# Patient Record
Sex: FEMALE | Race: WHITE | Employment: OTHER | URBAN - METROPOLITAN AREA
[De-identification: names, ages, dates, MRNs, and addresses within clinical notes are randomized per-mention and may not be internally consistent; named-entity substitution may affect disease eponyms.]

---

## 2018-03-13 ENCOUNTER — HOSPITAL ENCOUNTER (INPATIENT)
Age: 68
LOS: 8 days | Discharge: HOME | DRG: 871 | End: 2018-03-21
Attending: EMERGENCY MEDICINE | Admitting: INTERNAL MEDICINE
Payer: MEDICARE

## 2018-03-13 DIAGNOSIS — I95.9 HYPOTENSION, UNSPECIFIED HYPOTENSION TYPE: ICD-10-CM

## 2018-03-13 DIAGNOSIS — K92.2 GASTROINTESTINAL HEMORRHAGE, UNSPECIFIED GASTROINTESTINAL HEMORRHAGE TYPE: Primary | ICD-10-CM

## 2018-03-13 DIAGNOSIS — E83.42 HYPOMAGNESEMIA: ICD-10-CM

## 2018-03-13 DIAGNOSIS — I47.1 SVT (SUPRAVENTRICULAR TACHYCARDIA) (HCC): ICD-10-CM

## 2018-03-13 DIAGNOSIS — E87.6 HYPOKALEMIA: ICD-10-CM

## 2018-03-13 DIAGNOSIS — N30.01 ACUTE CYSTITIS WITH HEMATURIA: ICD-10-CM

## 2018-03-13 DIAGNOSIS — D64.9 ANEMIA, UNSPECIFIED TYPE: ICD-10-CM

## 2018-03-13 LAB
ALBUMIN SERPL-MCNC: 2.8 G/DL (ref 3.5–5)
ALBUMIN/GLOB SERPL: 0.8 {RATIO} (ref 1.1–2.2)
ALP SERPL-CCNC: 94 U/L (ref 45–117)
ALT SERPL-CCNC: 29 U/L (ref 12–78)
ANION GAP SERPL CALC-SCNC: 17 MMOL/L (ref 5–15)
APPEARANCE UR: ABNORMAL
AST SERPL-CCNC: 39 U/L (ref 15–37)
ATRIAL RATE: 0 BPM
BACTERIA URNS QL MICRO: ABNORMAL /HPF
BASOPHILS # BLD: 0 K/UL (ref 0–0.1)
BASOPHILS NFR BLD: 0 % (ref 0–1)
BILIRUB SERPL-MCNC: 1.2 MG/DL (ref 0.2–1)
BILIRUB UR QL: NEGATIVE
BUN SERPL-MCNC: 36 MG/DL (ref 6–20)
BUN/CREAT SERPL: 45 (ref 12–20)
CALCIUM SERPL-MCNC: 8.8 MG/DL (ref 8.5–10.1)
CALCULATED R AXIS, ECG10: 0 DEGREES
CALCULATED T AXIS, ECG11: 0 DEGREES
CHLORIDE SERPL-SCNC: 98 MMOL/L (ref 97–108)
CO2 SERPL-SCNC: 23 MMOL/L (ref 21–32)
COLOR UR: ABNORMAL
CREAT SERPL-MCNC: 0.8 MG/DL (ref 0.55–1.02)
DIAGNOSIS, 93000: NORMAL
DIFFERENTIAL METHOD BLD: ABNORMAL
EOSINOPHIL # BLD: 0 K/UL (ref 0–0.4)
EOSINOPHIL NFR BLD: 0 % (ref 0–7)
EPITH CASTS URNS QL MICRO: ABNORMAL /LPF
ERYTHROCYTE [DISTWIDTH] IN BLOOD BY AUTOMATED COUNT: 12.8 % (ref 11.5–14.5)
GLOBULIN SER CALC-MCNC: 3.6 G/DL (ref 2–4)
GLUCOSE SERPL-MCNC: 265 MG/DL (ref 65–100)
GLUCOSE UR STRIP.AUTO-MCNC: 250 MG/DL
HCT VFR BLD AUTO: 26.6 % (ref 35–47)
HEMOCCULT STL QL: POSITIVE
HGB BLD-MCNC: 8.9 G/DL (ref 11.5–16)
HGB UR QL STRIP: ABNORMAL
HYALINE CASTS URNS QL MICRO: ABNORMAL /LPF (ref 0–5)
IMM GRANULOCYTES # BLD: 0.1 K/UL (ref 0–0.04)
IMM GRANULOCYTES NFR BLD AUTO: 1 % (ref 0–0.5)
KETONES UR QL STRIP.AUTO: 15 MG/DL
LEUKOCYTE ESTERASE UR QL STRIP.AUTO: ABNORMAL
LIPASE SERPL-CCNC: 49 U/L (ref 73–393)
LYMPHOCYTES # BLD: 0.8 K/UL (ref 0.8–3.5)
LYMPHOCYTES NFR BLD: 7 % (ref 12–49)
MAGNESIUM SERPL-MCNC: 1.1 MG/DL (ref 1.6–2.4)
MCH RBC QN AUTO: 33.6 PG (ref 26–34)
MCHC RBC AUTO-ENTMCNC: 33.5 G/DL (ref 30–36.5)
MCV RBC AUTO: 100.4 FL (ref 80–99)
MONOCYTES # BLD: 1 K/UL (ref 0–1)
MONOCYTES NFR BLD: 9 % (ref 5–13)
NEUTS SEG # BLD: 8.7 K/UL (ref 1.8–8)
NEUTS SEG NFR BLD: 83 % (ref 32–75)
NITRITE UR QL STRIP.AUTO: NEGATIVE
NRBC # BLD: 0 K/UL (ref 0–0.01)
NRBC BLD-RTO: 0 PER 100 WBC
PH UR STRIP: 6 [PH] (ref 5–8)
PLATELET # BLD AUTO: 178 K/UL (ref 150–400)
PMV BLD AUTO: 10.7 FL (ref 8.9–12.9)
POTASSIUM SERPL-SCNC: 3.1 MMOL/L (ref 3.5–5.1)
PROT SERPL-MCNC: 6.4 G/DL (ref 6.4–8.2)
PROT UR STRIP-MCNC: 100 MG/DL
Q-T INTERVAL, ECG07: 0 MS
QRS DURATION, ECG06: 0 MS
QTC CALCULATION (BEZET), ECG08: 0 MS
RBC # BLD AUTO: 2.65 M/UL (ref 3.8–5.2)
RBC #/AREA URNS HPF: >100 /HPF (ref 0–5)
SODIUM SERPL-SCNC: 138 MMOL/L (ref 136–145)
SP GR UR REFRACTOMETRY: 1.02 (ref 1–1.03)
TROPONIN I SERPL-MCNC: <0.04 NG/ML
TSH SERPL DL<=0.05 MIU/L-ACNC: 5.3 UIU/ML (ref 0.36–3.74)
UROBILINOGEN UR QL STRIP.AUTO: 0.2 EU/DL (ref 0.2–1)
VENTRICULAR RATE, ECG03: 0 BPM
WBC # BLD AUTO: 10.5 K/UL (ref 3.6–11)
WBC URNS QL MICRO: >100 /HPF (ref 0–4)

## 2018-03-13 PROCEDURE — 87077 CULTURE AEROBIC IDENTIFY: CPT | Performed by: INTERNAL MEDICINE

## 2018-03-13 PROCEDURE — 87086 URINE CULTURE/COLONY COUNT: CPT | Performed by: INTERNAL MEDICINE

## 2018-03-13 PROCEDURE — 84484 ASSAY OF TROPONIN QUANT: CPT | Performed by: INTERNAL MEDICINE

## 2018-03-13 PROCEDURE — 81001 URINALYSIS AUTO W/SCOPE: CPT | Performed by: INTERNAL MEDICINE

## 2018-03-13 PROCEDURE — 87186 SC STD MICRODIL/AGAR DIL: CPT | Performed by: EMERGENCY MEDICINE

## 2018-03-13 PROCEDURE — P9016 RBC LEUKOCYTES REDUCED: HCPCS | Performed by: STUDENT IN AN ORGANIZED HEALTH CARE EDUCATION/TRAINING PROGRAM

## 2018-03-13 PROCEDURE — 93005 ELECTROCARDIOGRAM TRACING: CPT

## 2018-03-13 PROCEDURE — 74011000258 HC RX REV CODE- 258: Performed by: INTERNAL MEDICINE

## 2018-03-13 PROCEDURE — 86923 COMPATIBILITY TEST ELECTRIC: CPT | Performed by: STUDENT IN AN ORGANIZED HEALTH CARE EDUCATION/TRAINING PROGRAM

## 2018-03-13 PROCEDURE — 87186 SC STD MICRODIL/AGAR DIL: CPT | Performed by: INTERNAL MEDICINE

## 2018-03-13 PROCEDURE — 75810000137 HC PLCMT CENT VENOUS CATH

## 2018-03-13 PROCEDURE — 74011250636 HC RX REV CODE- 250/636: Performed by: INTERNAL MEDICINE

## 2018-03-13 PROCEDURE — 83690 ASSAY OF LIPASE: CPT | Performed by: STUDENT IN AN ORGANIZED HEALTH CARE EDUCATION/TRAINING PROGRAM

## 2018-03-13 PROCEDURE — 74011250636 HC RX REV CODE- 250/636

## 2018-03-13 PROCEDURE — 82272 OCCULT BLD FECES 1-3 TESTS: CPT | Performed by: EMERGENCY MEDICINE

## 2018-03-13 PROCEDURE — 83605 ASSAY OF LACTIC ACID: CPT | Performed by: EMERGENCY MEDICINE

## 2018-03-13 PROCEDURE — 65610000006 HC RM INTENSIVE CARE

## 2018-03-13 PROCEDURE — 36415 COLL VENOUS BLD VENIPUNCTURE: CPT | Performed by: INTERNAL MEDICINE

## 2018-03-13 PROCEDURE — 36430 TRANSFUSION BLD/BLD COMPNT: CPT

## 2018-03-13 PROCEDURE — 84443 ASSAY THYROID STIM HORMONE: CPT | Performed by: EMERGENCY MEDICINE

## 2018-03-13 PROCEDURE — 99285 EMERGENCY DEPT VISIT HI MDM: CPT

## 2018-03-13 PROCEDURE — C9113 INJ PANTOPRAZOLE SODIUM, VIA: HCPCS | Performed by: EMERGENCY MEDICINE

## 2018-03-13 PROCEDURE — 84436 ASSAY OF TOTAL THYROXINE: CPT | Performed by: INTERNAL MEDICINE

## 2018-03-13 PROCEDURE — 96367 TX/PROPH/DG ADDL SEQ IV INF: CPT

## 2018-03-13 PROCEDURE — 85025 COMPLETE CBC W/AUTO DIFF WBC: CPT | Performed by: STUDENT IN AN ORGANIZED HEALTH CARE EDUCATION/TRAINING PROGRAM

## 2018-03-13 PROCEDURE — 83735 ASSAY OF MAGNESIUM: CPT | Performed by: INTERNAL MEDICINE

## 2018-03-13 PROCEDURE — 96375 TX/PRO/DX INJ NEW DRUG ADDON: CPT

## 2018-03-13 PROCEDURE — 85027 COMPLETE CBC AUTOMATED: CPT | Performed by: INTERNAL MEDICINE

## 2018-03-13 PROCEDURE — 30233N1 TRANSFUSION OF NONAUTOLOGOUS RED BLOOD CELLS INTO PERIPHERAL VEIN, PERCUTANEOUS APPROACH: ICD-10-PCS | Performed by: INTERNAL MEDICINE

## 2018-03-13 PROCEDURE — 74011250636 HC RX REV CODE- 250/636: Performed by: EMERGENCY MEDICINE

## 2018-03-13 PROCEDURE — 84484 ASSAY OF TROPONIN QUANT: CPT | Performed by: STUDENT IN AN ORGANIZED HEALTH CARE EDUCATION/TRAINING PROGRAM

## 2018-03-13 PROCEDURE — 96365 THER/PROPH/DIAG IV INF INIT: CPT

## 2018-03-13 PROCEDURE — 96361 HYDRATE IV INFUSION ADD-ON: CPT

## 2018-03-13 PROCEDURE — 87040 BLOOD CULTURE FOR BACTERIA: CPT | Performed by: EMERGENCY MEDICINE

## 2018-03-13 PROCEDURE — 74011000258 HC RX REV CODE- 258: Performed by: EMERGENCY MEDICINE

## 2018-03-13 PROCEDURE — 83036 HEMOGLOBIN GLYCOSYLATED A1C: CPT | Performed by: INTERNAL MEDICINE

## 2018-03-13 PROCEDURE — 83735 ASSAY OF MAGNESIUM: CPT | Performed by: EMERGENCY MEDICINE

## 2018-03-13 PROCEDURE — 80053 COMPREHEN METABOLIC PANEL: CPT | Performed by: STUDENT IN AN ORGANIZED HEALTH CARE EDUCATION/TRAINING PROGRAM

## 2018-03-13 PROCEDURE — 87077 CULTURE AEROBIC IDENTIFY: CPT | Performed by: EMERGENCY MEDICINE

## 2018-03-13 PROCEDURE — 86901 BLOOD TYPING SEROLOGIC RH(D): CPT | Performed by: STUDENT IN AN ORGANIZED HEALTH CARE EDUCATION/TRAINING PROGRAM

## 2018-03-13 PROCEDURE — 96376 TX/PRO/DX INJ SAME DRUG ADON: CPT

## 2018-03-13 RX ORDER — ADENOSINE 3 MG/ML
INJECTION, SOLUTION INTRAVENOUS
Status: COMPLETED
Start: 2018-03-13 | End: 2018-03-13

## 2018-03-13 RX ORDER — ONDANSETRON 2 MG/ML
INJECTION INTRAMUSCULAR; INTRAVENOUS
Status: COMPLETED
Start: 2018-03-13 | End: 2018-03-13

## 2018-03-13 RX ORDER — MIDAZOLAM HYDROCHLORIDE 1 MG/ML
2 INJECTION, SOLUTION INTRAMUSCULAR; INTRAVENOUS
Status: DISCONTINUED | OUTPATIENT
Start: 2018-03-13 | End: 2018-03-13

## 2018-03-13 RX ORDER — ADENOSINE 3 MG/ML
12 INJECTION, SOLUTION INTRAVENOUS
Status: COMPLETED | OUTPATIENT
Start: 2018-03-13 | End: 2018-03-13

## 2018-03-13 RX ORDER — SODIUM CHLORIDE 9 MG/ML
250 INJECTION, SOLUTION INTRAVENOUS AS NEEDED
Status: DISCONTINUED | OUTPATIENT
Start: 2018-03-13 | End: 2018-03-21 | Stop reason: HOSPADM

## 2018-03-13 RX ORDER — POTASSIUM CHLORIDE 7.45 MG/ML
10 INJECTION INTRAVENOUS
Status: COMPLETED | OUTPATIENT
Start: 2018-03-13 | End: 2018-03-13

## 2018-03-13 RX ORDER — POTASSIUM CHLORIDE 7.45 MG/ML
10 INJECTION INTRAVENOUS
Status: COMPLETED | OUTPATIENT
Start: 2018-03-13 | End: 2018-03-14

## 2018-03-13 RX ORDER — MAGNESIUM SULFATE HEPTAHYDRATE 40 MG/ML
2 INJECTION, SOLUTION INTRAVENOUS ONCE
Status: COMPLETED | OUTPATIENT
Start: 2018-03-13 | End: 2018-03-13

## 2018-03-13 RX ORDER — ADENOSINE 3 MG/ML
6 INJECTION, SOLUTION INTRAVENOUS
Status: COMPLETED | OUTPATIENT
Start: 2018-03-13 | End: 2018-03-13

## 2018-03-13 RX ORDER — ONDANSETRON 2 MG/ML
4 INJECTION INTRAMUSCULAR; INTRAVENOUS
Status: COMPLETED | OUTPATIENT
Start: 2018-03-13 | End: 2018-03-13

## 2018-03-13 RX ORDER — PANTOPRAZOLE SODIUM 40 MG/10ML
40 INJECTION, POWDER, LYOPHILIZED, FOR SOLUTION INTRAVENOUS
Status: DISCONTINUED | OUTPATIENT
Start: 2018-03-13 | End: 2018-03-13 | Stop reason: SDUPTHER

## 2018-03-13 RX ORDER — SODIUM CHLORIDE 0.9 % (FLUSH) 0.9 %
5-10 SYRINGE (ML) INJECTION AS NEEDED
Status: DISCONTINUED | OUTPATIENT
Start: 2018-03-13 | End: 2018-03-21 | Stop reason: HOSPADM

## 2018-03-13 RX ORDER — FENTANYL CITRATE 50 UG/ML
50 INJECTION, SOLUTION INTRAMUSCULAR; INTRAVENOUS
Status: DISCONTINUED | OUTPATIENT
Start: 2018-03-13 | End: 2018-03-13

## 2018-03-13 RX ORDER — ONDANSETRON 2 MG/ML
4 INJECTION INTRAMUSCULAR; INTRAVENOUS
Status: DISCONTINUED | OUTPATIENT
Start: 2018-03-13 | End: 2018-03-21 | Stop reason: HOSPADM

## 2018-03-13 RX ADMIN — AMIODARONE HYDROCHLORIDE 1 MG/MIN: 50 INJECTION, SOLUTION INTRAVENOUS at 21:39

## 2018-03-13 RX ADMIN — POTASSIUM CHLORIDE 10 MEQ: 7.46 INJECTION, SOLUTION INTRAVENOUS at 21:42

## 2018-03-13 RX ADMIN — AMIODARONE HYDROCHLORIDE 150 MG: 50 INJECTION, SOLUTION INTRAVENOUS at 21:07

## 2018-03-13 RX ADMIN — ONDANSETRON 4 MG: 2 INJECTION INTRAMUSCULAR; INTRAVENOUS at 20:55

## 2018-03-13 RX ADMIN — ADENOSINE 6 MG: 3 INJECTION, SOLUTION INTRAVENOUS at 20:01

## 2018-03-13 RX ADMIN — SODIUM CHLORIDE 1000 ML: 900 INJECTION, SOLUTION INTRAVENOUS at 20:34

## 2018-03-13 RX ADMIN — SODIUM CHLORIDE 2000 ML: 900 INJECTION, SOLUTION INTRAVENOUS at 19:43

## 2018-03-13 RX ADMIN — MAGNESIUM SULFATE HEPTAHYDRATE 2 G: 40 INJECTION, SOLUTION INTRAVENOUS at 21:01

## 2018-03-13 RX ADMIN — ADENOSINE 12 MG: 3 INJECTION, SOLUTION INTRAVENOUS at 20:33

## 2018-03-13 RX ADMIN — ADENOSINE 12 MG: 3 INJECTION INTRAVENOUS at 20:33

## 2018-03-13 RX ADMIN — ADENOSINE 12 MG: 3 INJECTION, SOLUTION INTRAVENOUS at 20:08

## 2018-03-13 RX ADMIN — ONDANSETRON 4 MG: 2 INJECTION INTRAMUSCULAR; INTRAVENOUS at 23:39

## 2018-03-13 RX ADMIN — PANTOPRAZOLE SODIUM 40 MG: 40 INJECTION, POWDER, FOR SOLUTION INTRAVENOUS at 22:57

## 2018-03-13 RX ADMIN — ADENOSINE 6 MG: 3 INJECTION, SOLUTION INTRAVENOUS at 20:03

## 2018-03-13 RX ADMIN — DEXTROSE MONOHYDRATE 150 MG: 5 INJECTION, SOLUTION INTRAVENOUS at 22:48

## 2018-03-13 RX ADMIN — MAGNESIUM SULFATE HEPTAHYDRATE 2 G: 40 INJECTION, SOLUTION INTRAVENOUS at 22:41

## 2018-03-13 RX ADMIN — SODIUM CHLORIDE 1000 ML: 900 INJECTION, SOLUTION INTRAVENOUS at 20:16

## 2018-03-13 NOTE — IP AVS SNAPSHOT
[address] [facility] 74 
[phone] Patient: Alber Rodriguez MRN: [id number] VWI:0/01/7376 A check sangeetha indicates which time of day the medication should be taken. My Medications START taking these medications Instructions Each Dose to Equal  
 Morning Noon Evening Bedtime  
 amiodarone 200 mg tablet Commonly known as:  CORDARONE Start taking on:  3/22/2018 Your last dose was: Your next dose is: Take 1 Tab by mouth daily. 200 mg  
    
   
   
   
  
 folic acid 1 mg tablet Commonly known as:  Google Start taking on:  3/22/2018 Your last dose was: Your next dose is: Take 1 Tab by mouth daily. 1 mg  
    
   
   
   
  
 furosemide 20 mg tablet Commonly known as:  LASIX Your last dose was: Your next dose is: Take one tablet daily  
     
   
   
   
  
 potassium chloride SR 10 mEq tablet Commonly known as:  KLOR-CON 10 Your last dose was: Your next dose is: Take 1 Tab by mouth daily. 10 mEq  
    
   
   
   
  
 thiamine 100 mg tablet Commonly known as:  B-1 Start taking on:  3/22/2018 Your last dose was: Your next dose is: Take 1 Tab by mouth daily. 100 mg CONTINUE taking these medications Instructions Each Dose to Equal  
 Morning Noon Evening Bedtime VITAMIN C 500 mg tablet Generic drug:  ascorbic acid (vitamin C) Your last dose was: Your next dose is: Take 500 mg by mouth. 500 mg  
    
   
   
   
  
 VITAMIN D3 1,000 unit tablet Generic drug:  cholecalciferol Your last dose was: Your next dose is: Take 1,000 Units by mouth daily. 1000 Units Where to Get Your Medications Information on where to get these meds will be given to you by the nurse or doctor. ! Ask your nurse or doctor about these medications  
  amiodarone 573 mg tablet  
 folic acid 1 mg tablet  
 furosemide 20 mg tablet  
 potassium chloride SR 10 mEq tablet  
 thiamine 100 mg tablet

## 2018-03-13 NOTE — ED TRIAGE NOTES
C/o n/v/d, abdominal pain and dizziness x 2 days. +HA   Denies fevers. Denies CP or SOB. +coffee ground emesis in triage.

## 2018-03-13 NOTE — ED NOTES
Pt noted to convert into SVT on monitor. . Dr. Anabel Mcarthur notified. EKG ordered. Pt A/Ox4, denies pain, dizziness, or SOB. 7:38 PM  HR now 140, rhythm ST on monitor. 7:44 PM  Pt converted into SVT, , pt converted back to ST with vagal maneuvers. Dr. Anabel Mcarthur at bedside. .    7:51 PM  Pt in SVT, , Dr. Anabel Mcarthur at bedside. 7:52 PM  Pt able to convert back into ST with Vagal Maneuvers. .     0801  6 mg of Adenosine administered per Dr Anabel Day MD at bedside. Pt remains in SVT. Pt A/Ox4, RAMabel Denies SOB.     0803  6 mg of Adenosine administered per Dr. Anabel Day MD at bedside. Pt remains in SVT. Pt A/Ox4EVA Denies SOB.     0808  12 mg of Adenosie administered per Dr. Anabel Day MD at bedside. Pt remains in SVT. Pt A/Ox4, EVA Denies SOB.

## 2018-03-13 NOTE — IP AVS SNAPSHOT
[address] 
[phone] Patient: Carrie Ramos MRN: [id number] ILK:8/06/5405 About your hospitalization You were admitted on:  March 13, 2018 You last received care in the:  [address] [place] [address] You were discharged on:  March 21, 2018 Why you were hospitalized Your primary diagnosis was:  Svt (Supraventricular Tachycardia) (Hcc) Follow-up Information Follow up With Details Comments Contact Info Jovanni Prieto MD On 4/27/2018 10:40 AM [address] [place] [address] 
[phone] None   None (395) Patient stated that they have no PCP 
  
   pcp of record as estabished and see in one week Your Scheduled Appointments Wednesday March 21, 2018  3:00 PM EDT INFUSION 15 RI with RM 102C - CHAIR [facility] [address] [facility] [place] ([address]) [address] [address]  
[phone] Go to [address], ground floor. Thursday March 22, 2018  3:00 PM EDT INFUSION 15 RI with RM 102C - CHAIR [facility] [address] [facility] [place] ([address]) [address] [address]  
[phone] Go to [address], ground floor. Friday March 23, 2018  3:00 PM EDT INFUSION 15 RI with RM 102C - CHAIR [address] [facility] [place] ([address]) [address] [address] [phone] Go to [address], ground floor. Saturday March 24, 2018 12:00 PM EDT INFUSION 15 RI with RM 102C - CHAIR [facility] [facility] [place] ([address]) [address] [address] [phone] Go to [address], ground floor. Sunday March 25, 2018 12:00 PM EDT INFUSION 15 RI with RM 102C - CHAIR [facility] [facility] [place] ([address]) [address] [phone] Go to [address], ground floor. Monday March 26, 2018  3:00 PM EDT INFUSION 15 RI with RM 102C - CHAIR [address] [facility] [place] ([address]) [address] [address] [phone] Go to [address], ground floor. Friday April 27, 2018 10:40 AM EDT  
ESTABLISHED PATIENT with Nika Bonilla MD  
[facility] ([address] [facility]) [address] [place] [address]  [address]  
[phone] Discharge Orders None A check sangeetha indicates which time of day the medication should be taken. My Medications START taking these medications Instructions Each Dose to Equal  
 Morning Noon Evening Bedtime  
 amiodarone 200 mg tablet Commonly known as:  CORDARONE Start taking on:  3/22/2018 Your last dose was: Your next dose is: Take 1 Tab by mouth daily. 200 mg  
    
   
   
   
  
 folic acid 1 mg tablet Commonly known as:  Google Start taking on:  3/22/2018 Your last dose was: Your next dose is: Take 1 Tab by mouth daily. 1 mg  
    
   
   
   
  
 furosemide 20 mg tablet Commonly known as:  LASIX Your last dose was: Your next dose is: Take one tablet daily  
     
   
   
   
  
 potassium chloride SR 10 mEq tablet Commonly known as:  KLOR-CON 10 Your last dose was: Your next dose is: Take 1 Tab by mouth daily. 10 mEq  
    
   
   
   
  
 thiamine 100 mg tablet Commonly known as:  B-1 Start taking on:  3/22/2018 Your last dose was: Your next dose is: Take 1 Tab by mouth daily. 100 mg CONTINUE taking these medications Instructions Each Dose to Equal  
 Morning Noon Evening Bedtime VITAMIN C 500 mg tablet Generic drug:  ascorbic acid (vitamin C) Your last dose was: Your next dose is: Take 500 mg by mouth. 500 mg  
    
   
   
   
  
 VITAMIN D3 1,000 unit tablet Generic drug:  cholecalciferol Your last dose was: Your next dose is: Take 1,000 Units by mouth daily. 1000 Units Where to Get Your Medications Information on where to get these meds will be given to you by the nurse or doctor. ! Ask your nurse or doctor about these medications  
  amiodarone 629 mg tablet  
 folic acid 1 mg tablet  
 furosemide 20 mg tablet  
 potassium chloride SR 10 mEq tablet  
 thiamine 100 mg tablet Discharge Instructions Discharge Instructions PATIENT ID: Adolfo Duke MRN: [id number] YOB: 1950 DATE OF ADMISSION: 3/13/2018  7:12 PM   
DATE OF DISCHARGE: 3/21/2018 PRIMARY CARE PROVIDER: None ATTENDING PHYSICIAN: Wing Latrell MD 
DISCHARGING PROVIDER: Wing Latrell MD   
To contact this individual call [phone] and ask the  to page. If unavailable ask to be transferred the Adult Hospitalist Department. DISCHARGE DIAGNOSES sepsis CONSULTATIONS: IP CONSULT TO CARDIOLOGY 
IP CONSULT TO GASTROENTEROLOGY 
IP CONSULT TO GASTROENTEROLOGY 
IP CONSULT TO ELECTROPHYSIOLOGY 
IP CONSULT TO INFECTIOUS DISEASES PROCEDURES/SURGERIES: * No surgery found * PENDING TEST RESULTS:  
At the time of discharge the following test results are still pending: na 
 
FOLLOW UP APPOINTMENTS:  
Follow-up Information Follow up With Details Comments Contact Info Angel Myers MD On 4/27/2018 10:40 AM [place] [address] [address] [place] [address] [phone] None   None (395) Patient stated that they have no PCP 
  
   pcp of record as estabished and see in one week ADDITIONAL CARE RECOMMENDATIONS: na 
 
DIET: Resume previous diet limit sodium or else cardiac diet;; avoid alcohol ACTIVITY: Activity as tolerated WOUND CARE: na 
 
EQUIPMENT needed: na 
 
 
  
 SNF/Inpatient Rehab/LTAC  
x Independent/assisted living Hospice Other:  
 
  
 
Signed:  
Flora Sierra MD 
3/21/2018 
2:02 PM 
 
  
  
  
[employer] Announcement We are excited to announce that we are making your provider's discharge notes available to you in [employer]. You will see these notes when they are completed and signed by the physician that discharged you from your recent hospital stay. If you have any questions or concerns about any information you see in [employer], please call the Health Information Department where you were seen or reach out to your Primary Care Provider for more information about your plan of care. Introducing [address]! Elsy Anglin introduces [employer] patient portal. Now you can access parts of your medical record, email your doctor's office, and request medication refills online. 1. In your internet browser, go to [email or web address] 2. Click on the First Time User? Click Here link in the Sign In box. You will see the New Member Sign Up page. 3. Enter your [employer] Access Code exactly as it appears below. You will not need to use this code after youve completed the sign-up process. If you do not sign up before the expiration date, you must request a new code. · [employer] Access Code: [email or web address] Expires: 6/14/2018  1:46 PM 
 
4. Enter the last four digits of your Social Security Number (xxxx) and Date of Birth (mm/dd/yyyy) as indicated and click Submit. You will be taken to the next sign-up page. 5. Create a [employer] ID. This will be your [employer] login ID and cannot be changed, so think of one that is secure and easy to remember. 6. Create a [employer] password. You can change your password at any time. 7. Enter your Password Reset Question and Answer. This can be used at a later time if you forget your password. 8. Enter your e-mail address. You will receive e-mail notification when new information is available in [address] 9. Click Sign Up. You can now view and download portions of your medical record. 10. Click the Download Summary menu link to download a portable copy of your medical information. If you have questions, please visit the Frequently Asked Questions section of the [employer] website. Remember, [employer] is NOT to be used for urgent needs. For medical emergencies, dial 911. Now available from your iPhone and Android! Unresulted Labs-Please follow up with your PCP about these lab tests Order Current Status OVA & PARASITES, STOOL In process EKG, 12 LEAD, INITIAL Preliminary result Providers Seen During Your Hospitalization Provider Specialty Primary office phone Zoya Sow, [address] Emergency Medicine [phone] Lisa Jimenez MD Internal Medicine [phone] Geraldine Morley MD Internal Medicine [phone] Law Cardenas MD Internal Medicine [phone] Your Primary Care Physician (PCP) Primary Care Physician Office Phone Office Fax NONE ** None ** ** None ** You are allergic to the following No active allergies Recent Documentation Height Weight BMI Smoking Status 1.651 m 53.9 kg 19.77 kg/m2 Never Smoker Emergency Contacts Name Discharge Info Relation Home Work Mobile Rohith Rosen DISCHARGE CAREGIVER [3] Spouse [3] [phone] Patient Belongings The following personal items are in your possession at time of discharge: 
  Dental Appliances: None  Visual Aid: Glasses, With patient      Home Medications: None   Jewelry: None  Clothing: None    Other Valuables: None Please provide this summary of care documentation to your next provider. Signatures-by signing, you are acknowledging that this After Visit Summary has been reviewed with you and you have received a copy. Patient Signature:  ____________________________________________________________ Date:  ____________________________________________________________  
  
Devota Dandy Provider Signature:  ____________________________________________________________ Date:  ____________________________________________________________

## 2018-03-14 ENCOUNTER — APPOINTMENT (OUTPATIENT)
Dept: GENERAL RADIOLOGY | Age: 68
DRG: 871 | End: 2018-03-14
Attending: INTERNAL MEDICINE
Payer: MEDICARE

## 2018-03-14 ENCOUNTER — APPOINTMENT (OUTPATIENT)
Dept: GENERAL RADIOLOGY | Age: 68
DRG: 871 | End: 2018-03-14
Attending: EMERGENCY MEDICINE
Payer: MEDICARE

## 2018-03-14 ENCOUNTER — APPOINTMENT (OUTPATIENT)
Dept: CT IMAGING | Age: 68
DRG: 871 | End: 2018-03-14
Attending: INTERNAL MEDICINE
Payer: MEDICARE

## 2018-03-14 LAB
ABO + RH BLD: NORMAL
ALBUMIN SERPL-MCNC: 2.3 G/DL (ref 3.5–5)
ALBUMIN/GLOB SERPL: 0.9 {RATIO} (ref 1.1–2.2)
ALP SERPL-CCNC: 68 U/L (ref 45–117)
ALT SERPL-CCNC: 20 U/L (ref 12–78)
ANION GAP SERPL CALC-SCNC: 12 MMOL/L (ref 5–15)
ANION GAP SERPL CALC-SCNC: 7 MMOL/L (ref 5–15)
AST SERPL-CCNC: 33 U/L (ref 15–37)
ATRIAL RATE: 108 BPM
ATRIAL RATE: 135 BPM
ATRIAL RATE: 139 BPM
ATRIAL RATE: 153 BPM
BASOPHILS # BLD: 0 K/UL (ref 0–0.1)
BASOPHILS NFR BLD: 0 % (ref 0–1)
BILIRUB SERPL-MCNC: 1.2 MG/DL (ref 0.2–1)
BLD PROD TYP BPU: NORMAL
BLOOD GROUP ANTIBODIES SERPL: NORMAL
BNP SERPL-MCNC: 2490 PG/ML (ref 0–125)
BPU ID: NORMAL
BUN SERPL-MCNC: 18 MG/DL (ref 6–20)
BUN SERPL-MCNC: 24 MG/DL (ref 6–20)
BUN/CREAT SERPL: 49 (ref 12–20)
BUN/CREAT SERPL: 55 (ref 12–20)
CALCIUM SERPL-MCNC: 6.8 MG/DL (ref 8.5–10.1)
CALCIUM SERPL-MCNC: 7.2 MG/DL (ref 8.5–10.1)
CALCULATED P AXIS, ECG09: 38 DEGREES
CALCULATED P AXIS, ECG09: 72 DEGREES
CALCULATED P AXIS, ECG09: 92 DEGREES
CALCULATED R AXIS, ECG10: 71 DEGREES
CALCULATED R AXIS, ECG10: 72 DEGREES
CALCULATED R AXIS, ECG10: 92 DEGREES
CALCULATED R AXIS, ECG10: 98 DEGREES
CALCULATED T AXIS, ECG11: 41 DEGREES
CALCULATED T AXIS, ECG11: 50 DEGREES
CALCULATED T AXIS, ECG11: 53 DEGREES
CALCULATED T AXIS, ECG11: 62 DEGREES
CHLORIDE SERPL-SCNC: 110 MMOL/L (ref 97–108)
CHLORIDE SERPL-SCNC: 111 MMOL/L (ref 97–108)
CHOLEST SERPL-MCNC: 87 MG/DL
CK MB CFR SERPL CALC: NORMAL % (ref 0–2.5)
CK MB SERPL-MCNC: <1 NG/ML (ref 5–25)
CK SERPL-CCNC: 89 U/L (ref 26–192)
CO2 SERPL-SCNC: 20 MMOL/L (ref 21–32)
CO2 SERPL-SCNC: 24 MMOL/L (ref 21–32)
CORTIS SERPL-MCNC: 40.6 UG/DL
CREAT SERPL-MCNC: 0.33 MG/DL (ref 0.55–1.02)
CREAT SERPL-MCNC: 0.49 MG/DL (ref 0.55–1.02)
CROSSMATCH RESULT,%XM: NORMAL
DIAGNOSIS, 93000: NORMAL
DIFFERENTIAL METHOD BLD: ABNORMAL
EOSINOPHIL # BLD: 0 K/UL (ref 0–0.4)
EOSINOPHIL NFR BLD: 0 % (ref 0–7)
ERYTHROCYTE [DISTWIDTH] IN BLOOD BY AUTOMATED COUNT: 14.5 % (ref 11.5–14.5)
ERYTHROCYTE [DISTWIDTH] IN BLOOD BY AUTOMATED COUNT: 14.9 % (ref 11.5–14.5)
EST. AVERAGE GLUCOSE BLD GHB EST-MCNC: NORMAL MG/DL
FERRITIN SERPL-MCNC: 172 NG/ML (ref 8–252)
FOLATE SERPL-MCNC: 3.7 NG/ML (ref 5–21)
GLOBULIN SER CALC-MCNC: 2.6 G/DL (ref 2–4)
GLUCOSE SERPL-MCNC: 136 MG/DL (ref 65–100)
GLUCOSE SERPL-MCNC: 166 MG/DL (ref 65–100)
HBA1C MFR BLD: 4.7 % (ref 4.2–6.3)
HCT VFR BLD AUTO: 26.2 % (ref 35–47)
HCT VFR BLD AUTO: 30.3 % (ref 35–47)
HDLC SERPL-MCNC: 35 MG/DL
HDLC SERPL: 2.5 {RATIO} (ref 0–5)
HGB BLD-MCNC: 10.2 G/DL (ref 11.5–16)
HGB BLD-MCNC: 9.1 G/DL (ref 11.5–16)
IMM GRANULOCYTES # BLD: 0 K/UL
IMM GRANULOCYTES NFR BLD AUTO: 0 %
IRON SATN MFR SERPL: 72 % (ref 20–50)
IRON SERPL-MCNC: 131 UG/DL (ref 35–150)
IRON SERPL-MCNC: 131 UG/DL (ref 35–150)
LACTATE SERPL-SCNC: 1.6 MMOL/L (ref 0.4–2)
LACTATE SERPL-SCNC: 3.9 MMOL/L (ref 0.4–2)
LDLC SERPL CALC-MCNC: 37 MG/DL (ref 0–100)
LIPID PROFILE,FLP: NORMAL
LYMPHOCYTES # BLD: 0.6 K/UL (ref 0.8–3.5)
LYMPHOCYTES NFR BLD: 7 % (ref 12–49)
MAGNESIUM SERPL-MCNC: 1.6 MG/DL (ref 1.6–2.4)
MAGNESIUM SERPL-MCNC: 1.9 MG/DL (ref 1.6–2.4)
MAGNESIUM SERPL-MCNC: 2.7 MG/DL (ref 1.6–2.4)
MCH RBC QN AUTO: 33.2 PG (ref 26–34)
MCH RBC QN AUTO: 33.4 PG (ref 26–34)
MCHC RBC AUTO-ENTMCNC: 33.7 G/DL (ref 30–36.5)
MCHC RBC AUTO-ENTMCNC: 34.7 G/DL (ref 30–36.5)
MCV RBC AUTO: 95.6 FL (ref 80–99)
MCV RBC AUTO: 99.3 FL (ref 80–99)
MONOCYTES # BLD: 0.2 K/UL (ref 0–1)
MONOCYTES NFR BLD: 3 % (ref 5–13)
NEUTS BAND NFR BLD MANUAL: 6 % (ref 0–6)
NEUTS SEG # BLD: 7.4 K/UL (ref 1.8–8)
NEUTS SEG NFR BLD: 84 % (ref 32–75)
NRBC # BLD: 0 K/UL (ref 0–0.01)
NRBC # BLD: 0 K/UL (ref 0–0.01)
NRBC BLD-RTO: 0 PER 100 WBC
NRBC BLD-RTO: 0 PER 100 WBC
P-R INTERVAL, ECG05: 116 MS
P-R INTERVAL, ECG05: 120 MS
P-R INTERVAL, ECG05: 126 MS
PHOSPHATE SERPL-MCNC: 1.6 MG/DL (ref 2.6–4.7)
PHOSPHATE SERPL-MCNC: 2.4 MG/DL (ref 2.6–4.7)
PLATELET # BLD AUTO: 144 K/UL (ref 150–400)
PLATELET # BLD AUTO: 97 K/UL (ref 150–400)
PLATELET COMMENTS,PCOM: ABNORMAL
PMV BLD AUTO: 10.5 FL (ref 8.9–12.9)
PMV BLD AUTO: 10.7 FL (ref 8.9–12.9)
POTASSIUM SERPL-SCNC: 2.7 MMOL/L (ref 3.5–5.1)
POTASSIUM SERPL-SCNC: 3.6 MMOL/L (ref 3.5–5.1)
PREALB SERPL-MCNC: 6.4 MG/DL (ref 20–40)
PROT SERPL-MCNC: 4.9 G/DL (ref 6.4–8.2)
Q-T INTERVAL, ECG07: 288 MS
Q-T INTERVAL, ECG07: 296 MS
Q-T INTERVAL, ECG07: 316 MS
Q-T INTERVAL, ECG07: 392 MS
QRS DURATION, ECG06: 102 MS
QRS DURATION, ECG06: 106 MS
QRS DURATION, ECG06: 110 MS
QRS DURATION, ECG06: 110 MS
QTC CALCULATION (BEZET), ECG08: 444 MS
QTC CALCULATION (BEZET), ECG08: 480 MS
QTC CALCULATION (BEZET), ECG08: 509 MS
QTC CALCULATION (BEZET), ECG08: 525 MS
RBC # BLD AUTO: 2.74 M/UL (ref 3.8–5.2)
RBC # BLD AUTO: 3.05 M/UL (ref 3.8–5.2)
RBC MORPH BLD: ABNORMAL
SODIUM SERPL-SCNC: 141 MMOL/L (ref 136–145)
SODIUM SERPL-SCNC: 143 MMOL/L (ref 136–145)
SPECIMEN EXP DATE BLD: NORMAL
STATUS OF UNIT,%ST: NORMAL
T3FREE SERPL-MCNC: 1.5 PG/ML (ref 2.2–4)
T4 SERPL-MCNC: 9.6 UG/DL (ref 4.8–13.9)
TIBC SERPL-MCNC: 181 UG/DL (ref 250–450)
TRIGL SERPL-MCNC: 75 MG/DL (ref ?–150)
TROPONIN I SERPL-MCNC: 0.05 NG/ML
TROPONIN I SERPL-MCNC: 0.14 NG/ML
TROPONIN I SERPL-MCNC: 0.15 NG/ML
UNIT DIVISION, %UDIV: 0
VENTRICULAR RATE, ECG03: 108 BPM
VENTRICULAR RATE, ECG03: 135 BPM
VENTRICULAR RATE, ECG03: 139 BPM
VENTRICULAR RATE, ECG03: 188 BPM
VIT B12 SERPL-MCNC: 162 PG/ML (ref 211–911)
VLDLC SERPL CALC-MCNC: 15 MG/DL
WBC # BLD AUTO: 8.2 K/UL (ref 3.6–11)
WBC # BLD AUTO: 8.7 K/UL (ref 3.6–11)

## 2018-03-14 PROCEDURE — 82550 ASSAY OF CK (CPK): CPT | Performed by: INTERNAL MEDICINE

## 2018-03-14 PROCEDURE — 74011250636 HC RX REV CODE- 250/636: Performed by: INTERNAL MEDICINE

## 2018-03-14 PROCEDURE — 84134 ASSAY OF PREALBUMIN: CPT | Performed by: INTERNAL MEDICINE

## 2018-03-14 PROCEDURE — 74011250636 HC RX REV CODE- 250/636: Performed by: EMERGENCY MEDICINE

## 2018-03-14 PROCEDURE — 82746 ASSAY OF FOLIC ACID SERUM: CPT | Performed by: INTERNAL MEDICINE

## 2018-03-14 PROCEDURE — 83540 ASSAY OF IRON: CPT | Performed by: INTERNAL MEDICINE

## 2018-03-14 PROCEDURE — C1751 CATH, INF, PER/CENT/MIDLINE: HCPCS

## 2018-03-14 PROCEDURE — 65620000000 HC RM CCU GENERAL

## 2018-03-14 PROCEDURE — 74011000258 HC RX REV CODE- 258: Performed by: INTERNAL MEDICINE

## 2018-03-14 PROCEDURE — 82533 TOTAL CORTISOL: CPT | Performed by: INTERNAL MEDICINE

## 2018-03-14 PROCEDURE — 74011000250 HC RX REV CODE- 250: Performed by: INTERNAL MEDICINE

## 2018-03-14 PROCEDURE — 83735 ASSAY OF MAGNESIUM: CPT | Performed by: INTERNAL MEDICINE

## 2018-03-14 PROCEDURE — 36415 COLL VENOUS BLD VENIPUNCTURE: CPT | Performed by: INTERNAL MEDICINE

## 2018-03-14 PROCEDURE — 80061 LIPID PANEL: CPT | Performed by: INTERNAL MEDICINE

## 2018-03-14 PROCEDURE — 85025 COMPLETE CBC W/AUTO DIFF WBC: CPT | Performed by: INTERNAL MEDICINE

## 2018-03-14 PROCEDURE — C9113 INJ PANTOPRAZOLE SODIUM, VIA: HCPCS | Performed by: INTERNAL MEDICINE

## 2018-03-14 PROCEDURE — 71045 X-RAY EXAM CHEST 1 VIEW: CPT

## 2018-03-14 PROCEDURE — 74011250637 HC RX REV CODE- 250/637: Performed by: INTERNAL MEDICINE

## 2018-03-14 PROCEDURE — 74011000258 HC RX REV CODE- 258: Performed by: EMERGENCY MEDICINE

## 2018-03-14 PROCEDURE — 84100 ASSAY OF PHOSPHORUS: CPT | Performed by: INTERNAL MEDICINE

## 2018-03-14 PROCEDURE — 83880 ASSAY OF NATRIURETIC PEPTIDE: CPT | Performed by: INTERNAL MEDICINE

## 2018-03-14 PROCEDURE — 84481 FREE ASSAY (FT-3): CPT | Performed by: INTERNAL MEDICINE

## 2018-03-14 PROCEDURE — 93306 TTE W/DOPPLER COMPLETE: CPT

## 2018-03-14 PROCEDURE — 80048 BASIC METABOLIC PNL TOTAL CA: CPT | Performed by: INTERNAL MEDICINE

## 2018-03-14 PROCEDURE — 83605 ASSAY OF LACTIC ACID: CPT | Performed by: INTERNAL MEDICINE

## 2018-03-14 PROCEDURE — 82728 ASSAY OF FERRITIN: CPT | Performed by: INTERNAL MEDICINE

## 2018-03-14 PROCEDURE — 82607 VITAMIN B-12: CPT | Performed by: INTERNAL MEDICINE

## 2018-03-14 PROCEDURE — 80053 COMPREHEN METABOLIC PANEL: CPT | Performed by: INTERNAL MEDICINE

## 2018-03-14 RX ORDER — MAGNESIUM SULFATE HEPTAHYDRATE 40 MG/ML
2 INJECTION, SOLUTION INTRAVENOUS ONCE
Status: COMPLETED | OUTPATIENT
Start: 2018-03-14 | End: 2018-03-14

## 2018-03-14 RX ORDER — SODIUM CHLORIDE 9 MG/ML
125 INJECTION, SOLUTION INTRAVENOUS CONTINUOUS
Status: DISCONTINUED | OUTPATIENT
Start: 2018-03-14 | End: 2018-03-14

## 2018-03-14 RX ORDER — SODIUM CHLORIDE 0.9 % (FLUSH) 0.9 %
10 SYRINGE (ML) INJECTION
Status: ACTIVE | OUTPATIENT
Start: 2018-03-14 | End: 2018-03-14

## 2018-03-14 RX ORDER — SODIUM CHLORIDE 0.9 % (FLUSH) 0.9 %
5-10 SYRINGE (ML) INJECTION EVERY 8 HOURS
Status: DISCONTINUED | OUTPATIENT
Start: 2018-03-14 | End: 2018-03-21 | Stop reason: HOSPADM

## 2018-03-14 RX ORDER — SODIUM CHLORIDE 9 MG/ML
75 INJECTION, SOLUTION INTRAVENOUS CONTINUOUS
Status: DISCONTINUED | OUTPATIENT
Start: 2018-03-14 | End: 2018-03-17

## 2018-03-14 RX ORDER — ASCORBIC ACID 500 MG
500 TABLET ORAL DAILY
COMMUNITY

## 2018-03-14 RX ORDER — VANCOMYCIN HYDROCHLORIDE
1250 ONCE
Status: COMPLETED | OUTPATIENT
Start: 2018-03-14 | End: 2018-03-14

## 2018-03-14 RX ORDER — SODIUM CHLORIDE 0.9 % (FLUSH) 0.9 %
5-10 SYRINGE (ML) INJECTION AS NEEDED
Status: DISCONTINUED | OUTPATIENT
Start: 2018-03-14 | End: 2018-03-21 | Stop reason: HOSPADM

## 2018-03-14 RX ORDER — FOLIC ACID 5 MG/ML
1 INJECTION, SOLUTION INTRAMUSCULAR; INTRAVENOUS; SUBCUTANEOUS DAILY
Status: DISCONTINUED | OUTPATIENT
Start: 2018-03-14 | End: 2018-03-16

## 2018-03-14 RX ORDER — ACETAMINOPHEN 325 MG/1
650 TABLET ORAL
Status: DISCONTINUED | OUTPATIENT
Start: 2018-03-14 | End: 2018-03-21 | Stop reason: HOSPADM

## 2018-03-14 RX ORDER — NOREPINEPHRINE BITARTRATE/D5W 8 MG/250ML
2-16 PLASTIC BAG, INJECTION (ML) INTRAVENOUS
Status: DISCONTINUED | OUTPATIENT
Start: 2018-03-14 | End: 2018-03-15 | Stop reason: ALTCHOICE

## 2018-03-14 RX ORDER — MELATONIN
1000 DAILY
COMMUNITY
End: 2018-11-20 | Stop reason: SDUPTHER

## 2018-03-14 RX ORDER — POTASSIUM CHLORIDE 14.9 MG/ML
10 INJECTION INTRAVENOUS
Status: COMPLETED | OUTPATIENT
Start: 2018-03-14 | End: 2018-03-14

## 2018-03-14 RX ORDER — CYANOCOBALAMIN 1000 UG/ML
1000 INJECTION, SOLUTION INTRAMUSCULAR; SUBCUTANEOUS
Status: DISCONTINUED | OUTPATIENT
Start: 2018-03-14 | End: 2018-03-21 | Stop reason: HOSPADM

## 2018-03-14 RX ORDER — POTASSIUM CHLORIDE 14.9 MG/ML
10 INJECTION INTRAVENOUS
Status: COMPLETED | OUTPATIENT
Start: 2018-03-14 | End: 2018-03-15

## 2018-03-14 RX ADMIN — Medication 4 MCG/MIN: at 08:39

## 2018-03-14 RX ADMIN — SODIUM CHLORIDE 40 MG: 9 INJECTION INTRAMUSCULAR; INTRAVENOUS; SUBCUTANEOUS at 17:45

## 2018-03-14 RX ADMIN — Medication 10 ML: at 22:00

## 2018-03-14 RX ADMIN — POTASSIUM CHLORIDE 10 MEQ: 200 INJECTION, SOLUTION INTRAVENOUS at 09:08

## 2018-03-14 RX ADMIN — VANCOMYCIN HYDROCHLORIDE 750 MG: 750 INJECTION, POWDER, LYOPHILIZED, FOR SOLUTION INTRAVENOUS at 22:08

## 2018-03-14 RX ADMIN — PIPERACILLIN SODIUM AND TAZOBACTAM SODIUM 3.38 G: 3; .375 INJECTION, POWDER, LYOPHILIZED, FOR SOLUTION INTRAVENOUS at 05:01

## 2018-03-14 RX ADMIN — AMIODARONE HYDROCHLORIDE 0.5 MG/MIN: 50 INJECTION, SOLUTION INTRAVENOUS at 08:39

## 2018-03-14 RX ADMIN — SODIUM CHLORIDE 75 ML/HR: 900 INJECTION, SOLUTION INTRAVENOUS at 00:17

## 2018-03-14 RX ADMIN — POTASSIUM CHLORIDE 10 MEQ: 200 INJECTION, SOLUTION INTRAVENOUS at 22:08

## 2018-03-14 RX ADMIN — Medication 10 ML: at 17:45

## 2018-03-14 RX ADMIN — ONDANSETRON 4 MG: 2 INJECTION INTRAMUSCULAR; INTRAVENOUS at 12:31

## 2018-03-14 RX ADMIN — POTASSIUM CHLORIDE 10 MEQ: 200 INJECTION, SOLUTION INTRAVENOUS at 23:14

## 2018-03-14 RX ADMIN — SODIUM CHLORIDE: 900 INJECTION, SOLUTION INTRAVENOUS at 09:08

## 2018-03-14 RX ADMIN — ACETAMINOPHEN 650 MG: 325 TABLET, FILM COATED ORAL at 05:07

## 2018-03-14 RX ADMIN — SODIUM CHLORIDE 40 MG: 9 INJECTION INTRAMUSCULAR; INTRAVENOUS; SUBCUTANEOUS at 05:08

## 2018-03-14 RX ADMIN — POTASSIUM CHLORIDE 10 MEQ: 200 INJECTION, SOLUTION INTRAVENOUS at 20:37

## 2018-03-14 RX ADMIN — Medication 4 MCG/MIN: at 23:18

## 2018-03-14 RX ADMIN — VANCOMYCIN HYDROCHLORIDE 1250 MG: 10 INJECTION, POWDER, LYOPHILIZED, FOR SOLUTION INTRAVENOUS at 05:08

## 2018-03-14 RX ADMIN — POTASSIUM CHLORIDE 10 MEQ: 200 INJECTION, SOLUTION INTRAVENOUS at 10:43

## 2018-03-14 RX ADMIN — SODIUM CHLORIDE 125 ML/HR: 900 INJECTION, SOLUTION INTRAVENOUS at 05:00

## 2018-03-14 RX ADMIN — CEFTRIAXONE 1 G: 1 INJECTION, POWDER, FOR SOLUTION INTRAMUSCULAR; INTRAVENOUS at 00:09

## 2018-03-14 RX ADMIN — PIPERACILLIN AND TAZOBACTAM 3.38 G: 3; .375 INJECTION, POWDER, FOR SOLUTION INTRAVENOUS at 17:45

## 2018-03-14 RX ADMIN — SODIUM CHLORIDE 75 ML/HR: 900 INJECTION, SOLUTION INTRAVENOUS at 08:38

## 2018-03-14 RX ADMIN — POTASSIUM CHLORIDE 10 MEQ: 200 INJECTION, SOLUTION INTRAVENOUS at 07:04

## 2018-03-14 RX ADMIN — Medication 7 MCG/MIN: at 10:46

## 2018-03-14 RX ADMIN — SODIUM CHLORIDE: 900 INJECTION, SOLUTION INTRAVENOUS at 20:11

## 2018-03-14 RX ADMIN — ONDANSETRON 4 MG: 2 INJECTION INTRAMUSCULAR; INTRAVENOUS at 01:17

## 2018-03-14 RX ADMIN — PIPERACILLIN AND TAZOBACTAM 3.38 G: 3; .375 INJECTION, POWDER, FOR SOLUTION INTRAVENOUS at 10:54

## 2018-03-14 RX ADMIN — AMIODARONE HYDROCHLORIDE 0.5 MG/MIN: 50 INJECTION, SOLUTION INTRAVENOUS at 15:09

## 2018-03-14 RX ADMIN — FOLIC ACID 1 MG: 5 INJECTION, SOLUTION INTRAMUSCULAR; INTRAVENOUS; SUBCUTANEOUS at 09:57

## 2018-03-14 RX ADMIN — MAGNESIUM SULFATE HEPTAHYDRATE 2 G: 40 INJECTION, SOLUTION INTRAVENOUS at 20:11

## 2018-03-14 RX ADMIN — POTASSIUM CHLORIDE 10 MEQ: 200 INJECTION, SOLUTION INTRAVENOUS at 07:57

## 2018-03-14 RX ADMIN — CYANOCOBALAMIN 1000 MCG: 1000 INJECTION, SOLUTION INTRAMUSCULAR at 09:57

## 2018-03-14 RX ADMIN — POTASSIUM CHLORIDE 10 MEQ: 7.46 INJECTION, SOLUTION INTRAVENOUS at 00:41

## 2018-03-14 NOTE — ED NOTES
Pt sleeping in bed, on monitor/2L NC, IVFs/Amio drip continues to infuse, family remains at bedside, no N/V/D noted, NADN, VSS, WCTM.

## 2018-03-14 NOTE — PROGRESS NOTES
Pharmacist Note - Vancomycin Dosing    Consult provided for this 69 y.o. female for indication of sepsis. Antibiotic regimen(s): Zosyn and Vancomycin    Recent Labs      18   2359  18   1830   WBC  8.7  10.5   CREA   --   0.80   BUN   --   36*     Frequency of BMP: daily  Height: 165 cm  Weight: 45 kg  Est CrCl: 48 ml/min  Temp (24hrs), Av °F (36.7 °C), Min:97.6 °F (36.4 °C), Max:98.7 °F (37.1 °C)    Cultures: urine and blood      Goal trough = 15 - 20 mcg/mL    Therapy will be initiated with a loading dose of 1250 mg IV x 1 to be followed by a maintenance dose of 750 mg IV every 16 hours. Pharmacy to follow patient daily and order levels / make dose adjustments as appropriate.

## 2018-03-14 NOTE — ED NOTES
Dr. Husam Teixeira with Cardiology at bedside for eval, informed pt she wants her to stay on Amio drip and to have IVFs continue for BP. Pt denies CP/SOB, no N/V/D noted, temp improving, family remains at bedside, [facility].

## 2018-03-14 NOTE — ED NOTES
This RN assumed care of patient at this time. Patient resting in room with  at bedside. Amiodarone placed on hold at this time for SBP <90 per cardiology order, cardiology and inpatient MD paged. Patient A&O, speaking in full sentences, and does not appear to be in distress. Bag 2/4 of potassium infusing. Will continue to monitor patient via telemetry.

## 2018-03-14 NOTE — ED NOTES
RN attempted to take pt to CT, pt refused. Pt remains A&Ox3 and following commands. Johanna Kirk MD aware.

## 2018-03-14 NOTE — CONSULTS
[address]: Nancy Hess  [phone]  Requesting/referring provider: Dr. Yara Valentino  Reason for Consult: SVT, hypotension    HPI: Dawna Fang, a 69y.o. year-old who presents for evaluation of SVT new onset, in the setting of hypotension, nausea, vomiting, weakness, GIB, malaise x 3 days. In the ER has received 4L fluid, 1uPRBC and initial severe hypotension has improved slightly. Pt declines cardioversion unless emergent. No signs of heart failure or respiratory distress at this time. BP is borderline, MAP is 71 so will hold for now. Mag 1.1 on admission, repleting. Amio given IV for SVT in setting of hypotension after she became refractory to vagal maneuvers and and adenosine. Pt reports UTI sx prior to onset of illness. Ucx ordered    Assessment/Plan:  1. SVT- replete mag to 2, k to 4  -on amio gtt  -pt declines cardioversion at this time  2. GIB- PRBC infusing, recheck ordered  3. Abnormal TSH- check T4  4. Hyperglycemia- check A1c, no hx dm    Soc no tob no etoh   FHx + SVT  She  has no past medical history on file. Cardiovascular ROS: no chest pain or dyspnea on exertion  Respiratory ROS: no cough, shortness of breath, or wheezing  Neurological ROS: no TIA or stroke symptoms  All other systems negative except as above. PE  Vitals:    03/13/18 2119 03/13/18 2128 03/13/18 2143 03/13/18 2158   BP: (!) 77/54 (!) 77/54 (!) 86/53 90/64   Pulse: (!) 190 (!) 190 (!) 188 (!) 190   Resp: 22 22 16 20   Temp: 97.6 °F (36.4 °C) 97.6 °F (36.4 °C) 98.3 °F (36.8 °C) 97.9 °F (36.6 °C)   SpO2: 100% 100% 100% 100%   Weight:       Height:        Body mass index is 16.64 kg/(m^2).    General appearance - alert, thin, NAD  Mental status - affect appropriate to mood  Eyes - sclera anicteric, moist mucous membranes  Neck - supple, no significant adenopathy  Lymphatics - no  lymphadenopathy  Chest - clear to auscultation, no wheezes, rales or rhonchi  Heart - tachy, normal S1, S2, no murmurs, rubs, clicks or gallops  Abdomen - soft, nontender, nondistended, no masses or organomegaly  Back exam - full range of motion, no tenderness  Neurological - cranial nerves II through XII grossly intact, no focal deficit  Musculoskeletal - no muscular tenderness noted, normal strength  Extremities - peripheral pulses normal, no pedal edema  Skin - normal coloration  no rashes    Recent Labs:  No results found for: CHOL, CHOLX, CHLST, CHOLV, 559376, HDL, LDL, LDLC, DLDLP, TGLX, TRIGL, TRIGP, CHHD, CHHDX  Lab Results   Component Value Date/Time    Creatinine 0.80 03/13/2018 06:30 PM     Lab Results   Component Value Date/Time    BUN 36 (H) 03/13/2018 06:30 PM     Lab Results   Component Value Date/Time    Potassium 3.1 (L) 03/13/2018 06:30 PM     No results found for: HBA1C, HGBE8, STT6PUBP  Lab Results   Component Value Date/Time    HGB 8.9 (L) 03/13/2018 06:30 PM     Lab Results   Component Value Date/Time    PLATELET 891 73/39/3663 06:30 PM       Reviewed:  History reviewed. No pertinent past medical history.   History   Smoking Status    Never Smoker   Smokeless Tobacco    Never Used     History   Alcohol Use No     No Known Allergies    Current Facility-Administered Medications   Medication Dose Route Frequency    potassium chloride 10 mEq in 100 ml IVPB  10 mEq IntraVENous NOW    sodium chloride (NS) flush 5-10 mL  5-10 mL IntraVENous PRN    0.9% sodium chloride infusion 250 mL  250 mL IntraVENous PRN    0.9% sodium chloride infusion 250 mL  250 mL IntraVENous PRN    fentaNYL citrate (PF) injection 50 mcg  50 mcg IntraVENous NOW    midazolam (VERSED) injection 2 mg  2 mg IntraVENous NOW    potassium chloride 10 mEq in 100 ml IVPB  10 mEq IntraVENous NOW    amiodarone (CORDARONE) 375 mg/250 mL D5W infusion  1 mg/min IntraVENous TITRATE    pantoprazole (PROTONIX) 40 mg in sodium chloride (NS) 10 mL injection  40 mg IntraVENous NOW    amiodarone (CORDARONE) 150 mg in dextrose 5% 100 mL bolus infusion  150 mg IntraVENous NOW    magnesium sulfate 2 g/50 ml IVPB (premix or compounded)  2 g IntraVENous ONCE     No current outpatient prescriptions on file.        Tori David MD  [facility] heart and Vascular [place]  [address] [facility] [address]  [facility], [address] [facility] [address]

## 2018-03-14 NOTE — H&P
[address] [facility]  ACUTE CARE HISTORY AND PHYSICAL    Name:ANGEL ALBRECHT  MR#: [id number]  : 1950  ACCOUNT #: [de-identified]   DATE OF SERVICE: 2018    PRIMARY CARE PHYSICIAN:  None. SOURCE OF INFORMATION:  The patient and the patient's spouse, who was present at the bedside. CHIEF COMPLAINT: Nausea, vomiting and diarrhea. HISTORY OF PRESENT ILLNESS:  This is a 64-year-old woman without any significant past medical history, who was in her usual state of health until about 3 days ago when the patient developed nausea, vomiting and diarrhea. The symptoms are progressive. The patient also has associated abdominal pain. The pain is located at the center of the abdomen, described as a cramping pain. No known aggravating factors, but slight relief after bowel movement. The pain is 6/10 in severity. No associated fever, rigors and chills. The diarrhea is without mucous, but the  stated that there was blood in one of the episodes of the diarrhea. The patient emesis is described as coffee-ground. No prior history of gastrointestinal symptoms. No sick contact. The patient was brought to the emergency room reluctantly because the patient did not want to come to the emergency room. She has no primary care physician. She has not had any routine followups such as colonoscopy done in the past.  When the patient arrived at the emergency room, she went into supraventricular tachycardia. She also dropped blood pressure. The emergency room physician attempted to do cardioversion. The patient refused. Cardiology consult was requested. The patient was seen by the cardiologist while she was still in the emergency room. The patient also refused cardioversion by the cardiologist.  The patient was then started on amiodarone and was subsequently referred to the hospitalist service for evaluation for admission. She was found to be stool guaiac positive in the emergency room. The patient also presented with anemia. The patient was discussed with the gastroenterologist on call. Admission to the hospitalist service was advised. The patient also presented with elevated lactic acid level on urinalysis consistent with a urinary tract infection.   No record of prior admission to this hospital.      MD NANY Jules / Theresa Lemos  D: 03/14/2018 03:33     T: 03/14/2018 07:00  JOB #: [id number]

## 2018-03-14 NOTE — ED NOTES
Pt remains resting in bed, on monitor/2L NC, IVFs/abx and Amio drip continues, BP ranging from 80-90s SBP. Pt denies any pain at this time, not passing blood or any abd distention noted. Pt remains A&Ox3 and following commands, pt afebrile at this time, family remains at bedside, to notify provider of BP. WCTM.

## 2018-03-14 NOTE — PROGRESS NOTES
[place]: Husam Teixeira  [phone]  Requesting/referring provider: Dr. Yanna Michael  Reason for Consult: SVT, hypotension    Update. Finally this am with HR control, sinus tach. Awaiting EGD for eval of GIB. Declines colonoscopy to me. But with weight loss, nausea, and a month of gastritis, reiterated that I think she needs EGD. Rx for UTI/sepsis. Cont amio dripp today for control of SVT. HPI: Namrata Headley, a 69y.o. year-old who presents for evaluation of SVT new onset, in the setting of hypotension, nausea, vomiting, weakness, GIB, malaise x 3 days. In the ER has received 4L fluid, 1uPRBC and initial severe hypotension has improved slightly. Pt declines cardioversion unless emergent. No signs of heart failure or respiratory distress at this time. BP is borderline, MAP is 71 so will hold for now. Mag 1.1 on admission, repleting. Amio given IV for SVT in setting of hypotension after she became refractory to vagal maneuvers and and adenosine. Pt reports UTI sx prior to onset of illness. Ucx ordered    Assessment/Plan:  1. SVT- replete mag to 2, k to 4  -on amio gtt  -pt declines cardioversion/ablation/etc at this time  2. GIB- PRBC infusing, recheck ordered, GI consulting  3. Abnormal TSH- check T4  4. Hyperglycemia- check A1c, no hx dm  5. UTi/urosepsis- zosyn started  6. Troponin elevation- nonspecific in current setting. 7. Protein calorie malnutrition nini Body mass index is 16.64 kg/(m^2). prealbumin 6.4 GI evaluation ongoing  8. BNP elevation- appropriate for degeree of tachycardia, etc. Recheck tomorrow. Echo pending    Soc no tob no etoh   FHx + SVT  She  has no past medical history on file. Cardiovascular ROS: no chest pain or dyspnea on exertion  Respiratory ROS: no cough, shortness of breath, or wheezing  Neurological ROS: no TIA or stroke symptoms  All other systems negative except as above.      PE  Vitals:    03/14/18 0845 03/14/18 0848 03/14/18 0945 03/14/18 1030   BP: 93/56  95/56 95/54   Pulse: 92  100 96   Resp: 18  20    Temp:       SpO2: 95% 94% 92% 93%   Weight:       Height:        Body mass index is 16.64 kg/(m^2). General appearance - alert, thin, NAD  Mental status - affect appropriate to mood  Eyes - sclera anicteric, moist mucous membranes  Neck - supple, no significant adenopathy  Lymphatics - no  lymphadenopathy  Chest - clear to auscultation, no wheezes, rales or rhonchi  Heart - tachy, normal S1, S2, no murmurs, rubs, clicks or gallops  Abdomen - soft, nontender, nondistended, no masses or organomegaly  Back exam - full range of motion, no tenderness  Neurological - cranial nerves II through XII grossly intact, no focal deficit  Musculoskeletal - no muscular tenderness noted, normal strength  Extremities - peripheral pulses normal, no pedal edema  Skin - normal coloration  no rashes    Recent Labs:  Lab Results   Component Value Date/Time    Cholesterol, total 87 03/14/2018 05:07 AM    HDL Cholesterol 35 03/14/2018 05:07 AM    LDL, calculated 37 03/14/2018 05:07 AM    Triglyceride 75 03/14/2018 05:07 AM    CHOL/HDL Ratio 2.5 03/14/2018 05:07 AM     Lab Results   Component Value Date/Time    Creatinine 0.49 (L) 03/14/2018 05:07 AM     Lab Results   Component Value Date/Time    BUN 24 (H) 03/14/2018 05:07 AM     Lab Results   Component Value Date/Time    Potassium 2.7 (LL) 03/14/2018 05:07 AM     Lab Results   Component Value Date/Time    Hemoglobin A1c 4.7 03/13/2018 11:16 PM     Lab Results   Component Value Date/Time    HGB 9.1 (L) 03/14/2018 05:07 AM     Lab Results   Component Value Date/Time    PLATELET 97 (L) 49/18/9950 05:07 AM       Reviewed:  History reviewed. No pertinent past medical history.   History   Smoking Status    Never Smoker   Smokeless Tobacco    Never Used     History   Alcohol Use No     No Known Allergies    Current Facility-Administered Medications   Medication Dose Route Frequency    0.9% sodium chloride infusion  75 mL/hr IntraVENous CONTINUOUS  sodium chloride (NS) flush 5-10 mL  5-10 mL IntraVENous Q8H    sodium chloride (NS) flush 5-10 mL  5-10 mL IntraVENous PRN    acetaminophen (TYLENOL) tablet 650 mg  650 mg Oral Q4H PRN    pantoprazole (PROTONIX) 40 mg in sodium chloride 10 mL injection  40 mg IntraVENous Q12H    Vancomycin Pharmacy Dosing   Other Rx Dosing/Monitoring    piperacillin-tazobactam (ZOSYN) 3.375 g in 0.9% sodium chloride (MBP/ADV) 100 mL  3.375 g IntraVENous Q8H    sodium chloride 0.9 % bolus infusion 100 mL  100 mL IntraVENous RAD ONCE    iopamidol (ISOVUE-370) 76 % injection 100 mL  100 mL IntraVENous RAD ONCE    sodium chloride (NS) flush 10 mL  10 mL IntraVENous RAD ONCE    vancomycin (VANCOCIN) 750 mg in 0.9% sodium chloride (MBP/ADV) 250 mL  750 mg IntraVENous Q16H    NOREPINephrine (LEVOPHED) 8 mg in 5% dextrose 250mL infusion  2-16 mcg/min IntraVENous TITRATE    potassium phosphate 45 mmol in 0.9% sodium chloride 250 mL infusion   IntraVENous ONCE    folic acid (FOLVITE) 5 mg/mL injection 1 mg  1 mg IntraVENous DAILY    cyanocobalamin (VITAMIN B12) injection 1,000 mcg  1,000 mcg IntraMUSCular Q30D    sodium chloride (NS) flush 5-10 mL  5-10 mL IntraVENous PRN    0.9% sodium chloride infusion 250 mL  250 mL IntraVENous PRN    0.9% sodium chloride infusion 250 mL  250 mL IntraVENous PRN    amiodarone (CORDARONE) 375 mg/250 mL D5W infusion  1 mg/min IntraVENous TITRATE    ondansetron (ZOFRAN) injection 4 mg  4 mg IntraVENous Q4H PRN     Current Outpatient Prescriptions   Medication Sig    ascorbic acid, vitamin C, (VITAMIN C) 500 mg tablet Take 500 mg by mouth.  cholecalciferol (VITAMIN D3) 1,000 unit tablet Take 1,000 Units by mouth daily.        Tori David MD  [facility] [place]  [address] [facility] [address]  [facility], [address]

## 2018-03-14 NOTE — CONSULTS
[facility].  [address] [facility] 140 [facility], [address]  [phone]                     GI CONSULTATION NOTE  Berhane Coronado, AGACNP-BC  Work Cell: [phone]      NAME:  Dawna Fang   :   1950   MRN:   [id number]       Referring Provider: Dr. Jason Fuller Date: 3/14/2018     Chief Complaint: Diarrhea, nausea and vomiting     History of Present Illness:  Patient is a 69 y.o. who is seen in consultation at the request of Dr. Yara Valentino for the above complaints. Ms. Alec Rosario is without any significant PMH whom presented to the ER with diarrhea, nausea and vomiting. Onset of symptoms was 2 days ago. Monday she had abrupt onset of diarrhea. Stools loose, watery and non-bloody. She reports having between 5-6 episodes Monday. Tuesday, she had abrupt onset of nausea and vomiting. She did not have any episodes of diarrhea yesterday. No fevers or chills. No prior history of similar symptoms. No recent sick contacts, antibiotic use, travel or hospitalizations. She takes a few vitamins daily. No ASA or NSAID use. No tobacco and reports rare alcohol use. No prior EGD or colonoscopy. No known family history of cancer. No weight loss. In ER she was found to be in SVT and hypotensive, she was seen by cardiology and refused cardioversion. She was started on amiodarone drip but this was stopped due to hypotension. She was recently started on levophed drip this AM. She has UTI with suspected septic shock. She is anemic, stool heme positive with multiple electrolyte abnormalities. CT scans pending. PMH:  History reviewed. No pertinent past medical history.     PSH:  Past Surgical History:   Procedure Laterality Date    HX ORTHOPAEDIC      HX TONSILLECTOMY      HX WISDOM TEETH EXTRACTION         Allergies:  No Known Allergies    Home Medications:  None       Hospital Medications:  Current Facility-Administered Medications   Medication Dose Route Frequency    0.9% sodium chloride infusion 75 mL/hr IntraVENous CONTINUOUS    sodium chloride (NS) flush 5-10 mL  5-10 mL IntraVENous Q8H    sodium chloride (NS) flush 5-10 mL  5-10 mL IntraVENous PRN    acetaminophen (TYLENOL) tablet 650 mg  650 mg Oral Q4H PRN    pantoprazole (PROTONIX) 40 mg in sodium chloride 10 mL injection  40 mg IntraVENous Q12H    Vancomycin Pharmacy Dosing   Other Rx Dosing/Monitoring    piperacillin-tazobactam (ZOSYN) 3.375 g in 0.9% sodium chloride (MBP/ADV) 100 mL  3.375 g IntraVENous Q8H    sodium chloride 0.9 % bolus infusion 100 mL  100 mL IntraVENous RAD ONCE    iopamidol (ISOVUE-370) 76 % injection 100 mL  100 mL IntraVENous RAD ONCE    sodium chloride (NS) flush 10 mL  10 mL IntraVENous RAD ONCE    vancomycin (VANCOCIN) 750 mg in 0.9% sodium chloride (MBP/ADV) 250 mL  750 mg IntraVENous Q16H    potassium chloride 10 mEq in 50 ml IVPB  10 mEq IntraVENous Q1H    NOREPINephrine (LEVOPHED) 8 mg in 5% dextrose 250mL infusion  2-16 mcg/min IntraVENous TITRATE    potassium phosphate 45 mmol in 0.9% sodium chloride 250 mL infusion   IntraVENous ONCE    folic acid (FOLVITE) 5 mg/mL injection 1 mg  1 mg IntraVENous DAILY    cyanocobalamin (VITAMIN B12) injection 1,000 mcg  1,000 mcg IntraMUSCular Q30D    sodium chloride (NS) flush 5-10 mL  5-10 mL IntraVENous PRN    0.9% sodium chloride infusion 250 mL  250 mL IntraVENous PRN    0.9% sodium chloride infusion 250 mL  250 mL IntraVENous PRN    amiodarone (CORDARONE) 375 mg/250 mL D5W infusion  1 mg/min IntraVENous TITRATE    ondansetron (ZOFRAN) injection 4 mg  4 mg IntraVENous Q4H PRN     No current outpatient prescriptions on file. Social History:  Social History   Substance Use Topics    Smoking status: Never Smoker    Smokeless tobacco: Never Used    Alcohol use No       Family History:  History reviewed. No pertinent family history.     Review of Systems:    Constitutional: negative fever, negative chills, negative weight loss  Eyes:   negative visual changes  ENT:   negative sore throat, tongue or lip swelling  Respiratory:  negative cough, negative dyspnea  Cards:  negative for chest pain, palpitations, lower extremity edema  GI:   See HPI  :  negative for frequency, dysuria  Integument:  negative for rash and pruritus  Heme:  negative for easy bruising and gum/nose bleeding  Musculoskel: negative for myalgias, back pain and muscle weakness  Neuro: negative for headaches, dizziness, vertigo  Psych:  negative for feelings of anxiety, depression      Objective:   Patient Vitals for the past 8 hrs:   BP Temp Pulse Resp SpO2   03/14/18 0848 - - - - 94 %   03/14/18 0742 90/44 98.8 °F (37.1 °C) 93 16 95 %   03/14/18 0636 (!) 89/47 99.7 °F (37.6 °C) 98 18 96 %   03/14/18 0530 91/44 (!) 100.8 °F (38.2 °C) (!) 107 16 98 %   03/14/18 0411 100/54 - (!) 118 16 99 %   03/14/18 0314 105/61 - (!) 115 16 99 %   03/14/18 0201 101/57 - (!) 114 16 96 %   03/14/18 0122 102/62 - (!) 120 18 100 %             PHYSICAL EXAM:  General: WD, Thin. Alert, cooperative, no acute distress.    HEENT: NC, Atraumatic. PERRLA, EOMI. Anicteric sclerae. Lungs:  CTA Bilaterally. No Wheezing/Rhonchi/Rales. Heart:  Regular rate and rhythm, No murmur, No Rubs, No Gallops  Abdomen: Soft, non-distended, non-tender.  +Bowel sounds, no HSM  Extremities: No c/c/e  Neurologic:  Alert and oriented X 3. No acute neurological distress. Psych:   Good insight. Not anxious nor agitated.     Data Review     Recent Labs      03/14/18   0507  03/13/18   2359   WBC  8.2  8.7   HGB  9.1*  10.2*   HCT  26.2*  30.3*   PLT  97*  144*     Recent Labs      03/14/18   0507  03/13/18   1830   NA  141  138   K  2.7*  3.1*   CL  110*  98   CO2  24  23   BUN  24*  36*   CREA  0.49*  0.80   GLU  166*  265*   PHOS  1.6*   --    CA  7.2*  8.8     Recent Labs      03/14/18   0507  03/13/18   1830   SGOT  33  39*   AP  68  94   TP  4.9*  6.4   ALB  2.3*  2.8*   GLOB  2.6  3.6   LPSE   --   49*     No results for input(s): INR, PTP, APTT in the last 72 hours. No lab exists for component: INREXT     Imaging studies reviewed      Assessment:   1. Nausea, vomiting and diarrhea - possibly related to acute infectious process, however UTI and sepsis may be playing role as well,  2. Anemia - with heme positive stool, r/o upper/lower GI source of blood loss  3. UTI with septic shock  4. SVT   5. Hypokalemia     Patient Active Problem List   Diagnosis Code    SVT (supraventricular tachycardia) (HCC) I47.1              Plan:   -Supportive management per primary team  -IV PPI BID  -Continue antibiotics  -Stool studies for any recurrent diarrhea  -Follow cultures and await results of CT scan  -Consider EGD/colonoscopy once cardiac and metabolic status optimized - discussed with patient whom is reluctant to proceed with either  -Discussed with Dr. Esteban Sotelo  -Will follow along with you  -Thank you kindly for allowing us to participate in the care of this patient      I have personally reviewed the history and independently examined the patient. I have reviewed the chart and agree with the documentation recorded by the Mid Level Provider, including the assessment, treatment plan, and disposition.       Nicolasa Yen MD

## 2018-03-14 NOTE — ED NOTES
RN spoke with Dr. Tesfaye Luther with Cardiology, informed of latest Trop of 0.15- no orders received. Aware of pt's improving HR and that pt remains on Amio drip, no new orders received regarding drip.

## 2018-03-14 NOTE — ED NOTES
Pt resting in bed, on monitor/2L NC, remains on IVFs/Amio drip, A&Ox3 and following commands, denies CP/SOB, WCTM.

## 2018-03-14 NOTE — PROGRESS NOTES
1224 TRANSFER - IN REPORT:    Verbal report received from Debbie(name) on Wicho Randolph  being received from ED(unit) for routine progression of care      Report consisted of patients Situation, Background, Assessment and   Recommendations(SBAR). Information from the following report(s) SBAR, Kardex, ED Summary, Procedure Summary, Intake/Output, MAR, Accordion and Recent Results was reviewed with the receiving nurse. Opportunity for questions and clarification was provided. Assessment completed upon patients arrival to unit and care assumed. 1300 Pt arrived to unit via stretcher on 1L NC. Primary Nurse King Reyes RN and Dayna Danielson RN performed a dual skin assessment on this patient No impairment noted    Current Bed:     Total Care (foam)    Maximo score is 19    1730 CT called, Pt still refusing to have CTs performed. 200 Spoke with patient, Pt would like to have CTs performed tomorrow 3/15. Pt does not wish to speak with anyone but her doctors. 1930 Bedside and Verbal shift change report given to Amina Perez (oncoming nurse) by Mariano Rebolledo (offgoing nurse). Report included the following information SBAR, Kardex, ED Summary, Procedure Summary, Intake/Output, MAR, Accordion and Recent Results.

## 2018-03-14 NOTE — ED NOTES
In-patient team 8 provider in ER and made aware of pt's BP-orders received. Pharm sent request for med to be sent STAT. RN on phone with Cardiology regarding Amio drip.

## 2018-03-14 NOTE — H&P
[address] [facility]  ACUTE CARE HISTORY AND PHYSICAL    Name:ANGEL ALBRECHT  MR#: [id number]  : 1950  ACCOUNT #: [de-identified]   DATE OF SERVICE: 2018    PRIMARY CARE PHYSICIAN:  None. SOURCE OF INFORMATION:  The patient and the patient's spouse, who was present at the bedside. CHIEF COMPLAINT:  Nausea, vomiting and diarrhea. HISTORY OF PRESENT ILLNESS:  This is a 64-year-old woman without any significant past medical history, was in her usual state of health until about 3 days ago when patient developed nausea, vomiting and diarrhea. These symptoms are progressive and getting worse. Patient also has associated abdominal pain, the abdominal pain is located at the center of the abdomen, described as cramping. No known aggravating factors, but there is slight relief with bowel movement. No associated fever, rigors, chills. The diarrhea has no mucous, but the  stated that there was blood in the stool. The patient's vomit is coffee ground. The patient has no prior history of gastrointestinal disease. The patient has no primary care physician. She has not seen a doctor for some time. She has not had any routine followup, such as colonoscopy. Patient was brought to the emergency room reluctantly because the patient did not want to come to the emergency room. When the patient arrived at the emergency room, she was found to be in supraventricular tachycardia. She was also hypotensive. The emergency room physician was there to do a cardioversion, but the patient refused. A cardiology consult was requested. Patient was seen by the cardiologist while she was still in the emergency room. Patient also refused cardioversion by the cardiologist.  Patient was then started on amiodarone. She was found to be guaiac positive in the emergency room. She presented with anemia. A gastroenterology consult was requested. Admission to the hospitalist service was advised. Patient was subsequently referred to the hospitalist service for evaluation for admission. She has elevated lactic acid level and urinalysis consistent with a urinary tract infection when she arrived at the emergency room as well. PAST MEDICAL HISTORY:  Not significant. ALLERGIES:  NO KNOWN DRUG ALLERGIES. MEDICATIONS:  Patient is not on any medication at home. FAMILY HISTORY:  This was reviewed. It is not pertinent. No family history of gastrointestinal disease. PAST SURGICAL HISTORY:  Significant for tonsillectomy, wisdom tooth extraction. SOCIAL HISTORY:  No history of alcohol or tobacco abuse. REVIEW OF SYSTEMS:    HEAD, EYES, EARS, NOSE AND THROAT:  This is positive for dizziness. No headache, no blurring of vision, no photophobia. RESPIRATORY:  No cough, no shortness of breath, no hemoptysis. CARDIOVASCULAR:  No chest pain, orthopnea. No palpitations. GASTROINTESTINAL SYSTEM:  This is positive for abdominal pain, nausea, vomiting and diarrhea. GENITOURINARY:  No dysuria, no urgency and no frequency. All other systems are reviewed and they are negative. PHYSICAL EXAMINATION:  GENERAL:  Patient appeared ill and in moderate distress. VITAL SIGNS:  On arrival at the emergency room, temperature 97.8, pulse 135, this went up as high as 204, respiratory rate 18, blood pressure 105/65, oxygen saturation 100% on room air. HEAD:  Normocephalic, atraumatic. EYES:  Normal eye movement, no redness, no drainage, no discharge. EARS:  Normal external ears with no obvious drainage. NOSE:  No deformity and no drainage. MOUTH AND THROAT:  No visible oral lesions. Dry oral mucosa. NECK:  Supple, no JVD, no thyromegaly. CHEST:  Clear breath sounds. No wheezing, no crackles. HEART:  Normal S1 and S2, regular. No clinically appreciable murmur. ABDOMEN:  Soft, nontender, normal bowel sounds. CENTRAL NERVOUS SYSTEM:  Alert, oriented x3.   No gross focal neurological deficit. EXTREMITIES:  No edema. Pulses 2+ bilaterally. MUSCULOSKELETAL:  No obvious joint deformity or swelling. SKIN:  No active skin lesions seen in the exposed parts of the body. PSYCHIATRIC:  Normal mood and affect. LYMPHATICS:  No cervical lymphadenopathy. DIAGNOSTIC DATA:  EKG shows sinus tachycardia, right ventricular hypertrophy and nonspecific ST and T waves abnormalities. Chest x-ray shows mild pulmonary edema with bibasilar atelectasis. LABORATORY DATA:  Hematology:  WBC 10.5, hemoglobin 8.9, hematocrit 26.6, platelet 757. Lipase level 49. Chemistry:  Sodium 138, potassium 3.1, chloride 98, CO2 of 23, glucose 265, BUN 36, creatinine 0.80, calcium 8.8, bilirubin total 1.2, ALT 29, AST 39, alkaline phosphatase 94, total protein 6.4, albumin level 2.8, globulin 3.6. Cardiac profile, troponin less than 0.04. Stool occult blood positive. Magnesium 1.1.  TSH level 5.30. Urinalysis is significant for negative nitrite, large leukocyte esterase, 4+ bacteria, large blood. Lactic acid level 3.9. ASSESSMENT:  1. Supraventricular tachycardia. 2.  Suspected upper gastrointestinal bleed. 3.  Anemia secondary to upper gastrointestinal bleed. 4.  Hypokalemia. 5.  Hypomagnesemia. 6.  Hypotension. 7.  Subclinical hypothyroidism. 8.  Hyperglycemia. 9.  Acute gastroenteritis. 10.  Acute cystitis with hematuria. 11.  Dizziness. 12.  Suspected sepsis. 13.  Cachexia. ASSESSMENT AND PLAN:  1. Supraventricular tachycardia. We will admit the patient for further evaluation and treatment. We will obtain serial cardiac markers to rule out acute myocardial infarction as a possible cause of the supraventricular tachycardia. The patient has been seen by the cardiologist.  We will continue with amiodarone continuous infusion. Echocardiogram was also ordered by Cardiology. Will await further recommendation from Cardiology. 2.  Suspected upper gastrointestinal bleed.   We will start the patient on Protonix. We will await further recommendation from the gastroenterologist.    3.  Anemia. This is secondary to the upper gastrointestinal bleed. Transfusion was started in the emergency room by the emergency room physician. Will monitor the patient's hemoglobin and hematocrit closely. We will carry out anemia workup. 4.  Hypokalemia. Potassium was replaced in the emergency room. We will repeat a potassium level. 5.  Hypomagnesemia. This was also replaced in the emergency room. We will repeat a magnesium level. 6.  Hypotension. Patient became hypotensive in the emergency room, received IV fluid. We will monitor the patient's blood pressure closely. We will continue with fluid therapy. 7.  Subclinical hypothyroidism. We will check T3 and T4. Patient may require Synthroid. 8.  Hyperglycemia. We will check hemoglobin A1c level. The patient denies any history of diabetes. 9.  Acute gastroenteritis. We will await further recommendation from the gastroenterologist.  We will continue with conservative therapy, which included IV fluids. 10.  Acute cystitis with hematuria. Patient will be started on antibiotics. We will await urine culture. 11.  Dizziness. The etiology is not clear. We will obtain a CT scan of the head for further evaluation. 12.  Suspected sepsis. This is based on the tachycardia, elevated lactic acid level and is most likely coming from the acute cystitis with hematuria. We will start the patient on vancomycin and Zosyn. It could also be coming from the gastroenteritis as well. 13.  Cachexia. The patient is chronically sick, has not seen doctors for quite some time, has no primary care physician. We will obtain a CT scan of the chest, as well as a CT scan of the abdomen to evaluate from mass lesion. OTHER ISSUES:    CODE STATUS:  The patient is a FULL CODE. Will request SCD for DVT prophylaxis.       Renny Spencer MD       RE / CN  D: 03/14/2018 04:06     T: 03/14/2018 07:24  JOB #: [id number]

## 2018-03-14 NOTE — ED NOTES
Pt resting in bed, on monitor/2L NC, IVFs/IV abx infusing along with Amio drip. AM labs drawn and sent to lab. Pt 20 RAC IV d/c'd due to redness noted around IV site-RN pulled IV as precautionary measures-[facility] site, no complications noted while removing IV. Pt temp rechecked, 100.8- Tylenol given. Pt remains A&Ox3 and following commands, family remains at bedside, [facility].

## 2018-03-14 NOTE — PROGRESS NOTES
Admission Medication Reconciliation:    Information obtained from: Patient, chart, RX Query    Significant PMH/Disease States:   History reviewed. No pertinent past medical history. Chief Complaint for this Admission:  Vomiting, diarrhea, dizziness    Allergies:  Review of patient's allergies indicates no known allergies. Prior to Admission Medications:   Prior to Admission Medications   Prescriptions Last Dose Informant Patient Reported? Taking?   ascorbic acid, vitamin C, (VITAMIN C) 500 mg tablet 3/13/2018 at Unknown time  Yes Yes   Sig: Take 500 mg by mouth. cholecalciferol (VITAMIN D3) 1,000 unit tablet 3/13/2018 at Unknown time  Yes Yes   Sig: Take 1,000 Units by mouth daily. Facility-Administered Medications: None         Comments/Recommendations: Patient is a reliable historians, son Radha Denson) was present and confirmed her report. She has no known allergies, takes no medications, has been in \"fantastic\" health her whole life. Takes 2 supplements, which I added. Thank you for allowing me to participate in the care of your patient.     Leigh FreitasD, RN #[id number]

## 2018-03-14 NOTE — ED NOTES
Monie informed of labs. Verbal order for 10mEq K+ x4 for a total of 40mEq and to notify [facility] with Cardiology regarding trending Trop.

## 2018-03-14 NOTE — ED PROVIDER NOTES
HPI Comments: 69 y.o. female with no significant past medical history who presents from home via private vehicle with chief complaint of nausea, vomiting, and diarrhea. Pt reports that she has had 3 days of persistent vomiting and diarrhea. She describes the diarrhea as being watery and proceeded by diffuse cramping abdominal discomfort that relieves after she defecates. Today her  was concerned by her continued symptoms and that he thought he noticed blood in the patient's emesis. The patient does not recall having blood in her emesis. She denies black or bloody stools, but does states that she has a transient history of hemorrhoids in the past.  She also denies fever, chills, recent sick contacts, CP, SOB, palpitations, cough, leg swelling or pain, Hx of arrhythmia. There are no other acute medical concerns at this time. Note written by marce Wise, as dictated by Silver Lincoln DO 7:35 PM          The history is provided by the patient. History reviewed. No pertinent past medical history. Past Surgical History:   Procedure Laterality Date    HX ORTHOPAEDIC      HX TONSILLECTOMY      HX WISDOM TEETH EXTRACTION           History reviewed. No pertinent family history. Social History     Social History    Marital status:      Spouse name: N/A    Number of children: N/A    Years of education: N/A     Occupational History    Not on file. Social History Main Topics    Smoking status: Never Smoker    Smokeless tobacco: Never Used    Alcohol use No    Drug use: No    Sexual activity: Not on file     Other Topics Concern    Not on file     Social History Narrative    No narrative on file         ALLERGIES: Review of patient's allergies indicates no known allergies. Review of Systems   Constitutional: Negative for fever. HENT: Negative for trouble swallowing. Eyes: Negative for visual disturbance. Respiratory: Negative for cough.     Cardiovascular: Negative for chest pain. Gastrointestinal: Positive for abdominal pain, diarrhea, nausea and vomiting. Genitourinary: Negative for difficulty urinating. Musculoskeletal: Negative for gait problem. Skin: Negative for rash. Neurological: Positive for dizziness. Negative for headaches. Hematological: Does not bruise/bleed easily. Psychiatric/Behavioral: Negative for sleep disturbance. All other systems reviewed and are negative. Vitals:    03/13/18 [birth year] 03/13/18 1957 03/13/18 2011   BP:  113/78 100/76 111/78   Pulse: (!) 223 (!) 127 (!) 213 (!) 210   Resp: 24 23 16 18   Temp:       SpO2:  100% 100% 100%   Weight:    45.4 kg (100 lb)   Height:                Physical Exam   Constitutional: She is oriented to person, place, and time. She appears well-developed and well-nourished. Thin elderly female   HENT:   Head: Normocephalic and atraumatic. Eyes: Conjunctivae are normal.   Neck: Normal range of motion. Cardiovascular: Intact distal pulses. Tachycardic   Pulmonary/Chest: Effort normal and breath sounds normal. No respiratory distress. Abdominal: Soft. Bowel sounds are normal. There is no tenderness. There is no rebound and no guarding. Musculoskeletal: Normal range of motion. Neurological: She is alert and oriented to person, place, and time. Skin: Skin is warm and dry. Psychiatric: Her behavior is normal.   Nursing note and vitals reviewed. Note written by marce Pringle, as dictated by Donette Phoenix, DO 7:38 PM       MDM  Number of Diagnoses or Management Options  Critical Care  Total time providing critical care:  minutes (Total critical care time spent exclusive of procedures:  90 minutes  )    Anemia and volume loss form GI bleed and vomiting has caused her to have dysrhythmia. Plan to aggressively rehydrate and give blood.   If that is not successful, I do not believe her primary problem is cardiac however will consider giving antiarrhythmics if volume is not sufficient. She may also have a UTI that may be contributing to her presentation. Will add abx for the uti    ED Course       Central Line  Date/Time: 3/14/2018 1:08 AM  Performed by: Donny Christopher  Authorized by: Baltazar BENTON     Consent:     Consent obtained:  Verbal and emergent situation    Consent given by:  Patient and spouse    Risks discussed:  Arterial puncture, bleeding, incorrect placement, infection, nerve damage and pneumothorax    Alternatives discussed:  Observation  Pre-procedure details:     Hand hygiene: Hand hygiene performed prior to insertion      Sterile barrier technique: All elements of maximal sterile technique followed      Skin preparation:  ChloraPrep    Skin preparation agent: Skin preparation agent completely dried prior to procedure    Anesthesia (see MAR for exact dosages): Anesthesia method:  None  Procedure details:     Location:  L internal jugular    Patient position:  Trendelenburg    Procedural supplies:  Triple lumen    Catheter size:  7 Fr    Landmarks identified: yes      Ultrasound guidance: yes      Sterile ultrasound techniques: Sterile gel and sterile probe covers were used      Number of attempts:  2    Successful placement: yes    Post-procedure details:     Post-procedure:  Dressing applied and line sutured    Assessment:  Blood return through all ports, free fluid flow, no pneumothorax on x-ray and placement verified by x-ray    Patient tolerance of procedure: Tolerated well, no immediate complications          CONSULT NOTE:  9:13 PM Cleo Szymanski DO spoke with Dr. Shannon Edwards, Consult for Cardiology. Discussed available diagnostic tests and clinical findings. Dr. Shannon Edwards recommends continuing with electrical cardioversion if patient's BP continues to worsen. CONSULT NOTE:  9:38 PM Cleo Szymanski DO spoke with Dr. Liz Nelson, Consult for Hospitalist.  Discussed available diagnostic tests and clinical findings.   Dr. Liz Nelson agrees to come see and admit the patient. CONSULT NOTE:  9:38 PM Kristen Mosley DO spoke with Dr. Huong Dutton, Consult for Cardiology. Discussed available diagnostic tests and clinical findings. Dr. Huong Dutton agrees with continuing with current plan to cardiovert the patient if BP continues to fall. CONSULT NOTE:  9:39 PM Kristen Mosley DO spoke with Dr. Mayr Hansen, Consult for Gastroenterology. Discussed available diagnostic tests and clinical findings. Dr. Mary Hansen recommends giving the patient Protonix and he agrees to consult on the admission. 9:42 PM  ED EKG interpretation:  Rhythm: Supraventricular tachycardia with PVCs; and regular . Rate (approx.): 188 bpm; Axis: right axis deviation; ST/T wave: non-specific changes; no STEMI; normal QRS interval, Prolonged QTc interval at 509 ms. Note written by marce Larson, as dictated by Kristen Mosley DO 11:31 PM.      10:31 PM  Discussed the patient's case with Hospitalist, Cardiologist, and Gastroenterologist who all agree that currently the patient's tachycardia and hypotension are most likely due to anemia and hypovolemia. Specifically discussed whether antibiotics should be added to the patient's treatment at this time with the gastroenterologist, Dr. Mary Hansen, who does not believe that they are needed at this time; and I agree with that plan. 11:08 PM  ED EKG interpretation:  Rhythm: sinus tachycardia with PACs; and regular . Rate (approx.): 108 bpm; Axis: normal; ST/T wave: non-specific changes; normal RP and QRS intervals; prolonged QTc interval at 525 ms.   Note written by marce Larson, as dictated by Kristen Mosley DO 11:32 PM

## 2018-03-14 NOTE — PROGRESS NOTES
Hospitalist Progress Note  Gino Graves MD  Answering service: [phone] from in house phone      Date of Service:  3/14/2018  NAME:  Lisa Hall  :  1950  MRN:  [id number]      Admission Summary:   70 yo woman with no significant medical history presented to the ED from home on 3/13/18 with progressively worsening nausea, coffee-ground emesis, diarrhea x3 days and abdominal pain. In the ED, patient was found to be in SVT and hypotensive. Pt refused cardioversion so she was started on amiodarone gtt. She was admitted tot he ICU for new onset SVT, UGIB, UTI, sepsis. Interval history / Subjective:   Feels better than she did earlier; denies CP, SOB, palpitations, abdominal pain, f/c/n/v, diarrhea, dysuria now;  at bedside; d/w nurse; still in ED pending ICU bed     Assessment & Plan:     UTI with septic shock (POA)  - tachycardia, lactic acidosis, fever, tachypnea, hypotension on admission  - BCx 3/13 pending  - UCx 3/13 pending  - C diff ordered  - empiric vanc, Zosyn started 3/13; de-escalate at indicated  - start norepinephrine gtt  - decrease IVF rate due to pulmonary edema    SVT (POA)  - resolved with amiodarone gtt  - pt declined cardioversion  - Cardiology following  - troponin trending up  - TTE pending    UGIB and ABLA (POA)  - FOBT positive  - B12 and folate deficiencies; supplement  - s/p 2 units PRBC transfusion  - GI consulted but too unstable for EGD at this time  - continue IV PPI BID  - keep NPO on IVF    Hypokalemia, hypomagnesemia, hypophosphatemia - replete prn    Subclinical hypothyroidism - TSH mildly elevated but free T4 WNL and free T3 suppressed  - needs outpatient f/u in 6 weeks    Underweight, Body mass index is 16.64 kg/(m^2).    - check prealbumin  - consult Nutrition once able to take in po    Pulmonary edema   - decrease IVF rate  - check NT-proBNP and TTE    Code status: full  DVT prophylaxis: SCDs    Care Plan discussed with: Patient/Family, Nurse and Consultant GI  Disposition: TBD. Came from home. Has no PCP     Hospital Problems  Date Reviewed: 3/14/2018          Codes Class Noted POA    * (Principal)SVT (supraventricular tachycardia) ([facility] [address] ICD-10-CM: I47.1  ICD-9-CM: 427.89  3/13/2018 Yes            Review of Systems:   Pertinent items are noted in HPI. Vital Signs:    Last 24hrs VS reviewed since prior progress note.  Most recent are:  Visit Vitals    BP 90/44 (BP 1 Location: Left arm, BP Patient Position: At rest)    Pulse 93    Temp 98.8 °F (37.1 °C)    Resp 16    Ht 5' 5\" (1.651 m)    Wt 45.4 kg (100 lb)    SpO2 95%    BMI 16.64 kg/m2     No intake or output data in the 24 hours ending 03/14/18 0813     Physical Examination:      Constitutional:  awake, no acute distress, cooperative, pleasant    ENT:  oral mucosa moist, oropharynx benign  Neck supple, no masses   Resp:  b/l rales, no wheeze   CV:  regular rhythm, normal rate, no m/r/g appreciated, no edema, +pulses    GI:  +BS, soft, non distended, non tender     Musculoskeletal:  moves all extremities    Neurologic:  AAOx3, NFD     Skin:  warm, dry  Eyes:  PERRL    Data Review:    Review and/or order of clinical lab test  Review and/or order of tests in the radiology section of CPT  Review and/or order of tests in the medicine section of CPT    Labs:     Recent Labs      03/14/18 0507 03/13/18 2359   WBC  8.2  8.7   HGB  9.1*  10.2*   HCT  26.2*  30.3*   PLT  97*  144*     Recent Labs      03/14/18   0507 03/13/18 2359 03/13/18   1830   NA  141   --   138   K  2.7*   --   3.1*   CL  110*   --   98   CO2  24   --   23   BUN  24*   --   36*   CREA  0.49*   --   0.80   GLU  166*   --   265*   CA  7.2*   --   8.8   MG  1.9  2.7*  1.1*   PHOS  1.6*   --    --      Recent Labs      03/14/18   0507 03/13/18   1830   SGOT  33  39*   ALT  20  29   AP  68  94   TBILI  1.2*  1.2*   TP  4.9*  6.4   ALB  2.3*  2.8*   GLOB  2.6  3.6 LPSE   --   49*     No results for input(s): INR, PTP, APTT in the last 72 hours. No lab exists for component: INREXT   Recent Labs      03/14/18   0507   TIBC  181*   PSAT  72*   FERR  172      Lab Results   Component Value Date/Time    Folate 3.7 (L) 03/14/2018 05:07 AM      No results for input(s): PH, PCO2, PO2 in the last 72 hours.   Recent Labs      03/14/18   0507  03/13/18   2359  03/13/18   1830   CPK  89   --    --    CKNDX  Cannot be calculated   --    --    TROIQ  0.15*  0.05*  <0.04     Lab Results   Component Value Date/Time    Cholesterol, total 87 03/14/2018 05:07 AM    HDL Cholesterol 35 03/14/2018 05:07 AM    LDL, calculated 37 03/14/2018 05:07 AM    Triglyceride 75 03/14/2018 05:07 AM    CHOL/HDL Ratio 2.5 03/14/2018 05:07 AM     No results found for: [facility]  Lab Results   Component Value Date/Time    Color YELLOW/STRAW 03/13/2018 11:16 PM    Appearance CLOUDY (A) 03/13/2018 11:16 PM    Specific gravity 1.021 03/13/2018 11:16 PM    pH (UA) 6.0 03/13/2018 11:16 PM    Protein 100 (A) 03/13/2018 11:16 PM    Glucose 250 (A) 03/13/2018 11:16 PM    Ketone 15 (A) 03/13/2018 11:16 PM    Bilirubin NEGATIVE  03/13/2018 11:16 PM    Urobilinogen 0.2 03/13/2018 11:16 PM    Nitrites NEGATIVE  03/13/2018 11:16 PM    Leukocyte Esterase LARGE (A) 03/13/2018 11:16 PM    Epithelial cells FEW 03/13/2018 11:16 PM    Bacteria 4+ (A) 03/13/2018 11:16 PM    WBC >100 (H) 03/13/2018 11:16 PM    RBC >100 (H) 03/13/2018 11:16 PM     Medications Reviewed:     Current Facility-Administered Medications   Medication Dose Route Frequency    0.9% sodium chloride infusion  75 mL/hr IntraVENous CONTINUOUS    sodium chloride (NS) flush 5-10 mL  5-10 mL IntraVENous Q8H    sodium chloride (NS) flush 5-10 mL  5-10 mL IntraVENous PRN    acetaminophen (TYLENOL) tablet 650 mg  650 mg Oral Q4H PRN    lactobac ac& pc-s.therm-b.anim (KAY Q/RISAQUAD)  1 Cap Oral DAILY    pantoprazole (PROTONIX) 40 mg in sodium chloride 10 mL injection  40 mg IntraVENous Q12H    Vancomycin Pharmacy Dosing   Other Rx Dosing/Monitoring    piperacillin-tazobactam (ZOSYN) 3.375 g in 0.9% sodium chloride (MBP/ADV) 100 mL  3.375 g IntraVENous Q8H    sodium chloride 0.9 % bolus infusion 100 mL  100 mL IntraVENous RAD ONCE    iopamidol (ISOVUE-370) 76 % injection 100 mL  100 mL IntraVENous RAD ONCE    sodium chloride (NS) flush 10 mL  10 mL IntraVENous RAD ONCE    vancomycin (VANCOCIN) 750 mg in 0.9% sodium chloride (MBP/ADV) 250 mL  750 mg IntraVENous Q16H    potassium chloride 10 mEq in 50 ml IVPB  10 mEq IntraVENous Q1H    NOREPINephrine (LEVOPHED) 8 mg in 5% dextrose 250mL infusion  2-16 mcg/min IntraVENous TITRATE    sodium chloride (NS) flush 5-10 mL  5-10 mL IntraVENous PRN    0.9% sodium chloride infusion 250 mL  250 mL IntraVENous PRN    0.9% sodium chloride infusion 250 mL  250 mL IntraVENous PRN    amiodarone (CORDARONE) 375 mg/250 mL D5W infusion  1 mg/min IntraVENous TITRATE    ondansetron (ZOFRAN) injection 4 mg  4 mg IntraVENous Q4H PRN     No current outpatient prescriptions on file.     ______________________________________________________________________  EXPECTED LENGTH OF STAY: - - -  ACTUAL LENGTH OF STAY:          [address] MD Josh

## 2018-03-15 ENCOUNTER — APPOINTMENT (OUTPATIENT)
Dept: CT IMAGING | Age: 68
DRG: 871 | End: 2018-03-15
Attending: INTERNAL MEDICINE
Payer: MEDICARE

## 2018-03-15 LAB
ALBUMIN SERPL-MCNC: 2.4 G/DL (ref 3.5–5)
ALBUMIN/GLOB SERPL: 0.7 {RATIO} (ref 1.1–2.2)
ALP SERPL-CCNC: 79 U/L (ref 45–117)
ALT SERPL-CCNC: 26 U/L (ref 12–78)
ANION GAP SERPL CALC-SCNC: 10 MMOL/L (ref 5–15)
AST SERPL-CCNC: 45 U/L (ref 15–37)
BACTERIA SPEC CULT: ABNORMAL
BASOPHILS # BLD: 0.1 K/UL (ref 0–0.1)
BASOPHILS NFR BLD: 1 % (ref 0–1)
BILIRUB SERPL-MCNC: 0.9 MG/DL (ref 0.2–1)
BUN SERPL-MCNC: 15 MG/DL (ref 6–20)
BUN/CREAT SERPL: 44 (ref 12–20)
CALCIUM SERPL-MCNC: 7.2 MG/DL (ref 8.5–10.1)
CC UR VC: ABNORMAL
CHLORIDE SERPL-SCNC: 111 MMOL/L (ref 97–108)
CO2 SERPL-SCNC: 20 MMOL/L (ref 21–32)
CREAT SERPL-MCNC: 0.34 MG/DL (ref 0.55–1.02)
DIFFERENTIAL METHOD BLD: ABNORMAL
EOSINOPHIL # BLD: 0 K/UL (ref 0–0.4)
EOSINOPHIL NFR BLD: 0 % (ref 0–7)
ERYTHROCYTE [DISTWIDTH] IN BLOOD BY AUTOMATED COUNT: 15.9 % (ref 11.5–14.5)
GLOBULIN SER CALC-MCNC: 3.3 G/DL (ref 2–4)
GLUCOSE SERPL-MCNC: 121 MG/DL (ref 65–100)
HCT VFR BLD AUTO: 29.8 % (ref 35–47)
HGB BLD-MCNC: 10 G/DL (ref 11.5–16)
IMM GRANULOCYTES # BLD: 0 K/UL
IMM GRANULOCYTES NFR BLD AUTO: 0 %
LYMPHOCYTES # BLD: 0.7 K/UL (ref 0.8–3.5)
LYMPHOCYTES NFR BLD: 7 % (ref 12–49)
MAGNESIUM SERPL-MCNC: 2 MG/DL (ref 1.6–2.4)
MCH RBC QN AUTO: 33.4 PG (ref 26–34)
MCHC RBC AUTO-ENTMCNC: 33.6 G/DL (ref 30–36.5)
MCV RBC AUTO: 99.7 FL (ref 80–99)
MONOCYTES # BLD: 0.3 K/UL (ref 0–1)
MONOCYTES NFR BLD: 3 % (ref 5–13)
NEUTS BAND NFR BLD MANUAL: 2 % (ref 0–6)
NEUTS SEG # BLD: 9 K/UL (ref 1.8–8)
NEUTS SEG NFR BLD: 87 % (ref 32–75)
NRBC # BLD: 0 K/UL (ref 0–0.01)
NRBC BLD-RTO: 0 PER 100 WBC
PHOSPHATE SERPL-MCNC: 2.2 MG/DL (ref 2.6–4.7)
PLATELET # BLD AUTO: 128 K/UL (ref 150–400)
PMV BLD AUTO: 10.1 FL (ref 8.9–12.9)
POTASSIUM SERPL-SCNC: 4.2 MMOL/L (ref 3.5–5.1)
PROT SERPL-MCNC: 5.7 G/DL (ref 6.4–8.2)
RBC # BLD AUTO: 2.99 M/UL (ref 3.8–5.2)
RBC MORPH BLD: ABNORMAL
RBC MORPH BLD: ABNORMAL
SERVICE CMNT-IMP: ABNORMAL
SODIUM SERPL-SCNC: 141 MMOL/L (ref 136–145)
WBC # BLD AUTO: 10.1 K/UL (ref 3.6–11)
WBC MORPH BLD: ABNORMAL

## 2018-03-15 PROCEDURE — 74011250636 HC RX REV CODE- 250/636: Performed by: EMERGENCY MEDICINE

## 2018-03-15 PROCEDURE — 74011000258 HC RX REV CODE- 258: Performed by: INTERNAL MEDICINE

## 2018-03-15 PROCEDURE — 74011000250 HC RX REV CODE- 250: Performed by: INTERNAL MEDICINE

## 2018-03-15 PROCEDURE — 74011636320 HC RX REV CODE- 636/320: Performed by: INTERNAL MEDICINE

## 2018-03-15 PROCEDURE — 84100 ASSAY OF PHOSPHORUS: CPT | Performed by: INTERNAL MEDICINE

## 2018-03-15 PROCEDURE — 80053 COMPREHEN METABOLIC PANEL: CPT | Performed by: INTERNAL MEDICINE

## 2018-03-15 PROCEDURE — 74011250636 HC RX REV CODE- 250/636: Performed by: INTERNAL MEDICINE

## 2018-03-15 PROCEDURE — 36415 COLL VENOUS BLD VENIPUNCTURE: CPT | Performed by: INTERNAL MEDICINE

## 2018-03-15 PROCEDURE — C9113 INJ PANTOPRAZOLE SODIUM, VIA: HCPCS | Performed by: INTERNAL MEDICINE

## 2018-03-15 PROCEDURE — 70450 CT HEAD/BRAIN W/O DYE: CPT

## 2018-03-15 PROCEDURE — 83735 ASSAY OF MAGNESIUM: CPT | Performed by: INTERNAL MEDICINE

## 2018-03-15 PROCEDURE — 87040 BLOOD CULTURE FOR BACTERIA: CPT | Performed by: INTERNAL MEDICINE

## 2018-03-15 PROCEDURE — 71275 CT ANGIOGRAPHY CHEST: CPT

## 2018-03-15 PROCEDURE — 65660000001 HC RM ICU INTERMED STEPDOWN

## 2018-03-15 PROCEDURE — 74177 CT ABD & PELVIS W/CONTRAST: CPT

## 2018-03-15 PROCEDURE — 85025 COMPLETE CBC W/AUTO DIFF WBC: CPT | Performed by: INTERNAL MEDICINE

## 2018-03-15 RX ORDER — SODIUM CHLORIDE 0.9 % (FLUSH) 0.9 %
10 SYRINGE (ML) INJECTION
Status: COMPLETED | OUTPATIENT
Start: 2018-03-15 | End: 2018-03-15

## 2018-03-15 RX ADMIN — POTASSIUM CHLORIDE 10 MEQ: 200 INJECTION, SOLUTION INTRAVENOUS at 00:00

## 2018-03-15 RX ADMIN — IOPAMIDOL 100 ML: 755 INJECTION, SOLUTION INTRAVENOUS at 17:58

## 2018-03-15 RX ADMIN — Medication 10 ML: at 17:58

## 2018-03-15 RX ADMIN — SODIUM CHLORIDE: 900 INJECTION, SOLUTION INTRAVENOUS at 12:25

## 2018-03-15 RX ADMIN — PIPERACILLIN AND TAZOBACTAM 3.38 G: 3; .375 INJECTION, POWDER, FOR SOLUTION INTRAVENOUS at 19:03

## 2018-03-15 RX ADMIN — AMIODARONE HYDROCHLORIDE 1 MG/MIN: 50 INJECTION, SOLUTION INTRAVENOUS at 21:58

## 2018-03-15 RX ADMIN — Medication 5 ML: at 14:00

## 2018-03-15 RX ADMIN — PIPERACILLIN AND TAZOBACTAM 3.38 G: 3; .375 INJECTION, POWDER, FOR SOLUTION INTRAVENOUS at 10:14

## 2018-03-15 RX ADMIN — SODIUM CHLORIDE 40 MG: 9 INJECTION INTRAMUSCULAR; INTRAVENOUS; SUBCUTANEOUS at 17:10

## 2018-03-15 RX ADMIN — Medication 10 ML: at 21:57

## 2018-03-15 RX ADMIN — PIPERACILLIN AND TAZOBACTAM 3.38 G: 3; .375 INJECTION, POWDER, FOR SOLUTION INTRAVENOUS at 02:30

## 2018-03-15 RX ADMIN — SODIUM CHLORIDE 40 MG: 9 INJECTION INTRAMUSCULAR; INTRAVENOUS; SUBCUTANEOUS at 04:48

## 2018-03-15 RX ADMIN — FOLIC ACID 1 MG: 5 INJECTION, SOLUTION INTRAMUSCULAR; INTRAVENOUS; SUBCUTANEOUS at 09:00

## 2018-03-15 RX ADMIN — SODIUM CHLORIDE 100 ML: 900 INJECTION, SOLUTION INTRAVENOUS at 17:58

## 2018-03-15 RX ADMIN — Medication 2 MCG/MIN: at 04:49

## 2018-03-15 RX ADMIN — AMIODARONE HYDROCHLORIDE 0.5 MG/MIN: 50 INJECTION, SOLUTION INTRAVENOUS at 04:48

## 2018-03-15 RX ADMIN — VANCOMYCIN HYDROCHLORIDE 750 MG: 750 INJECTION, POWDER, LYOPHILIZED, FOR SOLUTION INTRAVENOUS at 15:09

## 2018-03-15 NOTE — PROGRESS NOTES
[facility] Progress Note    3/15/2018 8:48 AM  Admit Date: 3/13/2018  Admit Diagnosis: SVT (supraventricular tachycardia) (HCC)    Assessment/Plan     1. SVT- resolved on IV Amiodarone infusion, in sinus tachycardia -110bpm currently, will continue IV Amiodarone until GI testing is complete for stabilization of rhythm  -pt declined cardioversion/ablation/etc   -BP dropped with IV Adenosine, SVT refractory to vagal manuevers  2. GIB- Hgb up to 10.0 today after PRBC infusions, GI planning for EGD/colonoscopy once stable, on PPI   -ok to proceed with testing from cardiac standpoint today   3. Abnormal TSH- T4 ok at 9.6  4. UTI/urosepsis- cultures pending, antibiotics per primary team   5. Troponin elevation- nonspecific in current setting, TTE normal  6. Protein calorie malnutrition PTA Body mass index is 18.71 kg/(m^2). prealbumin 6.4 GI evaluation ongoing  7. BNP elevation- appropriate for degeree of tachycardia, etc, will recheck in AM  8. Hypomagnesemia - resolved  9. Hypotension - resolved, off IV levophed now    Echo 3/18 - LVEF 55 % to 60 %, no WMA, grade 1 dd, mildly dilated LA, mild to mod MR, mild TR, mild pulmonary HTN    Soc no tob no etoh   FHx + SVT    Subjective:     Purvi Gallagher denies chest pain, dyspnea, palpitations. Still on IV Amiodarone for rhythm control, off IV Levophed for hypotension now. Awaiting GI workup per patient for GI bleed. + weight loss, nausea, a month of gastritis - told patient she needs GI testing. Objective:      Physical Exam:  Visit Vitals    /67 (BP 1 Location: Left arm, BP Patient Position: At rest)    Pulse 95    Temp 98.2 °F (36.8 °C)    Resp 28    Ht 5' 5\" (1.651 m)    Wt 112 lb 7 oz (51 kg)    SpO2 94%    BMI 18.71 kg/m2     General Appearance:  Well developed, well nourished, alert and oriented x 3, in no acute distress. Ears/Nose/Mouth/Throat:   Hearing grossly normal.         Neck: Supple.  Left IJ line in place Chest:   Lungs clear to auscultation bilaterally. Cardiovascular:  Slightly tachycardic, regular rhythm, S1, S2 normal, no murmur. Abdomen:   Soft, non-tender, bowel sounds are active. Extremities: No edema bilaterally. Skin: Warm and dry. Telemetry: sinus tachycardia -110bpm    Data Review:   Labs:    Recent Results (from the past 24 hour(s))   METABOLIC PANEL, BASIC    Collection Time: 03/14/18  5:50 PM   Result Value Ref Range    Sodium 143 136 - 145 mmol/L    Potassium 3.6 3.5 - 5.1 mmol/L    Chloride 111 (H) 97 - 108 mmol/L    CO2 20 (L) 21 - 32 mmol/L    Anion gap 12 5 - 15 mmol/L    Glucose 136 (H) 65 - 100 mg/dL    BUN 18 6 - 20 MG/DL    Creatinine 0.33 (L) 0.55 - 1.02 MG/DL    BUN/Creatinine ratio 55 (H) 12 - 20      GFR est AA >60 >60 ml/min/1.73m2    GFR est non-AA >60 >60 ml/min/1.73m2    Calcium 6.8 (L) 8.5 - 10.1 MG/DL   MAGNESIUM    Collection Time: 03/14/18  5:50 PM   Result Value Ref Range    Magnesium 1.6 1.6 - 2.4 mg/dL   PHOSPHORUS    Collection Time: 03/14/18  5:50 PM   Result Value Ref Range    Phosphorus 2.4 (L) 2.6 - 4.7 MG/DL   CBC WITH AUTOMATED DIFF    Collection Time: 03/15/18  2:45 AM   Result Value Ref Range    WBC 10.1 3.6 - 11.0 K/uL    RBC 2.99 (L) 3.80 - 5.20 M/uL    HGB 10.0 (L) 11.5 - 16.0 g/dL    HCT 29.8 (L) 35.0 - 47.0 %    MCV 99.7 (H) 80.0 - 99.0 FL    MCH 33.4 26.0 - 34.0 PG    MCHC 33.6 30.0 - 36.5 g/dL    RDW 15.9 (H) 11.5 - 14.5 %    PLATELET 472 (L) 897 - 400 K/uL    MPV 10.1 8.9 - 12.9 FL    NRBC 0.0 0  WBC    ABSOLUTE NRBC 0.00 0.00 - 0.01 K/uL    NEUTROPHILS 87 (H) 32 - 75 %    BAND NEUTROPHILS 2 0 - 6 %    LYMPHOCYTES 7 (L) 12 - 49 %    MONOCYTES 3 (L) 5 - 13 %    EOSINOPHILS 0 0 - 7 %    BASOPHILS 1 0 - 1 %    IMMATURE GRANULOCYTES 0 %    ABS. NEUTROPHILS 9.0 (H) 1.8 - 8.0 K/UL    ABS. LYMPHOCYTES 0.7 (L) 0.8 - 3.5 K/UL    ABS. MONOCYTES 0.3 0.0 - 1.0 K/UL    ABS. EOSINOPHILS 0.0 0.0 - 0.4 K/UL    ABS.  BASOPHILS 0.1 0.0 - 0.1 K/UL ABS. IMM. GRANS. 0.0 K/UL    DF MANUAL      RBC COMMENTS ANISOCYTOSIS  1+        RBC COMMENTS POLYCHROMASIA  PRESENT        WBC COMMENTS VACUOLATED POLYS     METABOLIC PANEL, COMPREHENSIVE    Collection Time: 03/15/18  2:45 AM   Result Value Ref Range    Sodium 141 136 - 145 mmol/L    Potassium 4.2 3.5 - 5.1 mmol/L    Chloride 111 (H) 97 - 108 mmol/L    CO2 20 (L) 21 - 32 mmol/L    Anion gap 10 5 - 15 mmol/L    Glucose 121 (H) 65 - 100 mg/dL    BUN 15 6 - 20 MG/DL    Creatinine 0.34 (L) 0.55 - 1.02 MG/DL    BUN/Creatinine ratio 44 (H) 12 - 20      GFR est AA >60 >60 ml/min/1.73m2    GFR est non-AA >60 >60 ml/min/1.73m2    Calcium 7.2 (L) 8.5 - 10.1 MG/DL    Bilirubin, total 0.9 0.2 - 1.0 MG/DL    ALT (SGPT) 26 12 - 78 U/L    AST (SGOT) 45 (H) 15 - 37 U/L    Alk.  phosphatase 79 45 - 117 U/L    Protein, total 5.7 (L) 6.4 - 8.2 g/dL    Albumin 2.4 (L) 3.5 - 5.0 g/dL    Globulin 3.3 2.0 - 4.0 g/dL    A-G Ratio 0.7 (L) 1.1 - 2.2     MAGNESIUM    Collection Time: 03/15/18  2:45 AM   Result Value Ref Range    Magnesium 2.0 1.6 - 2.4 mg/dL   PHOSPHORUS    Collection Time: 03/15/18  2:45 AM   Result Value Ref Range    Phosphorus 2.2 (L) 2.6 - 4.7 MG/DL           Current Facility-Administered Medications   Medication Dose Route Frequency    0.9% sodium chloride infusion  75 mL/hr IntraVENous CONTINUOUS    sodium chloride (NS) flush 5-10 mL  5-10 mL IntraVENous Q8H    sodium chloride (NS) flush 5-10 mL  5-10 mL IntraVENous PRN    acetaminophen (TYLENOL) tablet 650 mg  650 mg Oral Q4H PRN    pantoprazole (PROTONIX) 40 mg in sodium chloride (NS) 10 mL injection  40 mg IntraVENous Q12H    Vancomycin Pharmacy Dosing   Other Rx Dosing/Monitoring    piperacillin-tazobactam (ZOSYN) 3.375 g in 0.9% sodium chloride (MBP/ADV) 100 mL  3.375 g IntraVENous Q8H    vancomycin (VANCOCIN) 750 mg in 0.9% sodium chloride (MBP/ADV) 250 mL  750 mg IntraVENous C50N    folic acid (FOLVITE) 5 mg/mL injection 1 mg  1 mg IntraVENous DAILY  cyanocobalamin (VITAMIN B12) injection 1,000 mcg  1,000 mcg IntraMUSCular Q30D    sodium chloride (NS) flush 5-10 mL  5-10 mL IntraVENous PRN    0.9% sodium chloride infusion 250 mL  250 mL IntraVENous PRN    0.9% sodium chloride infusion 250 mL  250 mL IntraVENous PRN    amiodarone (CORDARONE) 375 mg/250 mL D5W infusion  1 mg/min IntraVENous TITRATE    ondansetron (ZOFRAN) injection 4 mg  4 mg IntraVENous Q4H PRN       Angel Myers MD  [facility] [address] [place] [address] [address] [address]  [facility]  [phone]

## 2018-03-15 NOTE — PROGRESS NOTES
Physical Therapy Screening:    An [facility] screening referral was triggered for physical therapy based on results obtained during the nursing admission assessment. The patients chart was reviewed and the patient is appropriate for a skilled therapy evaluation if there is a decline in functional mobility from baseline. Please order a consult for physical therapy if you are in agreement and would like an evaluation to be completed. Thank you.     Richard Ramos, PT

## 2018-03-15 NOTE — INTERDISCIPLINARY ROUNDS
IDR/SLIDR Summary          Patient: Kita Glasgow MRN: [id number]    Age: 69 y.o. YOB: 1950 Room/Bed: [address] [place] [address]   Admit Diagnosis: SVT (supraventricular tachycardia) (HCC)  Principal Diagnosis: SVT (supraventricular tachycardia) (HCC)   Goals: Stable vital signs  Readmission: NO  Quality Measure: {Quality Measures:62401}  VTE Prophylaxis: Mechanical  Influenza Vaccine screening completed? YES  Pneumococcal Vaccine screening completed? YES  Mobility needs: {Yes/No Caps:59295}   Nutrition plan:{Yes/No Caps:75502}  Consults:{Consult needed:73189}    Financial concerns:{Yes/No Caps:22126}  Escalated to CM? {YES/NO:24297}  RRAT Score: ***   Interventions:{Intervention:75911}  Testing due for pt today?  YES  LOS: 2 days Expected length of stay *** days  Discharge plan: ***   PCP: None  Transportation needs: {Yes/No Caps:62544}    Days before discharge:two or more days before discharge   Discharge disposition: Home    Signed:     Rahat Domínguez RN  3/15/2018  1:28 AM

## 2018-03-15 NOTE — PROGRESS NOTES
1930: Bedside shift report received from [address].  at bedside. 2000: Patient agreed to go forward with CT imaging tomorrow during the day. 0730: Bedside shift change report given to Adiel Albarado RN (oncoming nurse) by Kai Alcala RN (offgoing nurse). Report included the following information SBAR, Kardex, Intake/Output, MAR and Recent Results.

## 2018-03-15 NOTE — PROGRESS NOTES
Primary Nurse Marry Owens RN and Janis RN performed a dual skin assessment on this patient No impairment noted    1947:Bedside and Verbal shift change report given to Dilia Perez (oncoming nurse) by Randa Dolan (offgoing nurse). Report included the following information SBAR, Kardex, Procedure Summary, Intake/Output, MAR, Accordion, Recent Results, Med Rec Status and Cardiac Rhythm NSR/ST.

## 2018-03-15 NOTE — PROGRESS NOTES
0730 Bedside shift change report given to Niecy Gordon (oncoming nurse) by Bren Panchal (offgoing nurse). Report included the following information SBAR, Intake/Output, MAR, Recent Results and Cardiac Rhythm Sinus Tach. 3326 Pt's /83 with MAP of 91. Levophed gtt stopped. 1216 TRANSFER - OUT REPORT:    Verbal report given to Sammy(name) on Kita Glasgow  being transferred to [facility]) for routine progression of care       Report consisted of patients Situation, Background, Assessment and   Recommendations(SBAR). Information from the following report(s) SBAR, Intake/Output, MAR, Recent Results and Cardiac Rhythm NSR was reviewed with the receiving nurse. Lines:   Triple Lumen Triple Lumen CVL to [facility] 03/13/18 Left Neck (Active)   Central Line Being Utilized Yes 3/15/2018  8:00 AM   Criteria for Appropriate Use Hemodynamically unstable, requiring monitoring lines, vasopressors, or volume resuscitation 3/15/2018  8:00 AM   Site Assessment Clean, dry, & intact 3/15/2018  8:00 AM   Infiltration Assessment 0 3/15/2018  8:00 AM   Affected Extremity/Extremities Color distal to insertion site pink (or appropriate for race); Pulses palpable;Range of motion performed 3/15/2018  8:00 AM   Date of Last Dressing Change 03/13/18 3/15/2018  8:00 AM   Dressing Status Clean, dry, & intact 3/15/2018  8:00 AM   Dressing Type Disk with Chlorhexadine gluconate (CHG) 3/15/2018  8:00 AM   Action Taken Open ports on tubing capped 3/15/2018  8:00 AM   Proximal Hub Color/Line Status White 3/15/2018  8:00 AM   Positive Blood Return (Medial Site) Yes 3/15/2018  8:00 AM   Medial Hub Color/Line Status Blue 3/15/2018  8:00 AM   Positive Blood Return (Lateral Site) Yes 3/15/2018  8:00 AM   Distal Hub Color/Line Status Brown 3/15/2018  8:00 AM   Positive Blood Return (Site #3) Yes 3/15/2018  8:00 AM   Alcohol Cap Used Yes 3/15/2018  8:00 AM       Peripheral IV 03/13/18 Left Antecubital (Active)   Site Assessment Clean, dry, & intact 3/15/2018 8:00 AM   Phlebitis Assessment 0 3/15/2018  8:00 AM   Infiltration Assessment 0 3/15/2018  8:00 AM   Dressing Status Clean, dry, & intact 3/15/2018  8:00 AM   Dressing Type Transparent 3/15/2018  8:00 AM   Hub Color/Line Status Pink 3/15/2018  8:00 AM   Action Taken Open ports on tubing capped 3/15/2018  8:00 AM   Alcohol Cap Used Yes 3/15/2018  8:00 AM       Peripheral IV 03/13/18 Left Hand (Active)   Site Assessment Clean, dry, & intact 3/15/2018  8:00 AM   Phlebitis Assessment 0 3/15/2018  8:00 AM   Infiltration Assessment 0 3/15/2018  8:00 AM   Dressing Status Clean, dry, & intact 3/15/2018  8:00 AM   Dressing Type Transparent 3/15/2018  8:00 AM   Hub Color/Line Status Pink 3/15/2018  8:00 AM   Action Taken Open ports on tubing capped 3/15/2018  8:00 AM   Alcohol Cap Used Yes 3/15/2018  8:00 AM        Opportunity for questions and clarification was provided.       Patient transported with:   Monitor  O2 @ 1 liters  Registered Nurse  [facility]

## 2018-03-15 NOTE — PROGRESS NOTES
Problem: Falls - Risk of  Goal: *Absence of Falls  Document Joseph Fall Risk and appropriate interventions in the flowsheet. Outcome: Progressing Towards Goal  Fall Risk Interventions:        pt wearing gripper socks, pt encouraged to ask for assistance to get out of bed. Pt encouraged to rise slowly. Call light within reach of pt and bed in lowest position and locked.

## 2018-03-15 NOTE — PROGRESS NOTES
Hospitalist Progress Note  Primo Brown MD  Answering service: [phone] from in house phone      Date of Service:  3/15/2018  NAME:  Alber Rodriguez  :  1950  MRN:  [id number]      Admission Summary:   70 yo woman with no significant medical history presented to the ED from home on 3/13/18 with progressively worsening nausea, coffee-ground emesis, diarrhea x3 days and abdominal pain. In the ED, patient was found to be in SVT and hypotensive. Pt refused cardioversion so she was started on amiodarone gtt. She was admitted tot he ICU for new onset SVT, UGIB, UTI, sepsis. Interval history / Subjective:   Feels better than she did yesterday; denies CP, SOB, palpitations, abdominal pain, f/c/n/v, diarrhea, dysuria now; , son, and sister at bedside; d/w nurse, off pressor as of this morning but still tachy at times     Assessment & Plan:     GNR UTI with septic shock (POA)  - tachycardia, lactic acidosis, fever, tachypnea, hypotension on admission  - BCx 3/13 GNR 1/2 bottles, 3/15 pending  - UCx 3/13 GNR  - C diff negative  - empiric vanc, Zosyn started 3/13; de-escalate as indicated  - septic shock resolved s/p IVF and norepinephrine gtt    SVT (POA)  - resolved with amiodarone gtt  - pt declined cardioversion  - Cardiology following  - troponin trending up  - TTE 3/14 EF 77-21%, grade 1 diastolic dysfunction    UGIB and ABLA (POA)  - FOBT positive  - B12 and folate deficiencies; supplements started  - s/p 2 units PRBC transfusion  - GI consulted but too unstable for EGD at this time  - continue IV PPI BID  - keep NPO on IVF  - CT chest/abd/pelvis pending    Hypokalemia, hypomagnesemia, hypophosphatemia - replete prn    Subclinical hypothyroidism - TSH mildly elevated but free T4 WNL and free T3 suppressed  - needs outpatient f/u in 6 weeks    Severe protein-calorie malnutrition/underweight, Body mass index is 18.71 kg/(m^2).    - prealbumin 6.4  - consult Nutrition once able to take in po    Pulmonary edema likely due to newly diagnosed diastolic heart failure, unknown chronicity  - NYHA Class 1  - decrease IVF rate  - NT-proBNP elevated and TTE as above    Code status: full  DVT prophylaxis: SCDs    Care Plan discussed with: Patient/Family, Nurse and Consultant GI  Disposition: TBD. Came from home. Has no PCP     Hospital Problems  Date Reviewed: 3/14/2018          Codes Class Noted POA    * (Principal)SVT (supraventricular tachycardia) ([facility] [address] ICD-10-CM: I47.1  ICD-9-CM: 427.89  3/13/2018 Yes            Review of Systems:   Pertinent items are noted in HPI. Vital Signs:    Last 24hrs VS reviewed since prior progress note.  Most recent are:  Visit Vitals    /63 (BP 1 Location: Left arm, BP Patient Position: At rest)    Pulse 97    Temp 98.4 °F (36.9 °C)    Resp 25    Ht 5' 5\" (1.651 m)    Wt 51 kg (112 lb 7 oz)    SpO2 92%    BMI 18.71 kg/m2       Intake/Output Summary (Last 24 hours) at 03/15/18 1223  Last data filed at 03/15/18 1200   Gross per 24 hour   Intake          3487.81 ml   Output             1600 ml   Net          1887.81 ml        Physical Examination:      Constitutional:  awake, no acute distress, cooperative, pleasant    ENT:  oral mucosa moist, oropharynx benign  Neck supple, no masses   Resp:  b/l rales, no wheeze   CV:  regular rhythm, normal rate, no m/r/g appreciated, no edema, +pulses    GI:  +BS, soft, non distended, non tender     Musculoskeletal:  moves all extremities    Neurologic:  AAOx3, NFD     Skin:  warm, dry  Eyes:  PERRL    Data Review:    Review and/or order of clinical lab test  Review and/or order of tests in the radiology section of CPT  Review and/or order of tests in the medicine section of CPT    Labs:     Recent Labs      03/15/18   0245  03/14/18   0507   WBC  10.1  8.2   HGB  10.0*  9.1*   HCT  29.8*  26.2*   PLT  128*  97*     Recent Labs      03/15/18   0245  03/14/18   1750 03/14/18   0507   NA  141  143  141   K  4.2  3.6  2.7*   CL  111*  111*  110*   CO2  20*  20*  24   BUN  15  18  24*   CREA  0.34*  0.33*  0.49*   GLU  121*  136*  166*   CA  7.2*  6.8*  7.2*   MG  2.0  1.6  1.9   PHOS  2.2*  2.4*  1.6*     Recent Labs      03/15/18   0245  03/14/18   0507  03/13/18   1830   SGOT  45*  33  39*   ALT  26  20  29   AP  79  68  94   TBILI  0.9  1.2*  1.2*   TP  5.7*  4.9*  6.4   ALB  2.4*  2.3*  2.8*   GLOB  3.3  2.6  3.6   LPSE   --    --   49*     No results for input(s): INR, PTP, APTT in the last 72 hours. No lab exists for component: INREXT, INREXT   Recent Labs      03/14/18   0507   TIBC  181*   PSAT  72*   FERR  172      Lab Results   Component Value Date/Time    Folate 3.7 (L) 03/14/2018 05:07 AM      No results for input(s): PH, PCO2, PO2 in the last 72 hours.   Recent Labs      03/14/18   0507  03/13/18   2359  03/13/18   1830   CPK  89   --    --    CKNDX  Cannot be calculated   --    --    TROIQ  0.14*  0.15*  0.05*  <0.04     Lab Results   Component Value Date/Time    Cholesterol, total 87 03/14/2018 05:07 AM    HDL Cholesterol 35 03/14/2018 05:07 AM    LDL, calculated 37 03/14/2018 05:07 AM    Triglyceride 75 03/14/2018 05:07 AM    CHOL/HDL Ratio 2.5 03/14/2018 05:07 AM     No results found for: [facility]  Lab Results   Component Value Date/Time    Color YELLOW/STRAW 03/13/2018 11:16 PM    Appearance CLOUDY (A) 03/13/2018 11:16 PM    Specific gravity 1.021 03/13/2018 11:16 PM    pH (UA) 6.0 03/13/2018 11:16 PM    Protein 100 (A) 03/13/2018 11:16 PM    Glucose 250 (A) 03/13/2018 11:16 PM    Ketone 15 (A) 03/13/2018 11:16 PM    Bilirubin NEGATIVE  03/13/2018 11:16 PM    Urobilinogen 0.2 03/13/2018 11:16 PM    Nitrites NEGATIVE  03/13/2018 11:16 PM    Leukocyte Esterase LARGE (A) 03/13/2018 11:16 PM    Epithelial cells FEW 03/13/2018 11:16 PM    Bacteria 4+ (A) 03/13/2018 11:16 PM    WBC >100 (H) 03/13/2018 11:16 PM    RBC >100 (H) 03/13/2018 11:16 PM     Medications Reviewed:     Current Facility-Administered Medications   Medication Dose Route Frequency    potassium phosphate 20 mmol in 0.9% sodium chloride 250 mL infusion   IntraVENous ONCE    0.9% sodium chloride infusion  75 mL/hr IntraVENous CONTINUOUS    sodium chloride (NS) flush 5-10 mL  5-10 mL IntraVENous Q8H    sodium chloride (NS) flush 5-10 mL  5-10 mL IntraVENous PRN    acetaminophen (TYLENOL) tablet 650 mg  650 mg Oral Q4H PRN    pantoprazole (PROTONIX) 40 mg in sodium chloride (NS) 10 mL injection  40 mg IntraVENous Q12H    Vancomycin Pharmacy Dosing   Other Rx Dosing/Monitoring    piperacillin-tazobactam (ZOSYN) 3.375 g in 0.9% sodium chloride (MBP/ADV) 100 mL  3.375 g IntraVENous Q8H    vancomycin (VANCOCIN) 750 mg in 0.9% sodium chloride (MBP/ADV) 250 mL  750 mg IntraVENous Q16H    NOREPINephrine (LEVOPHED) 8 mg in 5% dextrose 250mL infusion  2-16 mcg/min IntraVENous TITRATE    folic acid (FOLVITE) 5 mg/mL injection 1 mg  1 mg IntraVENous DAILY    cyanocobalamin (VITAMIN B12) injection 1,000 mcg  1,000 mcg IntraMUSCular Q30D    sodium chloride (NS) flush 5-10 mL  5-10 mL IntraVENous PRN    0.9% sodium chloride infusion 250 mL  250 mL IntraVENous PRN    0.9% sodium chloride infusion 250 mL  250 mL IntraVENous PRN    amiodarone (CORDARONE) 375 mg/250 mL D5W infusion  1 mg/min IntraVENous TITRATE    ondansetron (ZOFRAN) injection 4 mg  4 mg IntraVENous Q4H PRN     ______________________________________________________________________  EXPECTED LENGTH OF STAY: 3d 16h  ACTUAL LENGTH OF STAY:          2                 Trang Walsh MD

## 2018-03-15 NOTE — PROGRESS NOTES
[address]  [address]   [phone]                GI PROGRESS NOTE  Pamela [place], [facility]  Work Cell: [phone]      NAME:   Purvi Gallagher   :    1950   MRN:    [id number]     Assessment/Plan   1. Nausea, vomiting and diarrhea - possibly related to acute infectious process, however UTI and sepsis may be playing role as well. Symptoms improved. No further evaluation.   - Continue PPI and antibiotics  - Stool studies for any recurrent diarrhea  - Cultures pending  - Await results of CT scans   - Consider EGD/colonoscopy depending upon results of the above - discussed with patient whom is reluctant to proceed with either   2. Anemia - with heme positive stool, r/o upper/lower GI source of blood loss  - Monitor H&H, transfuse as needed   3. UTI with septic shock - improved   4. SVT - improved   5. Hypokalemia - improved      Patient Active Problem List   Diagnosis Code    SVT (supraventricular tachycardia) ([facility]) I47.1       Subjective:     Feeling better. Denies any nausea, vomiting, abdominal pain or diarrhea. Refused CT scans yesterday but now agreeable. Review of Systems    Constitutional: negative fever, negative chills, negative weight loss  Eyes:   negative visual changes  ENT:   negative sore throat, tongue or lip swelling  Respiratory:  negative cough, negative dyspnea  Cards:  negative for chest pain, palpitations, lower extremity edema  GI:   See HPI  :  negative for frequency, dysuria  Integument:  negative for rash and pruritus  Heme:  negative for easy bruising and gum/nose bleeding  Musculoskel: negative for myalgias, back pain and muscle weakness  Neuro: negative for headaches, dizziness, vertigo  Psych:  negative for feelings of anxiety, depression      Objective:     VITALS:   Last 24hrs VS reviewed since prior hospitalist progress note.  Most recent are:  Visit Vitals    /66 (BP 1 Location: Left arm, BP Patient Position: At rest)    Pulse 97    Temp 99 °F (37.2 °C)    Resp 27    Ht 5' 5\" (1.651 m)    Wt 51 kg (112 lb 7 oz)    SpO2 97%    BMI 18.71 kg/m2       Intake/Output Summary (Last 24 hours) at 03/15/18 1313  Last data filed at 03/15/18 1200   Gross per 24 hour   Intake          3487.81 ml   Output             1400 ml   Net          2087.81 ml        PHYSICAL EXAM:  General   well developed, thin, alert, in no acute distress  EENT  Normocephalic, Atraumatic, PERRLA, EOMI, sclera clear  Respiratory   Clear To Auscultation bilaterally - no wheezes, rales, rhonchi, or crackles  Cardiology  Regular Rate and Rythmn  - no murmurs, rubs or gallops  Abdominal  Soft, non-tender, non-distended, positive bowel sounds, no hepatosplenomegaly, no palpable mass  Extremities  No clubbing, cyanosis, or edema. Pulses intact. Neurological  No focal neurological deficits noted  Psychological  Oriented x 3. Normal affect. Lab Data   Recent Results (from the past 12 hour(s))   CBC WITH AUTOMATED DIFF    Collection Time: 03/15/18  2:45 AM   Result Value Ref Range    WBC 10.1 3.6 - 11.0 K/uL    RBC 2.99 (L) 3.80 - 5.20 M/uL    HGB 10.0 (L) 11.5 - 16.0 g/dL    HCT 29.8 (L) 35.0 - 47.0 %    MCV 99.7 (H) 80.0 - 99.0 FL    MCH 33.4 26.0 - 34.0 PG    MCHC 33.6 30.0 - 36.5 g/dL    RDW 15.9 (H) 11.5 - 14.5 %    PLATELET 966 (L) 509 - 400 K/uL    MPV 10.1 8.9 - 12.9 FL    NRBC 0.0 0  WBC    ABSOLUTE NRBC 0.00 0.00 - 0.01 K/uL    NEUTROPHILS 87 (H) 32 - 75 %    BAND NEUTROPHILS 2 0 - 6 %    LYMPHOCYTES 7 (L) 12 - 49 %    MONOCYTES 3 (L) 5 - 13 %    EOSINOPHILS 0 0 - 7 %    BASOPHILS 1 0 - 1 %    IMMATURE GRANULOCYTES 0 %    ABS. NEUTROPHILS 9.0 (H) 1.8 - 8.0 K/UL    ABS. LYMPHOCYTES 0.7 (L) 0.8 - 3.5 K/UL    ABS. MONOCYTES 0.3 0.0 - 1.0 K/UL    ABS. EOSINOPHILS 0.0 0.0 - 0.4 K/UL    ABS. BASOPHILS 0.1 0.0 - 0.1 K/UL    ABS. IMM.  GRANS. 0.0 K/UL    DF MANUAL      RBC COMMENTS ANISOCYTOSIS  1+        RBC COMMENTS POLYCHROMASIA  PRESENT        WBC COMMENTS VACUOLATED POLYS     METABOLIC PANEL, COMPREHENSIVE    Collection Time: 03/15/18  2:45 AM   Result Value Ref Range    Sodium 141 136 - 145 mmol/L    Potassium 4.2 3.5 - 5.1 mmol/L    Chloride 111 (H) 97 - 108 mmol/L    CO2 20 (L) 21 - 32 mmol/L    Anion gap 10 5 - 15 mmol/L    Glucose 121 (H) 65 - 100 mg/dL    BUN 15 6 - 20 MG/DL    Creatinine 0.34 (L) 0.55 - 1.02 MG/DL    BUN/Creatinine ratio 44 (H) 12 - 20      GFR est AA >60 >60 ml/min/1.73m2    GFR est non-AA >60 >60 ml/min/1.73m2    Calcium 7.2 (L) 8.5 - 10.1 MG/DL    Bilirubin, total 0.9 0.2 - 1.0 MG/DL    ALT (SGPT) 26 12 - 78 U/L    AST (SGOT) 45 (H) 15 - 37 U/L    Alk.  phosphatase 79 45 - 117 U/L    Protein, total 5.7 (L) 6.4 - 8.2 g/dL    Albumin 2.4 (L) 3.5 - 5.0 g/dL    Globulin 3.3 2.0 - 4.0 g/dL    A-G Ratio 0.7 (L) 1.1 - 2.2     MAGNESIUM    Collection Time: 03/15/18  2:45 AM   Result Value Ref Range    Magnesium 2.0 1.6 - 2.4 mg/dL   PHOSPHORUS    Collection Time: 03/15/18  2:45 AM   Result Value Ref Range    Phosphorus 2.2 (L) 2.6 - 4.7 MG/DL         Medications: Reviewed    PMH/ reviewed - no change compared to H&P  Mid-Level Provider: Levon Jha NP   Date/Time:  3/15/2018

## 2018-03-15 NOTE — PROGRESS NOTES
Problem: Falls - Risk of  Goal: *Absence of Falls  Document Joseph Fall Risk and appropriate interventions in the flowsheet.    Outcome: Progressing Towards Goal  Fall Risk Interventions:

## 2018-03-16 LAB
ALBUMIN SERPL-MCNC: 2.1 G/DL (ref 3.5–5)
ALBUMIN/GLOB SERPL: 0.8 {RATIO} (ref 1.1–2.2)
ALP SERPL-CCNC: 74 U/L (ref 45–117)
ALT SERPL-CCNC: 24 U/L (ref 12–78)
ANION GAP SERPL CALC-SCNC: 10 MMOL/L (ref 5–15)
AST SERPL-CCNC: 47 U/L (ref 15–37)
BASOPHILS # BLD: 0 K/UL (ref 0–0.1)
BASOPHILS NFR BLD: 0 % (ref 0–1)
BILIRUB SERPL-MCNC: 0.8 MG/DL (ref 0.2–1)
BNP SERPL-MCNC: 3010 PG/ML (ref 0–125)
BUN SERPL-MCNC: 12 MG/DL (ref 6–20)
BUN/CREAT SERPL: 35 (ref 12–20)
CALCIUM SERPL-MCNC: 7 MG/DL (ref 8.5–10.1)
CHLORIDE SERPL-SCNC: 109 MMOL/L (ref 97–108)
CO2 SERPL-SCNC: 20 MMOL/L (ref 21–32)
CREAT SERPL-MCNC: 0.34 MG/DL (ref 0.55–1.02)
DIFFERENTIAL METHOD BLD: ABNORMAL
EOSINOPHIL # BLD: 0 K/UL (ref 0–0.4)
EOSINOPHIL NFR BLD: 0 % (ref 0–7)
ERYTHROCYTE [DISTWIDTH] IN BLOOD BY AUTOMATED COUNT: 15.5 % (ref 11.5–14.5)
GLOBULIN SER CALC-MCNC: 2.8 G/DL (ref 2–4)
GLUCOSE SERPL-MCNC: 106 MG/DL (ref 65–100)
HCT VFR BLD AUTO: 28 % (ref 35–47)
HGB BLD-MCNC: 9.5 G/DL (ref 11.5–16)
IMM GRANULOCYTES # BLD: 0 K/UL (ref 0–0.04)
IMM GRANULOCYTES NFR BLD AUTO: 0 % (ref 0–0.5)
LYMPHOCYTES # BLD: 0.6 K/UL (ref 0.8–3.5)
LYMPHOCYTES NFR BLD: 11 % (ref 12–49)
MAGNESIUM SERPL-MCNC: 1.8 MG/DL (ref 1.6–2.4)
MCH RBC QN AUTO: 33.5 PG (ref 26–34)
MCHC RBC AUTO-ENTMCNC: 33.9 G/DL (ref 30–36.5)
MCV RBC AUTO: 98.6 FL (ref 80–99)
MONOCYTES # BLD: 0.5 K/UL (ref 0–1)
MONOCYTES NFR BLD: 9 % (ref 5–13)
NEUTS SEG # BLD: 4.7 K/UL (ref 1.8–8)
NEUTS SEG NFR BLD: 80 % (ref 32–75)
NRBC # BLD: 0 K/UL (ref 0–0.01)
NRBC BLD-RTO: 0 PER 100 WBC
PHOSPHATE SERPL-MCNC: 2.1 MG/DL (ref 2.6–4.7)
PLATELET # BLD AUTO: 121 K/UL (ref 150–400)
PMV BLD AUTO: 10.3 FL (ref 8.9–12.9)
POTASSIUM SERPL-SCNC: 3.4 MMOL/L (ref 3.5–5.1)
PROT SERPL-MCNC: 4.9 G/DL (ref 6.4–8.2)
RBC # BLD AUTO: 2.84 M/UL (ref 3.8–5.2)
RBC MORPH BLD: ABNORMAL
RBC MORPH BLD: ABNORMAL
SODIUM SERPL-SCNC: 139 MMOL/L (ref 136–145)
WBC # BLD AUTO: 5.8 K/UL (ref 3.6–11)
WBC MORPH BLD: ABNORMAL

## 2018-03-16 PROCEDURE — 74011250636 HC RX REV CODE- 250/636: Performed by: INTERNAL MEDICINE

## 2018-03-16 PROCEDURE — C9113 INJ PANTOPRAZOLE SODIUM, VIA: HCPCS | Performed by: INTERNAL MEDICINE

## 2018-03-16 PROCEDURE — 36415 COLL VENOUS BLD VENIPUNCTURE: CPT | Performed by: NURSE PRACTITIONER

## 2018-03-16 PROCEDURE — 83880 ASSAY OF NATRIURETIC PEPTIDE: CPT | Performed by: NURSE PRACTITIONER

## 2018-03-16 PROCEDURE — 74011000258 HC RX REV CODE- 258: Performed by: INTERNAL MEDICINE

## 2018-03-16 PROCEDURE — 84100 ASSAY OF PHOSPHORUS: CPT | Performed by: INTERNAL MEDICINE

## 2018-03-16 PROCEDURE — 74011250637 HC RX REV CODE- 250/637: Performed by: NURSE PRACTITIONER

## 2018-03-16 PROCEDURE — 65660000000 HC RM CCU STEPDOWN

## 2018-03-16 PROCEDURE — 74011250637 HC RX REV CODE- 250/637: Performed by: INTERNAL MEDICINE

## 2018-03-16 PROCEDURE — 74011250636 HC RX REV CODE- 250/636: Performed by: NURSE PRACTITIONER

## 2018-03-16 PROCEDURE — 85025 COMPLETE CBC W/AUTO DIFF WBC: CPT | Performed by: INTERNAL MEDICINE

## 2018-03-16 PROCEDURE — 83735 ASSAY OF MAGNESIUM: CPT | Performed by: NURSE PRACTITIONER

## 2018-03-16 PROCEDURE — 80053 COMPREHEN METABOLIC PANEL: CPT | Performed by: INTERNAL MEDICINE

## 2018-03-16 RX ORDER — PANTOPRAZOLE SODIUM 40 MG/1
40 TABLET, DELAYED RELEASE ORAL
Status: DISCONTINUED | OUTPATIENT
Start: 2018-03-16 | End: 2018-03-21 | Stop reason: HOSPADM

## 2018-03-16 RX ORDER — SODIUM,POTASSIUM PHOSPHATES 280-250MG
1 POWDER IN PACKET (EA) ORAL 4 TIMES DAILY
Status: DISCONTINUED | OUTPATIENT
Start: 2018-03-16 | End: 2018-03-17

## 2018-03-16 RX ORDER — FOLIC ACID 1 MG/1
1 TABLET ORAL DAILY
Status: DISCONTINUED | OUTPATIENT
Start: 2018-03-16 | End: 2018-03-21 | Stop reason: HOSPADM

## 2018-03-16 RX ORDER — POTASSIUM CHLORIDE 750 MG/1
40 TABLET, FILM COATED, EXTENDED RELEASE ORAL ONCE
Status: COMPLETED | OUTPATIENT
Start: 2018-03-16 | End: 2018-03-16

## 2018-03-16 RX ORDER — POTASSIUM CHLORIDE 750 MG/1
40 TABLET, FILM COATED, EXTENDED RELEASE ORAL
Status: COMPLETED | OUTPATIENT
Start: 2018-03-16 | End: 2018-03-16

## 2018-03-16 RX ORDER — AMIODARONE HYDROCHLORIDE 200 MG/1
200 TABLET ORAL DAILY
Status: DISCONTINUED | OUTPATIENT
Start: 2018-03-16 | End: 2018-03-21 | Stop reason: HOSPADM

## 2018-03-16 RX ORDER — LANOLIN ALCOHOL/MO/W.PET/CERES
400 CREAM (GRAM) TOPICAL DAILY
Status: DISCONTINUED | OUTPATIENT
Start: 2018-03-16 | End: 2018-03-20

## 2018-03-16 RX ORDER — MAGNESIUM SULFATE HEPTAHYDRATE 40 MG/ML
2 INJECTION, SOLUTION INTRAVENOUS ONCE
Status: COMPLETED | OUTPATIENT
Start: 2018-03-16 | End: 2018-03-16

## 2018-03-16 RX ADMIN — POTASSIUM CHLORIDE 40 MEQ: 750 TABLET, EXTENDED RELEASE ORAL at 17:54

## 2018-03-16 RX ADMIN — Medication 10 ML: at 13:35

## 2018-03-16 RX ADMIN — PIPERACILLIN AND TAZOBACTAM 3.38 G: 3; .375 INJECTION, POWDER, FOR SOLUTION INTRAVENOUS at 02:54

## 2018-03-16 RX ADMIN — AMIODARONE HYDROCHLORIDE 200 MG: 200 TABLET ORAL at 09:08

## 2018-03-16 RX ADMIN — CEFTRIAXONE 1 G: 1 INJECTION, POWDER, FOR SOLUTION INTRAMUSCULAR; INTRAVENOUS at 12:28

## 2018-03-16 RX ADMIN — PANTOPRAZOLE SODIUM 40 MG: 40 TABLET, DELAYED RELEASE ORAL at 17:54

## 2018-03-16 RX ADMIN — POTASSIUM & SODIUM PHOSPHATES POWDER PACK 280-160-250 MG 1 PACKET: 280-160-250 PACK at 17:53

## 2018-03-16 RX ADMIN — PIPERACILLIN AND TAZOBACTAM 3.38 G: 3; .375 INJECTION, POWDER, FOR SOLUTION INTRAVENOUS at 09:07

## 2018-03-16 RX ADMIN — MAGNESIUM SULFATE HEPTAHYDRATE 2 G: 40 INJECTION, SOLUTION INTRAVENOUS at 08:58

## 2018-03-16 RX ADMIN — Medication 400 MG: at 11:40

## 2018-03-16 RX ADMIN — SODIUM CHLORIDE 40 MG: 9 INJECTION INTRAMUSCULAR; INTRAVENOUS; SUBCUTANEOUS at 05:45

## 2018-03-16 RX ADMIN — POTASSIUM CHLORIDE 40 MEQ: 750 TABLET, EXTENDED RELEASE ORAL at 08:53

## 2018-03-16 RX ADMIN — VANCOMYCIN HYDROCHLORIDE 750 MG: 750 INJECTION, POWDER, LYOPHILIZED, FOR SOLUTION INTRAVENOUS at 05:45

## 2018-03-16 RX ADMIN — Medication 10 ML: at 05:46

## 2018-03-16 RX ADMIN — SODIUM CHLORIDE 75 ML/HR: 900 INJECTION, SOLUTION INTRAVENOUS at 13:35

## 2018-03-16 RX ADMIN — FOLIC ACID 1 MG: 1 TABLET ORAL at 11:47

## 2018-03-16 NOTE — PROGRESS NOTES
[facility] Progress Note    3/16/2018 8:30 AM  Admit Date: 3/13/2018  Admit Diagnosis: SVT (supraventricular tachycardia) (HCC)    Assessment/Plan     1. SVT- resolved on IV Amiodarone infusion, in NSR currently on IV Amiodarone, since no inpatient GI procedures are being performed will stop IV Amiodarone and begin Amiodarone 200mg daily, will consult EP/Dr. Man Garcia regarding medication recommendations for long term suppression of SVT, patient not interested in ablation, declined cardioversion   -Adenosine worked x 2 then not responsive, SVT refractory to vagal manuevers after initial success. Initial presentation complicated by refractory hypotension, dehydration, electrolyte abnormalities and gi bleed  2. GIB- Hgb stable after PRBC infusions, patient reports that GI is not planning to do any inpatient procedures, on PPI   -ok to proceed with testing from cardiac standpoint today   3. Abnormal TSH- T4 ok at 9.6  4. UTI/urosepsis- cultures pending, antibiotics per primary team   5. Troponin elevation- nonspecific in current setting, TTE normal  6. Protein calorie malnutrition PTA Body mass index is 18.71 kg/(m^2). prealbumin 6.4 GI evaluation ongoing  7. BNP elevation- appropriate for degeree of tachycardia, etc  8. Hypomagnesemia - Mg 1.8, will give Mg Sulfate 2g IV once now, will begin Mg Oxide 400mg daily   9. Hypotension - resolved, off IV levophed   10. Hypokalemia - K 3.4, will give KCL 40meq PO once now  11. Etoh use - possibly significant use PTA, pt avoids discussion    Echo 3/18 - LVEF 55 % to 60 %, no WMA, grade 1 dd, mildly dilated LA, mild to mod MR, mild TR, mild pulmonary HTN    Soc no tob   FHx + SVT    Subjective:     Mariann Linares denies chest pain, dyspnea, palpitations. Still on IV Amiodarone for rhythm control. Says GI is not planning any inpatient procedures for now. Wants to go home. Discussed medical management of SVT and EP consult today.   She is agreeable to this.      Objective:      Physical Exam:  Visit Vitals    /78 (BP 1 Location: Right arm, BP Patient Position: At rest)    Pulse 92    Temp 98.1 °F (36.7 °C)    Resp 26    Ht 5' 5\" (1.651 m)    Wt 118 lb 13.3 oz (53.9 kg)    SpO2 93%    BMI 19.77 kg/m2     General Appearance:  Well developed, well nourished, alert and oriented x 3, in no acute distress. Ears/Nose/Mouth/Throat:   Hearing grossly normal.         Neck: Supple. Left IJ line in place   Chest:   Lungs clear to auscultation bilaterally. Cardiovascular:  Normal rate and regular rhythm, S1, S2 normal, no murmur. Abdomen:   Soft, non-tender, bowel sounds are active. Extremities: No edema bilaterally. Skin: Warm and dry. Telemetry: normal sinus rhythm    Data Review:   Labs:    Recent Results (from the past 24 hour(s))   MAGNESIUM    Collection Time: 03/16/18  5:44 AM   Result Value Ref Range    Magnesium 1.8 1.6 - 2.4 mg/dL   NT-PRO BNP    Collection Time: 03/16/18  5:44 AM   Result Value Ref Range    NT pro-BNP 3010 (H) 0 - 125 PG/ML   CBC WITH AUTOMATED DIFF    Collection Time: 03/16/18  5:44 AM   Result Value Ref Range    WBC 5.8 3.6 - 11.0 K/uL    RBC 2.84 (L) 3.80 - 5.20 M/uL    HGB 9.5 (L) 11.5 - 16.0 g/dL    HCT 28.0 (L) 35.0 - 47.0 %    MCV 98.6 80.0 - 99.0 FL    MCH 33.5 26.0 - 34.0 PG    MCHC 33.9 30.0 - 36.5 g/dL    RDW 15.5 (H) 11.5 - 14.5 %    PLATELET 820 (L) 285 - 400 K/uL    MPV 10.3 8.9 - 12.9 FL    NRBC 0.0 0  WBC    ABSOLUTE NRBC 0.00 0.00 - 0.01 K/uL    NEUTROPHILS 80 (H) 32 - 75 %    LYMPHOCYTES 11 (L) 12 - 49 %    MONOCYTES 9 5 - 13 %    EOSINOPHILS 0 0 - 7 %    BASOPHILS 0 0 - 1 %    IMMATURE GRANULOCYTES 0 0.0 - 0.5 %    ABS. NEUTROPHILS 4.7 1.8 - 8.0 K/UL    ABS. LYMPHOCYTES 0.6 (L) 0.8 - 3.5 K/UL    ABS. MONOCYTES 0.5 0.0 - 1.0 K/UL    ABS. EOSINOPHILS 0.0 0.0 - 0.4 K/UL    ABS. BASOPHILS 0.0 0.0 - 0.1 K/UL    ABS. IMM.  GRANS. 0.0 0.00 - 0.04 K/UL    DF SMEAR SCANNED      RBC COMMENTS ANISOCYTOSIS  1+        RBC COMMENTS MACROCYTOSIS  1+        WBC COMMENTS TOXIC GRANULATION     METABOLIC PANEL, COMPREHENSIVE    Collection Time: 03/16/18  5:44 AM   Result Value Ref Range    Sodium 139 136 - 145 mmol/L    Potassium 3.4 (L) 3.5 - 5.1 mmol/L    Chloride 109 (H) 97 - 108 mmol/L    CO2 20 (L) 21 - 32 mmol/L    Anion gap 10 5 - 15 mmol/L    Glucose 106 (H) 65 - 100 mg/dL    BUN 12 6 - 20 MG/DL    Creatinine 0.34 (L) 0.55 - 1.02 MG/DL    BUN/Creatinine ratio 35 (H) 12 - 20      GFR est AA >60 >60 ml/min/1.73m2    GFR est non-AA >60 >60 ml/min/1.73m2    Calcium 7.0 (L) 8.5 - 10.1 MG/DL    Bilirubin, total 0.8 0.2 - 1.0 MG/DL    ALT (SGPT) 24 12 - 78 U/L    AST (SGOT) 47 (H) 15 - 37 U/L    Alk.  phosphatase 74 45 - 117 U/L    Protein, total 4.9 (L) 6.4 - 8.2 g/dL    Albumin 2.1 (L) 3.5 - 5.0 g/dL    Globulin 2.8 2.0 - 4.0 g/dL    A-G Ratio 0.8 (L) 1.1 - 2.2     PHOSPHORUS    Collection Time: 03/16/18  5:44 AM   Result Value Ref Range    Phosphorus 2.1 (L) 2.6 - 4.7 MG/DL           Current Facility-Administered Medications   Medication Dose Route Frequency    magnesium sulfate 2 g/50 ml IVPB (premix or compounded)  2 g IntraVENous ONCE    amiodarone (CORDARONE) tablet 200 mg  200 mg Oral DAILY    magnesium oxide (MAG-OX) tablet 400 mg  400 mg Oral DAILY    0.9% sodium chloride infusion  75 mL/hr IntraVENous CONTINUOUS    sodium chloride (NS) flush 5-10 mL  5-10 mL IntraVENous Q8H    sodium chloride (NS) flush 5-10 mL  5-10 mL IntraVENous PRN    acetaminophen (TYLENOL) tablet 650 mg  650 mg Oral Q4H PRN    pantoprazole (PROTONIX) 40 mg in sodium chloride (NS) 10 mL injection  40 mg IntraVENous Q12H    Vancomycin Pharmacy Dosing   Other Rx Dosing/Monitoring    piperacillin-tazobactam (ZOSYN) 3.375 g in 0.9% sodium chloride (MBP/ADV) 100 mL  3.375 g IntraVENous Q8H    vancomycin (VANCOCIN) 750 mg in 0.9% sodium chloride (MBP/ADV) 250 mL  750 mg IntraVENous I15W    folic acid (FOLVITE) 5 mg/mL injection 1 mg  1 mg IntraVENous DAILY    cyanocobalamin (VITAMIN B12) injection 1,000 mcg  1,000 mcg IntraMUSCular Q30D    sodium chloride (NS) flush 5-10 mL  5-10 mL IntraVENous PRN    0.9% sodium chloride infusion 250 mL  250 mL IntraVENous PRN    0.9% sodium chloride infusion 250 mL  250 mL IntraVENous PRN    ondansetron (ZOFRAN) injection 4 mg  4 mg IntraVENous Q4H PRN       Angel Myers MD  [facility] [address] [facility] [address]  [place]  [phone]

## 2018-03-16 NOTE — PROGRESS NOTES
NUTRITION COMPLETE ASSESSMENT    RECOMMENDATIONS:   1. Advance diet as tolerated  2. Encourage po intake when diet advanced  3. Weekly weights     Interventions/Plan:   Food/Nutrient Delivery:           RD to follow diet advancement, po intake, wt satus    Assessment:   Reason for Assessment:   [x]BPA/MST Referral     Diet: Clear liquids  Supplements: None  Nutritionally Significant Medications: [x] Reviewed & Includes: Vit B12, Folvite, Mag-Ox, Protonix, NS @75mL/hr  Meal Intake: No data found. Current Hospitalization:   Fluid Restriction:  none  Appetite:  fair  PO Ability: Independent Average po intake: unsure-diet just upgraded  Average supplements intake:  n/a      Subjective:  \"I usually weigh 102 lbs; how could I be 118 lbs when I haven't eaten anything over the past couple days. I like being 102 lbs. I don't want to gain any weight. If you send me Ensure I won't drink it. \"    Objective:  Pt seen for MST. Pt admitted with supraventricular tachycardia, upper GI bleed. Reviewed chart, spoke with pt and . Since admission pt found to have hypokalemia, hypomagnesemia, hypotension, anemia and subclinical hypothyroidism. Pt diet advanced this AM to clears- pt states drinking juice. Indicated that her  lbs (wt loss in past, but not recently; indicated wt stable for some time) and surprised that scale weighed her at 118 lbs. Offered Ensure clear while pt on clear liquid diet; pt declined- states she will not drink any supplements or eat snacks when diet advanced- wants to stay the same weight and not gain (understands that UBW of 102 lbs is underweight). Noted temporal and clavicular wasting. K and phosphorus low; being repleted. Will follow per protocol. Estimated Nutrition Needs:   Kcals/day: 1278 Kcals/day (1808-1950 kcal/day (MSJ x 1.2-1.3))  Protein: 54 g (54-59g/day (1-1.1g/kg))  Fluid: 1300 ml (1mL/kcal)  Based On:  Lianne [address]  Weight Used: Actual wt    Pt expected to meet estimated nutrient needs:  []   Yes     []  No [x] Unable to predict at this time    Nutrition Diagnosis:   1. Inadequate protein-energy intake related to clear liquid diet as evidenced by pt on clear liquid diet    Goals:      Pt diet to advance in next 1-2 days     Monitoring & Evaluation:    - Total energy intake   - Weight/weight change   -      Previous Nutrition Goals Met:   N/A  Previous Recommendations:    N/A    Education & Discharge Needs:   [x] None Identified   [] Identified and addressed    [] Participated in care plan, discharge planning, and/or interdisciplinary rounds        Cultural, Anglican and ethnic food preferences identified: None    Skin Integrity: [x]Intact  []Other  Edema: [x]None []Other  Last BM: 3/14/2018  Food Allergies: [x]None []Other  Diet Restrictions: Cultural/Mandaeism Preference(s): None     Anthropometrics:    Weight Loss Metrics 3/16/2018 3/13/2018   Today's Wt 118 lb 13.3 oz -   BMI - 19.77 kg/m2      Weight Source: Standing scale (comment)  Height: 5' 5\" (165.1 cm),    Body mass index is 19.77 kg/(m^2).    ,     ,      Labs:    Lab Results   Component Value Date/Time    Sodium 139 03/16/2018 05:44 AM    Potassium 3.4 (L) 03/16/2018 05:44 AM    Chloride 109 (H) 03/16/2018 05:44 AM    CO2 20 (L) 03/16/2018 05:44 AM    Glucose 106 (H) 03/16/2018 05:44 AM    BUN 12 03/16/2018 05:44 AM    Creatinine 0.34 (L) 03/16/2018 05:44 AM    Calcium 7.0 (L) 03/16/2018 05:44 AM    Magnesium 1.8 03/16/2018 05:44 AM    Phosphorus 2.1 (L) 03/16/2018 05:44 AM    Albumin 2.1 (L) 03/16/2018 05:44 AM     Lab Results   Component Value Date/Time    Hemoglobin A1c 4.7 03/13/2018 11:16 PM         Jakob Rivera RD

## 2018-03-16 NOTE — PROGRESS NOTES
Rosaline Peck is a 69 yr old female admitted due to UTI with septic shock, SVT, pulmonary edema and possible diastolic HF. Upon interview patient is alert and oriented x4. Patient verified demographics and NOK. Patient does not have a PCP. Provided patient a list of the [employer] PCP and encouraged her to review. Patient reports full independence with no assistive device. Patient drives but often has family take her places. Patient is not on home oxygen but is requiring 1 L NC. Need to try and wean the oxygen prior to discharge.     Patient would benefit from the [facility] to home Visit    Care Management Interventions  PCP Verified by CM: No  [employer] Signup: No  Discharge Durable Medical Equipment: No  Physical Therapy Consult: No  Occupational Therapy Consult: No  Speech Therapy Consult: No  Current Support Network: Lives with Spouse  Confirm Follow Up Transport: Family  Plan discussed with Pt/Family/Caregiver: Yes  Freedom of Choice Offered: Yes     [phone]

## 2018-03-16 NOTE — PROGRESS NOTES
TRANSFER - OUT REPORT:    Verbal report given to RAEANN Ochoa (name) on Jazmine Coffey  being transferred to [facility] (unit) for routine progression of care       Report consisted of patients Situation, Background, Assessment and   Recommendations(SBAR). Information from the following report(s) SBAR was reviewed with the receiving nurse.     Lines:   Triple Lumen Triple Lumen CVL to LIJ 03/13/18 Left Neck (Active)   Central Line Being Utilized Yes 3/15/2018 11:00 PM   Criteria for Appropriate Use Hemodynamically unstable, requiring monitoring lines, vasopressors, or volume resuscitation 3/15/2018 11:00 PM   Site Assessment Clean, dry, & intact 3/15/2018 11:00 PM   Infiltration Assessment 0 3/15/2018 11:00 PM   Affected Extremity/Extremities Color distal to insertion site pink (or appropriate for race) 3/15/2018 11:00 PM   Date of Last Dressing Change 03/13/18 3/15/2018 11:00 PM   Dressing Status Clean, dry, & intact 3/15/2018 11:00 PM   Dressing Type Disk with Chlorhexadine gluconate (CHG) 3/15/2018 11:00 PM   Action Taken Open ports on tubing capped 3/15/2018 11:00 PM   Proximal Hub Color/Line Status Yellow 3/15/2018 11:00 PM   Positive Blood Return (Medial Site) Yes 3/15/2018 11:00 PM   Medial Hub Color/Line Status Blue 3/15/2018 11:00 PM   Positive Blood Return (Lateral Site) Yes 3/15/2018 11:00 PM   Distal Hub Color/Line Status Brown 3/15/2018 11:00 PM   Positive Blood Return (Site #3) Yes 3/15/2018 11:00 PM   Alcohol Cap Used Yes 3/15/2018 11:00 PM       Peripheral IV 03/13/18 Left Antecubital (Active)   Site Assessment Clean, dry, & intact 3/16/2018  3:23 AM   Phlebitis Assessment 0 3/16/2018  3:23 AM   Infiltration Assessment 0 3/16/2018  3:23 AM   Dressing Status Clean, dry, & intact 3/16/2018  3:23 AM   Dressing Type Transparent 3/16/2018  3:23 AM   Hub Color/Line Status Capped;Flushed 3/16/2018  3:23 AM   Action Taken Open ports on tubing capped 3/16/2018  3:23 AM   Alcohol Cap Used Yes 3/16/2018  3:23 AM Peripheral IV 03/13/18 Left Hand (Active)   Site Assessment Clean, dry, & intact 3/16/2018  3:23 AM   Phlebitis Assessment 0 3/16/2018  3:23 AM   Infiltration Assessment 0 3/16/2018  3:23 AM   Dressing Status Clean, dry, & intact 3/16/2018  3:23 AM   Dressing Type Transparent 3/16/2018  3:23 AM   Hub Color/Line Status Capped;Flushed 3/16/2018  3:23 AM   Action Taken Open ports on tubing capped 3/16/2018  3:23 AM   Alcohol Cap Used Yes 3/16/2018  3:23 AM        Opportunity for questions and clarification was provided.       Patient transported with:   Mithridion

## 2018-03-16 NOTE — CONSULTS
Cardiac Electrophysiology Consultation Note     Subjective:      Tom Titus is a 69 y.o. patient who is seen for evaluation of PSVT  She is kindly referred from Dr Kemal Garcia  She was admitted with GI bleeding and GI consult has been obtained but will wait for EGD outpatient  She reported normal health until this week.   She said her weight is always on low side and she did not have palpitation until she was told about her arrhythmia  She said her son had PSVT and did not have ablation done but she did not know what he had, starting age 12  There is no family hx of arrhythmia or sudden death  She did not know she has RBBB  She admitted to drinking daily 1-3 glasses of wine but no liquor     PSVT did not break with adenosine reportedly but not sure if it was given large dose or via central line or not  She has not had recurrent PSVT with amiodarone    Echo 3/2018 - LVEF 55 % to 60 %, no WMA, grade 1 dd, mildly dilated LA, mild to mod MR, mild TR, mild pulmonary HTN    Patient Active Problem List   Diagnosis Code    SVT (supraventricular tachycardia) (formerly [facility]) I47.1     Current Facility-Administered Medications   Medication Dose Route Frequency Provider Last Rate Last Dose    amiodarone (CORDARONE) tablet 200 mg  200 mg Oral DAILY García Crystal, NP   200 mg at 03/16/18 0908    magnesium oxide (MAG-OX) tablet 400 mg  400 mg Oral DAILY García Crystal, NP   400 mg at 10/60/51 7583    folic acid (FOLVITE) tablet 1 mg  1 mg Oral DAILY Mathew Jacob MD   1 mg at 03/16/18 1147    cefTRIAXone (ROCEPHIN) 1 g in 0.9% sodium chloride (MBP/ADV) 50 mL  1 g IntraVENous Q24H Mathew Jacob  mL/hr at 03/16/18 1228 1 g at 03/16/18 1228    pantoprazole (PROTONIX) tablet 40 mg  40 mg Oral ACB&D Sue Rodriguez NP        potassium chloride SR (KLOR-CON 10) tablet 40 mEq  40 mEq Oral ONCE Mathew Jacob MD        potassium, sodium phosphates (NEUTRA-PHOS) packet 1 Packet  1 Packet Oral QID Sofie Laney Bullock MD        0.9% sodium chloride infusion  75 mL/hr IntraVENous CONTINUOUS Tyler Salcedo MD 75 mL/hr at 03/16/18 1335 75 mL/hr at 03/16/18 1335    sodium chloride (NS) flush 5-10 mL  5-10 mL IntraVENous Q8H Jim Lomax MD   10 mL at 03/16/18 1335    sodium chloride (NS) flush 5-10 mL  5-10 mL IntraVENous PRN Connie Brown MD        acetaminophen (TYLENOL) tablet 650 mg  650 mg Oral Q4H PRN Connie Brown MD   650 mg at 03/14/18 0507    cyanocobalamin (VITAMIN B12) injection 1,000 mcg  1,000 mcg IntraMUSCular Q30D Elizabeth Brown MD   1,000 mcg at 03/14/18 0957    sodium chloride (NS) flush 5-10 mL  5-10 mL IntraVENous PRN Naseem Lively, DO        0.9% sodium chloride infusion 250 mL  250 mL IntraVENous PRN Naseem Lively, DO        0.9% sodium chloride infusion 250 mL  250 mL IntraVENous PRN Naseem Lively, DO        ondansetron [facility]) injection 4 mg  4 mg IntraVENous Q4H PRN Connie Brown MD   4 mg at 03/14/18 1231     No Known Allergies  No past medical history. Past Surgical History:   Procedure Laterality Date    HX ORTHOPAEDIC      HX TONSILLECTOMY      HX WISDOM TEETH EXTRACTION       family history: see HPI  Social History   Substance Use Topics    Smoking status: Never Smoker    Smokeless tobacco: Never Used    Alcohol use No        Review of Systems:   Constitutional: Negative for fever, chills, weight loss, + malaise/fatigue. HEENT: Negative for nosebleeds, vision changes. Respiratory: Negative for cough, hemoptysis  Cardiovascular: Negative for chest pain, + palpitations, no orthopnea, claudication, leg swelling, syncope, and PND. Gastrointestinal: + for nausea, vomiting, diarrhea, no blood in stool and melena. Genitourinary: Negative for dysuria, and hematuria. Musculoskeletal: + for myalgias, arthralgia. Skin: Negative for rash. Heme: Does not bleed or bruise easily.    Neurological: Negative for speech change and focal weakness     Objective: Visit Vitals    /68    Pulse 95    Temp 98.2 °F (36.8 °C)    Resp 18    Ht 5' 5\" (1.651 m)    Wt 118 lb 13.3 oz (53.9 kg)    SpO2 96%    BMI 19.77 kg/m2      Physical Exam:   Constitutional: No respiratory distress. Thin pleasant woman  Head: Normocephalic and atraumatic. Eyes: Pupils are equal, round  ENT: hearing normal  Neck: supple. No JVD present. Cardiovascular: Normal rate, regular rhythm. Exam reveals no gallop and no friction rub. No murmur heard. Pulmonary/Chest: No wheezes. Abdominal: Soft, no tenderness. Musculoskeletal: no edema. Neurological: alert, oriented. Skin: Skin is warm and dry  Psychiatric: normal mood and affect. 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 She wants to have 30 day loop monitor sent to her house  We will order and follow up in office  I have discussed with Dr Kalli Ho and Dr Rema Lawrence as well    Future Appointments  Date Time Provider Jamie Allen   4/3/2018 2:00 PM Fina Staton  [address]          Thank you for involving me in this patient's care and please call with further concerns or questions. Rod Carmona M.D.   Electrophysiology/Cardiology  [facility]  [address], [address], [address]  [address], [address]                             [address] [place] [address]  [phone]

## 2018-03-16 NOTE — CONSULTS
Full note will follow  She is seen and examined  I spoke to her  and her and they did not want medications or ablation.   She wants 30 day loop recorder to her house  She may go home and follow up outpatient

## 2018-03-16 NOTE — PROGRESS NOTES
Hospitalist Progress Note  Jaret Patel MD  Answering service: [phone] from in house phone      Date of Service:  3/16/2018  NAME:  Sumanth Smith  :  1950  MRN:  [id number]      Admission Summary:   68 yo woman with no significant medical history presented to the ED from home on 3/13/18 with progressively worsening nausea, coffee-ground emesis, diarrhea x3 days and abdominal pain. In the ED, patient was found to be in SVT and hypotensive. Pt refused cardioversion so she was started on amiodarone gtt. She was admitted tot he ICU for new onset SVT, UGIB, UTI, sepsis.     Interval history / Subjective:   Just had bowel incontinence with large amount of liquid green stool; denies CP, SOB, palpitations, abdominal pain, f/c/n/v, dysuria now;  at bedside; d/w nurse, transferred to remote telemetry     Assessment & Plan:     E coli bacteremia and UTI with septic shock (POA)  - tachycardia, lactic acidosis, fever, tachypnea, hypotension on admission  - BCx 3/13 E coli 1/2 bottles, 3/15 NGTD; unlikely contaminant as same organism  - UCx 3/13 E coli  - C diff negative  - s/p empiric vanc, Zosyn 3/13-3/16  - ceftriaxone started 3/16 and will need 10-14 days  - septic shock resolved s/p IVF and norepinephrine gtt  - would not recommend levofloxacin due to recent SVT    SVT (POA)  - resolved with amiodarone gtt  - pt declined cardioversion/ablation  - Cardiology following  - EPS consulted but pt wants to have outpatient 30-day loop recorder  - troponin peaked at 0.15 and trended down  - TTE 3/14 EF 91-99%, grade 1 diastolic dysfunction    UGIB and ABLA (POA)  - FOBT positive  - B12 and folate deficiencies; supplements started  - s/p 2 units PRBC transfusion  - GI consulted: recommending EUS for possible IPMN  - continue PPI BID  - now on full liquids and ADAT  - CT chest/abd/pelvis 3/15 ascites, anasarca, 1.5cm pancreatic head cyst likely representing an incidental side branch  IPMN    Mild compression deformity fracture of T12   - seen on CT a/p  - seems asymptomatic but significant generalized weakness  - PT/OT evals  - check vitamin D    Multiple locules of gas in face, orbits, neck  - seen on CT head and CTA chest 3/15  - pt asymptomatic  - spoke with radiology Kriss Llamas 3/16 appears to be intravascular air and if pt is asymptomatic is likely clinically insignificant    Hypokalemia, hypomagnesemia, hypophosphatemia - replete prn    Subclinical hypothyroidism - TSH mildly elevated but free T4 WNL and free T3 suppressed  - needs outpatient f/u in 6 weeks    Severe protein-calorie malnutrition/underweight, Body mass index is 19.77 kg/(m^2). - prealbumin 6.4  - consult Nutrition    Pulmonary edema likely due to newly diagnosed diastolic heart failure, unknown chronicity  - NYHA Class 1  - decrease IVF rate  - NT-proBNP elevated and TTE as above    Code status: full  DVT prophylaxis: SCDs    Care Plan discussed with: Patient/Family, Nurse and Consultant GI  Disposition: TBD. Came from home. Has no PCP     Hospital Problems  Date Reviewed: 3/14/2018          Codes Class Noted POA    * (Principal)SVT (supraventricular tachycardia) ([facility] [address] ICD-10-CM: I47.1  ICD-9-CM: 427.89  3/13/2018 Yes            Review of Systems:   Pertinent items are noted in HPI. Vital Signs:    Last 24hrs VS reviewed since prior progress note.  Most recent are:  Visit Vitals    /68    Pulse 95    Temp 98.2 °F (36.8 °C)    Resp 18    Ht 5' 5\" (1.651 m)    Wt 53.9 kg (118 lb 13.3 oz)    SpO2 96%    BMI 19.77 kg/m2       Intake/Output Summary (Last 24 hours) at 03/16/18 1557  Last data filed at 03/16/18 0800   Gross per 24 hour   Intake                0 ml   Output             1000 ml   Net            -1000 ml        Physical Examination:      Constitutional:  awake, no acute distress, cooperative, pleasant    ENT:  oral mucosa moist, oropharynx benign  Neck supple, no masses   Resp:  b/l rales, no wheeze   CV:  regular rhythm, normal rate, no m/r/g appreciated, no edema, +pulses    GI:  +BS, soft, non distended, non tender     Musculoskeletal:  moves all extremities    Neurologic:  AAOx3, NFD     Skin:  warm, dry  Eyes:  PERRL    Data Review:    Review and/or order of clinical lab test  Review and/or order of tests in the radiology section of CPT  Review and/or order of tests in the medicine section of CPT    Labs:     Recent Labs      03/16/18   0544  03/15/18   0245   WBC  5.8  10.1   HGB  9.5*  10.0*   HCT  28.0*  29.8*   PLT  121*  128*     Recent Labs      03/16/18   0544  03/15/18   0245  03/14/18   1750   NA  139  141  143   K  3.4*  4.2  3.6   CL  109*  111*  111*   CO2  20*  20*  20*   BUN  12  15  18   CREA  0.34*  0.34*  0.33*   GLU  106*  121*  136*   CA  7.0*  7.2*  6.8*   MG  1.8  2.0  1.6   PHOS  2.1*  2.2*  2.4*     Recent Labs      03/16/18   0544  03/15/18   0245  03/14/18   0507  03/13/18   1830   SGOT  47*  45*  33  39*   ALT  24  26  20  29   AP  74  79  68  94   TBILI  0.8  0.9  1.2*  1.2*   TP  4.9*  5.7*  4.9*  6.4   ALB  2.1*  2.4*  2.3*  2.8*   GLOB  2.8  3.3  2.6  3.6   LPSE   --    --    --   49*     No results for input(s): INR, PTP, APTT in the last 72 hours. No lab exists for component: INREXT, INREXT   Recent Labs      03/14/18   0507   TIBC  181*   PSAT  72*   FERR  172      Lab Results   Component Value Date/Time    Folate 3.7 (L) 03/14/2018 05:07 AM      No results for input(s): PH, PCO2, PO2 in the last 72 hours.   Recent Labs      03/14/18   0507  03/13/18   2359  03/13/18   1830   CPK  89   --    --    CKNDX  Cannot be calculated   --    --    TROIQ  0.14*  0.15*  0.05*  <0.04     Lab Results   Component Value Date/Time    Cholesterol, total 87 03/14/2018 05:07 AM    HDL Cholesterol 35 03/14/2018 05:07 AM    LDL, calculated 37 03/14/2018 05:07 AM    Triglyceride 75 03/14/2018 05:07 AM    CHOL/HDL Ratio 2.5 03/14/2018 05:07 AM     No results found for: [facility]  Lab Results   Component Value Date/Time    Color YELLOW/STRAW 03/13/2018 11:16 PM    Appearance CLOUDY (A) 03/13/2018 11:16 PM    Specific gravity 1.021 03/13/2018 11:16 PM    pH (UA) 6.0 03/13/2018 11:16 PM    Protein 100 (A) 03/13/2018 11:16 PM    Glucose 250 (A) 03/13/2018 11:16 PM    Ketone 15 (A) 03/13/2018 11:16 PM    Bilirubin NEGATIVE  03/13/2018 11:16 PM    Urobilinogen 0.2 03/13/2018 11:16 PM    Nitrites NEGATIVE  03/13/2018 11:16 PM    Leukocyte Esterase LARGE (A) 03/13/2018 11:16 PM    Epithelial cells FEW 03/13/2018 11:16 PM    Bacteria 4+ (A) 03/13/2018 11:16 PM    WBC >100 (H) 03/13/2018 11:16 PM    RBC >100 (H) 03/13/2018 11:16 PM     Medications Reviewed:     Current Facility-Administered Medications   Medication Dose Route Frequency    amiodarone (CORDARONE) tablet 200 mg  200 mg Oral DAILY    magnesium oxide (MAG-OX) tablet 400 mg  400 mg Oral DAILY    folic acid (FOLVITE) tablet 1 mg  1 mg Oral DAILY    cefTRIAXone (ROCEPHIN) 1 g in 0.9% sodium chloride (MBP/ADV) 50 mL  1 g IntraVENous Q24H    pantoprazole (PROTONIX) tablet 40 mg  40 mg Oral ACB&D    potassium chloride SR (KLOR-CON 10) tablet 40 mEq  40 mEq Oral ONCE    potassium, sodium phosphates (NEUTRA-PHOS) packet 1 Packet  1 Packet Oral QID    0.9% sodium chloride infusion  75 mL/hr IntraVENous CONTINUOUS    sodium chloride (NS) flush 5-10 mL  5-10 mL IntraVENous Q8H    sodium chloride (NS) flush 5-10 mL  5-10 mL IntraVENous PRN    acetaminophen (TYLENOL) tablet 650 mg  650 mg Oral Q4H PRN    cyanocobalamin (VITAMIN B12) injection 1,000 mcg  1,000 mcg IntraMUSCular Q30D    sodium chloride (NS) flush 5-10 mL  5-10 mL IntraVENous PRN    0.9% sodium chloride infusion 250 mL  250 mL IntraVENous PRN    0.9% sodium chloride infusion 250 mL  250 mL IntraVENous PRN    ondansetron (ZOFRAN) injection 4 mg  4 mg IntraVENous Q4H PRN ______________________________________________________________________  EXPECTED LENGTH OF STAY: 3d 16h  ACTUAL LENGTH OF STAY:          Joe Ferrer MD

## 2018-03-16 NOTE — ROUTINE PROCESS
TRANSFER - IN REPORT:    Verbal report received from Adrianna(name) on Mariann Linares  being received from [facility]) for routine progression of care      Report consisted of patients Situation, Background, Assessment and   Recommendations(SBAR). Information from the following report(s) SBAR was reviewed with the receiving nurse. Opportunity for questions and clarification was provided. Assessment completed upon patients arrival to unit and care assumed.

## 2018-03-16 NOTE — PROGRESS NOTES
Pt alert and oriented x4 lungs clear on 1L N/C for comfort. Remains on tele in SR with occasional PAC/PVCs pulses+, noted edema to RUE x2 with redness to fingers comparatively. No c/o of numbness/tingling. BS+ hypoactive, no pain/tenderness upon palpitation. Last BM 3/14, per pt was liquid/coffee grounds. No issues noted overnight. No c/o of dysuria, pur wick in place. Pt on bedrest, baseline is selfcare/ambulatory. Pt NPO with ice chips only, possible EGD. No acute distress as of note. Continue to monitor status and medicate as ordered. Bedside shift change report given to Zena Arias RN (oncoming nurse) by Ester Moe (offgoing nurse). Report included the following information SBAR, Kardex, Intake/Output, MAR, Recent Results and Cardiac Rhythm . Valentín Orellana

## 2018-03-16 NOTE — PROGRESS NOTES
[address]  [address]  [address] [place] [address]   [phone]                GI PROGRESS NOTE  Rita Conley, [employer]  Work Cell: [phone]      NAME:   Jazmine Coffey   :    1950   MRN:    [id number]     Assessment/Plan   1. Nausea, vomiting and diarrhea - possibly related to acute infectious process, however UTI and sepsis may be playing role as well. Symptoms improved. No further diarrhea. CT with pancreatic cyst - suspected IPMN. - Advance diet as tolerated  - Continue PPI and antibiotics  - Stool studies for any recurrent diarrhea  - Consider EGD/EUS +/- colonoscopy as outpatient given that Hgb stable and is without any overt evidence of bleeding   2. Anemia - with heme positive stool, Hgb stable. No overt signs of bleeding.   - Monitor H&H, transfuse as needed   3. UTI with septic shock - improved   4. SVT - improved   5. Hypokalemia - improved      Patient Active Problem List   Diagnosis Code    SVT (supraventricular tachycardia) ([facility]) I47.1       Subjective:     Feeling much better. Denies any pain. Tolerating clears without nausea or vomiting. Passing flatus but has not had BM. Review of Systems    Constitutional: negative fever, negative chills, negative weight loss  Eyes:   negative visual changes  ENT:   negative sore throat, tongue or lip swelling  Respiratory:  negative cough, negative dyspnea  Cards:  negative for chest pain, palpitations, lower extremity edema  GI:   See HPI  :  negative for frequency, dysuria  Integument:  negative for rash and pruritus  Heme:  negative for easy bruising and gum/nose bleeding  Musculoskel: negative for myalgias, back pain and muscle weakness  Neuro: negative for headaches, dizziness, vertigo  Psych:  negative for feelings of anxiety, depression     Objective:     VITALS:   Last 24hrs VS reviewed since prior hospitalist progress note.  Most recent are:  Visit Vitals    /78 (BP 1 Location: Right arm, BP Patient Position: At rest)    Pulse 92    Temp 98.1 °F (36.7 °C)    Resp 26    Ht 5' 5\" (1.651 m)    Wt 53.9 kg (118 lb 13.3 oz)    SpO2 93%    BMI 19.77 kg/m2       Intake/Output Summary (Last 24 hours) at 03/16/18 1134  Last data filed at 03/16/18 3768   Gross per 24 hour   Intake              400 ml   Output             1100 ml   Net             -700 ml        PHYSICAL EXAM:  General   well developed, thin, alert, in no acute distress  EENT  Normocephalic, Atraumatic, PERRLA, EOMI, sclera clear  Respiratory   Clear To Auscultation bilaterally - no wheezes, rales, rhonchi, or crackles  Cardiology  Regular Rate and Rythmn  - no murmurs, rubs or gallops  Abdominal  Soft, non-tender, non-distended, positive bowel sounds, no hepatosplenomegaly, no palpable mass  Extremities  No clubbing, cyanosis, or edema. Pulses intact. Neurological  No focal neurological deficits noted  Psychological  Oriented x 3. Normal affect. Lab Data   Recent Results (from the past 12 hour(s))   MAGNESIUM    Collection Time: 03/16/18  5:44 AM   Result Value Ref Range    Magnesium 1.8 1.6 - 2.4 mg/dL   NT-PRO BNP    Collection Time: 03/16/18  5:44 AM   Result Value Ref Range    NT pro-BNP 3010 (H) 0 - 125 PG/ML   CBC WITH AUTOMATED DIFF    Collection Time: 03/16/18  5:44 AM   Result Value Ref Range    WBC 5.8 3.6 - 11.0 K/uL    RBC 2.84 (L) 3.80 - 5.20 M/uL    HGB 9.5 (L) 11.5 - 16.0 g/dL    HCT 28.0 (L) 35.0 - 47.0 %    MCV 98.6 80.0 - 99.0 FL    MCH 33.5 26.0 - 34.0 PG    MCHC 33.9 30.0 - 36.5 g/dL    RDW 15.5 (H) 11.5 - 14.5 %    PLATELET 698 (L) 895 - 400 K/uL    MPV 10.3 8.9 - 12.9 FL    NRBC 0.0 0  WBC    ABSOLUTE NRBC 0.00 0.00 - 0.01 K/uL    NEUTROPHILS 80 (H) 32 - 75 %    LYMPHOCYTES 11 (L) 12 - 49 %    MONOCYTES 9 5 - 13 %    EOSINOPHILS 0 0 - 7 %    BASOPHILS 0 0 - 1 %    IMMATURE GRANULOCYTES 0 0.0 - 0.5 %    ABS. NEUTROPHILS 4.7 1.8 - 8.0 K/UL    ABS. LYMPHOCYTES 0.6 (L) 0.8 - 3.5 K/UL    ABS.  MONOCYTES 0.5 0.0 - 1.0 K/UL    ABS. EOSINOPHILS 0.0 0.0 - 0.4 K/UL    ABS. BASOPHILS 0.0 0.0 - 0.1 K/UL    ABS. IMM. GRANS. 0.0 0.00 - 0.04 K/UL    DF SMEAR SCANNED      RBC COMMENTS ANISOCYTOSIS  1+        RBC COMMENTS MACROCYTOSIS  1+        WBC COMMENTS TOXIC GRANULATION     METABOLIC PANEL, COMPREHENSIVE    Collection Time: 03/16/18  5:44 AM   Result Value Ref Range    Sodium 139 136 - 145 mmol/L    Potassium 3.4 (L) 3.5 - 5.1 mmol/L    Chloride 109 (H) 97 - 108 mmol/L    CO2 20 (L) 21 - 32 mmol/L    Anion gap 10 5 - 15 mmol/L    Glucose 106 (H) 65 - 100 mg/dL    BUN 12 6 - 20 MG/DL    Creatinine 0.34 (L) 0.55 - 1.02 MG/DL    BUN/Creatinine ratio 35 (H) 12 - 20      GFR est AA >60 >60 ml/min/1.73m2    GFR est non-AA >60 >60 ml/min/1.73m2    Calcium 7.0 (L) 8.5 - 10.1 MG/DL    Bilirubin, total 0.8 0.2 - 1.0 MG/DL    ALT (SGPT) 24 12 - 78 U/L    AST (SGOT) 47 (H) 15 - 37 U/L    Alk. phosphatase 74 45 - 117 U/L    Protein, total 4.9 (L) 6.4 - 8.2 g/dL    Albumin 2.1 (L) 3.5 - 5.0 g/dL    Globulin 2.8 2.0 - 4.0 g/dL    A-G Ratio 0.8 (L) 1.1 - 2.2     PHOSPHORUS    Collection Time: 03/16/18  5:44 AM   Result Value Ref Range    Phosphorus 2.1 (L) 2.6 - 4.7 MG/DL         Medications: Reviewed    PMH/SH reviewed - no change compared to H&P  Mid-Level Provider: Debbrah Severs, NP   Date/Time:  3/16/2018        I have personally reviewed the history and independently examined the patient. I have reviewed the chart and agree with the documentation recorded by the Mid Level Provider, including the assessment, treatment plan, and disposition.       Ward Yusuf MD

## 2018-03-17 ENCOUNTER — APPOINTMENT (OUTPATIENT)
Dept: GENERAL RADIOLOGY | Age: 68
DRG: 871 | End: 2018-03-17
Attending: INTERNAL MEDICINE
Payer: MEDICARE

## 2018-03-17 LAB
25(OH)D3 SERPL-MCNC: 13.6 NG/ML (ref 30–100)
ANION GAP SERPL CALC-SCNC: 8 MMOL/L (ref 5–15)
BUN SERPL-MCNC: 7 MG/DL (ref 6–20)
BUN/CREAT SERPL: 20 (ref 12–20)
CALCIUM SERPL-MCNC: 7.4 MG/DL (ref 8.5–10.1)
CHLORIDE SERPL-SCNC: 109 MMOL/L (ref 97–108)
CO2 SERPL-SCNC: 24 MMOL/L (ref 21–32)
CREAT SERPL-MCNC: 0.35 MG/DL (ref 0.55–1.02)
ERYTHROCYTE [DISTWIDTH] IN BLOOD BY AUTOMATED COUNT: 14.9 % (ref 11.5–14.5)
GLUCOSE SERPL-MCNC: 116 MG/DL (ref 65–100)
HCT VFR BLD AUTO: 28.1 % (ref 35–47)
HGB BLD-MCNC: 9.4 G/DL (ref 11.5–16)
MAGNESIUM SERPL-MCNC: 1.5 MG/DL (ref 1.6–2.4)
MCH RBC QN AUTO: 33 PG (ref 26–34)
MCHC RBC AUTO-ENTMCNC: 33.5 G/DL (ref 30–36.5)
MCV RBC AUTO: 98.6 FL (ref 80–99)
NRBC # BLD: 0 K/UL (ref 0–0.01)
NRBC BLD-RTO: 0 PER 100 WBC
PHOSPHATE SERPL-MCNC: 1.2 MG/DL (ref 2.6–4.7)
PLATELET # BLD AUTO: 126 K/UL (ref 150–400)
PMV BLD AUTO: 10.5 FL (ref 8.9–12.9)
POTASSIUM SERPL-SCNC: 3.8 MMOL/L (ref 3.5–5.1)
RBC # BLD AUTO: 2.85 M/UL (ref 3.8–5.2)
SODIUM SERPL-SCNC: 141 MMOL/L (ref 136–145)
WBC # BLD AUTO: 6.4 K/UL (ref 3.6–11)

## 2018-03-17 PROCEDURE — 74011000258 HC RX REV CODE- 258: Performed by: INTERNAL MEDICINE

## 2018-03-17 PROCEDURE — 83735 ASSAY OF MAGNESIUM: CPT | Performed by: NURSE PRACTITIONER

## 2018-03-17 PROCEDURE — 82306 VITAMIN D 25 HYDROXY: CPT | Performed by: INTERNAL MEDICINE

## 2018-03-17 PROCEDURE — 80048 BASIC METABOLIC PNL TOTAL CA: CPT | Performed by: NURSE PRACTITIONER

## 2018-03-17 PROCEDURE — 85027 COMPLETE CBC AUTOMATED: CPT | Performed by: INTERNAL MEDICINE

## 2018-03-17 PROCEDURE — 74011250637 HC RX REV CODE- 250/637: Performed by: NURSE PRACTITIONER

## 2018-03-17 PROCEDURE — 97116 GAIT TRAINING THERAPY: CPT

## 2018-03-17 PROCEDURE — 74011250636 HC RX REV CODE- 250/636: Performed by: INTERNAL MEDICINE

## 2018-03-17 PROCEDURE — G8979 MOBILITY GOAL STATUS: HCPCS

## 2018-03-17 PROCEDURE — 36415 COLL VENOUS BLD VENIPUNCTURE: CPT | Performed by: NURSE PRACTITIONER

## 2018-03-17 PROCEDURE — G8978 MOBILITY CURRENT STATUS: HCPCS

## 2018-03-17 PROCEDURE — 84100 ASSAY OF PHOSPHORUS: CPT | Performed by: NURSE PRACTITIONER

## 2018-03-17 PROCEDURE — 93970 EXTREMITY STUDY: CPT

## 2018-03-17 PROCEDURE — 71046 X-RAY EXAM CHEST 2 VIEWS: CPT

## 2018-03-17 PROCEDURE — 74011250637 HC RX REV CODE- 250/637: Performed by: INTERNAL MEDICINE

## 2018-03-17 PROCEDURE — 77030012893

## 2018-03-17 PROCEDURE — 97161 PT EVAL LOW COMPLEX 20 MIN: CPT

## 2018-03-17 PROCEDURE — 65660000000 HC RM CCU STEPDOWN

## 2018-03-17 RX ORDER — SODIUM,POTASSIUM PHOSPHATES 280-250MG
2 POWDER IN PACKET (EA) ORAL 4 TIMES DAILY
Status: DISCONTINUED | OUTPATIENT
Start: 2018-03-17 | End: 2018-03-21 | Stop reason: HOSPADM

## 2018-03-17 RX ADMIN — CASTOR OIL AND BALSAM, PERU: 788; 87 OINTMENT TOPICAL at 17:59

## 2018-03-17 RX ADMIN — PANTOPRAZOLE SODIUM 40 MG: 40 TABLET, DELAYED RELEASE ORAL at 17:59

## 2018-03-17 RX ADMIN — POTASSIUM & SODIUM PHOSPHATES POWDER PACK 280-160-250 MG 2 PACKET: 280-160-250 PACK at 23:19

## 2018-03-17 RX ADMIN — PANTOPRAZOLE SODIUM 40 MG: 40 TABLET, DELAYED RELEASE ORAL at 07:16

## 2018-03-17 RX ADMIN — CASTOR OIL AND BALSAM, PERU: 788; 87 OINTMENT TOPICAL at 09:18

## 2018-03-17 RX ADMIN — POTASSIUM & SODIUM PHOSPHATES POWDER PACK 280-160-250 MG 2 PACKET: 280-160-250 PACK at 17:59

## 2018-03-17 RX ADMIN — FOLIC ACID 1 MG: 1 TABLET ORAL at 09:17

## 2018-03-17 RX ADMIN — Medication 400 MG: at 09:17

## 2018-03-17 RX ADMIN — POTASSIUM & SODIUM PHOSPHATES POWDER PACK 280-160-250 MG 1 PACKET: 280-160-250 PACK at 09:17

## 2018-03-17 RX ADMIN — Medication 10 ML: at 07:17

## 2018-03-17 RX ADMIN — CEFTRIAXONE 1 G: 1 INJECTION, POWDER, FOR SOLUTION INTRAMUSCULAR; INTRAVENOUS at 12:07

## 2018-03-17 RX ADMIN — Medication 10 ML: at 15:37

## 2018-03-17 RX ADMIN — POTASSIUM & SODIUM PHOSPHATES POWDER PACK 280-160-250 MG 1 PACKET: 280-160-250 PACK at 12:07

## 2018-03-17 RX ADMIN — Medication 10 ML: at 22:00

## 2018-03-17 RX ADMIN — AMIODARONE HYDROCHLORIDE 200 MG: 200 TABLET ORAL at 09:17

## 2018-03-17 NOTE — ROUTINE PROCESS
Primary Nurse Aidan Blackburn RN and Zoie RN performed a dual skin assessment on this patient Impairment noted- see wound doc flow sheet  Maximo score is 19

## 2018-03-17 NOTE — PROGRESS NOTES
Hospitalist Progress Note  Bashir Guthrie MD  Answering service: [phone] from in house phone      Date of Service:  3/17/2018  NAME:  Druscilla Leventhal  :  1950  MRN:  [id number]      Admission Summary:   68 yo woman with no significant medical history presented to the ED from home on 3/13/18 with progressively worsening nausea, coffee-ground emesis, diarrhea x3 days and abdominal pain. In the ED, patient was found to be in SVT and hypotensive. Pt refused cardioversion so she was started on amiodarone gtt. She was admitted tot he ICU for new onset SVT, UGIB, UTI, sepsis.     Interval history / Subjective:   Still c/o diarrhea but now liquid and not as green; denies CP, SOB, palpitations, abdominal pain, f/c/n/v, dysuria now;  and sister at bedside; d/w nurse, getting CABEZAS and SpO2 down to 86% on RA     Assessment & Plan:     E coli bacteremia and UTI with septic shock (POA)  - tachycardia, lactic acidosis, fever, tachypnea, hypotension on admission  - BCx 3/13 E coli 1/2 bottles, 3/15 NGTD; unlikely contaminant as same organism  - UCx 3/13 E coli  - C diff negative  - s/p empiric vanc, Zosyn 3/13-3/16  - ceftriaxone started 3/16 and will need 10-14 days  - septic shock resolved s/p IVF and norepinephrine gtt  - would not recommend levofloxacin due to recent SVT  - consult ID on Monday 3/19 for outpt abx    CABEZAS and hypoxia on exertion, 3/17  - likely due to b/l pleural effusions and atx  - check CXR today  - wean supplemental O2 as tolerated or will need home O2 eval prior to discharge    SVT (POA)  - resolved with amiodarone gtt  - pt declined cardioversion/ablation  - Cardiology following  - EPS consulted but pt wants to have outpatient 30-day loop recorder  - troponin peaked at 0.15 and trended down  - TTE 3/14 EF 63-45%, grade 1 diastolic dysfunction    UGIB and ABLA (POA)  - FOBT positive  - B12 and folate deficiencies; supplements started  - s/p 2 units PRBC transfusion  - GI consulted: recommending EGD/EUS for possible IPMN; pt agreeable to EGD/EUS if it can be done on Mon/Tues of next week   - continue PPI BID  - tolerating solid diet  - CT chest/abd/pelvis 3/15 ascites, anasarca, 1.5cm pancreatic head cyst likely representing an incidental side branch IPMN    Mild compression deformity fracture of T12   - seen on CT a/p  - seems asymptomatic but significant generalized weakness  - PT/OT evals  - vitamin D deficiency    Vitamin D deficiency - start supplement    Multiple locules of gas in face, orbits, neck  - seen on CT head and CTA chest 3/15  - pt asymptomatic  - spoke with radiology Kriss Valverde 3/16 appears to be intravascular air and if pt is asymptomatic is likely clinically insignificant    Hypokalemia, hypomagnesemia, hypophosphatemia - replete prn    Subclinical hypothyroidism - TSH mildly elevated but free T4 WNL and free T3 suppressed  - needs outpatient f/u in 6 weeks    Severe protein-calorie malnutrition/underweight, Body mass index is 19.77 kg/(m^2). - prealbumin 6.4  - consult Nutrition    Pulmonary edema likely due to newly diagnosed diastolic heart failure, unknown chronicity  - NYHA Class 1  - stop IVF  - NT-proBNP elevated and TTE as above    Bilateral LE edema - check venous duplex, elevate legs, JOSHUA hose  - pt has been refusing to wear SCDs per nurse    Code status: full  DVT prophylaxis: unable to give chemical PPx due to GIB and pt refusing to wear SCDs    Care Plan discussed with: Patient/Family and Nurse  Disposition: TBD. Came from home. Has no PCP     Hospital Problems  Date Reviewed: 3/14/2018          Codes Class Noted POA    * (Principal)SVT (supraventricular tachycardia) ([facility] [address] ICD-10-CM: I47.1  ICD-9-CM: 427.89  3/13/2018 Yes            Review of Systems:   Pertinent items are noted in HPI. Vital Signs:    Last 24hrs VS reviewed since prior progress note.  Most recent are:  Visit Vitals    /76    Pulse (!) 106    Temp 98.5 °F (36.9 °C)    Resp 16    Ht 5' 5\" (1.651 m)    Wt 53.9 kg (118 lb 13.3 oz)    SpO2 93%    BMI 19.77 kg/m2       Intake/Output Summary (Last 24 hours) at 03/17/18 1526  Last data filed at 03/16/18 2055   Gross per 24 hour   Intake                0 ml   Output              250 ml   Net             -250 ml        Physical Examination:      Constitutional:  awake, no acute distress, cooperative, pleasant    ENT:  oral mucosa moist, oropharynx benign  Neck supple, no masses   Resp:  b/l rales, no wheeze   CV:  regular rhythm, normal rate, no m/r/g appreciated, b/l LE edema, +pulses    GI:  +BS, soft, non distended, non tender     Musculoskeletal:  moves all extremities    Neurologic:  AAOx3, NFD     Skin:  warm, dry  Eyes:  PERRL    Data Review:    Review and/or order of clinical lab test  Review and/or order of tests in the radiology section of CPT  Review and/or order of tests in the medicine section of CPT    Labs:     Recent Labs      03/17/18 0315 03/16/18   0544   WBC  6.4  5.8   HGB  9.4*  9.5*   HCT  28.1*  28.0*   PLT  126*  121*     Recent Labs      03/17/18 0315 03/16/18   0544  03/15/18   0245   NA  141  139  141   K  3.8  3.4*  4.2   CL  109*  109*  111*   CO2  24  20*  20*   BUN  7  12  15   CREA  0.35*  0.34*  0.34*   GLU  116*  106*  121*   CA  7.4*  7.0*  7.2*   MG  1.5*  1.8  2.0   PHOS  1.2*  2.1*  2.2*     Recent Labs      03/16/18   0544  03/15/18   0245   SGOT  47*  45*   ALT  24  26   AP  74  79   TBILI  0.8  0.9   TP  4.9*  5.7*   ALB  2.1*  2.4*   GLOB  2.8  3.3     No results for input(s): INR, PTP, APTT in the last 72 hours. No lab exists for component: INREXT, INREXT   No results for input(s): FE, TIBC, PSAT, FERR in the last 72 hours. Lab Results   Component Value Date/Time    Folate 3.7 (L) 03/14/2018 05:07 AM      No results for input(s): PH, PCO2, PO2 in the last 72 hours.   No results for input(s): CPK, CKNDX, TROIQ in the last 72 hours.     No lab exists for component: CPKMB  Lab Results   Component Value Date/Time    Cholesterol, total 87 03/14/2018 05:07 AM    HDL Cholesterol 35 03/14/2018 05:07 AM    LDL, calculated 37 03/14/2018 05:07 AM    Triglyceride 75 03/14/2018 05:07 AM    CHOL/HDL Ratio 2.5 03/14/2018 05:07 AM     No results found for: [facility]  Lab Results   Component Value Date/Time    Color YELLOW/STRAW 03/13/2018 11:16 PM    Appearance CLOUDY (A) 03/13/2018 11:16 PM    Specific gravity 1.021 03/13/2018 11:16 PM    pH (UA) 6.0 03/13/2018 11:16 PM    Protein 100 (A) 03/13/2018 11:16 PM    Glucose 250 (A) 03/13/2018 11:16 PM    Ketone 15 (A) 03/13/2018 11:16 PM    Bilirubin NEGATIVE  03/13/2018 11:16 PM    Urobilinogen 0.2 03/13/2018 11:16 PM    Nitrites NEGATIVE  03/13/2018 11:16 PM    Leukocyte Esterase LARGE (A) 03/13/2018 11:16 PM    Epithelial cells FEW 03/13/2018 11:16 PM    Bacteria 4+ (A) 03/13/2018 11:16 PM    WBC >100 (H) 03/13/2018 11:16 PM    RBC >100 (H) 03/13/2018 11:16 PM     Medications Reviewed:     Current Facility-Administered Medications   Medication Dose Route Frequency    potassium, sodium phosphates (NEUTRA-PHOS) packet 2 Packet  2 Packet Oral QID    amiodarone (CORDARONE) tablet 200 mg  200 mg Oral DAILY    magnesium oxide (MAG-OX) tablet 400 mg  400 mg Oral DAILY    folic acid (FOLVITE) tablet 1 mg  1 mg Oral DAILY    cefTRIAXone (ROCEPHIN) 1 g in 0.9% sodium chloride (MBP/ADV) 50 mL  1 g IntraVENous Q24H    pantoprazole (PROTONIX) tablet 40 mg  40 mg Oral ACB&D    balsam peru-castor oil (VENELEX)  mg/gram ointment   Topical BID    sodium chloride (NS) flush 5-10 mL  5-10 mL IntraVENous Q8H    sodium chloride (NS) flush 5-10 mL  5-10 mL IntraVENous PRN    acetaminophen (TYLENOL) tablet 650 mg  650 mg Oral Q4H PRN    cyanocobalamin (VITAMIN B12) injection 1,000 mcg  1,000 mcg IntraMUSCular Q30D    sodium chloride (NS) flush 5-10 mL  5-10 mL IntraVENous PRN    0.9% sodium chloride infusion 250 mL  250 mL IntraVENous PRN    0.9% sodium chloride infusion 250 mL  250 mL IntraVENous PRN    ondansetron (ZOFRAN) injection 4 mg  4 mg IntraVENous Q4H PRN     ______________________________________________________________________  EXPECTED LENGTH OF STAY: 3d 16h  ACTUAL LENGTH OF STAY:          [address] Lavern Cantrell MD

## 2018-03-17 NOTE — PROCEDURES
[facility]  *** FINAL REPORT ***    Name: Eleanor Dover  MRN: [id number]    Inpatient  : 10 Sep 1950  HIS Order #: [id number]  [id number] [facility] Visit #: [id number]  Date: 17 Mar 2018    TYPE OF TEST: Peripheral Venous Testing    REASON FOR TEST  Limb swelling    Right Leg:-  Deep venous thrombosis:           No  Superficial venous thrombosis:    No  Deep venous insufficiency:        Not examined  Superficial venous insufficiency: Not examined    Left Leg:-  Deep venous thrombosis:           No  Superficial venous thrombosis:    No  Deep venous insufficiency:        Not examined  Superficial venous insufficiency: Not examined      INTERPRETATION/FINDINGS  PROCEDURE:  Color duplex ultrasound imaging of lower extremity veins. FINDINGS:       Right: The common femoral, deep femoral, femoral, popliteal,  posterior tibial, peroneal, and great saphenous are patent and without   evidence of thrombus;  each is fully compressible and there is no  narrowing of the flow channel on color Doppler imaging. Phasic flow  is observed in the common femoral vein. Left:   The common femoral, deep femoral, femoral, popliteal,  posterior tibial, peroneal, and great saphenous are patent and without   evidence of thrombus;  each is fully compressible and there is no  narrowing of the flow channel on color Doppler imaging. Phasic flow  is observed in the common femoral vein. IMPRESSION:  No evidence of right or left lower extremity vein  thrombosis. ADDITIONAL COMMENTS    I have personally reviewed the data relevant to the interpretation of  this  study.     TECHNOLOGIST: Anna Marie Burden RVT  Signed: 2018 05:38 PM    PHYSICIAN: Michael Manzo MD  Signed: 2018 08:24 AM

## 2018-03-17 NOTE — PROGRESS NOTES
Problem: Mobility Impaired (Adult and Pediatric)  Goal: *Acute Goals and Plan of Care (Insert Text)  Physical Therapy Goals  Initiated 3/17/2018  1. Patient will move from supine to sit and sit to supine , scoot up and down and roll side to side in bed with independence within 7 day(s). 2.  Patient will transfer from bed to chair and chair to bed with independence using the least restrictive device within 7 day(s). 3.  Patient will perform sit to stand with independence within 7 day(s). 4.  Patient will ambulate with supervision/set-up for 300 feet with the least restrictive device within 7 day(s). 5.  Patient will ascend/descend 7 stairs with 1 handrail(s) with minimal assistance/contact guard assist within 7 day(s). physical Therapy EVALUATION  Patient: Lisa Hall (79 y.o. female)  Date: 3/17/2018  Primary Diagnosis: SVT (supraventricular tachycardia) ([facility])        Precautions: falls      ASSESSMENT :  Based on the objective data described below, the patient presents with generalized weakness, decreased functional mobility, decreased balance below her baseline and decreased gait. Pt with possible h/o ETOH per chart but apparently denies. 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 Pt now feels she is very weak and not at her usual baseline. Pt initially very unsteady on standing and walking, did somewhat better with use of IV pole for steadying herself. Gait is unsteady and with path deviations (mild), and pt fatigued after 40 feet, wanting to turn back. Productive cough and mild SOB noted but easily resolved with sitting on bed. Pt states she is not a smoker and doesn't know how she got this cough. Pt cautioned to call for assistance as she is unsteady and she verbalized understanding.  Recommend HHPT, will need to clear at least 3 steps to get into her home, but can stay downstairs if needed. Supportive  and sister present. Will follow for strengthening and gait training. .  Recommended to pt and family OOB x three meals and walking with nsg. Patient will benefit from skilled intervention to address the above impairments. Patients rehabilitation potential is considered to be Excellent  Factors which may influence rehabilitation potential include:   [x]         None noted  []         Mental ability/status  []         Medical condition  []         Home/family situation and support systems  []         Safety awareness  []         Pain tolerance/management  []         Other:      PLAN :  Recommendations and Planned Interventions:  [x]           Bed Mobility Training             []    Neuromuscular Re-Education  [x]           Transfer Training                   []    Orthotic/Prosthetic Training  [x]           Gait Training                         []    Modalities  [x]           Therapeutic Exercises           []    Edema Management/Control  []           Therapeutic Activities            []    Patient and Family Training/Education  []           Other (comment):    Frequency/Duration: Patient will be followed by physical therapy  5 times a week to address goals. Discharge Recommendations: Home Health  Further Equipment Recommendations for Discharge: tbd     SUBJECTIVE:   Patient stated I thnk I will need some therapy at home at first.     OBJECTIVE DATA SUMMARY:   HISTORY:    History reviewed. No pertinent past medical history.   Past Surgical History:   Procedure Laterality Date    HX ORTHOPAEDIC      HX TONSILLECTOMY      HX WISDOM TEETH EXTRACTION       Prior Level of Function/Home Situation: Ind without A device  Personal factors and/or comorbidities impacting plan of care:     Home Situation  Home Environment: Private residence  One/Two Story Residence: Two story  Living Alone: No  Support Systems: Child(aram), Family member(s), Friends \ neighbors  Patient Expects to be Discharged to[de-identified] Private residence  Current DME Used/Available at Home: None    EXAMINATION/PRESENTATION/DECISION MAKING:   Critical Behavior:  Neurologic State: Alert  Orientation Level: Appropriate for age  Cognition: Appropriate decision making     Hearing: Auditory  Auditory Impairment: None  Skin:  intact  Edema: none noted  Range Of Motion:      WFL      Strength:        generally decreased WFL           Coordination:   decreased  Vision:    grossly normal  Functional Mobility:  Bed Mobility:  Rolling: Contact guard assistance  Supine to Sit: Moderate assistance  Sit to Supine: Moderate assistance  Scooting: Assist x1;Additional time  Transfers:  Sit to Stand: Assist x1;Minimum assistance  Stand to Sit: Contact guard assistance            Balance:   Sitting: Intact  Standing: Impaired  Standing - Static: Good  Standing - Dynamic : Fair  Ambulation/Gait Training:  Distance (ft): 80 Feet (ft)  Assistive Device: Gait belt  Ambulation - Level of Assistance: Minimal assistance (occ mod assist due to loss of balance)        Gait Abnormalities: Antalgic;Trunk sway increased; Path deviations        Base of Support: Narrowed     Speed/Ailyn: Pace decreased (<100 feet/min); Fluctuations  Step Length: Right shortened;Left shortened            Functional Measure:  Tinetti test:    Sitting Balance: 1  Arises: 0  Attempts to Rise: 0  Immediate Standing Balance: 0  Standing Balance: 1  Nudged: 1  Eyes Closed: 0  Turn 360 Degrees - Continuous/Discontinuous: 0  Turn 360 Degrees - Steady/Unsteady: 1  Sitting Down: 1  Balance Score: 5  Indication of Gait: 0  R Step Length/Height: 1  L Step Length/Height: 1  R Foot Clearance: 1  L Foot Clearance: 1  Step Symmetry: 1  Step Continuity: 1  Path: 1  Trunk: 0  Walking Time: 0  Gait Score: 7  Total Score: 12       Tinetti Test and G-code impairment scale:  Percentage of Impairment CH    0%   CI    1-19% CJ    20-39% CK    40-59% CL    60-79% CM    80-99% CN     100%   Tinetti  Score 0-28 28 23-27 17-22 12-16 6-11 1-5 0       Tinetti Tool Score Risk of Falls  <19 = High Fall Risk  19-24 = Moderate Fall Risk  25-28 = Low Fall Risk  Tinetti ME. Performance-Oriented Assessment of Mobility Problems in Elderly Patients. [employer]; [id number]. (Scoring Description: PT Bulletin Feb. 10, 1993)    Older adults: Laura Wahl et al, 2009; n = 1000 [facility] elderly evaluated with ABC, BRADY, ADL, and IADL)  · Mean BRADY score for males aged 69-68 years = 26.21(3.40)  · Mean BRADY score for females age 69-68 years = 25.16(4.30)  · Mean BRADY score for males over 80 years = 23.29(6.02)  · Mean BRADY score for females over 80 years = 17.20(8.32)       G codes: In compliance with CMSs Claims Based Outcome Reporting, the following G-code set was chosen for this patient based on their primary functional limitation being treated: The outcome measure chosen to determine the severity of the functional limitation was the Tinetti with a score of 12/28 which was correlated with the impairment scale.     ? Mobility - Walking and Moving Around:     - CURRENT STATUS: CI - 1%-19% impaired, limited or restricted    - GOAL STATUS: CK - 40%-59% impaired, limited or restricted    - D/C STATUS:  ---------------To be determined---------------      Physical Therapy Evaluation Charge Determination   History Examination Presentation Decision-Making   MEDIUM  Complexity : 1-2 comorbidities / personal factors will impact the outcome/ POC  LOW Complexity : 1-2 Standardized tests and measures addressing body structure, function, activity limitation and / or participation in recreation  LOW Complexity : Stable, uncomplicated  LOW Complexity : FOTO score of       Based on the above components, the patient evaluation is determined to be of the following complexity level: LOW     Pain:  Pain Scale 1: Numeric (0 - 10)  Pain Intensity 1: 0      Activity Tolerance:   fair  Please refer to the flowsheet for vital signs taken during this treatment. After treatment:   []         Patient left in no apparent distress sitting up in chair  [x]         Patient left in no apparent distress in bed  [x]         Call bell left within reach  [x]         Nursing notified  [x]         Caregiver present  []         Bed alarm activated    COMMUNICATION/EDUCATION:   The patients plan of care was discussed with: Registered Nurse. [x]         Fall prevention education was provided and the patient/caregiver indicated understanding. [x]         Patient/family have participated as able in goal setting and plan of care. [x]         Patient/family agree to work toward stated goals and plan of care. []         Patient understands intent and goals of therapy, but is neutral about his/her participation. []         Patient is unable to participate in goal setting and plan of care.     Thank you for this referral.  Shad Morley, PT   Time Calculation: 14 mins

## 2018-03-17 NOTE — PROGRESS NOTES
[facility] Progress Note    3/17/2018 8:30 AM  Admit Date: 3/13/2018  Admit Diagnosis: SVT (supraventricular tachycardia) (HCC)    Assessment/Plan     1. SVT- due to extenuating circumstances. After discussion with pt and with ep, will stop amio and do loop, only more meds if svt recurs. 2. GIB- Hgb stable after PRBC infusions, patient reports that GI is not planning to do any inpatient procedures, on PPI   3. Abnormal TSH- T4 ok at 9.6  4. UTI/urosepsis- iv abx as epr im  5. Troponin elevation- nonspecific in current setting, TTE normal  6. Protein calorie malnutrition PTA Body mass index is 18.71 kg/(m^2). prealbumin 6.4    7. BNP elevation- appropriate for degeree of tachycardia, etc  8. Hypomagnesemia - replete  9. Hypotension - resolved, off IV levophed   10. Hypokalemia - replete prn  11. Etoh use - possibly significant use PTA, pt avoids discussion    Echo 3/18 - LVEF 55 % to 60 %, no WMA, grade 1 dd, mildly dilated LA, mild to mod MR, mild TR, mild pulmonary HTN    Soc no tob   FHx + SVT    Subjective:     Luis Duarte denies chest pain, dyspnea, palpitations. Still on IV Amiodarone for rhythm control. Says [facility] is not planning any inpatient procedures for now. Wants to go home. Discussed medical management of SVT and EP consult today. She is agreeable to this. Objective:      Physical Exam:  Visit Vitals    /70 (BP 1 Location: Left arm, BP Patient Position: At rest)    Pulse 95    Temp 98.8 °F (37.1 °C)    Resp 15    Ht 5' 5\" (1.651 m)    Wt 118 lb 13.3 oz (53.9 kg)    SpO2 93%    BMI 19.77 kg/m2     General Appearance:  Well developed, well nourished, alert and oriented x 3, in no acute distress. Ears/Nose/Mouth/Throat:   Hearing grossly normal.         Neck: Supple. Left IJ line in place   Chest:   Lungs clear to auscultation bilaterally. Cardiovascular:  Normal rate and regular rhythm, S1, S2 normal, no murmur.    Abdomen:   Soft, non-tender, bowel sounds are active. Extremities: No edema bilaterally. Skin: Warm and dry.            Telemetry: normal sinus rhythm    Data Review:   Labs:    Recent Results (from the past 24 hour(s))   METABOLIC PANEL, BASIC    Collection Time: 03/17/18  3:15 AM   Result Value Ref Range    Sodium 141 136 - 145 mmol/L    Potassium 3.8 3.5 - 5.1 mmol/L    Chloride 109 (H) 97 - 108 mmol/L    CO2 24 21 - 32 mmol/L    Anion gap 8 5 - 15 mmol/L    Glucose 116 (H) 65 - 100 mg/dL    BUN 7 6 - 20 MG/DL    Creatinine 0.35 (L) 0.55 - 1.02 MG/DL    BUN/Creatinine ratio 20 12 - 20      GFR est AA >60 >60 ml/min/1.73m2    GFR est non-AA >60 >60 ml/min/1.73m2    Calcium 7.4 (L) 8.5 - 10.1 MG/DL   MAGNESIUM    Collection Time: 03/17/18  3:15 AM   Result Value Ref Range    Magnesium 1.5 (L) 1.6 - 2.4 mg/dL   CBC W/O DIFF    Collection Time: 03/17/18  3:15 AM   Result Value Ref Range    WBC 6.4 3.6 - 11.0 K/uL    RBC 2.85 (L) 3.80 - 5.20 M/uL    HGB 9.4 (L) 11.5 - 16.0 g/dL    HCT 28.1 (L) 35.0 - 47.0 %    MCV 98.6 80.0 - 99.0 FL    MCH 33.0 26.0 - 34.0 PG    MCHC 33.5 30.0 - 36.5 g/dL    RDW 14.9 (H) 11.5 - 14.5 %    PLATELET 632 (L) 697 - 400 K/uL    MPV 10.5 8.9 - 12.9 FL    NRBC 0.0 0  WBC    ABSOLUTE NRBC 0.00 0.00 - 0.01 K/uL   VITAMIN D, 25 HYDROXY    Collection Time: 03/17/18  3:15 AM   Result Value Ref Range    Vitamin D 25-Hydroxy 13.6 (L) 30 - 100 ng/mL   PHOSPHORUS    Collection Time: 03/17/18  3:15 AM   Result Value Ref Range    Phosphorus 1.2 (L) 2.6 - 4.7 MG/DL           Current Facility-Administered Medications   Medication Dose Route Frequency    amiodarone (CORDARONE) tablet 200 mg  200 mg Oral DAILY    magnesium oxide (MAG-OX) tablet 400 mg  400 mg Oral DAILY    folic acid (FOLVITE) tablet 1 mg  1 mg Oral DAILY    cefTRIAXone (ROCEPHIN) 1 g in 0.9% sodium chloride (MBP/ADV) 50 mL  1 g IntraVENous Q24H    pantoprazole (PROTONIX) tablet 40 mg  40 mg Oral ACB&D    potassium, sodium phosphates (NEUTRA-PHOS) packet 1 Packet  1 Packet Oral QID    balsam peru-castor oil (VENELEX) [id number] mg/gram ointment   Topical BID    0.9% sodium chloride infusion  75 mL/hr IntraVENous CONTINUOUS    sodium chloride (NS) flush 5-10 mL  5-10 mL IntraVENous Q8H    sodium chloride (NS) flush 5-10 mL  5-10 mL IntraVENous PRN    acetaminophen (TYLENOL) tablet 650 mg  650 mg Oral Q4H PRN    cyanocobalamin (VITAMIN B12) injection 1,000 mcg  1,000 mcg IntraMUSCular Q30D    sodium chloride (NS) flush 5-10 mL  5-10 mL IntraVENous PRN    0.9% sodium chloride infusion 250 mL  250 mL IntraVENous PRN    0.9% sodium chloride infusion 250 mL  250 mL IntraVENous PRN    ondansetron (ZOFRAN) injection 4 mg  4 mg IntraVENous Q4H PRN       Yun Borrego MD  [facility] [address] [place] [address] 13, [address] [place] [address],[address]  Tash Chappell  [phone]

## 2018-03-18 LAB
ALBUMIN SERPL-MCNC: 1.9 G/DL (ref 3.5–5)
ALBUMIN/GLOB SERPL: 0.6 {RATIO} (ref 1.1–2.2)
ALP SERPL-CCNC: 78 U/L (ref 45–117)
ALT SERPL-CCNC: 22 U/L (ref 12–78)
ANION GAP SERPL CALC-SCNC: 7 MMOL/L (ref 5–15)
AST SERPL-CCNC: 43 U/L (ref 15–37)
BILIRUB SERPL-MCNC: 0.6 MG/DL (ref 0.2–1)
BUN SERPL-MCNC: 5 MG/DL (ref 6–20)
BUN/CREAT SERPL: 19 (ref 12–20)
CALCIUM SERPL-MCNC: 7.5 MG/DL (ref 8.5–10.1)
CHLORIDE SERPL-SCNC: 107 MMOL/L (ref 97–108)
CO2 SERPL-SCNC: 27 MMOL/L (ref 21–32)
CREAT SERPL-MCNC: 0.26 MG/DL (ref 0.55–1.02)
ERYTHROCYTE [DISTWIDTH] IN BLOOD BY AUTOMATED COUNT: 14.9 % (ref 11.5–14.5)
GLOBULIN SER CALC-MCNC: 3 G/DL (ref 2–4)
GLUCOSE SERPL-MCNC: 105 MG/DL (ref 65–100)
HCT VFR BLD AUTO: 25.6 % (ref 35–47)
HGB BLD-MCNC: 8.6 G/DL (ref 11.5–16)
MAGNESIUM SERPL-MCNC: 1.2 MG/DL (ref 1.6–2.4)
MCH RBC QN AUTO: 33.2 PG (ref 26–34)
MCHC RBC AUTO-ENTMCNC: 33.6 G/DL (ref 30–36.5)
MCV RBC AUTO: 98.8 FL (ref 80–99)
NRBC # BLD: 0 K/UL (ref 0–0.01)
NRBC BLD-RTO: 0 PER 100 WBC
PHOSPHATE SERPL-MCNC: 2.6 MG/DL (ref 2.6–4.7)
PLATELET # BLD AUTO: 120 K/UL (ref 150–400)
PMV BLD AUTO: 11 FL (ref 8.9–12.9)
POTASSIUM SERPL-SCNC: 3.5 MMOL/L (ref 3.5–5.1)
PROT SERPL-MCNC: 4.9 G/DL (ref 6.4–8.2)
RBC # BLD AUTO: 2.59 M/UL (ref 3.8–5.2)
SODIUM SERPL-SCNC: 141 MMOL/L (ref 136–145)
WBC # BLD AUTO: 7 K/UL (ref 3.6–11)
WBC #/AREA STL HPF: NORMAL /HPF (ref 0–4)

## 2018-03-18 PROCEDURE — 85027 COMPLETE CBC AUTOMATED: CPT | Performed by: INTERNAL MEDICINE

## 2018-03-18 PROCEDURE — 87427 SHIGA-LIKE TOXIN AG IA: CPT | Performed by: INTERNAL MEDICINE

## 2018-03-18 PROCEDURE — 83735 ASSAY OF MAGNESIUM: CPT | Performed by: INTERNAL MEDICINE

## 2018-03-18 PROCEDURE — 36415 COLL VENOUS BLD VENIPUNCTURE: CPT | Performed by: INTERNAL MEDICINE

## 2018-03-18 PROCEDURE — 87177 OVA AND PARASITES SMEARS: CPT | Performed by: INTERNAL MEDICINE

## 2018-03-18 PROCEDURE — 65660000000 HC RM CCU STEPDOWN

## 2018-03-18 PROCEDURE — 80053 COMPREHEN METABOLIC PANEL: CPT | Performed by: INTERNAL MEDICINE

## 2018-03-18 PROCEDURE — 84100 ASSAY OF PHOSPHORUS: CPT | Performed by: INTERNAL MEDICINE

## 2018-03-18 PROCEDURE — 74011250637 HC RX REV CODE- 250/637: Performed by: NURSE PRACTITIONER

## 2018-03-18 PROCEDURE — 74011250637 HC RX REV CODE- 250/637: Performed by: INTERNAL MEDICINE

## 2018-03-18 PROCEDURE — 87045 FECES CULTURE AEROBIC BACT: CPT | Performed by: INTERNAL MEDICINE

## 2018-03-18 PROCEDURE — 89055 LEUKOCYTE ASSESSMENT FECAL: CPT | Performed by: INTERNAL MEDICINE

## 2018-03-18 PROCEDURE — 74011000258 HC RX REV CODE- 258: Performed by: INTERNAL MEDICINE

## 2018-03-18 PROCEDURE — 74011250636 HC RX REV CODE- 250/636: Performed by: INTERNAL MEDICINE

## 2018-03-18 RX ORDER — THERA TABS 400 MCG
1 TAB ORAL DAILY
Status: DISCONTINUED | OUTPATIENT
Start: 2018-03-18 | End: 2018-03-21 | Stop reason: HOSPADM

## 2018-03-18 RX ORDER — LANOLIN ALCOHOL/MO/W.PET/CERES
100 CREAM (GRAM) TOPICAL DAILY
Status: DISCONTINUED | OUTPATIENT
Start: 2018-03-18 | End: 2018-03-21 | Stop reason: HOSPADM

## 2018-03-18 RX ADMIN — PIPERACILLIN SODIUM,TAZOBACTAM SODIUM 4.5 G: 4; .5 INJECTION, POWDER, FOR SOLUTION INTRAVENOUS at 13:14

## 2018-03-18 RX ADMIN — POTASSIUM & SODIUM PHOSPHATES POWDER PACK 280-160-250 MG 2 PACKET: 280-160-250 PACK at 13:14

## 2018-03-18 RX ADMIN — POTASSIUM & SODIUM PHOSPHATES POWDER PACK 280-160-250 MG 2 PACKET: 280-160-250 PACK at 10:08

## 2018-03-18 RX ADMIN — PANTOPRAZOLE SODIUM 40 MG: 40 TABLET, DELAYED RELEASE ORAL at 17:37

## 2018-03-18 RX ADMIN — CASTOR OIL AND BALSAM, PERU: 788; 87 OINTMENT TOPICAL at 17:38

## 2018-03-18 RX ADMIN — CASTOR OIL AND BALSAM, PERU: 788; 87 OINTMENT TOPICAL at 10:09

## 2018-03-18 RX ADMIN — Medication 400 MG: at 10:09

## 2018-03-18 RX ADMIN — Medication 10 ML: at 22:53

## 2018-03-18 RX ADMIN — POTASSIUM & SODIUM PHOSPHATES POWDER PACK 280-160-250 MG 2 PACKET: 280-160-250 PACK at 17:37

## 2018-03-18 RX ADMIN — Medication 1 CAPSULE: at 13:14

## 2018-03-18 RX ADMIN — PIPERACILLIN SODIUM,TAZOBACTAM SODIUM 4.5 G: 4; .5 INJECTION, POWDER, FOR SOLUTION INTRAVENOUS at 22:53

## 2018-03-18 RX ADMIN — THERA TABS 1 TABLET: TAB at 10:09

## 2018-03-18 RX ADMIN — PANTOPRAZOLE SODIUM 40 MG: 40 TABLET, DELAYED RELEASE ORAL at 10:09

## 2018-03-18 RX ADMIN — FOLIC ACID 1 MG: 1 TABLET ORAL at 10:09

## 2018-03-18 RX ADMIN — Medication 10 ML: at 04:12

## 2018-03-18 RX ADMIN — POTASSIUM & SODIUM PHOSPHATES POWDER PACK 280-160-250 MG 2 PACKET: 280-160-250 PACK at 22:53

## 2018-03-18 RX ADMIN — Medication 10 ML: at 14:00

## 2018-03-18 RX ADMIN — MAGNESIUM SULFATE HEPTAHYDRATE 6 G: 500 INJECTION, SOLUTION INTRAMUSCULAR; INTRAVENOUS at 10:43

## 2018-03-18 RX ADMIN — Medication 100 MG: at 10:09

## 2018-03-18 RX ADMIN — AMIODARONE HYDROCHLORIDE 200 MG: 200 TABLET ORAL at 10:09

## 2018-03-18 NOTE — PROGRESS NOTES
Hospitalist Progress Note  Graciela Mendez MD  Answering service: [phone] from in house phone      Date of Service:  3/18/2018  NAME:  Mariann Linares  :  1950  MRN:  [id number]      Admission Summary:   68 yo woman with no significant medical history presented to the ED from home on 3/13/18 with progressively worsening nausea, coffee-ground emesis, diarrhea x3 days and abdominal pain. In the ED, patient was found to be in SVT and hypotensive. Pt refused cardioversion so she was started on amiodarone gtt. She was admitted tot he ICU for new onset SVT, UGIB, UTI, sepsis.     Interval history / Subjective:   Still c/o diarrhea but now liquid and not as green; denies CP, SOB, palpitations, abdominal pain, f/c/n/v, dysuria now;  at bedside; d/w nurse, getting CABEZAS and SpO2 down to mid-80s% on RA     Assessment & Plan:     E coli bacteremia and UTI with septic shock (POA)  - tachycardia, lactic acidosis, fever, tachypnea, hypotension on admission  - BCx 3/13 E coli 1/2 bottles, 3/15 NGTD; unlikely contaminant as same organism  - UCx 3/13 E coli  - C diff negative  - s/p empiric vanc, Zosyn 3/13-3/16; s/p ceftriaxone 3/16-3/17  - restarted Zosyn 3/18 due to possible HCAP  - septic shock resolved s/p IVF and norepinephrine gtt  - would not recommend levofloxacin due to recent SVT  - consult ID on Monday 3/19 for outpt abx    CABEZAS and hypoxia on exertion likely due to b/l pleural effusions, atx and possible developing HCAP, 3/17  - CXR 3/17 with above  - wean supplemental O2 as tolerated or will need home O2 eval prior to discharge  - check sputum Cx if obtainable, check MRSA swab  - afebrile and no leucocytosis but due to above symptoms, will change ceftriaxone to Zosyn    SVT (POA)  - resolved with amiodarone gtt  - pt declined cardioversion/ablation  - Cardiology following  - EPS consulted but pt wants to have outpatient 30-day loop recorder  - troponin peaked at 0.15 and trended down  - TTE 3/14 EF 99-54%, grade 1 diastolic dysfunction    UGIB and ABLA (POA)  - FOBT positive  - B12 and folate deficiencies; supplements started  - s/p 2 units PRBC transfusion  - GI consulted: recommending EGD/EUS for possible IPMN; pt agreeable to EGD/EUS if it can be done on Mon/Tues of this week   - continue PPI BID  - tolerating solid diet  - CT chest/abd/pelvis 3/15 ascites, anasarca, 1.5cm pancreatic head cyst likely representing an incidental side branch IPMN    Mild compression deformity fracture of T12   - seen on CT a/p  - seems asymptomatic but significant generalized weakness  - PT/OT evals  - vitamin D deficiency    Vitamin D deficiency - started supplement    Multiple locules of gas in face, orbits, neck  - seen on CT head and CTA chest 3/15  - pt asymptomatic  - spoke with radiology Kriss Salazar [place] 3/16 appears to be intravascular air and if pt is asymptomatic is likely clinically insignificant    Hypokalemia, hypomagnesemia, hypophosphatemia - replete prn    Subclinical hypothyroidism - TSH mildly elevated but free T4 WNL and free T3 suppressed  - needs outpatient f/u in 6 weeks    Severe protein-calorie malnutrition/underweight, Body mass index is 19.77 kg/(m^2).    - prealbumin 6.4  - consult Nutrition  - likely due to chronic alcoholism    Pulmonary edema likely due to newly diagnosed diastolic heart failure, unknown chronicity  - NYHA Class 1  - stopped IVF  - NT-proBNP elevated and TTE as above    Bilateral LE edema - venous duplex 3/17 negative DVT  - elevate legs, JOSHUA hose  - pt has been refusing to wear SCDs per nurse    Chronic alcoholism  - reportedly drinks 1-3 glasses of wine daily; complete cessation advised  - no evidence of withdrawal so far but has been confused at times/not remembering events during this hospitalization  - no indication for prn Ativan but will start MVI, thiamine, folic acid    Code status: full  DVT prophylaxis: unable to give chemical PPx due to GIB and pt refusing to wear SCDs    Care Plan discussed with: Patient/Family and Nurse. Spoke to  at bedside  Disposition: TBD. Came from home. Has no PCP     Hospital Problems  Date Reviewed: 3/14/2018          Codes Class Noted POA    * (Principal)SVT (supraventricular tachycardia) ([facility] [place] [address] ICD-10-CM: I47.1  ICD-9-CM: 427.89  3/13/2018 Yes            Review of Systems:   Pertinent items are noted in HPI. Vital Signs:    Last 24hrs VS reviewed since prior progress note.  Most recent are:  Visit Vitals    /69 (BP 1 Location: Left arm, BP Patient Position: At rest)    Pulse (!) 108    Temp 98.5 °F (36.9 °C)    Resp 20    Ht 5' 5\" (1.651 m)    Wt 53.9 kg (118 lb 13.3 oz)    SpO2 (!) 87%    BMI 19.77 kg/m2     No intake or output data in the 24 hours ending 03/18/18 0935     Physical Examination:      Constitutional:  awake, no acute distress   ENT:  oral mucosa moist, oropharynx benign  Neck supple, no masses   Resp:  b/l rales, no wheeze   CV:  regular rhythm, normal rate, no m/r/g appreciated, b/l LE edema, +pulses    GI:  +BS, soft, non distended, non tender     Musculoskeletal:  moves all extremities    Neurologic:  AAOx3, NFD     Skin:  warm, dry  Eyes:  PERRL    Data Review:    Review and/or order of clinical lab test  Review and/or order of tests in the radiology section of CPT  Review and/or order of tests in the medicine section of CPT    Labs:     Recent Labs      03/18/18   0408  03/17/18   0315   WBC  7.0  6.4   HGB  8.6*  9.4*   HCT  25.6*  28.1*   PLT  120*  126*     Recent Labs      03/18/18   0408  03/17/18   0315  03/16/18   0544   NA  141  141  139   K  3.5  3.8  3.4*   CL  107  109*  109*   CO2  27  24  20*   BUN  5*  7  12   CREA  0.26*  0.35*  0.34*   GLU  105*  116*  106*   CA  7.5*  7.4*  7.0*   MG  1.2*  1.5*  1.8   PHOS  2.6  1.2*  2.1*     Recent Labs      03/18/18   0408  03/16/18   0544   SGOT  43*  47*   ALT  22  24   AP 78  74   TBILI  0.6  0.8   TP  4.9*  4.9*   ALB  1.9*  2.1*   GLOB  3.0  2.8     No results for input(s): INR, PTP, APTT in the last 72 hours. No lab exists for component: INREXT, INREXT   No results for input(s): FE, TIBC, PSAT, FERR in the last 72 hours. Lab Results   Component Value Date/Time    Folate 3.7 (L) 03/14/2018 05:07 AM      No results for input(s): PH, PCO2, PO2 in the last 72 hours. No results for input(s): CPK, CKNDX, TROIQ in the last 72 hours.     No lab exists for component: CPKMB  Lab Results   Component Value Date/Time    Cholesterol, total 87 03/14/2018 05:07 AM    HDL Cholesterol 35 03/14/2018 05:07 AM    LDL, calculated 37 03/14/2018 05:07 AM    Triglyceride 75 03/14/2018 05:07 AM    CHOL/HDL Ratio 2.5 03/14/2018 05:07 AM     No results found for: [facility]  Lab Results   Component Value Date/Time    Color YELLOW/STRAW 03/13/2018 11:16 PM    Appearance CLOUDY (A) 03/13/2018 11:16 PM    Specific gravity 1.021 03/13/2018 11:16 PM    pH (UA) 6.0 03/13/2018 11:16 PM    Protein 100 (A) 03/13/2018 11:16 PM    Glucose 250 (A) 03/13/2018 11:16 PM    Ketone 15 (A) 03/13/2018 11:16 PM    Bilirubin NEGATIVE  03/13/2018 11:16 PM    Urobilinogen 0.2 03/13/2018 11:16 PM    Nitrites NEGATIVE  03/13/2018 11:16 PM    Leukocyte Esterase LARGE (A) 03/13/2018 11:16 PM    Epithelial cells FEW 03/13/2018 11:16 PM    Bacteria 4+ (A) 03/13/2018 11:16 PM    WBC >100 (H) 03/13/2018 11:16 PM    RBC >100 (H) 03/13/2018 11:16 PM     Medications Reviewed:     Current Facility-Administered Medications   Medication Dose Route Frequency    magnesium sulfate 6 g in 0.9% sodium chloride IVPB  6 g IntraVENous ONCE    thiamine (B-1) tablet 100 mg  100 mg Oral DAILY    potassium, sodium phosphates (NEUTRA-PHOS) packet 2 Packet  2 Packet Oral QID    amiodarone (CORDARONE) tablet 200 mg  200 mg Oral DAILY    magnesium oxide (MAG-OX) tablet 400 mg  400 mg Oral DAILY    folic acid (FOLVITE) tablet 1 mg  1 mg Oral DAILY    cefTRIAXone (ROCEPHIN) 1 g in 0.9% sodium chloride (MBP/ADV) 50 mL  1 g IntraVENous Q24H    pantoprazole (PROTONIX) tablet 40 mg  40 mg Oral ACB&D    balsam peru-castor oil (VENELEX)  mg/gram ointment   Topical BID    sodium chloride (NS) flush 5-10 mL  5-10 mL IntraVENous Q8H    sodium chloride (NS) flush 5-10 mL  5-10 mL IntraVENous PRN    acetaminophen (TYLENOL) tablet 650 mg  650 mg Oral Q4H PRN    cyanocobalamin (VITAMIN B12) injection 1,000 mcg  1,000 mcg IntraMUSCular Q30D    sodium chloride (NS) flush 5-10 mL  5-10 mL IntraVENous PRN    0.9% sodium chloride infusion 250 mL  250 mL IntraVENous PRN    0.9% sodium chloride infusion 250 mL  250 mL IntraVENous PRN    ondansetron (ZOFRAN) injection 4 mg  4 mg IntraVENous Q4H PRN     ______________________________________________________________________  EXPECTED LENGTH OF STAY: 3d 16h  ACTUAL LENGTH OF STAY:          5                 Lila Jerez MD

## 2018-03-18 NOTE — PROGRESS NOTES
Problem: Falls - Risk of  Goal: *Absence of Falls  Document Joseph Fall Risk and appropriate interventions in the flowsheet.    Outcome: Progressing Towards Goal  Fall Risk Interventions:  Mobility Interventions: Communicate number of staff needed for ambulation/transfer, OT consult for ADLs, Patient to call before getting OOB, PT Consult for mobility concerns, PT Consult for assist device competence, Strengthening exercises (ROM-active/passive), Utilize walker, cane, or other assitive device, Utilize gait belt for transfers/ambulation         Medication Interventions: Assess postural VS orthostatic hypotension, Evaluate medications/consider consulting pharmacy, Patient to call before getting OOB, Teach patient to arise slowly, Utilize gait belt for transfers/ambulation    Elimination Interventions: Call light in reach, Elevated toilet seat, Patient to call for help with toileting needs, Toilet paper/wipes in reach, Toileting schedule/hourly rounds

## 2018-03-18 NOTE — PROGRESS NOTES
GI PROGRESS NOTE    NAME:             Ashlee Bright   :              1950   MRN:              [id number]   Admit Date:     3/13/2018  Todays Date:  3/17/2018      Subjective:     Anxious to go home  Noting 3 loose stools today     Abdominal pain:none  Nausea: none      Review of Systems - Respiratory ROS: no cough, shortness of breath, or wheezing  Cardiovascular ROS: no chest pain or dyspnea on exertion  Medications-reviewed     Current Facility-Administered Medications   Medication Dose Route Frequency    potassium, sodium phosphates (NEUTRA-PHOS) packet 2 Packet  2 Packet Oral QID    amiodarone (CORDARONE) tablet 200 mg  200 mg Oral DAILY    magnesium oxide (MAG-OX) tablet 400 mg  400 mg Oral DAILY    folic acid (FOLVITE) tablet 1 mg  1 mg Oral DAILY    cefTRIAXone (ROCEPHIN) 1 g in 0.9% sodium chloride (MBP/ADV) 50 mL  1 g IntraVENous Q24H    pantoprazole (PROTONIX) tablet 40 mg  40 mg Oral ACB&D    balsam peru-castor oil (VENELEX)  mg/gram ointment   Topical BID    sodium chloride (NS) flush 5-10 mL  5-10 mL IntraVENous Q8H    sodium chloride (NS) flush 5-10 mL  5-10 mL IntraVENous PRN    acetaminophen (TYLENOL) tablet 650 mg  650 mg Oral Q4H PRN    cyanocobalamin (VITAMIN B12) injection 1,000 mcg  1,000 mcg IntraMUSCular Q30D    sodium chloride (NS) flush 5-10 mL  5-10 mL IntraVENous PRN    0.9% sodium chloride infusion 250 mL  250 mL IntraVENous PRN    0.9% sodium chloride infusion 250 mL  250 mL IntraVENous PRN    ondansetron (ZOFRAN) injection 4 mg  4 mg IntraVENous Q4H PRN        Objective:   Patient Vitals for the past 8 hrs:   BP Temp Pulse Resp SpO2   187 117/64 98.2 °F (36.8 °C) (!) 104 16 91 %   18 1530 123/77 98.5 °F (36.9 °C) (!) 108 16 93 %         0701 -  1900  In: -   Out: 450 [Urine:450]    EXAM:    Visit Vitals    /64    Pulse (!) 104    Temp 98.2 °F (36.8 °C)    Resp 16    Ht 5' 5\" (1.651 m)    Wt 53.9 kg (118 lb 13.3 oz)    SpO2 91%    BMI 19.77 kg/m2     General appearance: alert, cooperative, no distress, appears stated age  Lungs: clear to auscultation bilaterally  Heart:tachycardic, regular rhythm, S1, S2 normal, no murmur, click, rub or gallop  Abdomen: soft, non-tender. Bowel sounds normal. No masses,  no organomegaly      Data Review     Recent Labs      03/17/18   0315  03/16/18   0544   WBC  6.4  5.8   HGB  9.4*  9.5*   HCT  28.1*  28.0*   PLT  126*  121*     Recent Labs      03/17/18   0315  03/16/18   0544   NA  141  139   K  3.8  3.4*   CL  109*  109*   CO2  24  20*   BUN  7  12   CREA  0.35*  0.34*   GLU  116*  106*   PHOS  1.2*  2.1*   CA  7.4*  7.0*     Recent Labs      03/16/18   0544  03/15/18   0245   SGOT  47*  45*   AP  74  79   TP  4.9*  5.7*   ALB  2.1*  2.4*   GLOB  2.8  3.3                              Assessment:   1. Nausea, vomiting and diarrhea - possibly related to acute infectious process, however UTI and sepsis may be playing role as well. Symptoms improved. Still some diarrhea. CT with pancreatic cyst - suspected IPMN. - Continue PPI and antibiotics  - Stool studies to be obtained  - Consider EGD/EUS +/- colonoscopy as outpatient given that Hgb stable and is without any overt evidence of bleeding. EUS can be done on Tuesday according to Dr. Thanh Horton   2. Anemia - with heme positive stool, Hgb stable. No overt signs of bleeding.   - Monitor H&H, transfuse as needed   3. UTI with septic shock - improved   4. SVT - improved   5. Hypokalemia - improved          Principal Problem:    SVT (supraventricular tachycardia) ([address] (3/13/2018)        Plan:   1.  As above         Saw Butler MD

## 2018-03-18 NOTE — PROGRESS NOTES
Bedside and Verbal shift change report given to RAEANN Ochoa (oncoming nurse) by Deandra Rosen (offgoing nurse). Report included the following information SBAR, Kardex, Intake/Output, MAR, Recent Results, Cardiac Rhythm Occ SVTs and Alarm Parameters .

## 2018-03-18 NOTE — PROGRESS NOTES
GI PROGRESS NOTE    NAME:             Jose Ray   :              1950   MRN:              [id number]   Admit Date:     3/13/2018  Todays Date:  3/18/2018      Subjective:          Anxious to go home  Noting 3 loose stools today                Abdominal pain:none  Nausea: none   Stools collected for study                                      Review of Systems - Respiratory ROS: no cough, shortness of breath, or wheezing  Cardiovascular ROS: no chest pain or dyspnea on exertion  Medications-reviewed     Current Facility-Administered Medications   Medication Dose Route Frequency    thiamine (B-1) tablet 100 mg  100 mg Oral DAILY    therapeutic multivitamin (THERAGRAN) tablet 1 Tab  1 Tab Oral DAILY    piperacillin-tazobactam (ZOSYN) 4.5 g in 0.9% sodium chloride (MBP/ADV) 100 mL  4.5 g IntraVENous Q8H    lactobac ac& pc-s.therm-b.anim (KAY Q/RISAQUAD)  1 Cap Oral DAILY    piperacillin-tazobactam (ZOSYN) 4.5 g in 0.9% sodium chloride (MBP/ADV) 100 mL  4.5 g IntraVENous ONCE    potassium, sodium phosphates (NEUTRA-PHOS) packet 2 Packet  2 Packet Oral QID    amiodarone (CORDARONE) tablet 200 mg  200 mg Oral DAILY    magnesium oxide (MAG-OX) tablet 400 mg  400 mg Oral DAILY    folic acid (FOLVITE) tablet 1 mg  1 mg Oral DAILY    pantoprazole (PROTONIX) tablet 40 mg  40 mg Oral ACB&D    balsam peru-castor oil (VENELEX)  mg/gram ointment   Topical BID    sodium chloride (NS) flush 5-10 mL  5-10 mL IntraVENous Q8H    sodium chloride (NS) flush 5-10 mL  5-10 mL IntraVENous PRN    acetaminophen (TYLENOL) tablet 650 mg  650 mg Oral Q4H PRN    cyanocobalamin (VITAMIN B12) injection 1,000 mcg  1,000 mcg IntraMUSCular Q30D    sodium chloride (NS) flush 5-10 mL  5-10 mL IntraVENous PRN    0.9% sodium chloride infusion 250 mL  250 mL IntraVENous PRN    0.9% sodium chloride infusion 250 mL  250 mL IntraVENous PRN    ondansetron (ZOFRAN) injection 4 mg  4 mg IntraVENous Q4H PRN Objective:   Patient Vitals for the past 8 hrs:   BP Temp Pulse Resp SpO2   03/18/18 1225 112/68 98.8 °F (37.1 °C) (!) 106 20 (!) 88 %   03/18/18 0822 - - - - (!) 87 %   03/18/18 0812 116/78 98.5 °F (36.9 °C) (!) 108 20 (!) 83 %        03/16 1901 - 03/18 0700  In: -   Out: 250 [Urine:250]    EXAM:    Visit Vitals    /68    Pulse (!) 106    Temp 98.8 °F (37.1 °C)    Resp 20    Ht 5' 5\" (1.651 m)    Wt 53.9 kg (118 lb 13.3 oz)    SpO2 (!) 88%    BMI 19.77 kg/m2     General appearance: alert, cooperative, no distress, appears stated age      Data Review     Recent Labs      03/18/18   0408  03/17/18   0315   WBC  7.0  6.4   HGB  8.6*  9.4*   HCT  25.6*  28.1*   PLT  120*  126*     Recent Labs      03/18/18   0408  03/17/18   0315   NA  141  141   K  3.5  3.8   CL  107  109*   CO2  27  24   BUN  5*  7   CREA  0.26*  0.35*   GLU  105*  116*   PHOS  2.6  1.2*   CA  7.5*  7.4*     Recent Labs      03/18/18   0408  03/16/18   0544   SGOT  43*  47*   AP  78  74   TP  4.9*  4.9*   ALB  1.9*  2.1*   GLOB  3.0  2.8                              Assessment:   1. Nausea, vomiting and diarrhea - possibly related to acute infectious process, however UTI and sepsis may be playing role as well. Symptoms improved. Still some diarrhea. CT with pancreatic cyst - suspected IPMN. - Continue PPI and antibiotics  - Stool studies to be obtained  - Consider EGD/EUS +/- colonoscopy as outpatient given that Hgb stable and is without any overt evidence of bleeding. EUS can be done on Tuesday according to Dr. Kandi Dodson   2. Anemia - with heme positive stool, Hgb stable. No overt signs of bleeding.   - Monitor H&H, transfuse as needed   3. UTI with septic shock - improved   4. SVT - improved   5. Hypokalemia - improved        Principal Problem:    SVT (supraventricular tachycardia) ([facility] [address] (3/13/2018)        Plan:   As above.  Will stay for EUS Tuesday       Karthik Brown MD

## 2018-03-18 NOTE — ROUTINE PROCESS
Patient is 83% sats on the room air. Refuses to apply and wear oxygen. MD will be notified. 09:40 MD is in the room.

## 2018-03-19 LAB
ALBUMIN SERPL-MCNC: 1.9 G/DL (ref 3.5–5)
ALBUMIN/GLOB SERPL: 0.6 {RATIO} (ref 1.1–2.2)
ALP SERPL-CCNC: 80 U/L (ref 45–117)
ALT SERPL-CCNC: 19 U/L (ref 12–78)
ANION GAP SERPL CALC-SCNC: 7 MMOL/L (ref 5–15)
AST SERPL-CCNC: 35 U/L (ref 15–37)
ATRIAL RATE: 100 BPM
BACTERIA SPEC CULT: ABNORMAL
BACTERIA SPEC CULT: ABNORMAL
BILIRUB SERPL-MCNC: 0.8 MG/DL (ref 0.2–1)
BUN SERPL-MCNC: 6 MG/DL (ref 6–20)
BUN/CREAT SERPL: 21 (ref 12–20)
CALCIUM SERPL-MCNC: 7.8 MG/DL (ref 8.5–10.1)
CALCULATED P AXIS, ECG09: 5 DEGREES
CALCULATED R AXIS, ECG10: 83 DEGREES
CALCULATED T AXIS, ECG11: 7 DEGREES
CHLORIDE SERPL-SCNC: 105 MMOL/L (ref 97–108)
CO2 SERPL-SCNC: 26 MMOL/L (ref 21–32)
CREAT SERPL-MCNC: 0.29 MG/DL (ref 0.55–1.02)
DIAGNOSIS, 93000: NORMAL
ERYTHROCYTE [DISTWIDTH] IN BLOOD BY AUTOMATED COUNT: 14.6 % (ref 11.5–14.5)
GLOBULIN SER CALC-MCNC: 3.2 G/DL (ref 2–4)
GLUCOSE SERPL-MCNC: 106 MG/DL (ref 65–100)
HCT VFR BLD AUTO: 27.4 % (ref 35–47)
HGB BLD-MCNC: 9.2 G/DL (ref 11.5–16)
MAGNESIUM SERPL-MCNC: 1.8 MG/DL (ref 1.6–2.4)
MCH RBC QN AUTO: 33.2 PG (ref 26–34)
MCHC RBC AUTO-ENTMCNC: 33.6 G/DL (ref 30–36.5)
MCV RBC AUTO: 98.9 FL (ref 80–99)
NRBC # BLD: 0.02 K/UL (ref 0–0.01)
NRBC BLD-RTO: 0.2 PER 100 WBC
P-R INTERVAL, ECG05: 122 MS
PHOSPHATE SERPL-MCNC: 3.8 MG/DL (ref 2.6–4.7)
PLATELET # BLD AUTO: 198 K/UL (ref 150–400)
PMV BLD AUTO: 11.1 FL (ref 8.9–12.9)
POTASSIUM SERPL-SCNC: 3.5 MMOL/L (ref 3.5–5.1)
PROT SERPL-MCNC: 5.1 G/DL (ref 6.4–8.2)
Q-T INTERVAL, ECG07: 368 MS
QRS DURATION, ECG06: 110 MS
QTC CALCULATION (BEZET), ECG08: 474 MS
RBC # BLD AUTO: 2.77 M/UL (ref 3.8–5.2)
SERVICE CMNT-IMP: ABNORMAL
SODIUM SERPL-SCNC: 138 MMOL/L (ref 136–145)
VENTRICULAR RATE, ECG03: 100 BPM
WBC # BLD AUTO: 9.7 K/UL (ref 3.6–11)

## 2018-03-19 PROCEDURE — 74011000258 HC RX REV CODE- 258: Performed by: INTERNAL MEDICINE

## 2018-03-19 PROCEDURE — 74011250636 HC RX REV CODE- 250/636: Performed by: EMERGENCY MEDICINE

## 2018-03-19 PROCEDURE — 97535 SELF CARE MNGMENT TRAINING: CPT | Performed by: OCCUPATIONAL THERAPIST

## 2018-03-19 PROCEDURE — 85027 COMPLETE CBC AUTOMATED: CPT | Performed by: INTERNAL MEDICINE

## 2018-03-19 PROCEDURE — 74011250637 HC RX REV CODE- 250/637: Performed by: INTERNAL MEDICINE

## 2018-03-19 PROCEDURE — 84100 ASSAY OF PHOSPHORUS: CPT | Performed by: INTERNAL MEDICINE

## 2018-03-19 PROCEDURE — 97165 OT EVAL LOW COMPLEX 30 MIN: CPT | Performed by: OCCUPATIONAL THERAPIST

## 2018-03-19 PROCEDURE — C1751 CATH, INF, PER/CENT/MIDLINE: HCPCS

## 2018-03-19 PROCEDURE — 74011250636 HC RX REV CODE- 250/636: Performed by: INTERNAL MEDICINE

## 2018-03-19 PROCEDURE — 02HV33Z INSERTION OF INFUSION DEVICE INTO SUPERIOR VENA CAVA, PERCUTANEOUS APPROACH: ICD-10-PCS

## 2018-03-19 PROCEDURE — 51798 US URINE CAPACITY MEASURE: CPT

## 2018-03-19 PROCEDURE — 83735 ASSAY OF MAGNESIUM: CPT | Performed by: INTERNAL MEDICINE

## 2018-03-19 PROCEDURE — 36415 COLL VENOUS BLD VENIPUNCTURE: CPT | Performed by: INTERNAL MEDICINE

## 2018-03-19 PROCEDURE — 74011250637 HC RX REV CODE- 250/637: Performed by: NURSE PRACTITIONER

## 2018-03-19 PROCEDURE — 80053 COMPREHEN METABOLIC PANEL: CPT | Performed by: INTERNAL MEDICINE

## 2018-03-19 PROCEDURE — 76937 US GUIDE VASCULAR ACCESS: CPT

## 2018-03-19 PROCEDURE — 65660000000 HC RM CCU STEPDOWN

## 2018-03-19 PROCEDURE — 97116 GAIT TRAINING THERAPY: CPT

## 2018-03-19 PROCEDURE — 93005 ELECTROCARDIOGRAM TRACING: CPT

## 2018-03-19 RX ORDER — FUROSEMIDE 10 MG/ML
10 INJECTION INTRAMUSCULAR; INTRAVENOUS 2 TIMES DAILY
Status: DISCONTINUED | OUTPATIENT
Start: 2018-03-19 | End: 2018-03-19

## 2018-03-19 RX ORDER — FUROSEMIDE 10 MG/ML
10 INJECTION INTRAMUSCULAR; INTRAVENOUS 2 TIMES DAILY
Status: DISCONTINUED | OUTPATIENT
Start: 2018-03-20 | End: 2018-03-20

## 2018-03-19 RX ADMIN — Medication 100 MG: at 10:17

## 2018-03-19 RX ADMIN — Medication 5 ML: at 14:00

## 2018-03-19 RX ADMIN — PANTOPRAZOLE SODIUM 40 MG: 40 TABLET, DELAYED RELEASE ORAL at 19:16

## 2018-03-19 RX ADMIN — PIPERACILLIN SODIUM,TAZOBACTAM SODIUM 4.5 G: 4; .5 INJECTION, POWDER, FOR SOLUTION INTRAVENOUS at 08:24

## 2018-03-19 RX ADMIN — Medication 1 CAPSULE: at 10:17

## 2018-03-19 RX ADMIN — Medication 10 ML: at 08:24

## 2018-03-19 RX ADMIN — AMIODARONE HYDROCHLORIDE 200 MG: 200 TABLET ORAL at 10:17

## 2018-03-19 RX ADMIN — POTASSIUM & SODIUM PHOSPHATES POWDER PACK 280-160-250 MG 2 PACKET: 280-160-250 PACK at 19:18

## 2018-03-19 RX ADMIN — POTASSIUM & SODIUM PHOSPHATES POWDER PACK 280-160-250 MG 2 PACKET: 280-160-250 PACK at 13:49

## 2018-03-19 RX ADMIN — POTASSIUM & SODIUM PHOSPHATES POWDER PACK 280-160-250 MG 1 PACKET: 280-160-250 PACK at 10:17

## 2018-03-19 RX ADMIN — PIPERACILLIN SODIUM,TAZOBACTAM SODIUM 4.5 G: 4; .5 INJECTION, POWDER, FOR SOLUTION INTRAVENOUS at 13:57

## 2018-03-19 RX ADMIN — FUROSEMIDE 10 MG: 10 INJECTION, SOLUTION INTRAMUSCULAR; INTRAVENOUS at 19:17

## 2018-03-19 RX ADMIN — PANTOPRAZOLE SODIUM 40 MG: 40 TABLET, DELAYED RELEASE ORAL at 08:24

## 2018-03-19 RX ADMIN — CEFTRIAXONE 2 G: 2 INJECTION, POWDER, FOR SOLUTION INTRAMUSCULAR; INTRAVENOUS at 16:23

## 2018-03-19 RX ADMIN — SODIUM CHLORIDE 250 ML: 900 INJECTION, SOLUTION INTRAVENOUS at 13:49

## 2018-03-19 RX ADMIN — Medication 400 MG: at 10:17

## 2018-03-19 RX ADMIN — THERA TABS 1 TABLET: TAB at 10:17

## 2018-03-19 RX ADMIN — Medication 10 ML: at 19:25

## 2018-03-19 RX ADMIN — FOLIC ACID 1 MG: 1 TABLET ORAL at 10:17

## 2018-03-19 NOTE — PROGRESS NOTES
Bedside and Verbal shift change report given to Ernestina Garcia RN (oncoming nurse) by Axel Bear (offgoing nurse). Report included the following information SBAR, Kardex and MAR.

## 2018-03-19 NOTE — PROGRESS NOTES
Occupational Therapy EVALUATION/discharge  Patient: Adolfo Duke (53 y.o. female)  Date: 3/19/2018  Primary Diagnosis: SVT (supraventricular tachycardia) ([facility])        Precautions:   (standard)    ASSESSMENT:   Based on the objective data described below, the patient presents at an overall supervision level with LE ADLs, toileting and functional mobility pushing IV pole while amb. O2 sats on RA 88% and RN placed pt on 2L o2 with sats then >92%. Pt demonstrated good safety awareness and no LOB during session. Pt and her  educated on energy conservation techniques with written handout provided. Both verbalized good understanding. Further skilled acute occupational therapy is not indicated at this time. Discharge Recommendations: None for OT  Further Equipment Recommendations for Discharge: none for OT      SUBJECTIVE:   Patient stated I am doing ok with the ADLs. I just need PT    OBJECTIVE DATA SUMMARY:   HISTORY:   History reviewed. No pertinent past medical history. Past Surgical History:   Procedure Laterality Date    HX ORTHOPAEDIC      HX TONSILLECTOMY      HX WISDOM TEETH EXTRACTION         Prior Level of Function/Environment/Context: Independent, drives very little, performs light IADLs  Home Situation  Home Environment: Private residence  One/Two Story Residence: Two story  Living Alone: No  Support Systems: Child(aram), Family member(s), Friends \ neighbors  Patient Expects to be Discharged to[de-identified] Private residence  Current DME Used/Available at Home: None  Tub or Shower Type: Shower  [x]  Right hand dominant   []  Left hand dominant    EXAMINATION OF PERFORMANCE DEFICITS:  Cognitive/Behavioral Status:  Neurologic State: Alert; Appropriate for age  Orientation Level: Oriented X4  Cognition: Appropriate decision making; Appropriate for age attention/concentration; Appropriate safety awareness; Follows commands  Perception: Appears intact  Perseveration: No perseveration noted  Safety/Judgement: Awareness of environment;Driving appropriateness; Fall prevention;Good awareness of safety precautions; Home safety; Insight into deficits      Hearing: Auditory  Auditory Impairment: None    Vision/Perceptual:    Acuity: Within Defined Limits    Corrective Lenses: Glasses    Range of Motion:  AROM: Generally decreased, functional  PROM: Generally decreased, functional                      Strength:  Strength: Generally decreased, functional                Coordination:  Coordination: Within functional limits  Fine Motor Skills-Upper: Left Intact; Right Intact    Gross Motor Skills-Upper: Left Intact; Right Intact    Tone & Sensation:  Tone: Normal  Sensation: Intact                      Balance:  Sitting: Intact  Standing: Intact (pushing IV pole)    Functional Mobility and Transfers for ADLs:       Transfers:  Sit to Stand: Supervision  Stand to Sit: Supervision  Bed to Chair: Supervision  Toilet Transfer : Supervision    ADL Assessment:  Feeding: Independent    Oral Facial Hygiene/Grooming: Supervision (standing at sink)    Bathing: Supervision; Additional time    Upper Body Dressing: Independent    Lower Body Dressing: Supervision; Additional time    Toileting: Supervision; Additional time (bowel urgency)                ADL Intervention and task modifications:  Patient instructed and indicated understanding energy conservation techniques to increase independence and safety during all ADLs for end goal of returning back home. Provided instruction body is like a jar of marbles, marbles represent energy, at end of day need as many marbles as possible to obtain a good night sleep, REM sleep is when the body repairs itself. This ensures a full jar of marbles, full of energy when wake up to start a new day. Use energy conservation techniques during ADLs so can increase participation in life activities patient prefers, to ensure more frequent good days.  If having a bad day, evaluate tasks completed day before and re-plan how to save energy to complete same tasks, for example if going grocery shopping do not complete full bathing/dressing/grooming. Visual handout provided. Patient indicated understanding by stating tasks already completing to save energy ie sitting to don all clothing. Cognitive Retraining  Safety/Judgement: Awareness of environment;Driving appropriateness; Fall prevention;Good awareness of safety precautions; Home safety; Insight into deficits      Functional Measure:  Barthel Index:    Bathin  Bladder: 10  Bowels: 5  Groomin  Dressin  Feeding: 10  Mobility: 10  Stairs: 5  Toilet Use: 5  Transfer (Bed to Chair and Back): 10  Total: 65       Barthel and G-code impairment scale:  Percentage of impairment CH  0% CI  1-19% CJ  20-39% CK  40-59% CL  60-79% CM  80-99% CN  100%   Barthel Score 0-100 100 99-80 79-60 59-40 20-39 1-19   0   Barthel Score 0-20 20 17-19 13-16 9-12 5-8 1-4 0      The Barthel ADL Index: Guidelines  1. The index should be used as a record of what a patient does, not as a record of what a patient could do. 2. The main aim is to establish degree of independence from any help, physical or verbal, however minor and for whatever reason. 3. The need for supervision renders the patient not independent. 4. A patient's performance should be established using the best available evidence. Asking the patient, friends/relatives and nurses are the usual sources, but direct observation and common sense are also important. However direct testing is not needed. 5. Usually the patient's performance over the preceding 24-48 hours is important, but occasionally longer periods will be relevant. 6. Middle categories imply that the patient supplies over 50 per cent of the effort. 7. Use of aids to be independent is allowed. Juan Carlos Stanley., Barthel, D.W. (0102). Functional evaluation: the Barthel Index. [address] [facility] (14)2. LEONARD Guzman, Dominguez Avalos., Kathy Valle., Nadine Sommers, [address] [place] [address] (). Measuring the change indisability after inpatient rehabilitation; comparison of the responsiveness of the Barthel Index and Functional [place] Measure. Journal of Neurology, Neurosurgery, and Psychiatry, 66(4), 241-863. SHAYLA Bateman, LUPIS Mercedes, & Tyler Najera M.A. (2004.) Assessment of post-stroke quality of life in cost-effectiveness studies: The usefulness of the Barthel Index and the EuroQoL-5D. Quality of Life Research, 13, 888-77       G codes: In compliance with CMSs Claims Based Outcome Reporting, the following G-code set was chosen for this patient based on their primary functional limitation being treated: The outcome measure chosen to determine the severity of the functional limitation was the Barthel Index with a score of 65/100 which was correlated with the impairment scale. ? Self Care:     - CURRENT STATUS: CJ - 20%-39% impaired, limited or restricted    - GOAL STATUS: CJ - 20%-39% impaired, limited or restricted    - D/C STATUS:  CJ - 20%-39% impaired, limited or restricted     Occupational Therapy Evaluation Charge Determination   History Examination Decision-Making   LOW Complexity : Brief history review  LOW Complexity : 1-3 performance deficits relating to physical, cognitive , or psychosocial skils that result in activity limitations and / or participation restrictions  LOW Complexity : No comorbidities that affect functional and no verbal or physical assistance needed to complete eval tasks       Based on the above components, the patient evaluation is determined to be of the following complexity level: LOW   Pain:  Pain Scale 1: Numeric (0 - 10)  Pain Intensity 1: 0              Activity Tolerance:   Fair  Please refer to the flowsheet for vital signs taken during this treatment.   After treatment:   [x]  Patient left in no apparent distress sitting up in chair  []  Patient left in no apparent distress in bed  [x]  Call bell left within reach  [x] Nursing notified  [x]  Caregiver present - pt's   []  Bed alarm activated    COMMUNICATION/EDUCATION:   Communication/Collaboration:  [x]      Home safety education was provided and the patient/caregiver indicated understanding. [x]      Patient/family have participated as able and agree with findings and recommendations. []      Patient is unable to participate in plan of care at this time.   Findings and recommendations were discussed with: Registered Nurse    Pepper Phalen, OT  Time Calculation: 24 mins

## 2018-03-19 NOTE — PROCEDURES
Midline Insertion and Progress Note    PRE-PROCEDURE VERIFICATION  Correct Procedure: yes  Correct Site:  yes  Temperature: Temp: 97.8 °F (36.6 °C), Temperature Source: Temp Source: Oral  Recent Labs      03/19/18   0445   BUN  6   CREA  0.29*   PLT  198   WBC  9.7     Allergies: Review of patient's allergies indicates no known allergies. PROCEDURE DETAIL  A Powerglide single lumen midline was started for antibiotic therapy.  The following documentation is in addition to the Midline properties in the lines/airways flowsheet :  Lot #: [id number]  Catheter Total Length: 8 (cm)  Vein Selection for Midline :right basilic  Complication Related to Insertion: none      Line is okay to use: yes    Cathleen Mireles, RN, BSN, [address] [facility]  [facility] [place]

## 2018-03-19 NOTE — WOUND CARE
WOCN Note:   New consult placed by RN for skin assessment; Chart review revealed:   Admitted for nausea, vomiting, diarrhea; history of   1. Supraventricular tachycardia. 2.  Suspected upper gastrointestinal bleed. 3.  Anemia secondary to upper gastrointestinal bleed. 4.  Hypokalemia. 5.  Hypomagnesemia. 6.  Hypotension. 7.  Subclinical hypothyroidism. 8.  Hyperglycemia. 9.  Acute gastroenteritis. 10.  Acute cystitis with hematuria. 11.  Dizziness. 12.  Suspected sepsis. 13.  Cachexia. Admitted from home;  at bedside;     Assessment:   Patient is alert, verbal denied pain; Patient up in chair with legs elevated; Patient wearing briefs for incontinence  Diet: cardiac  Bilateral heel, buttocks, and sacral skin intact and with blanchable erythema. Palpable DP pulses bilaterally. Bilateral lower extremities with edema; Buttocks with a cluster of superficial gold scabs on right buttock consistent with scratches; bilateral buttock/perianal area red, raw consistent with incontinence associated dermatitis; barrier cream applied; Patient repositioned without assist from sitting to standing and back to sitting with legs elevated on chair with pillow; heels offloaded;     Skin Care & Pressure Injury Prevention Recommendations:  1. Minimize layers of linen/pads under patient to optimize support surface. 2.  Encourage patient to reposition approximately every 2 hours;  3. Float heels with pillow under calves. 4.  Manage incontinence SensiCare barrier cream to bilateral buttock/perianal area; apply barrier cream three times daily and with each incontinence episode, use moisturizing foam cleanser to remove soiled surface layer and reapply barrier cream;  minimize use of briefs when able;   5. Continue on current surface for pressure redistribution. 6.  Assess/protect skin in contact with medical devices. Keep skin moisturized;   7.   Ensure that patient is repositioning in chair shifting weight approximately every 15 minutes and standing up every hour.      Patient has an order for [facility]; recommend discontinuing vasolex and using barrier cream to manage incontinence associated dermatitis    Discussed above assessment and recommendations with Donte Jolly (RAEANN)    Transition of Care: Plan to follow weekly and as needed while admitted to hospital.    Fanta Krishnamurthy RN  Certified Wound, Ostomy, Continence Nurse  office [phone]  pager [phone]

## 2018-03-19 NOTE — PROGRESS NOTES
Bedside and Verbal shift change report given to Chiqui Cabral RN (oncoming nurse) by Hilary Hernandez RN (offgoing nurse). Report included the following information SBAR, Kardex, ED Summary, Intake/Output, MAR and Recent Results.

## 2018-03-19 NOTE — ADVANCED PRACTICE NURSE
Dr. Aretha Moser office to arrange 30 day event monitor  Will arrange follow up with Dr. Priya Casey for 4-6 weeks so that event monitor results can be reviewed. We will sign off at this point. Please feel free to contact us for any future problems. Thank you for allowing us to participate in the care of Lisa Hall.     Juliann Marquez NP

## 2018-03-19 NOTE — PROGRESS NOTES
NUTRITION FOLLOW-UP ASSESSMENT    RECOMMENDATIONS:   1. Continue cardiac diet  2. Obtain standing weight  3. Encourage high protein foods and snacks: Offer Magic Cup, yogurt and protein powder  4. Rx vitamin, mineral replacement     Interventions/Plan:   Food/Nutrient Delivery:           meals and snacks  Nutrition Education:     Coordination of Care:    Nutrition Counseling:        Assessment:   Reason for Assessment:   [x] Provider Consult    Diet: Cardiac  Supplements: Initially refused Ensure Clear  Nutritionally Significant Medications: [x] Reviewed & Includes: B12, B1,Folvite, MV, FloraQ, Protonix, Neutra-Phos, mg2+ replacement, Zosyn   Meal Intake: No data found. Subjective:  Appetite good; Bowels vastly better; -110#. Really don't know how much I weigh. I'll stand on a scale. Had croissant with turkey sausage from [employer] breakfast today.  eating patient lunch entree however, pt did take the soup and eating chicken salad brought in by sister. Objective:  Admit SVT, +N/V diarrhea; heme+ stool (Hgb 9.2 3/19/18), pancreatic cyst GI suspected IPMN. Pt reports bowels as \"vastly improved now\". GI noted Hgb stable, Rx PPI and declined EGD/EUS; stool studies pending. Noted pancreatic cyst, IPMN suspected. No prior encounters to document wt trend. Pt claims -110#, but told prior RD # and  admit wt 118# standing scale (LE+1 pitting edema on admit). Plan to re-weigh to verify. Pt doesn't report any recent weight changes, and claims appetite good now. No difficulty chew/swallow. Visited and eating lunch however, appears to be eating more foods brought in by family then hospital trays to document intake.  eating her hospital lunch tray now. ? Appetite/intake PTA. Pre-albumin 6.4 (3/14/18) appears moderate depletion, but had heme+ stool. Discussed kcal/protein needs. Electrolytes replaced and Rx MV, B1, B12 and folate. Claims dislikes Ensure.  Trial offer Magic Cup (9 gm pro), yogurt and protein powder to add to food of choice. Estimated Nutrition Needs:   Kcals/day: 1278 Kcals/day (1550-5579 kcal/day (MSJ x 1.2-1.3))  Protein: 54 g (54-59g/day (1-1.1g/kg))  Fluid: 1300 ml (1mL/kcal)     Based On: [place] [facility] [place]  Weight Used: 118# admit wt    Pt expected to meet estimated nutrient needs: Appears good now, but  [x] Unable to predict at this time  Comparative Standards:  ;  ;      Nutrition Diagnosis:   1. Altered GI related to ? Acute infection/sepsis (UTI)  vs. unknown (EGD, EUS refused, stool studies pending) as evidenced by Rx PPI, ABX and heme+stool, +N/V and diarrhea on admit, pancreatic cyst.    Goals:      Consume % all meals in next 24 hr.     Monitoring & Evaluation:    - Total energy intake   - Weight/weight change   -  Total protein intake    Previous Nutrition Goals Met:  Previous goal met with diet progressed from liquids to solids and tolerated. Previous Recommendations:      N/A    Education & Discharge Needs:   [x] Identified and addressed: Discussed protein/kcal needs    [x] Participated in care plan, discharge planning, and/or interdisciplinary rounds        Cultural, Congregation and ethnic food preferences identified:   None    Skin Integrity: [x]Intact    Edema: [x] LE+1 pitting  Last BM: 3/19/18  Food Allergies: [x]NKA    Anthropometrics:    Weight Loss Metrics 3/16/2018 LE +1 pitting edema 3/13/2018   Today's Wt 118 lb 13.3 oz -   BMI - 19.77 kg/m2      Last 3 Recorded Weights in this Encounter    03/15/18 0000 03/15/18 0600 03/16/18 0520   Weight: 51 kg (112 lb 7 oz) 51 kg (112 lb 7 oz) 53.9 kg (118 lb 13.3 oz)      Weight Source: Standing scale   Height: 5' 5\" (165.1 cm),    Body mass index is 19.77 kg/(m^2).    ,     ,      Labs:    Lab Results   Component Value Date/Time    Sodium 138 03/19/2018 04:45 AM    Potassium 3.5 03/19/2018 04:45 AM    Chloride 105 03/19/2018 04:45 AM    CO2 26 03/19/2018 04:45 AM    Glucose 106 (H) 03/19/2018 04:45 AM    BUN 6 03/19/2018 04:45 AM    Creatinine 0.29 (L) 03/19/2018 04:45 AM    Calcium 7.8 (L) 03/19/2018 04:45 AM    Magnesium 1.8 03/19/2018 04:45 AM    Phosphorus 3.8 03/19/2018 04:45 AM    Albumin 1.9 (L) 03/19/2018 04:45 AM     Lab Results   Component Value Date/Time    Hemoglobin A1c 4.7 03/13/2018 11:16 PM     Lab Results   Component Value Date/Time    Glucose 106 (H) 03/19/2018 04:45 AM      Lab Results   Component Value Date/Time    ALT (SGPT) 19 03/19/2018 04:45 AM    AST (SGOT) 35 03/19/2018 04:45 AM    Alk.  phosphatase 80 03/19/2018 04:45 AM    Bilirubin, total 0.8 03/19/2018 04:45 AM        Charmel Oppenheim, RD

## 2018-03-19 NOTE — PROGRESS NOTES
Hospitalist Progress Note  Skye Young MD  Answering service: [phone] from in house phone      Date of Service:  3/19/2018  NAME:  Adolfo Duke  :  1950  MRN:  [id number]      Admission Summary:   70 yo woman with no significant medical history presented to the ED from home on 3/13/18 with progressively worsening nausea, coffee-ground emesis, diarrhea x3 days and abdominal pain. In the ED, patient was found to be in SVT and hypotensive. Pt refused cardioversion so she was started on amiodarone gtt. She was admitted tot he ICU for new onset SVT, UGIB, UTI, sepsis.     Interval history / Subjective:   Just finished getting midline IV placed; diarrhea resolved; c/o SOB with exertion and still using O2 NC due to desaturation with PT ambulation; denies CP, palpitations, abdominal pain, f/c/n/v, dysuria;  at bedside; d/w nurse, getting CABEZAS and SpO2 down to mid-80s% on RA     Assessment & Plan:     E coli bacteremia and UTI with septic shock (POA)  - tachycardia, lactic acidosis, fever, tachypnea, hypotension on admission  - BCx 3/13 E coli 1/2 bottles, 3/15 NGTD; unlikely contaminant as same organism  - UCx 3/13 E coli  - C diff negative  - s/p empiric vanc, Zosyn 3/13-3/16; s/p ceftriaxone 3/16-3/17  - restarted Zosyn 3/18-3/19 due to possible HCAP  - septic shock resolved s/p IVF and norepinephrine gtt  - would not recommend levofloxacin due to recent SVT  - ID consulted: recommended ceftriaxone 2g IV q24h until 3/26 at [facility]  - s/p midline placement 3/19    CABEZAS and hypoxia on exertion likely due to b/l pleural effusions, atx and possible developing HCAP, 3/17  - CXR 3/17 with above  - wean supplemental O2 as tolerated  - sputum Cx unobtainable, refused MRSA swab  - afebrile and no leucocytosis but due to above symptoms, changed ceftriaxone to Zosyn  - trial of Lasix but pt does not want to go home with Lasix  - check CXR in AM    SVT (POA)  - resolved with amiodarone gtt  - pt declined cardioversion/ablation  - Cardiology following  - EPS consulted but pt wants to have outpatient 30-day loop recorder  - troponin peaked at 0.15 and trended down  - TTE 3/14 EF 60-45%, grade 1 diastolic dysfunction    UGIB and ABLA (POA)  - FOBT positive  - B12 and folate deficiencies; supplements started  - s/p 2 units PRBC transfusion  - GI consulted: recommending EGD/EUS for possible IPMN; pt agreeable to EGD/EUS if it can be done on Mon/Tues of this week   - continue PPI BID  - tolerating solid diet  - CT chest/abd/pelvis 3/15 ascites, anasarca, 1.5cm pancreatic head cyst likely representing an incidental side branch IPMN    Mild compression deformity fracture of T12   - seen on CT a/p  - seems asymptomatic but significant generalized weakness  - PT/OT evals  - vitamin D deficiency    Vitamin D deficiency - started supplement    Multiple locules of gas in face, orbits, neck  - seen on CT head and CTA chest 3/15  - pt asymptomatic  - spoke with radiology Kriss Hernandez 3/16 appears to be intravascular air and if pt is asymptomatic is likely clinically insignificant    Hypokalemia, hypomagnesemia, hypophosphatemia - replete prn    Subclinical hypothyroidism - TSH mildly elevated but free T4 WNL and free T3 suppressed  - needs outpatient f/u in 6 weeks    Severe protein-calorie malnutrition/underweight, Body mass index is 19.77 kg/(m^2).    - prealbumin 6.4  - consult Nutrition  - likely due to chronic alcoholism    Pulmonary edema likely due to newly diagnosed diastolic heart failure, unknown chronicity  - NYHA Class 1  - stopped IVF  - NT-proBNP elevated and TTE as above  - IV Lasix inpatient but pt does not want to go home on Lasix    Bilateral LE edema - venous duplex 3/17 negative DVT  - elevate legs, JOSHUA hose  - pt has been refusing to wear SCDs per nurse    Chronic alcoholism  - reportedly drinks 1-3 glasses of wine daily; complete cessation advised  - no evidence of withdrawal so far but has been confused at times/not remembering events during this hospitalization  - no indication for prn Ativan but started MVI, thiamine, folic acid    Diarrhea - resolved  - stool Cx 3/18 pending    Code status: full  DVT prophylaxis: unable to give chemical PPx due to GIB and pt refusing to wear SCDs    Care Plan discussed with: Patient/Family and Nurse. Spoke to  at bedside  Disposition: TBD. Came from home. Has no PCP     Hospital Problems  Date Reviewed: 3/14/2018          Codes Class Noted POA    * (Principal)SVT (supraventricular tachycardia) ([facility] [address] ICD-10-CM: I47.1  ICD-9-CM: 427.89  3/13/2018 Yes            Review of Systems:   Pertinent items are noted in HPI. Vital Signs:    Last 24hrs VS reviewed since prior progress note.  Most recent are:  Visit Vitals    /70 (BP 1 Location: Left arm, BP Patient Position: At rest)    Pulse 90    Temp 97.8 °F (36.6 °C)    Resp 18    Ht 5' 5\" (1.651 m)    Wt 53.9 kg (118 lb 13.3 oz)    SpO2 93%    BMI 19.77 kg/m2     No intake or output data in the 24 hours ending 03/19/18 1626     Physical Examination:      Constitutional:  awake, no acute distress, NC in place   ENT:  oral mucosa moist, oropharynx benign  Neck supple, no masses   Resp:  b/l rales, no wheeze   CV:  regular rhythm, normal rate, no m/r/g appreciated, b/l LE edema, +pulses    GI:  +BS, soft, non distended, non tender     Musculoskeletal:  moves all extremities, RUE midline IV    Neurologic:  AAOx3, NFD     Skin:  warm, dry  Eyes:  PERRL    Data Review:    Review and/or order of clinical lab test  Review and/or order of tests in the radiology section of CPT  Review and/or order of tests in the medicine section of CPT    Labs:     Recent Labs      03/19/18   0445  03/18/18   0408   WBC  9.7  7.0   HGB  9.2*  8.6*   HCT  27.4*  25.6*   PLT  198  120*     Recent Labs      03/19/18   0445  03/18/18   0408  03/17/18   0315   NA  138  141  141 K  3.5  3.5  3.8   CL  105  107  109*   CO2  26  27  24   BUN  6  5*  7   CREA  0.29*  0.26*  0.35*   GLU  106*  105*  116*   CA  7.8*  7.5*  7.4*   MG  1.8  1.2*  1.5*   PHOS  3.8  2.6  1.2*     Recent Labs      03/19/18   0445  03/18/18   0408   SGOT  35  43*   ALT  19  22   AP  80  78   TBILI  0.8  0.6   TP  5.1*  4.9*   ALB  1.9*  1.9*   GLOB  3.2  3.0     No results for input(s): INR, PTP, APTT in the last 72 hours. No lab exists for component: INREXT, INREXT   No results for input(s): FE, TIBC, PSAT, FERR in the last 72 hours. Lab Results   Component Value Date/Time    Folate 3.7 (L) 03/14/2018 05:07 AM      No results for input(s): PH, PCO2, PO2 in the last 72 hours. No results for input(s): CPK, CKNDX, TROIQ in the last 72 hours.     No lab exists for component: CPKMB  Lab Results   Component Value Date/Time    Cholesterol, total 87 03/14/2018 05:07 AM    HDL Cholesterol 35 03/14/2018 05:07 AM    LDL, calculated 37 03/14/2018 05:07 AM    Triglyceride 75 03/14/2018 05:07 AM    CHOL/HDL Ratio 2.5 03/14/2018 05:07 AM     No results found for: [facility]  Lab Results   Component Value Date/Time    Color YELLOW/STRAW 03/13/2018 11:16 PM    Appearance CLOUDY (A) 03/13/2018 11:16 PM    Specific gravity 1.021 03/13/2018 11:16 PM    pH (UA) 6.0 03/13/2018 11:16 PM    Protein 100 (A) 03/13/2018 11:16 PM    Glucose 250 (A) 03/13/2018 11:16 PM    Ketone 15 (A) 03/13/2018 11:16 PM    Bilirubin NEGATIVE  03/13/2018 11:16 PM    Urobilinogen 0.2 03/13/2018 11:16 PM    Nitrites NEGATIVE  03/13/2018 11:16 PM    Leukocyte Esterase LARGE (A) 03/13/2018 11:16 PM    Epithelial cells FEW 03/13/2018 11:16 PM    Bacteria 4+ (A) 03/13/2018 11:16 PM    WBC >100 (H) 03/13/2018 11:16 PM    RBC >100 (H) 03/13/2018 11:16 PM     Medications Reviewed:     Current Facility-Administered Medications   Medication Dose Route Frequency    cefTRIAXone (ROCEPHIN) 2 g in 0.9% sodium chloride (MBP/ADV) 50 mL  2 g IntraVENous Q24H    thiamine (B-1) tablet 100 mg  100 mg Oral DAILY    therapeutic multivitamin (THERAGRAN) tablet 1 Tab  1 Tab Oral DAILY    lactobac ac& pc-s.therm-b.anim (KAY Q/RISAQUAD)  1 Cap Oral DAILY    potassium, sodium phosphates (NEUTRA-PHOS) packet 2 Packet  2 Packet Oral QID    amiodarone (CORDARONE) tablet 200 mg  200 mg Oral DAILY    magnesium oxide (MAG-OX) tablet 400 mg  400 mg Oral DAILY    folic acid (FOLVITE) tablet 1 mg  1 mg Oral DAILY    pantoprazole (PROTONIX) tablet 40 mg  40 mg Oral ACB&D    balsam peru-castor oil (VENELEX)  mg/gram ointment   Topical BID    sodium chloride (NS) flush 5-10 mL  5-10 mL IntraVENous Q8H    sodium chloride (NS) flush 5-10 mL  5-10 mL IntraVENous PRN    acetaminophen (TYLENOL) tablet 650 mg  650 mg Oral Q4H PRN    cyanocobalamin (VITAMIN B12) injection 1,000 mcg  1,000 mcg IntraMUSCular Q30D    sodium chloride (NS) flush 5-10 mL  5-10 mL IntraVENous PRN    0.9% sodium chloride infusion 250 mL  250 mL IntraVENous PRN    0.9% sodium chloride infusion 250 mL  250 mL IntraVENous PRN    ondansetron (ZOFRAN) injection 4 mg  4 mg IntraVENous Q4H PRN     ______________________________________________________________________  EXPECTED LENGTH OF STAY: 3d 16h  ACTUAL LENGTH OF STAY:          6                 Chitra Vásquez MD

## 2018-03-19 NOTE — PROGRESS NOTES
Bedside and Verbal shift change report given to [address] [place] [address] (oncoming nurse) by Marla Baumann (offgoing nurse). Report included the following information SBAR, Kardex and ED Summary.

## 2018-03-19 NOTE — PROGRESS NOTES
Problem: Falls - Risk of  Goal: *Absence of Falls  Document Joseph Fall Risk and appropriate interventions in the flowsheet.    Outcome: Progressing Towards Goal  Fall Risk Interventions:  Mobility Interventions: Communicate number of staff needed for ambulation/transfer         Medication Interventions: Assess postural VS orthostatic hypotension    Elimination Interventions: Call light in reach

## 2018-03-19 NOTE — PROGRESS NOTES
[address]  [address] [facility] [address]   [phone]                GI PROGRESS NOTE  Pamela Thomas, AGACN-BC  Work Cell: [phone]      NAME:   Purvi Gallagher   :    1950   MRN:    [id number]     Assessment/Plan   1. Nausea, vomiting and diarrhea - possibly related to acute infectious process, however UTI and sepsis may be playing role as well. Symptoms improved. Still with some loose stools. CT with pancreatic cyst - suspected IPMN. - Continue PPI   - Stool studies pending   - Declined EGD/EUS upon my visit - however would like to further discuss this with Dr. Kaur   2. Anemia - with heme positive stool, Hgb stable. No overt signs of bleeding.   - Monitor H&H, transfuse as needed   3. UTI with septic shock and E. Coli bacteremia - improved   - Management per primary team  4. SVT - improved   5. Hypokalemia - improved      Patient Active Problem List   Diagnosis Code    SVT (supraventricular tachycardia) ([facility]) I47.1       Subjective:     Feeling relatively well. Denies any pain. Having some urgent stools she suspects is due to antibiotics. Tolerating diet. Review of Systems    Constitutional: negative fever, negative chills, negative weight loss  Eyes:   negative visual changes  ENT:   negative sore throat, tongue or lip swelling  Respiratory:  negative cough, negative dyspnea  Cards:  negative for chest pain, palpitations, lower extremity edema  GI:   See HPI  :  negative for frequency, dysuria  Integument:  negative for rash and pruritus  Heme:  negative for easy bruising and gum/nose bleeding  Musculoskel: negative for myalgias, back pain and muscle weakness  Neuro: negative for headaches, dizziness, vertigo  Psych:  negative for feelings of anxiety, depression      Objective:     VITALS:   Last 24hrs VS reviewed since prior hospitalist progress note.  Most recent are:  Visit Vitals    /67    Pulse 90    Temp 99.1 °F (37.3 °C)    Resp 20  Ht 5' 5\" (1.651 m)    Wt 53.9 kg (118 lb 13.3 oz)    SpO2 (!) 86%    BMI 19.77 kg/m2     No intake or output data in the 24 hours ending 03/19/18 0923     PHYSICAL EXAM:  General   well developed, thin, alert, in no acute distress  EENT  Normocephalic, Atraumatic, PERRLA, EOMI, sclera clear  Respiratory   Clear To Auscultation bilaterally - no wheezes, rales, rhonchi, or crackles  Cardiology  Regular Rate and Rythmn  - no murmurs, rubs or gallops  Abdominal  Soft, non-tender, non-distended, positive bowel sounds, no hepatosplenomegaly, no palpable mass  Extremities  No clubbing, cyanosis, or edema. Pulses intact. Neurological  No focal neurological deficits noted  Psychological  Oriented x 3. Normal affect. Lab Data   Recent Results (from the past 12 hour(s))   CBC W/O DIFF    Collection Time: 03/19/18  4:45 AM   Result Value Ref Range    WBC 9.7 3.6 - 11.0 K/uL    RBC 2.77 (L) 3.80 - 5.20 M/uL    HGB 9.2 (L) 11.5 - 16.0 g/dL    HCT 27.4 (L) 35.0 - 47.0 %    MCV 98.9 80.0 - 99.0 FL    MCH 33.2 26.0 - 34.0 PG    MCHC 33.6 30.0 - 36.5 g/dL    RDW 14.6 (H) 11.5 - 14.5 %    PLATELET 586 916 - 708 K/uL    MPV 11.1 8.9 - 12.9 FL    NRBC 0.2 (H) 0  WBC    ABSOLUTE NRBC 0.02 (H) 0.00 - 8.98 K/uL   METABOLIC PANEL, COMPREHENSIVE    Collection Time: 03/19/18  4:45 AM   Result Value Ref Range    Sodium 138 136 - 145 mmol/L    Potassium 3.5 3.5 - 5.1 mmol/L    Chloride 105 97 - 108 mmol/L    CO2 26 21 - 32 mmol/L    Anion gap 7 5 - 15 mmol/L    Glucose 106 (H) 65 - 100 mg/dL    BUN 6 6 - 20 MG/DL    Creatinine 0.29 (L) 0.55 - 1.02 MG/DL    BUN/Creatinine ratio 21 (H) 12 - 20      GFR est AA >60 >60 ml/min/1.73m2    GFR est non-AA >60 >60 ml/min/1.73m2    Calcium 7.8 (L) 8.5 - 10.1 MG/DL    Bilirubin, total 0.8 0.2 - 1.0 MG/DL    ALT (SGPT) 19 12 - 78 U/L    AST (SGOT) 35 15 - 37 U/L    Alk.  phosphatase 80 45 - 117 U/L    Protein, total 5.1 (L) 6.4 - 8.2 g/dL    Albumin 1.9 (L) 3.5 - 5.0 g/dL    Globulin 3.2 2.0 - 4.0 g/dL    A-G Ratio 0.6 (L) 1.1 - 2.2     MAGNESIUM    Collection Time: 03/19/18  4:45 AM   Result Value Ref Range    Magnesium 1.8 1.6 - 2.4 mg/dL   PHOSPHORUS    Collection Time: 03/19/18  4:45 AM   Result Value Ref Range    Phosphorus 3.8 2.6 - 4.7 MG/DL         Medications: Reviewed    PMH/ reviewed - no change compared to H&P  Mid-Level Provider: Galina Ruelsa NP   Date/Time:  3/19/2018        I have personally reviewed the history and independently examined the patient. I have reviewed the chart and agree with the documentation recorded by the Mid Level Provider, including the assessment, treatment plan, and disposition.     ASSESSMENT AND PLAN:  WOuld hold off on endoscopic procedures at this time till she finishes the antibiotics and her O2 sats start holding up    Arnel Santoyo MD

## 2018-03-19 NOTE — CONSULTS
ID consult  NAME:  Druscilla Leventhal                      :   1950                       MRN:   [id number]   Date/Time:  3/19/2018 12:40 PM  Subjective:   REASON FOR CONSULT:   E.coli bacteremia      Druscilla Leventhal  is a 69 y. o.female  with  No PMH not on any meds  Admitted with diarrhea and vomiting on 3/13/18  Pt was doing well and was at baseline when she developed diarrhea on 3/12 and then vomiting. Pt says she was having urinary symptoms for a few days- burning and frequency- also was tired with malaise- she had coffee ground vomitus and blood in the stool  She denied any fever. When she came to the ED she was in SVT, hypotensive - no fever  She was seen by cardiology- refused SVT, was started on amiodarone drip  She received PRBC for Hb 8.9  I am asked to see her for e.coli in the blood and urine and for antibiotic management  Pt says she retired as a  last year and says has been depressed because of sudden void- she ahs been drinking 3 glasses of wine every day    History reviewed. No pertinent past medical history. PSH- tonsillectomy    Social History     Social History    Marital status:      Spouse name: N/A    Number of children: N/A    Years of education: N/A     Occupational History    Not on file. Social History Main Topics    Smoking status: Never Smoker    Smokeless tobacco: Never Used    Alcohol use No    Drug use: No    Sexual activity: Not on file     Other Topics Concern    Not on file     Social History Narrative      History reviewed. No pertinent family history.   No Known Allergies      Current Facility-Administered Medications   Medication Dose Route Frequency Provider Last Rate Last Dose    thiamine (B-1) tablet 100 mg  100 mg Oral DAILY Bashir Guthrie MD   100 mg at 18 1017    therapeutic multivitamin (THERAGRAN) tablet 1 Tab  1 Tab Oral DAILY Bashir Guthrie MD   1 Tab at 18 1017    piperacillin-tazobactam (ZOSYN) 4.5 g in 0.9% sodium chloride (MBP/ADV) 100 mL  4.5 g IntraVENous Q8H Thelma Vance MD   4.5 g at 03/19/18 0824    lactobac ac& pc-s.therm-b.anim (KAY Q/RISAQUAD)  1 Cap Oral DAILY Thelma Vance MD   1 Cap at 03/19/18 1017    potassium, sodium phosphates (NEUTRA-PHOS) packet 2 Packet  2 Packet Oral QID Thelma Vance MD   1 Packet at 03/19/18 1017    amiodarone (CORDARONE) tablet 200 mg  200 mg Oral DAILY Daniel Oropeza NP   200 mg at 03/19/18 1017    magnesium oxide (MAG-OX) tablet 400 mg  400 mg Oral DAILY Daniel Oropeza NP   400 mg at 56/38/78 2736    folic acid (FOLVITE) tablet 1 mg  1 mg Oral DAILY Thelma Vance MD   1 mg at 03/19/18 1017    pantoprazole (PROTONIX) tablet 40 mg  40 mg Oral ACB&D Rylie Abraham NP   40 mg at 03/19/18 0824    balsam peru-castor oil (VENELEX)  mg/gram ointment   Topical BID Thelma Vance MD   Stopped at 03/19/18 0900    sodium chloride (NS) flush 5-10 mL  5-10 mL IntraVENous Q8H Melanie Oliveros MD   10 mL at 03/19/18 0824    sodium chloride (NS) flush 5-10 mL  5-10 mL IntraVENous PRN Melanie Oliveros MD   10 mL at 03/18/18 0412    acetaminophen (TYLENOL) tablet 650 mg  650 mg Oral Q4H PRN Melanie Oliveros MD   650 mg at 03/14/18 0507    cyanocobalamin (VITAMIN B12) injection 1,000 mcg  1,000 mcg IntraMUSCular Q30D Thelma Vance MD   1,000 mcg at 03/14/18 0957    sodium chloride (NS) flush 5-10 mL  5-10 mL IntraVENous PRN Juan Rajesh, DO        0.9% sodium chloride infusion 250 mL  250 mL IntraVENous PRN Juan Rajesh, DO        0.9% sodium chloride infusion 250 mL  250 mL IntraVENous PRN Juan Rajesh, DO        [facility]ron [facility]) injection 4 mg  4 mg IntraVENous Q4H PRN Melanie Oliveros MD   4 mg at 03/14/18 1231        REVIEW OF SYSTEMS:     Const: negative fever, negative chills, negative weight loss  Eyes:  negative diplopia or visual changes, negative eye pain  ENT:  Has cough  Resp:  Cough and sob  Cards: negative for chest pain, palpitations, lower extremity edema  Skin:  negative for rash and pruritus  Heme: negative for easy bruising and gum/nose bleeding  MS: negative for myalgias, arthralgias, back pain and muscle weakness  Neurolo:negative for headaches, dizziness, vertigo, memory problems   Psych: situational depression    Objective:   VITALS:    Visit Vitals    /64 (BP 1 Location: Right arm, BP Patient Position: At rest)    Pulse 90    Temp 98.2 °F (36.8 °C)    Resp 20    Ht 5' 5\" (1.651 m)    Wt 118 lb 13.3 oz (53.9 kg)    SpO2 94%    BMI 19.77 kg/m2       PHYSICAL EXAM:   General:    Alert, cooperative, no distress, frail    Head:   Normocephalic, without obvious abnormality, atraumatic. Eyes:   Conjunctivae clear, anicteric sclerae. Pupils are equal  Nose:  Nares normal. No drainage or sinus tenderness. Throat:    Lips, mucosa, and tongue normal.  No Thrush  Neck:  Supple,   Back:    No CVA tenderness. Lungs:   Decreased air entry both bases  Heart:   s1s2  Abdomen:   Soft,   Extremities: Edema legs  Skin:     No rashes or lesions.   Not Jaundiced  Lymph: Cervical, supraclavicular normal.  Neurologic: Grossly non-focal    Pertinent Labs  Wbc 9.7    HB 9.2  .4 on admission  B 12 162  Folate 3.7  ALT 29  AST 39  TIBC 181  Iron saturation 72  Iron 131  Cr 0.29  Lactate 3.9    IMAGING RESULTS:      Impression/Recommendation  69 yr female with no PMH admitted with vomiting, diarrhea, found to be in SVT/low MG/Low K/Anemic/     Sepsis due to ecoli bacteremia and urosepsis  Will check for post void bladder scan to look for residue  CT abdomen no evidence of renal lesion /obstruction  On zosyn- change to ceftriaxone  She will go home on IV ceftriaxone 2 grams every day for 7 more days ( total of 14 days)  levaquin was an option- side effects discussed and she prefers not to be on it  She wants to go to [facility] to get the antibiotic      SVT could be due to infection  / ? Low Mg and K contributing   ( was alcohol contributing )    Hypoxia/ cough- could be due to fluid overload- has b/l pleural effusion  And atelectasis   unlikely to be pneumonia    Hypomagnesemia/jypokalemia could be due to alcohol- replenished    Anemia - possible GI bleed - FOB positive- S/p PRBC  Also has low B12/Folate    Age indeterminate compression fracture T12- thin built-  Osteoporosis highly likely  Hypotension resolved    ETOH use    Discussed with patient and family and  requesting provider      [facility]  1) Care of midline  2) Ceftriaxone 2 grams IV every day until 3/26/18  3) BC/CMP on 3/24/18  4) Remove midline after completion of antibiotic  5) FAx results to [facility] at [phone]) Call [facility] with any questions- [phone]

## 2018-03-19 NOTE — PROGRESS NOTES
Problem: Mobility Impaired (Adult and Pediatric)  Goal: *Acute Goals and Plan of Care (Insert Text)  Physical Therapy Goals  Initiated 3/17/2018  1. Patient will move from supine to sit and sit to supine , scoot up and down and roll side to side in bed with independence within 7 day(s). 2.  Patient will transfer from bed to chair and chair to bed with independence using the least restrictive device within 7 day(s). 3.  Patient will perform sit to stand with independence within 7 day(s). 4.  Patient will ambulate with supervision/set-up for 300 feet with the least restrictive device within 7 day(s). 5.  Patient will ascend/descend 7 stairs with 1 handrail(s) with minimal assistance/contact guard assist within 7 day(s). physical Therapy TREATMENT  Patient: Fabiola Resendez (16 y.o. female)  Date: 3/19/2018  Diagnosis: SVT (supraventricular tachycardia) (HCC) SVT (supraventricular tachycardia) (HCC)       Precautions: fall  Chart, physical therapy assessment, plan of care and goals were reviewed.     ASSESSMENT:  Pt is making steady progress, she is OOB in chair and motivated to regain her strength and endurance, pt c/o BLE edema and stiffness, agreeable to participate with therapy, sit to stand from chair with CGA, ambulated with rolling walker with CGA x 150 feet, given cues to stay close to walker base of support, pt also practiced stairs, pt reports feeling very nervous on stairs, completed 3 steps with both rails and minimal assist and reassurance, SPO2 decreased to 85% while ambulating on RA, reapplied O2, SPO2 gradually increased to 94%, pt adamant that she will not use O2 at home even if she continues to desaturate with activity, educated pt on the importance of maintaining SPO2 > 90%, pt has a supportive  and sister, anticipate she is appropriate to return home,  recommend home health PT  Progression toward goals:  []    Improving appropriately and progressing toward goals  [x]    Improving slowly and progressing toward goals  []    Not making progress toward goals and plan of care will be adjusted     PLAN:  Patient continues to benefit from skilled intervention to address the above impairments. Continue treatment per established plan of care. Discharge Recommendations:  Home Health  Further Equipment Recommendations for Discharge:  portable oxygen and rolling walker, to be determined      SUBJECTIVE:   Patient stated I will leave AMA before I go home with oxygen.   Pt politely declining oxygen if needed at home. OBJECTIVE DATA SUMMARY:   Critical Behavior:  Neurologic State: Alert, Appropriate for age  Orientation Level: Oriented X4  Cognition: Appropriate decision making, Appropriate for age attention/concentration, Appropriate safety awareness, Follows commands  Safety/Judgement: Awareness of environment, Driving appropriateness, Fall prevention, Good awareness of safety precautions, Home safety, Insight into deficits  Functional Mobility Training:  Bed Mobility:   not assessed, OOB in chair                 Transfers:  Sit to Stand: Contact guard assistance; Additional time;Assist x1  Stand to Sit: Contact guard assistance;Assist x1;Additional time                    Balance:  Sitting: Intact  Standing: Impaired  Standing - Static: Good  Standing - Dynamic : Good (with rolling walker)  Ambulation/Gait Training:  Distance (ft): 150 Feet (ft)  Assistive Device: Gait belt;Walker, rolling  Ambulation - Level of Assistance: Contact guard assistance;Assist x1        Gait Abnormalities: Decreased step clearance              Speed/Ailyn: Slow  Step Length: Left shortened;Right shortened          Stairs:  Number of Stairs Trained: 3, pt reports feeling very nervous on stairs, reports she will have some \"stong men\" help her at home  Stairs - Level of Assistance: Minimum assistance; Additional time;Assist X1   Rail Use: Both    Pain:  Pain Scale 1: Numeric (0 - 10)  Pain Intensity 1: 0 Activity Tolerance:   Fair, SPO2 at rest with 2 LPM O2 99%, with ambulation decreased to 85%, reapplied O2, SPO2 increased to 94%    After treatment:   [x]    Patient left in no apparent distress sitting up in chair  []    Patient left in no apparent distress in bed  [x]    Call bell left within reach  [x]    Nursing notified  []    Caregiver present  []    Bed alarm activated    COMMUNICATION/COLLABORATION:   The patients plan of care was discussed with: Registered Nurse    Alecia Chiang   Time Calculation: 20 mins

## 2018-03-20 ENCOUNTER — TELEPHONE (OUTPATIENT)
Dept: CARDIOLOGY CLINIC | Age: 68
End: 2018-03-20

## 2018-03-20 ENCOUNTER — APPOINTMENT (OUTPATIENT)
Dept: GENERAL RADIOLOGY | Age: 68
DRG: 871 | End: 2018-03-20
Attending: INTERNAL MEDICINE
Payer: MEDICARE

## 2018-03-20 DIAGNOSIS — I47.1 SVT (SUPRAVENTRICULAR TACHYCARDIA) (HCC): Primary | ICD-10-CM

## 2018-03-20 LAB
ANION GAP SERPL CALC-SCNC: 10 MMOL/L (ref 5–15)
BACTERIA SPEC CULT: NORMAL
BUN SERPL-MCNC: 8 MG/DL (ref 6–20)
BUN/CREAT SERPL: 24 (ref 12–20)
C JEJUNI+C COLI AG STL QL: NEGATIVE
CALCIUM SERPL-MCNC: 7.6 MG/DL (ref 8.5–10.1)
CHLORIDE SERPL-SCNC: 104 MMOL/L (ref 97–108)
CO2 SERPL-SCNC: 26 MMOL/L (ref 21–32)
CREAT SERPL-MCNC: 0.33 MG/DL (ref 0.55–1.02)
E COLI SXT1+2 STL IA: NO GROWTH
ERYTHROCYTE [DISTWIDTH] IN BLOOD BY AUTOMATED COUNT: 14.6 % (ref 11.5–14.5)
GLUCOSE SERPL-MCNC: 103 MG/DL (ref 65–100)
HCT VFR BLD AUTO: 26.2 % (ref 35–47)
HGB BLD-MCNC: 8.8 G/DL (ref 11.5–16)
MAGNESIUM SERPL-MCNC: 1.5 MG/DL (ref 1.6–2.4)
MCH RBC QN AUTO: 33.3 PG (ref 26–34)
MCHC RBC AUTO-ENTMCNC: 33.6 G/DL (ref 30–36.5)
MCV RBC AUTO: 99.2 FL (ref 80–99)
NRBC # BLD: 0 K/UL (ref 0–0.01)
NRBC BLD-RTO: 0 PER 100 WBC
PHOSPHATE SERPL-MCNC: 3.8 MG/DL (ref 2.6–4.7)
PLATELET # BLD AUTO: 237 K/UL (ref 150–400)
PMV BLD AUTO: 10.7 FL (ref 8.9–12.9)
POTASSIUM SERPL-SCNC: 3.3 MMOL/L (ref 3.5–5.1)
RBC # BLD AUTO: 2.64 M/UL (ref 3.8–5.2)
SERVICE CMNT-IMP: NORMAL
SERVICE CMNT-IMP: NORMAL
SODIUM SERPL-SCNC: 140 MMOL/L (ref 136–145)
WBC # BLD AUTO: 10 K/UL (ref 3.6–11)

## 2018-03-20 PROCEDURE — 65270000029 HC RM PRIVATE

## 2018-03-20 PROCEDURE — 84100 ASSAY OF PHOSPHORUS: CPT | Performed by: INTERNAL MEDICINE

## 2018-03-20 PROCEDURE — 74011000250 HC RX REV CODE- 250: Performed by: INTERNAL MEDICINE

## 2018-03-20 PROCEDURE — 71046 X-RAY EXAM CHEST 2 VIEWS: CPT

## 2018-03-20 PROCEDURE — 74011250636 HC RX REV CODE- 250/636: Performed by: NURSE PRACTITIONER

## 2018-03-20 PROCEDURE — 85027 COMPLETE CBC AUTOMATED: CPT | Performed by: INTERNAL MEDICINE

## 2018-03-20 PROCEDURE — 74011250637 HC RX REV CODE- 250/637: Performed by: NURSE PRACTITIONER

## 2018-03-20 PROCEDURE — 97116 GAIT TRAINING THERAPY: CPT

## 2018-03-20 PROCEDURE — 36415 COLL VENOUS BLD VENIPUNCTURE: CPT | Performed by: INTERNAL MEDICINE

## 2018-03-20 PROCEDURE — 74011000258 HC RX REV CODE- 258: Performed by: INTERNAL MEDICINE

## 2018-03-20 PROCEDURE — 74011250637 HC RX REV CODE- 250/637: Performed by: INTERNAL MEDICINE

## 2018-03-20 PROCEDURE — 74011250636 HC RX REV CODE- 250/636: Performed by: INTERNAL MEDICINE

## 2018-03-20 PROCEDURE — 80048 BASIC METABOLIC PNL TOTAL CA: CPT | Performed by: INTERNAL MEDICINE

## 2018-03-20 PROCEDURE — 83735 ASSAY OF MAGNESIUM: CPT | Performed by: INTERNAL MEDICINE

## 2018-03-20 RX ORDER — LANOLIN ALCOHOL/MO/W.PET/CERES
400 CREAM (GRAM) TOPICAL 2 TIMES DAILY
Status: DISCONTINUED | OUTPATIENT
Start: 2018-03-20 | End: 2018-03-21 | Stop reason: HOSPADM

## 2018-03-20 RX ORDER — MAGNESIUM SULFATE HEPTAHYDRATE 40 MG/ML
2 INJECTION, SOLUTION INTRAVENOUS ONCE
Status: COMPLETED | OUTPATIENT
Start: 2018-03-20 | End: 2018-03-20

## 2018-03-20 RX ORDER — POTASSIUM CHLORIDE 750 MG/1
40 TABLET, FILM COATED, EXTENDED RELEASE ORAL
Status: ACTIVE | OUTPATIENT
Start: 2018-03-20 | End: 2018-03-20

## 2018-03-20 RX ORDER — BACITRACIN 500 UNIT/G
1 PACKET (EA) TOPICAL ONCE
Status: COMPLETED | OUTPATIENT
Start: 2018-03-20 | End: 2018-03-20

## 2018-03-20 RX ORDER — FUROSEMIDE 10 MG/ML
20 INJECTION INTRAMUSCULAR; INTRAVENOUS 2 TIMES DAILY
Status: DISCONTINUED | OUTPATIENT
Start: 2018-03-20 | End: 2018-03-21

## 2018-03-20 RX ORDER — POTASSIUM CHLORIDE 750 MG/1
40 TABLET, FILM COATED, EXTENDED RELEASE ORAL
Status: COMPLETED | OUTPATIENT
Start: 2018-03-20 | End: 2018-03-20

## 2018-03-20 RX ADMIN — BACITRACIN 1 PACKET: 500 OINTMENT TOPICAL at 14:23

## 2018-03-20 RX ADMIN — POTASSIUM & SODIUM PHOSPHATES POWDER PACK 280-160-250 MG 2 PACKET: 280-160-250 PACK at 19:48

## 2018-03-20 RX ADMIN — Medication 10 ML: at 15:25

## 2018-03-20 RX ADMIN — CEFTRIAXONE 2 G: 2 INJECTION, POWDER, FOR SOLUTION INTRAMUSCULAR; INTRAVENOUS at 15:25

## 2018-03-20 RX ADMIN — Medication 400 MG: at 08:59

## 2018-03-20 RX ADMIN — Medication 10 ML: at 22:42

## 2018-03-20 RX ADMIN — Medication 400 MG: at 19:48

## 2018-03-20 RX ADMIN — THERA TABS 1 TABLET: TAB at 08:59

## 2018-03-20 RX ADMIN — PANTOPRAZOLE SODIUM 40 MG: 40 TABLET, DELAYED RELEASE ORAL at 06:55

## 2018-03-20 RX ADMIN — POTASSIUM & SODIUM PHOSPHATES POWDER PACK 280-160-250 MG 2 PACKET: 280-160-250 PACK at 22:40

## 2018-03-20 RX ADMIN — POTASSIUM CHLORIDE 40 MEQ: 750 TABLET, EXTENDED RELEASE ORAL at 08:59

## 2018-03-20 RX ADMIN — FOLIC ACID 1 MG: 1 TABLET ORAL at 08:59

## 2018-03-20 RX ADMIN — AMIODARONE HYDROCHLORIDE 200 MG: 200 TABLET ORAL at 08:59

## 2018-03-20 RX ADMIN — MAGNESIUM SULFATE HEPTAHYDRATE 2 G: 40 INJECTION, SOLUTION INTRAVENOUS at 09:00

## 2018-03-20 RX ADMIN — CASTOR OIL AND BALSAM, PERU: 788; 87 OINTMENT TOPICAL at 18:00

## 2018-03-20 RX ADMIN — PANTOPRAZOLE SODIUM 40 MG: 40 TABLET, DELAYED RELEASE ORAL at 16:27

## 2018-03-20 RX ADMIN — Medication 10 ML: at 16:23

## 2018-03-20 RX ADMIN — POTASSIUM & SODIUM PHOSPHATES POWDER PACK 280-160-250 MG 2 PACKET: 280-160-250 PACK at 08:59

## 2018-03-20 RX ADMIN — Medication 1 CAPSULE: at 08:59

## 2018-03-20 RX ADMIN — Medication 100 MG: at 08:59

## 2018-03-20 RX ADMIN — POTASSIUM & SODIUM PHOSPHATES POWDER PACK 280-160-250 MG 2 PACKET: 280-160-250 PACK at 12:47

## 2018-03-20 NOTE — PROGRESS NOTES
Hospitalist Progress Note  [place] MD Nilda  Answering service: [phone] from in house phone         Date of Service:  3/20/2018  NAME:  Aung Olvera  :  1950  MRN:  [id number]    Admission Summary:   70 yo woman with no significant medical history presented to the ED from home on 3/13/18 with progressively worsening nausea, coffee-ground emesis, diarrhea x3 days and abdominal pain. In the ED, patient was found to be in SVT and hypotensive. Pt refused cardioversion so she was started on amiodarone gtt. She was admitted tot he ICU for new onset SVT, UGIB, UTI, sepsis.     Interval history / Subjective:     3/20:  Pt fails 6 min walk test , multiple visits to room to discuss pt 's safe discharge needs, pt refused home 02    Will reeval in am as cxr is a bit better, pt denies heavy 2nd hand tobacco some exposure and is not a smoker.  , 45 minutes spent with pt in room and on floor working chart and discussing case with care team/PT/.       Assessment & Plan:      E coli bacteremia and UTI with septic shock (POA)  - tachycardia, lactic acidosis, fever, tachypnea, hypotension on admission  - BCx 3/13 E coli 1/2 bottles, 3/15 NGTD; unlikely contaminant as same organism  - UCx 3/13 E coli  - C diff negative  - s/p empiric vanc, Zosyn 3/13-3/16; s/p ceftriaxone 3/16-3/17  - restarted Zosyn 3/18-3/19 due to possible HCAP  - septic shock resolved s/p IVF and norepinephrine gtt  - would not recommend levofloxacin due to recent SVT  - ID consulted: recommended ceftriaxone 2g IV q24h until 3/26 at [facility]  - s/p midline placement 3/19  - IJ out 3/20/2018      CABEZAS and hypoxia on exertion likely due to b/l pleural effusions, atx and possible developing HCAP, 3/17  - CXR 3/17 with above  - wean supplemental O2 as tolerated  - sputum Cx unobtainable, refused MRSA swab  - afebrile and no leucocytosis but due to above symptoms, changed ceftriaxone to Zosyn  - trial of Lasix but pt does not want to go home with Lasix  - check CXR in AM  -  Fails 6 min walk test, pt refusing home 02      SVT (POA)  - resolved with amiodarone gtt  - pt declined cardioversion/ablation  - Cardiology following  - EPS consulted but pt wants to have outpatient 30-day loop recorder  - troponin peaked at 0.15 and trended down  - TTE 3/14 EF 73-77%, grade 1 diastolic dysfunction     UGIB and ABLA (POA)  - FOBT positive  - B12 and folate deficiencies; supplements started  - s/p 2 units PRBC transfusion  - GI consulted: recommending EGD/EUS for possible IPMN; pt agreeable to EGD/EUS if it can be done on Mon/Tues of this week   - continue PPI BID  - tolerating solid diet  - CT chest/abd/pelvis 3/15 ascites, anasarca, 1.5cm pancreatic head cyst likely representing an incidental side branch IPMN     Mild compression deformity fracture of T12   - seen on CT a/p  - seems asymptomatic but significant generalized weakness  - PT/OT evals  - vitamin D deficiency     Vitamin D deficiency - started supplement     Multiple locules of gas in face, orbits, neck  - seen on CT head and CTA chest 3/15  - pt asymptomatic  - spoke with radiology Drs Gaetana Baumgarten 3/16 appears to be intravascular air and if pt is asymptomatic is likely clinically insignificant     Hypokalemia, hypomagnesemia, hypophosphatemia - replete prn  Lab Results   Component Value Date/Time    Potassium 3.3 (L) 03/20/2018 03:34 AM    replaced      Subclinical hypothyroidism - TSH mildly elevated but free T4 WNL and free T3 suppressed  - needs outpatient f/u in 6 weeks     Severe protein-calorie malnutrition/underweight, Body mass index is 19.77 kg/(m^2).    - prealbumin 6.4  - consult Nutrition  - likely due to chronic alcoholism     Pulmonary edema likely due to newly diagnosed diastolic heart failure, unknown chronicity  - NYHA Class 1  - stopped IVF  - NT-proBNP elevated and TTE as above  - IV Lasix inpatient but pt does not want to go home on Lasix- lasix increased 3/20/2018 pm      Bilateral LE edema - venous duplex 3/17 negative DVT  - elevate legs, JOSHUA hose  - pt has been refusing to wear SCDs per nurse     Chronic alcoholism  - reportedly drinks 1-3 glasses of wine daily; complete cessation advised  - no evidence of withdrawal so far but has been confused at times/not remembering events during this hospitalization  - no indication for prn Ativan but started MVI, thiamine, folic acid     Diarrhea - resolved  - stool Cx 3/18 pending     Code status: full  DVT prophylaxis: unable to give chemical PPx due to GIB and pt refusing to wear SCDs     Care Plan discussed with: Patient/Family and Nurse. Spoke to  at bedside  Disposition: TBD. Came from home. Has no PCP          Hospital Problems  Date Reviewed: 3/14/2018          Codes Class Noted POA    * (Principal)SVT (supraventricular tachycardia) ([facility] [address] ICD-10-CM: I47.1  ICD-9-CM: 427.89  3/13/2018 Yes                Review of Systems:   A comprehensive review of systems was negative. Vital Signs:    Last 24hrs VS reviewed since prior progress note. Most recent are:  Visit Vitals    BP 94/61 (BP 1 Location: Left arm, BP Patient Position: At rest)    Pulse (!) 102    Temp 98.3 °F (36.8 °C)    Resp 22    Ht 5' 5\" (1.651 m)    Wt 53.9 kg (118 lb 13.3 oz)    SpO2 (!) 87%    BMI 19.77 kg/m2         Intake/Output Summary (Last 24 hours) at 03/20/18 1324  Last data filed at 03/19/18 2106   Gross per 24 hour   Intake             2.75 ml   Output              300 ml   Net          -297.25 ml        Physical Examination:             Constitutional:  No acute distress, cooperative, pleasant    ENT:  Oral mucous moist, oropharynx benign. Neck supple,    Resp:  CTA bilaterally. No wheezing/rhonchi/rales. No accessory muscle use   CV:  Regular rhythm, normal rate, no murmurs, gallops, rubs    GI:  Soft, non distended, non tender.  normoactive bowel sounds, no hepatosplenomegaly     Musculoskeletal:  No edema, warm, 2+ pulses throughout    Neurologic:  Moves all extremities. AAOx3, CN II-XII reviewed     Psych:  Good insight, Not anxious nor agitated. Skin:  Good turgor, no rashes or ulcers       Data Review:    Review and/or order of clinical lab test      Labs:     Recent Labs      03/20/18 0334 03/19/18   0445   WBC  10.0  9.7   HGB  8.8*  9.2*   HCT  26.2*  27.4*   PLT  237  198     Recent Labs      03/20/18 0334 03/19/18 0445  03/18/18   0408   NA  140  138  141   K  3.3*  3.5  3.5   CL  104  105  107   CO2  26  26  27   BUN  8  6  5*   CREA  0.33*  0.29*  0.26*   GLU  103*  106*  105*   CA  7.6*  7.8*  7.5*   MG  1.5*  1.8  1.2*   PHOS  3.8  3.8  2.6     Recent Labs      03/19/18 0445 03/18/18   0408   SGOT  35  43*   ALT  19  22   AP  80  78   TBILI  0.8  0.6   TP  5.1*  4.9*   ALB  1.9*  1.9*   GLOB  3.2  3.0     No results for input(s): INR, PTP, APTT in the last 72 hours. No lab exists for component: INREXT   No results for input(s): FE, TIBC, PSAT, FERR in the last 72 hours. Lab Results   Component Value Date/Time    Folate 3.7 (L) 03/14/2018 05:07 AM      No results for input(s): PH, PCO2, PO2 in the last 72 hours. No results for input(s): CPK, CKNDX, TROIQ in the last 72 hours.     No lab exists for component: CPKMB  Lab Results   Component Value Date/Time    Cholesterol, total 87 03/14/2018 05:07 AM    HDL Cholesterol 35 03/14/2018 05:07 AM    LDL, calculated 37 03/14/2018 05:07 AM    Triglyceride 75 03/14/2018 05:07 AM    CHOL/HDL Ratio 2.5 03/14/2018 05:07 AM     No results found for: [facility]  Lab Results   Component Value Date/Time    Color YELLOW/STRAW 03/13/2018 11:16 PM    Appearance CLOUDY (A) 03/13/2018 11:16 PM    Specific gravity 1.021 03/13/2018 11:16 PM    pH (UA) 6.0 03/13/2018 11:16 PM    Protein 100 (A) 03/13/2018 11:16 PM    Glucose 250 (A) 03/13/2018 11:16 PM    Ketone 15 (A) 03/13/2018 11:16 PM    Bilirubin NEGATIVE 03/13/2018 11:16 PM    Urobilinogen 0.2 03/13/2018 11:16 PM    Nitrites NEGATIVE  03/13/2018 11:16 PM    Leukocyte Esterase LARGE (A) 03/13/2018 11:16 PM    Epithelial cells FEW 03/13/2018 11:16 PM    Bacteria 4+ (A) 03/13/2018 11:16 PM    WBC >100 (H) 03/13/2018 11:16 PM    RBC >100 (H) 03/13/2018 11:16 PM         Medications Reviewed:     Current Facility-Administered Medications   Medication Dose Route Frequency    magnesium oxide (MAG-OX) tablet 400 mg  400 mg Oral BID    potassium chloride SR (KLOR-CON 10) tablet 40 mEq  40 mEq Oral NOW    cefTRIAXone (ROCEPHIN) 2 g in 0.9% sodium chloride (MBP/ADV) 50 mL  2 g IntraVENous Q24H    furosemide (LASIX) injection 10 mg  10 mg IntraVENous BID    thiamine (B-1) tablet 100 mg  100 mg Oral DAILY    therapeutic multivitamin (THERAGRAN) tablet 1 Tab  1 Tab Oral DAILY    lactobac ac& pc-s.therm-b.anim (KAY Q/RISAQUAD)  1 Cap Oral DAILY    potassium, sodium phosphates (NEUTRA-PHOS) packet 2 Packet  2 Packet Oral QID    amiodarone (CORDARONE) tablet 200 mg  200 mg Oral DAILY    folic acid (FOLVITE) tablet 1 mg  1 mg Oral DAILY    pantoprazole (PROTONIX) tablet 40 mg  40 mg Oral ACB&D    balsam peru-castor oil (VENELEX)  mg/gram ointment   Topical BID    sodium chloride (NS) flush 5-10 mL  5-10 mL IntraVENous Q8H    sodium chloride (NS) flush 5-10 mL  5-10 mL IntraVENous PRN    acetaminophen (TYLENOL) tablet 650 mg  650 mg Oral Q4H PRN    cyanocobalamin (VITAMIN B12) injection 1,000 mcg  1,000 mcg IntraMUSCular Q30D    sodium chloride (NS) flush 5-10 mL  5-10 mL IntraVENous PRN    0.9% sodium chloride infusion 250 mL  250 mL IntraVENous PRN    0.9% sodium chloride infusion 250 mL  250 mL IntraVENous PRN    ondansetron (ZOFRAN) injection 4 mg  4 mg IntraVENous Q4H PRN     ______________________________________________________________________  EXPECTED LENGTH OF STAY: 3d 16h  ACTUAL LENGTH OF STAY:          7                 Matt Ocampo MD

## 2018-03-20 NOTE — PROGRESS NOTES
Pt's sats continue to be low. Patient is on room air and sats are about 83-84% and then when asked to take good deep breaths she is able to bring sats up to 88% but does not stay there long. This RN educated her on the importance of good oxygenation. I also explained that she may not be able to be discharged without O2 if sats can not get higher on room air and stay there. Patient is addiment about going home today and will not be going with lasix or O2, and she is going home today no matter what even if she has to sign AMA paperwork to make that happen. This RN responded with recommending that she discuss this with the MD in the am and if patient continued to remain stable and alert than we could wait until morning otherwise on call will need to be notified.

## 2018-03-20 NOTE — PROGRESS NOTES
Problem: Mobility Impaired (Adult and Pediatric)  Goal: *Acute Goals and Plan of Care (Insert Text)  Physical Therapy Goals  Initiated 3/17/2018  1. Patient will move from supine to sit and sit to supine , scoot up and down and roll side to side in bed with independence within 7 day(s). 2.  Patient will transfer from bed to chair and chair to bed with independence using the least restrictive device within 7 day(s). 3.  Patient will perform sit to stand with independence within 7 day(s). 4.  Patient will ambulate with supervision/set-up for 300 feet with the least restrictive device within 7 day(s). 5.  Patient will ascend/descend 7 stairs with 1 handrail(s) with minimal assistance/contact guard assist within 7 day(s). physical Therapy TREATMENT  Patient: Scotty Mandujano (69 y.o. female)  Date: 3/20/2018  Diagnosis: SVT (supraventricular tachycardia) (HCC) SVT (supraventricular tachycardia) (HCC)       Precautions: fall  Chart, physical therapy assessment, plan of care and goals were reviewed.     ASSESSMENT:  Pt is making steady progress, completed the 6 min walk test and Pulse Oximetry Assessment, see below for full results, pt siting up in chair initially without O2, SPO2 89%, transfers with modified independence and ambulates with rolling walker with supervision with mild CABEZAS, SPO2 87% on RA, increased to 84% with 2 L O2 and 88% with 3 L O2 but following ambulation > 200 feet and feeling exerted, 2/10, pt returned to her chair and remained on O2, following 5 min rest SPO2 97% with 3 L O2, recommend home with oxygen, pt remains adamant that she does not want O2 despite education of importance, recommend home health PT however pt declines and states she plans to do [place] for balance with her    Progression toward goals:  []    Improving appropriately and progressing toward goals  [x]    Improving slowly and progressing toward goals  []    Not making progress toward goals and plan of care will be adjusted     PLAN:  Patient continues to benefit from skilled intervention to address the above impairments. Continue treatment per established plan of care. Discharge Recommendations:  Home Health, pt declined  Further Equipment Recommendations for Discharge:  Rolling walker, oxygen     SUBJECTIVE:   Patient stated I never had any health issues until I came to the hospital.    OBJECTIVE DATA SUMMARY:   Critical Behavior:  Neurologic State: Alert, Appropriate for age  Orientation Level: Oriented X4  Cognition: Appropriate for age attention/concentration, Appropriate safety awareness, Follows commands  Safety/Judgement: Awareness of environment, Driving appropriateness, Fall prevention, Good awareness of safety precautions, Home safety  Functional Mobility Training:  Bed Mobility:   not assessed, OOB in chair                 Transfers:  Sit to Stand: Modified independent  Stand to Sit: Modified independent                             Balance:  Sitting: Intact  Standing: Intact; With support  Ambulation/Gait Training:  Distance (ft): 425 Feet (ft)  Assistive Device: Gait belt;Walker, rolling  Ambulation - Level of Assistance: Supervision       6 MWT results: (Specify if any supplemental oxygen is used, the type, pre, during and post sats.)  Distance Walked in Feet (ft): 350 ft. Valente Rating of Perceived exertion (0-10 point scale):      Beginnin   Pre O2 Saturation: 89 on RA     Mid walk: 2      End: 2   Post O2 Saturation: 88 on 3 L O2    Assistive device used: Assistive Device: Gait belt;Walker, rolling        Normative data:   Men 39-80 years old = 1889 feet; Women 3680 years ele=1437 feet  Modified 10 point Valente RPE scale utilized:  0 = no breathlessness at all ---> 10 = maximum exertion  Please refer to the flowsheet for any additional vital signs taken during this treatment.      Pulse Oximetry Assessment    89% at rest on room air  87% while ambulating on room air  97% at rest on 3LPM  88% while ambulating on 3LPM                              Pain:  Pain Scale 1: Numeric (0 - 10)  Pain Intensity 1: 0              Activity Tolerance:   Good, tolerated ambulation > 6 min, mild CABEZAS, desaturates with activity, SPO2 88% and > with 3 L O2    After treatment:   [x]    Patient left in no apparent distress sitting up in chair  []    Patient left in no apparent distress in bed  [x]    Call bell left within reach  [x]    Nursing notified  []    Caregiver present  []    Bed alarm activated    COMMUNICATION/COLLABORATION:   The patients plan of care was discussed with: Registered Nurse    Alecia King   Time Calculation: 19 mins

## 2018-03-20 NOTE — PROGRESS NOTES
Consults received for: home health, home oxygen and OPIC. Cm met with patient to discuss. Patient is in agreement with cm setting up the OPIC only. Cm asked patient if she had decided on a PCP yet, as she had been given a list of [facility] PCP's last week. Patient stated that her son-in-law is a physician and he is going to help her with this. She stated that she wanted to 'interview' a few doctors before making a decision. Cm informed her that due to this, I would not be able to arrange home health, without a PCP. Patient stated that she did not need home health anyway. In regards to oxygen, patient is currently not wearing her o2 in the room and continues to deny the need for it both at the hospital and at home. She stated that she will sign whatever paperwork necessary, even if it states she is going against medical advice, but she refuses to agree to wear oxygen at home. Cm informed her of the risks of going home without oxygen, that she would become hypoxic and have to come back to the hospital and patient continued to refuse. In regards to the outpatient infusion center, cm contacted [facility] ([phone]) to start the process. Once they receive the infusion order form, they can set patient up. Cm left a message with Dr. Lisha Altamirano, asking if I could fax her the order form; waiting for call back. 2:20p: Orders for [facility] received and faxed to [facility]. All appointments have been placed on discharge instructions. 2:40p: Cm now informed that patient is agreeable to home oxygen. Orders are currently being written and cm will work on home o2, if still needed tomorrow, per MD. Aaliyah Cabezas followed up with patient to discuss her choice in a PCP. Patient stated she chose Dr. Earl Ventura, and her appointment was June 20th.    [employer] , [facility]

## 2018-03-20 NOTE — PROGRESS NOTES
Bedside and Verbal shift change report given to [facility] (oncoming nurse) by Wander Hurt RN (offgoing nurse). Report included the following information SBAR, Kardex, Intake/Output and MAR.

## 2018-03-20 NOTE — PROGRESS NOTES
[address]  [address]  [address] [place] [address]   [phone]                GI PROGRESS NOTE  Robson Brown, [facility]  Work Cell: [phone]      NAME:   Luis Duarte   :    1950   MRN:    [id number]     Assessment/Plan   1. Nausea, vomiting and diarrhea - possibly related to acute infectious process, however UTI and sepsis may be playing role as well. Symptoms improved. Still with some loose stools. CT with pancreatic cyst - suspected IPMN. - Continue PPI   - Stool studies pending   - Would hold off on EGD/EUS for now until pulmonary status optimized and antibiotics are completed  2. Anemia - with heme positive stool, Hgb stable. No overt signs of bleeding.   - Monitor H&H, transfuse as needed   3. UTI with septic shock and E. Coli bacteremia - improved   - Management per primary team  4. SVT - improved   5. Hypokalemia - improved     Will see PRN. Recommend follow-up outpatient to discuss completing the above. Patient Active Problem List   Diagnosis Code    SVT (supraventricular tachycardia) ([facility]) I47.1       Subjective:     Feeling well. Denies any pain. OOB in chair. Awaiting chest x-ray. Oxygen saturations still borderline. Review of Systems    Constitutional: negative fever, negative chills, negative weight loss  Eyes:   negative visual changes  ENT:   negative sore throat, tongue or lip swelling  Respiratory:  negative cough, negative dyspnea  Cards:  negative for chest pain, palpitations, lower extremity edema  GI:   See HPI  :  negative for frequency, dysuria  Integument:  negative for rash and pruritus  Heme:  negative for easy bruising and gum/nose bleeding  Musculoskel: negative for myalgias, back pain and muscle weakness  Neuro: negative for headaches, dizziness, vertigo  Psych:  negative for feelings of anxiety, depression     Objective:     VITALS:   Last 24hrs VS reviewed since prior hospitalist progress note.  Most recent are:  Visit Vitals    BP 94/61 (BP 1 Location: Left arm, BP Patient Position: At rest)    Pulse (!) 102    Temp 98.3 °F (36.8 °C)    Resp 22    Ht 5' 5\" (1.651 m)    Wt 53.9 kg (118 lb 13.3 oz)    SpO2 (!) 87%    BMI 19.77 kg/m2       Intake/Output Summary (Last 24 hours) at 03/20/18 7101  Last data filed at 03/19/18 2106   Gross per 24 hour   Intake             2.75 ml   Output              300 ml   Net          -297.25 ml        PHYSICAL EXAM:  General   well developed, thin, alert, in no acute distress  EENT  Normocephalic, Atraumatic, PERRLA, EOMI, sclera clear  Respiratory   Clear To Auscultation bilaterally - no wheezes, rales, rhonchi, or crackles  Cardiology  Regular Rate and Rythmn  - no murmurs, rubs or gallops  Abdominal  Soft, non-tender, non-distended, positive bowel sounds, no hepatosplenomegaly, no palpable mass  Extremities  No clubbing, cyanosis, or edema. Pulses intact. Neurological  No focal neurological deficits noted  Psychological  Oriented x 3. Normal affect.        Lab Data   Recent Results (from the past 12 hour(s))   CBC W/O DIFF    Collection Time: 03/20/18  3:34 AM   Result Value Ref Range    WBC 10.0 3.6 - 11.0 K/uL    RBC 2.64 (L) 3.80 - 5.20 M/uL    HGB 8.8 (L) 11.5 - 16.0 g/dL    HCT 26.2 (L) 35.0 - 47.0 %    MCV 99.2 (H) 80.0 - 99.0 FL    MCH 33.3 26.0 - 34.0 PG    MCHC 33.6 30.0 - 36.5 g/dL    RDW 14.6 (H) 11.5 - 14.5 %    PLATELET 716 854 - 995 K/uL    MPV 10.7 8.9 - 12.9 FL    NRBC 0.0 0  WBC    ABSOLUTE NRBC 0.00 0.00 - 4.97 K/uL   METABOLIC PANEL, BASIC    Collection Time: 03/20/18  3:34 AM   Result Value Ref Range    Sodium 140 136 - 145 mmol/L    Potassium 3.3 (L) 3.5 - 5.1 mmol/L    Chloride 104 97 - 108 mmol/L    CO2 26 21 - 32 mmol/L    Anion gap 10 5 - 15 mmol/L    Glucose 103 (H) 65 - 100 mg/dL    BUN 8 6 - 20 MG/DL    Creatinine 0.33 (L) 0.55 - 1.02 MG/DL    BUN/Creatinine ratio 24 (H) 12 - 20      GFR est AA >60 >60 ml/min/1.73m2    GFR est non-AA >60 >60 ml/min/1.73m2    Calcium 7.6 (L) 8.5 - 10.1 MG/DL   MAGNESIUM    Collection Time: 03/20/18  3:34 AM   Result Value Ref Range    Magnesium 1.5 (L) 1.6 - 2.4 mg/dL   PHOSPHORUS    Collection Time: 03/20/18  3:34 AM   Result Value Ref Range    Phosphorus 3.8 2.6 - 4.7 MG/DL         Medications: Reviewed    PMH/ reviewed - no change compared to H&P  Mid-Level Provider: Kwasi Purvis NP   Date/Time:  3/20/2018        I have personally reviewed the history and independently examined the patient. I have reviewed the chart and agree with the documentation recorded by the Mid Level Provider, including the assessment, treatment plan, and disposition.       Kale Mcgill MD

## 2018-03-20 NOTE — PROGRESS NOTES
IJ removed without difficulties, tip intact, patient verbalized understanding of needing to be flat until 1530 and to call out for assistance if she feels like she is bleeding from the site.

## 2018-03-20 NOTE — ADVANCED PRACTICE NURSE
Reviewed chart this AM  K 3.3, will give KCL 40meq PO once now  Mg 1.5, will give Mg Sulfate 2g IV once now, increased Mg Oxide to 400mg BID  Read hospitalist's note about refusing to be discharged on lasix, refusing to wear SCDs, getting IV lasix 10mg BID now which may be contributing to electrolyte abnormalities, in addition to chronic etoh use  Noted need for IV antibiotics til 3/26 per ID  GI workup underway  Will follow up with Dr. Joaquim Mcmullen office nurse regarding status of 30 day event monitor  Has follow up with Dr. Yusef Cardona scheduled for 4/27/18 at 10:40 AM    Vadim Ortiz NP

## 2018-03-20 NOTE — PROGRESS NOTES
Spiritual Care Partner Volunteer visited patient in Rm 536 on 3/20/2018.   Documented by:  Hedy Councilman, Chaplain, MDiv, MS, [address] ([id number])

## 2018-03-21 VITALS
SYSTOLIC BLOOD PRESSURE: 99 MMHG | HEIGHT: 65 IN | OXYGEN SATURATION: 95 % | HEART RATE: 99 BPM | BODY MASS INDEX: 19.8 KG/M2 | WEIGHT: 118.83 LBS | DIASTOLIC BLOOD PRESSURE: 73 MMHG | TEMPERATURE: 99.3 F | RESPIRATION RATE: 20 BRPM

## 2018-03-21 PROBLEM — I47.1 SVT (SUPRAVENTRICULAR TACHYCARDIA) (HCC): Status: RESOLVED | Noted: 2018-03-13 | Resolved: 2018-03-21

## 2018-03-21 LAB
ANION GAP SERPL CALC-SCNC: 8 MMOL/L (ref 5–15)
BASOPHILS # BLD: 0 K/UL (ref 0–0.1)
BASOPHILS NFR BLD: 0 % (ref 0–1)
BNP SERPL-MCNC: 766 PG/ML (ref 0–125)
BUN SERPL-MCNC: 8 MG/DL (ref 6–20)
BUN/CREAT SERPL: 26 (ref 12–20)
CALCIUM SERPL-MCNC: 7.8 MG/DL (ref 8.5–10.1)
CHLORIDE SERPL-SCNC: 104 MMOL/L (ref 97–108)
CO2 SERPL-SCNC: 26 MMOL/L (ref 21–32)
CREAT SERPL-MCNC: 0.31 MG/DL (ref 0.55–1.02)
DIFFERENTIAL METHOD BLD: ABNORMAL
E COLI SXT STL QL IA: NEGATIVE
EOSINOPHIL # BLD: 0.3 K/UL (ref 0–0.4)
EOSINOPHIL NFR BLD: 2 % (ref 0–7)
ERYTHROCYTE [DISTWIDTH] IN BLOOD BY AUTOMATED COUNT: 14.7 % (ref 11.5–14.5)
GLUCOSE SERPL-MCNC: 103 MG/DL (ref 65–100)
HCT VFR BLD AUTO: 27.9 % (ref 35–47)
HGB BLD-MCNC: 9.2 G/DL (ref 11.5–16)
IMM GRANULOCYTES # BLD: 0.1 K/UL (ref 0–0.04)
IMM GRANULOCYTES NFR BLD AUTO: 1 % (ref 0–0.5)
LYMPHOCYTES # BLD: 1.2 K/UL (ref 0.8–3.5)
LYMPHOCYTES NFR BLD: 12 % (ref 12–49)
MAGNESIUM SERPL-MCNC: 1.6 MG/DL (ref 1.6–2.4)
MCH RBC QN AUTO: 33.2 PG (ref 26–34)
MCHC RBC AUTO-ENTMCNC: 33 G/DL (ref 30–36.5)
MCV RBC AUTO: 100.7 FL (ref 80–99)
MONOCYTES # BLD: 0.8 K/UL (ref 0–1)
MONOCYTES NFR BLD: 8 % (ref 5–13)
NEUTS SEG # BLD: 7.9 K/UL (ref 1.8–8)
NEUTS SEG NFR BLD: 77 % (ref 32–75)
NRBC # BLD: 0 K/UL (ref 0–0.01)
NRBC BLD-RTO: 0 PER 100 WBC
O+P SPEC MICRO: NORMAL
O+P STL CONC: NORMAL
PLATELET # BLD AUTO: 300 K/UL (ref 150–400)
PMV BLD AUTO: 10.5 FL (ref 8.9–12.9)
POTASSIUM SERPL-SCNC: 4 MMOL/L (ref 3.5–5.1)
RBC # BLD AUTO: 2.77 M/UL (ref 3.8–5.2)
SODIUM SERPL-SCNC: 138 MMOL/L (ref 136–145)
SPECIMEN SOURCE: NORMAL
SPECIMEN SOURCE: NORMAL
WBC # BLD AUTO: 10.3 K/UL (ref 3.6–11)

## 2018-03-21 PROCEDURE — 74011000258 HC RX REV CODE- 258: Performed by: INTERNAL MEDICINE

## 2018-03-21 PROCEDURE — 80048 BASIC METABOLIC PNL TOTAL CA: CPT | Performed by: INTERNAL MEDICINE

## 2018-03-21 PROCEDURE — 74011250637 HC RX REV CODE- 250/637: Performed by: NURSE PRACTITIONER

## 2018-03-21 PROCEDURE — 74011250637 HC RX REV CODE- 250/637: Performed by: INTERNAL MEDICINE

## 2018-03-21 PROCEDURE — 85025 COMPLETE CBC W/AUTO DIFF WBC: CPT | Performed by: INTERNAL MEDICINE

## 2018-03-21 PROCEDURE — 83735 ASSAY OF MAGNESIUM: CPT | Performed by: INTERNAL MEDICINE

## 2018-03-21 PROCEDURE — 74011250636 HC RX REV CODE- 250/636: Performed by: INTERNAL MEDICINE

## 2018-03-21 PROCEDURE — 36415 COLL VENOUS BLD VENIPUNCTURE: CPT | Performed by: INTERNAL MEDICINE

## 2018-03-21 PROCEDURE — 97116 GAIT TRAINING THERAPY: CPT

## 2018-03-21 PROCEDURE — 83880 ASSAY OF NATRIURETIC PEPTIDE: CPT | Performed by: INTERNAL MEDICINE

## 2018-03-21 RX ORDER — FUROSEMIDE 20 MG/1
TABLET ORAL
Qty: 30 TAB | Refills: 0 | Status: SHIPPED | OUTPATIENT
Start: 2018-03-21 | End: 2018-07-03

## 2018-03-21 RX ORDER — POTASSIUM CHLORIDE 750 MG/1
10 TABLET, FILM COATED, EXTENDED RELEASE ORAL DAILY
Qty: 30 TAB | Refills: 0 | Status: SHIPPED | OUTPATIENT
Start: 2018-03-21 | End: 2018-07-03

## 2018-03-21 RX ORDER — FUROSEMIDE 10 MG/ML
40 INJECTION INTRAMUSCULAR; INTRAVENOUS ONCE
Status: COMPLETED | OUTPATIENT
Start: 2018-03-21 | End: 2018-03-21

## 2018-03-21 RX ORDER — AMIODARONE HYDROCHLORIDE 200 MG/1
200 TABLET ORAL DAILY
Qty: 30 TAB | Refills: 0 | Status: SHIPPED | OUTPATIENT
Start: 2018-03-22 | End: 2018-07-03

## 2018-03-21 RX ORDER — FOLIC ACID 1 MG/1
1 TABLET ORAL DAILY
Qty: 20 TAB | Refills: 0 | Status: SHIPPED | OUTPATIENT
Start: 2018-03-22 | End: 2018-07-03

## 2018-03-21 RX ORDER — LANOLIN ALCOHOL/MO/W.PET/CERES
100 CREAM (GRAM) TOPICAL DAILY
Qty: 20 TAB | Refills: 0 | Status: SHIPPED | OUTPATIENT
Start: 2018-03-22 | End: 2018-07-03

## 2018-03-21 RX ADMIN — PANTOPRAZOLE SODIUM 40 MG: 40 TABLET, DELAYED RELEASE ORAL at 06:49

## 2018-03-21 RX ADMIN — POTASSIUM & SODIUM PHOSPHATES POWDER PACK 280-160-250 MG 2 PACKET: 280-160-250 PACK at 12:38

## 2018-03-21 RX ADMIN — AMIODARONE HYDROCHLORIDE 200 MG: 200 TABLET ORAL at 10:15

## 2018-03-21 RX ADMIN — FOLIC ACID 1 MG: 1 TABLET ORAL at 10:15

## 2018-03-21 RX ADMIN — Medication 10 ML: at 06:00

## 2018-03-21 RX ADMIN — Medication 100 MG: at 10:15

## 2018-03-21 RX ADMIN — Medication 400 MG: at 10:15

## 2018-03-21 RX ADMIN — THERA TABS 1 TABLET: TAB at 10:15

## 2018-03-21 RX ADMIN — CEFTRIAXONE 2 G: 2 INJECTION, POWDER, FOR SOLUTION INTRAMUSCULAR; INTRAVENOUS at 14:12

## 2018-03-21 RX ADMIN — FUROSEMIDE 20 MG: 10 INJECTION, SOLUTION INTRAMUSCULAR; INTRAVENOUS at 10:14

## 2018-03-21 RX ADMIN — Medication 1 CAPSULE: at 10:15

## 2018-03-21 NOTE — PROGRESS NOTES
Cm spoke with MD and RN about patients' current o2 requirements; MD is is discharging patient home today and she does not need home oxygen. J Carlos has arranged outpatient infusion and nothing else at this time, per patient request. No further CM needs.   [employer] , [place]

## 2018-03-21 NOTE — PROGRESS NOTES
Problem: Mobility Impaired (Adult and Pediatric)  Goal: *Acute Goals and Plan of Care (Insert Text)  Physical Therapy Goals  Initiated 3/17/2018  1. Patient will move from supine to sit and sit to supine , scoot up and down and roll side to side in bed with independence within 7 day(s). 2.  Patient will transfer from bed to chair and chair to bed with independence using the least restrictive device within 7 day(s). 3.  Patient will perform sit to stand with independence within 7 day(s). 4.  Patient will ambulate with supervision/set-up for 300 feet with the least restrictive device within 7 day(s). 5.  Patient will ascend/descend 7 stairs with 1 handrail(s) with minimal assistance/contact guard assist within 7 day(s). physical Therapy TREATMENT  Patient: J Luis Quijano (1 y.o. female)  Date: 3/21/2018  Diagnosis: SVT (supraventricular tachycardia) (HCC) SVT (supraventricular tachycardia) (HCC)       Precautions:  (standard)  Chart, physical therapy assessment, plan of care and goals were reviewed. ASSESSMENT:  Spoke with MD and he requested patient be seen for PT around 1:00 pm today and mobilized without oxygen while monitoring O2 sats. Patient is up in the chair at 1:00 pm and agreeable to PT. She is requesting to use the rolling walker that was delivered for home use and the walker was adjusted for proper fit. Patient demonstrates O2 sats at 89% on room air with heart rate 92 bpm at rest. Patient was able to ambulate 400 feet with the rolling walker and ascend/descend 3 stairs with 2 rails with supervision. O2 sats remain greater than 87% with patient performing pursed lip breathing during activity. Patient reports that she feels mildly short of breath but that her breathing is much better than it has been over the last few days. She is not interested in taking home O2 home with her as she feels that it will make her look like an old lady. She is not interested in any follow up therapy.  Discussed home and walker safety with patient. Progression toward goals:  [x]    Improving appropriately and progressing toward goals  []    Improving slowly and progressing toward goals  []    Not making progress toward goals and plan of care will be adjusted     PLAN:  Patient continues to benefit from skilled intervention to address the above impairments. Continue treatment per established plan of care. Discharge Recommendations:  None - patient has refused  Further Equipment Recommendations for Discharge:  Rolling walker      SUBJECTIVE:   Patient stated I am not taking oxygen home with me.     OBJECTIVE DATA SUMMARY:   Critical Behavior:  Neurologic State: Alert  Orientation Level: Oriented X4  Cognition: Appropriate for age attention/concentration, Appropriate safety awareness, Follows commands  Safety/Judgement: Awareness of environment, Driving appropriateness, Fall prevention, Good awareness of safety precautions, Home safety, Insight into deficits  Functional Mobility Training:  Bed Mobility:  Supine to Sit:  (Up in th chair)  Sit to Supine:  (Returns to the chair.)  Transfers:  Sit to Stand: Supervision  Stand to Sit: Supervision  Balance:  Sitting: Intact  Standing: Intact; With support  Ambulation/Gait Training:  Distance (ft): 400 Feet (ft)  Assistive Device: Gait belt;Walker, rolling  Ambulation - Level of Assistance: Supervision   No loss of balance. + pursed lip breathing. Stairs:  Number of Stairs Trained: 3  Stairs - Level of Assistance: Supervision   Rail Use: Both    Pain:  Pain Scale 1: Numeric (0 - 10)  Pain Intensity 1: 0  Activity Tolerance:   02 sats remain greater than 87% at rest and with activity.      After treatment:   [x]    Patient left in no apparent distress sitting up in chair  []    Patient left in no apparent distress in bed  [x]    Call bell left within reach  [x]    Nursing notified  [x]    Caregiver present  []    Bed alarm activated    COMMUNICATION/COLLABORATION:   The patients plan of care was discussed with: Registered Nurse    Oneil Alcala, PT   Time Calculation: 15 mins

## 2018-03-21 NOTE — ROUTINE PROCESS
Bedside and Verbal shift change report given to Terry Capellan RN (oncoming nurse) by Sara Doshi (offgoing nurse). Report included the following information SBAR, Intake/Output, MAR and Recent Results.

## 2018-03-21 NOTE — DISCHARGE SUMMARY
Discharge Summary       PATIENT ID: Scotty Mandujano  MRN: [id number]   YOB: 1950    DATE OF ADMISSION: 3/13/2018  7:12 PM    DATE OF DISCHARGE: 3/21/2018    PRIMARY CARE PROVIDER: None     ATTENDING PHYSICIAN: Sg Abraham MD   DISCHARGING PROVIDER: Sg Abraham MD    To contact this individual call [phone] and ask the  to page. If unavailable ask to be transferred the Adult Hospitalist Department. CONSULTATIONS: IP CONSULT TO CARDIOLOGY  IP CONSULT TO GASTROENTEROLOGY  IP CONSULT TO GASTROENTEROLOGY  IP CONSULT TO ELECTROPHYSIOLOGY  IP CONSULT TO INFECTIOUS DISEASES    PROCEDURES/SURGERIES: * No surgery found *    [address] COURSE:       Admission Summary:   70 yo woman with no significant medical history presented to the ED from home on 3/13/18 with progressively worsening nausea, coffee-ground emesis, diarrhea x3 days and abdominal pain. In the ED, patient was found to be in SVT and hypotensive. Pt refused cardioversion so she was started on amiodarone gtt. She was admitted tot he ICU for new onset SVT, UGIB, UTI, sepsis.      Interval history / Subjective:      3/20:  Pt fails 6 min walk test , multiple visits to room to discuss pt 's safe discharge needs, pt refused home 02    Will reeval in am as cxr is a bit better, pt denies heavy 2nd hand tobacco some exposure and is not a smoker.  , 45 minutes spent with pt in room and on floor working chart and discussing case with care team/PT/.       Assessment & Plan:       E coli bacteremia and UTI with septic shock (POA)  - tachycardia, lactic acidosis, fever, tachypnea, hypotension on admission  - BCx 3/13 E coli 1/2 bottles, 3/15 NGTD; unlikely contaminant as same organism  - UCx 3/13 E coli  - C diff negative  - s/p empiric vanc, Zosyn 3/13-3/16; s/p ceftriaxone 3/16-3/17  - restarted Zosyn 3/18-3/19 due to possible HCAP  - septic shock resolved s/p IVF and norepinephrine gtt  - would not recommend levofloxacin due to recent SVT  - ID consulted: recommended ceftriaxone 2g IV q24h until 3/26 at [facility]  - s/p midline placement 3/19  - IJ out 3/20/2018       CABEZAS and hypoxia on exertion likely due to b/l pleural effusions, atx and possible developing HCAP, 3/17  - CXR 3/17 with above  - wean supplemental O2 as tolerated  - sputum Cx unobtainable, refused MRSA swab  - afebrile and no leucocytosis but due to above symptoms, changed ceftriaxone to Zosyn  - trial of Lasix but pt does not want to go home with Lasix  - check CXR in AM  -  Fails 6 min walk test, pt refusing home 02       SVT (POA)  - resolved with amiodarone gtt  - pt declined cardioversion/ablation  - Cardiology following  - EPS consulted but pt wants to have outpatient 30-day loop recorder  - troponin peaked at 0.15 and trended down  - TTE 3/14 EF 10-37%, grade 1 diastolic dysfunction      UGIB and ABLA (POA)  - FOBT positive  - B12 and folate deficiencies; supplements started  - s/p 2 units PRBC transfusion  - GI consulted: recommending EGD/EUS for possible IPMN; pt agreeable to EGD/EUS if it can be done on Mon/Tues of this week   - continue PPI BID  - tolerating solid diet  - CT chest/abd/pelvis 3/15 ascites, anasarca, 1.5cm pancreatic head cyst likely representing an incidental side branch IPMN      Mild compression deformity fracture of T12   - seen on CT a/p  - seems asymptomatic but significant generalized weakness  - PT/OT evals  - vitamin D deficiency      Vitamin D deficiency - started supplement      Multiple locules of gas in face, orbits, neck  - seen on CT head and CTA chest 3/15  - pt asymptomatic  - spoke with radiology Kriss Martines 3/16 appears to be intravascular air and if pt is asymptomatic is likely clinically insignificant      Hypokalemia, hypomagnesemia, hypophosphatemia - replete prn        Lab Results   Component Value Date/Time     Potassium 3.3 (L) 03/20/2018 03:34 AM    replaced       Subclinical hypothyroidism - TSH mildly elevated but free T4 WNL and free T3 suppressed  - needs outpatient f/u in 6 weeks      Severe protein-calorie malnutrition/underweight, Body mass index is 19.77 kg/(m^2).    - prealbumin 6.4  - consult Nutrition  - likely due to chronic alcoholism      Pulmonary edema likely due to newly diagnosed diastolic heart failure, unknown chronicity  - NYHA Class 1  - stopped IVF  - NT-proBNP elevated and TTE as above  - IV Lasix inpatient but pt does not want to go home on Lasix- lasix increased 3/20/2018 pm       Bilateral LE edema - venous duplex 3/17 negative DVT  - elevate legs, JOSHUA hose  - pt has been refusing to wear SCDs per nurse      Chronic alcoholism  - reportedly drinks 1-3 glasses of wine daily; complete cessation advised  - no evidence of withdrawal so far but has been confused at times/not remembering events during this hospitalization  - no indication for prn Ativan but started MVI, thiamine, folic acid      Diarrhea - resolved  - stool Cx 3/18 pending      Code status: full  DVT prophylaxis: unable to give chemical PPx due to GIB and pt refusing to wear SCDs  [address] discussed with: Patient/Family and Nurse. Spoke to  at bedside  Disposition: TBD. Came from home. Has no PCP       [facility]  Date Reviewed: 3/14/2018             Codes Class Noted POA     * (Principal)SVT (supraventricular tachycardia) (HCC) ICD-10-CM: I47.1  ICD-9-CM: 427.89   3/13/2018 Yes                     Review of Systems:   A comprehensive review of systems was negative.         Vital Signs:    Last 24hrs VS reviewed since prior progress note.  Most recent are:       Visit Vitals    BP 94/61 (BP 1 Location: Left arm, BP Patient Position: At rest)    Pulse (!) 102    Temp 98.3 °F (36.8 °C)    Resp 22    Ht 5' 5\" (1.651 m)    Wt 53.9 kg (118 lb 13.3 oz)    SpO2 (!) 87%    BMI 19.77 kg/m2            Intake/Output Summary (Last 24 hours) at 03/20/18 1324  Last data filed at 03/19/18 4983    Gross per 24 hour   Intake             2.75 ml   Output              300 ml   Net          -297.25 ml         Physical Examination:             Constitutional:  No acute distress, cooperative, pleasant    ENT:  Oral mucous moist, oropharynx benign. Neck supple,    Resp:  CTA bilaterally. No wheezing/rhonchi/rales. No accessory muscle use   CV:  Regular rhythm, normal rate, no murmurs, gallops, rubs    GI:  Soft, non distended, non tender. normoactive bowel sounds, no hepatosplenomegaly     Musculoskeletal:  No edema, warm, 2+ pulses throughout    Neurologic:  Moves all extremities. AAOx3, CN II-XII reviewed                                             Psych:  Good insight, Not anxious nor agitated. Skin:  Good turgor, no rashes or ulcers         Data Review:    Review and/or order of clinical lab test        Labs:           Recent Labs       03/20/18   0334  03/19/18   0445   WBC  10.0  9.7   HGB  8.8*  9.2*   HCT  26.2*  27.4*   PLT  237  198            Recent Labs       03/20/18   0334  03/19/18   0445  03/18/18   0408   NA  140  138  141   K  3.3*  3.5  3.5   CL  104  105  107   CO2  26  26  27   BUN  8  6  5*   CREA  0.33*  0.29*  0.26*   GLU  103*  106*  105*   CA  7.6*  7.8*  7.5*   MG  1.5*  1.8  1.2*   PHOS  3.8  3.8  2.6           Recent Labs       03/19/18   0445  03/18/18   0408   SGOT  35  43*   ALT  19  22   AP  80  78   TBILI  0.8  0.6   TP  5.1*  4.9*   ALB  1.9*  1.9*   GLOB  3.2  3.0      No results for input(s): INR, PTP, APTT in the last 72 hours.     No lab exists for component: INREXT   No results for input(s): FE, TIBC, PSAT, FERR in the last 72 hours. Lab Results   Component Value Date/Time     Folate 3.7 (L) 03/14/2018 05:07 AM      No results for input(s): PH, PCO2, PO2 in the last 72 hours.   No results for input(s): CPK, CKNDX, TROIQ in the last 72 hours.     No lab exists for component: CPKMB        Lab Results   Component Value Date/Time     Cholesterol, total 87 03/14/2018 05:07 AM   HDL Cholesterol 35 03/14/2018 05:07 AM     LDL, calculated 37 03/14/2018 05:07 AM     Triglyceride 75 03/14/2018 05:07 AM     CHOL/HDL Ratio 2.5 03/14/2018 05:07 AM      No results found for: [facility]        Lab Results   Component Value Date/Time     Color YELLOW/STRAW 03/13/2018 11:16 PM     Appearance CLOUDY (A) 03/13/2018 11:16 PM     Specific gravity 1.021 03/13/2018 11:16 PM     pH (UA) 6.0 03/13/2018 11:16 PM     Protein 100 (A) 03/13/2018 11:16 PM     Glucose 250 (A) 03/13/2018 11:16 PM     Ketone 15 (A) 03/13/2018 11:16 PM     Bilirubin NEGATIVE  03/13/2018 11:16 PM     Urobilinogen 0.2 03/13/2018 11:16 PM     Nitrites NEGATIVE  03/13/2018 11:16 PM     Leukocyte Esterase LARGE (A) 03/13/2018 11:16 PM     Epithelial cells FEW 03/13/2018 11:16 PM     Bacteria 4+ (A) 03/13/2018 11:16 PM     WBC >100 (H) 03/13/2018 11:16 PM     RBC >100 (H) 03/13/2018 11:16 PM            Medications Reviewed:             Current Facility-Administered Medications   Medication Dose Route Frequency    magnesium oxide (MAG-OX) tablet 400 mg  400 mg Oral BID    potassium chloride SR (KLOR-CON 10) tablet 40 mEq  40 mEq Oral NOW    cefTRIAXone (ROCEPHIN) 2 g in 0.9% sodium chloride (MBP/ADV) 50 mL  2 g IntraVENous Q24H    furosemide (LASIX) injection 10 mg  10 mg IntraVENous BID    thiamine (B-1) tablet 100 mg  100 mg Oral DAILY    therapeutic multivitamin (THERAGRAN) tablet 1 Tab  1 Tab Oral DAILY    lactobac ac& pc-s.therm-b.anim (KAY Q/RISAQUAD)  1 Cap Oral DAILY    potassium, sodium phosphates (NEUTRA-PHOS) packet 2 Packet  2 Packet Oral QID    amiodarone (CORDARONE) tablet 200 mg  200 mg Oral DAILY    folic acid (FOLVITE) tablet 1 mg  1 mg Oral DAILY    pantoprazole (PROTONIX) tablet 40 mg  40 mg Oral ACB&D    balsam peru-castor oil (VENELEX)  mg/gram ointment    Topical BID    sodium chloride (NS) flush 5-10 mL  5-10 mL IntraVENous Q8H    sodium chloride (NS) flush 5-10 mL  5-10 mL IntraVENous PRN    acetaminophen (TYLENOL) tablet 650 mg  650 mg Oral Q4H PRN    cyanocobalamin (VITAMIN B12) injection 1,000 mcg  1,000 mcg IntraMUSCular Q30D    sodium chloride (NS) flush 5-10 mL  5-10 mL IntraVENous PRN    0.9% sodium chloride infusion 250 mL  250 mL IntraVENous PRN    0.9% sodium chloride infusion 250 mL  250 mL IntraVENous PRN    ondansetron (ZOFRAN) injection 4 mg  4 mg IntraVENous Q4H PRN              DISCHARGE DIAGNOSES / PLAN:      1.  as above        PENDING TEST RESULTS:   At the time of discharge the following test results are still pending: na    FOLLOW UP APPOINTMENTS:    Follow-up Information     Follow up With Details Comments Contact Info    Tyler Salcedo MD On 4/27/2018 10:40 AM [place] [address]  [address] [place] [id number] [phone]      None   None (395) Patient stated that they have no PCP        pcp of record as estabished and see in one week             ADDITIONAL CARE RECOMMENDATIONS: na    DIET: Cardiac Diet     ACTIVITY: Activity as tolerated    WOUND CARE: na    EQUIPMENT needed: na      DISCHARGE MEDICATIONS:  Current Discharge Medication List      START taking these medications    Details   amiodarone (CORDARONE) 200 mg tablet Take 1 Tab by mouth daily. Qty: 30 Tab, Refills: 0      furosemide (LASIX) 20 mg tablet Take one tablet daily  Qty: 30 Tab, Refills: 0      thiamine (B-1) 100 mg tablet Take 1 Tab by mouth daily. Qty: 20 Tab, Refills: 0      folic acid (FOLVITE) 1 mg tablet Take 1 Tab by mouth daily. Qty: 20 Tab, Refills: 0      potassium chloride SR (KLOR-CON 10) 10 mEq tablet Take 1 Tab by mouth daily. Qty: 30 Tab, Refills: 0         CONTINUE these medications which have NOT CHANGED    Details   ascorbic acid, vitamin C, (VITAMIN C) 500 mg tablet Take 500 mg by mouth. cholecalciferol (VITAMIN D3) 1,000 unit tablet Take 1,000 Units by mouth daily. NOTIFY YOUR PHYSICIAN FOR ANY OF THE FOLLOWING:   Fever over 101 degrees for 24 hours.    Chest pain, shortness of breath, fever, chills, nausea, vomiting, diarrhea, change in mentation, falling, weakness, bleeding. Severe pain or pain not relieved by medications. Or, any other signs or symptoms that you may have questions about.     DISPOSITION:    Home With:   OT  PT  HH  RN       Long term SNF/Inpatient Rehab   x Independent/assisted living    Hospice    Other:       PATIENT CONDITION AT DISCHARGE:     Functional status    Poor     Deconditioned    x Independent      Cognition    x Lucid     Forgetful     Dementia      Catheters/lines (plus indication)    Astudillo      PICC     PEG    x None      Code status   x  Full code     DNR      PHYSICAL EXAMINATION AT DISCHARGE:   Refer to Progress Note  Pul; clear  CV: rr no gallop   Abd; soft non tender      CHRONIC MEDICAL DIAGNOSES:  Problem List as of 3/21/2018  Date Reviewed: 3/21/2018          Codes Class Noted - Resolved    * (Principal)RESOLVED: SVT (supraventricular tachycardia) ([facility] [address] ICD-10-CM: I47.1  ICD-9-CM: 427.89  3/13/2018 - 3/21/2018              Greater than  30  minutes were spent with the patient on counseling and coordination of care    Signed:   Flora Sierra MD  3/21/2018  2:03 PM

## 2018-03-21 NOTE — DISCHARGE INSTRUCTIONS
Discharge Instructions       PATIENT ID: Wicho Randolph  MRN: [id number]   YOB: 1950    DATE OF ADMISSION: 3/13/2018  7:12 PM    DATE OF DISCHARGE: 3/21/2018    PRIMARY CARE PROVIDER: None     ATTENDING PHYSICIAN: Dominguez Westbrook MD  DISCHARGING PROVIDER: Dominguez Westbrook MD    To contact this individual call [phone] and ask the  to page. If unavailable ask to be transferred the Adult Hospitalist Department. DISCHARGE DIAGNOSES sepsis    CONSULTATIONS: IP CONSULT TO CARDIOLOGY  IP CONSULT TO GASTROENTEROLOGY  IP CONSULT TO GASTROENTEROLOGY  IP CONSULT TO ELECTROPHYSIOLOGY  IP CONSULT TO INFECTIOUS DISEASES    PROCEDURES/SURGERIES: * No surgery found *    PENDING TEST RESULTS:   At the time of discharge the following test results are still pending: na    FOLLOW UP APPOINTMENTS:   Follow-up Information     Follow up With Details Comments Contact Info    Mariana Granados MD On 4/27/2018 10:40 AM [email or web address]   [address] [place] [phone]      None   None (395) Patient stated that they have no PCP        pcp of record as estabished and see in one week             ADDITIONAL CARE RECOMMENDATIONS: na    DIET: Resume previous diet limit sodium or else cardiac diet;; avoid alcohol      ACTIVITY: Activity as tolerated    WOUND CARE: na    EQUIPMENT needed: na      DISCHARGE MEDICATIONS:   See Medication Reconciliation Form    · It is important that you take the medication exactly as they are prescribed. · Keep your medication in the bottles provided by the pharmacist and keep a list of the medication names, dosages, and times to be taken in your wallet. · Do not take other medications without consulting your doctor. NOTIFY YOUR PHYSICIAN FOR ANY OF THE FOLLOWING:   Fever over 101 degrees for 24 hours. Chest pain, shortness of breath, fever, chills, nausea, vomiting, diarrhea, change in mentation, falling, weakness, bleeding.  Severe pain or pain not relieved by medications. Or, any other signs or symptoms that you may have questions about.       DISPOSITION:    Home With:   OT  PT  HH  RN       SNF/Inpatient Rehab/LTAC   x Independent/assisted living    Hospice    Other:          Signed:   Julian Churchill MD  3/21/2018  2:02 PM

## 2018-03-21 NOTE — PROGRESS NOTES
Patient's discharge instructions were reviewed, signed, copy given. All questions answered at this time. Patient to be discharged with a midline in place -- this was not removed at this time.

## 2018-03-22 ENCOUNTER — HOSPITAL ENCOUNTER (OUTPATIENT)
Dept: INFUSION THERAPY | Age: 68
Discharge: HOME | End: 2018-03-22
Payer: MEDICARE

## 2018-03-22 ENCOUNTER — DOCUMENTATION ONLY (OUTPATIENT)
Dept: CASE MANAGEMENT | Age: 68
End: 2018-03-22

## 2018-03-22 VITALS
RESPIRATION RATE: 16 BRPM | HEART RATE: 91 BPM | SYSTOLIC BLOOD PRESSURE: 93 MMHG | DIASTOLIC BLOOD PRESSURE: 58 MMHG | TEMPERATURE: 97.7 F

## 2018-03-22 PROCEDURE — 74011250636 HC RX REV CODE- 250/636: Performed by: INTERNAL MEDICINE

## 2018-03-22 PROCEDURE — 74011000258 HC RX REV CODE- 258: Performed by: INTERNAL MEDICINE

## 2018-03-22 PROCEDURE — 96365 THER/PROPH/DIAG IV INF INIT: CPT

## 2018-03-22 RX ADMIN — CEFTRIAXONE SODIUM 2 G: 2 INJECTION, POWDER, FOR SOLUTION INTRAMUSCULAR; INTRAVENOUS at 16:33

## 2018-03-22 NOTE — PROGRESS NOTES
Attempted to reach patient via telephone, left message to call this nurse in reference to recent hospital stay. Will attempt to reach patient again.

## 2018-03-23 ENCOUNTER — HOSPITAL ENCOUNTER (OUTPATIENT)
Dept: INFUSION THERAPY | Age: 68
Discharge: HOME | End: 2018-03-23
Payer: MEDICARE

## 2018-03-23 VITALS — RESPIRATION RATE: 18 BRPM

## 2018-03-23 PROCEDURE — 74011000258 HC RX REV CODE- 258: Performed by: INTERNAL MEDICINE

## 2018-03-23 PROCEDURE — 74011250636 HC RX REV CODE- 250/636: Performed by: INTERNAL MEDICINE

## 2018-03-23 PROCEDURE — 96365 THER/PROPH/DIAG IV INF INIT: CPT

## 2018-03-23 RX ORDER — HEPARIN 100 UNIT/ML
500 SYRINGE INTRAVENOUS AS NEEDED
Status: CANCELLED | OUTPATIENT
Start: 2018-03-26 | End: 2018-03-26

## 2018-03-23 RX ORDER — SODIUM CHLORIDE 0.9 % (FLUSH) 0.9 %
10-40 SYRINGE (ML) INJECTION AS NEEDED
Status: CANCELLED | OUTPATIENT
Start: 2018-03-26 | End: 2018-03-26

## 2018-03-23 RX ADMIN — CEFTRIAXONE SODIUM 2 G: 2 INJECTION, POWDER, FOR SOLUTION INTRAMUSCULAR; INTRAVENOUS at 15:12

## 2018-03-23 NOTE — PROGRESS NOTES
Outpatient Infusion Center Short Visit Progress Note    1500 Pt admit to [facility] for daily Rocephin ambulatory with walker in stable condition. Assessment completed. No new concerns voiced. Midline flushed w/ positive blood return; NS infusing. Patient Vitals for the past 12 hrs:   Temp Pulse Resp BP   03/23/18 1504 (P) 97.6 °F (36.4 °C) (P) 92 18 (P) 99/64       Medications:  NS  Rocephin      1600 Pt tolerated treatment well. D/c home ambulatory in no distress. Pt aware of next appointment scheduled for 03/24/2018.

## 2018-03-24 ENCOUNTER — HOSPITAL ENCOUNTER (OUTPATIENT)
Dept: INFUSION THERAPY | Age: 68
Discharge: HOME | End: 2018-03-24
Payer: MEDICARE

## 2018-03-24 VITALS
HEART RATE: 86 BPM | TEMPERATURE: 97 F | DIASTOLIC BLOOD PRESSURE: 67 MMHG | SYSTOLIC BLOOD PRESSURE: 102 MMHG | RESPIRATION RATE: 18 BRPM

## 2018-03-24 PROCEDURE — 74011000258 HC RX REV CODE- 258: Performed by: INTERNAL MEDICINE

## 2018-03-24 PROCEDURE — 74011250636 HC RX REV CODE- 250/636: Performed by: INTERNAL MEDICINE

## 2018-03-24 PROCEDURE — 96365 THER/PROPH/DIAG IV INF INIT: CPT

## 2018-03-24 RX ORDER — HEPARIN 100 UNIT/ML
500 SYRINGE INTRAVENOUS AS NEEDED
Status: ACTIVE | OUTPATIENT
Start: 2018-03-24 | End: 2018-03-25

## 2018-03-24 RX ORDER — SODIUM CHLORIDE 0.9 % (FLUSH) 0.9 %
10 SYRINGE (ML) INJECTION AS NEEDED
Status: ACTIVE | OUTPATIENT
Start: 2018-03-24 | End: 2018-03-25

## 2018-03-24 RX ADMIN — SODIUM CHLORIDE, PRESERVATIVE FREE 500 UNITS: 5 INJECTION INTRAVENOUS at 12:27

## 2018-03-24 RX ADMIN — Medication 10 ML: at 12:27

## 2018-03-24 RX ADMIN — CEFTRIAXONE SODIUM 2 G: 2 INJECTION, POWDER, FOR SOLUTION INTRAMUSCULAR; INTRAVENOUS at 11:47

## 2018-03-24 NOTE — PROGRESS NOTES
Outpatient Infusion Center Short Visit Progress Note    8074 Pt admit to [facility] for daily Rocephin ambulatory with walker in stable condition. Assessment completed. No new concerns voiced. Midline flushed w/ positive blood return; NS infusing. Patient Vitals for the past 12 hrs:   Temp Pulse Resp BP   03/24/18 1154 97 °F (36.1 °C) 86 18 102/67       Medications:  NS  Rocephin      1225 Pt tolerated treatment well. D/c home ambulatory in no distress. Pt aware of next appointment scheduled for 03/25/2018.

## 2018-03-25 ENCOUNTER — HOSPITAL ENCOUNTER (OUTPATIENT)
Dept: INFUSION THERAPY | Age: 68
Discharge: HOME | End: 2018-03-25
Payer: MEDICARE

## 2018-03-25 VITALS
DIASTOLIC BLOOD PRESSURE: 67 MMHG | TEMPERATURE: 97.2 F | SYSTOLIC BLOOD PRESSURE: 105 MMHG | HEART RATE: 88 BPM | RESPIRATION RATE: 18 BRPM

## 2018-03-25 PROCEDURE — 96365 THER/PROPH/DIAG IV INF INIT: CPT

## 2018-03-25 PROCEDURE — 74011250636 HC RX REV CODE- 250/636: Performed by: INTERNAL MEDICINE

## 2018-03-25 PROCEDURE — 74011000258 HC RX REV CODE- 258: Performed by: INTERNAL MEDICINE

## 2018-03-25 RX ORDER — HEPARIN 100 UNIT/ML
500 SYRINGE INTRAVENOUS AS NEEDED
Status: ACTIVE | OUTPATIENT
Start: 2018-03-25 | End: 2018-03-26

## 2018-03-25 RX ORDER — SODIUM CHLORIDE 0.9 % (FLUSH) 0.9 %
5-10 SYRINGE (ML) INJECTION AS NEEDED
Status: DISCONTINUED | OUTPATIENT
Start: 2018-03-25 | End: 2018-03-29 | Stop reason: HOSPADM

## 2018-03-25 RX ADMIN — CEFTRIAXONE SODIUM 2 G: 2 INJECTION, POWDER, FOR SOLUTION INTRAMUSCULAR; INTRAVENOUS at 11:53

## 2018-03-25 NOTE — PROGRESS NOTES
Outpatient Infusion Center Short Visit Progress Note    4178 Pt admit to [facility] for daily Rocephin ambulatory with walker in stable condition. Assessment completed. No new concerns voiced. Midline flushed w/ positive blood return; NS infusing. Visit Vitals    /67 (BP 1 Location: Left arm, BP Patient Position: At rest)    Pulse 88    Temp 97.2 °F (36.2 °C)    Resp 18     Medications:  NS  Rocephin      1230 Pt tolerated treatment well. D/c home ambulatory in no distress. Pt aware of next appointment scheduled for 03/26/2018.

## 2018-03-26 ENCOUNTER — DOCUMENTATION ONLY (OUTPATIENT)
Dept: CASE MANAGEMENT | Age: 68
End: 2018-03-26

## 2018-03-26 ENCOUNTER — HOSPITAL ENCOUNTER (OUTPATIENT)
Dept: INFUSION THERAPY | Age: 68
Discharge: HOME | End: 2018-03-26
Payer: MEDICARE

## 2018-03-26 VITALS
RESPIRATION RATE: 16 BRPM | HEART RATE: 94 BPM | DIASTOLIC BLOOD PRESSURE: 61 MMHG | SYSTOLIC BLOOD PRESSURE: 90 MMHG | TEMPERATURE: 98 F

## 2018-03-26 PROCEDURE — 96365 THER/PROPH/DIAG IV INF INIT: CPT

## 2018-03-26 PROCEDURE — 74011250636 HC RX REV CODE- 250/636: Performed by: INTERNAL MEDICINE

## 2018-03-26 PROCEDURE — 74011000258 HC RX REV CODE- 258: Performed by: INTERNAL MEDICINE

## 2018-03-26 RX ADMIN — CEFTRIAXONE SODIUM 2 G: 2 INJECTION, POWDER, FOR SOLUTION INTRAMUSCULAR; INTRAVENOUS at 15:35

## 2018-03-26 NOTE — PROGRESS NOTES
Outpatient Infusion Center Short Visit Progress Note    1130 Pt admit to [facility] for daily Rocephin ambulatory with walker in stable condition. Assessment completed. No new concerns voiced. Midline flushed w/ positive blood return; NS infusing. Visit Vitals    BP 90/61    Pulse 94    Temp 98 °F (36.7 °C)    Resp 16     Medications:  NS  Rocephin      1605 Pt tolerated treatment well. Today is last day of treatment. Midline removed post infusion per orders, midline catheter in tact upon removal, gauze held to site and Tegaderm applied, no blood noted upon removal. D/c home ambulatory in no distress. Pt has no further appointments at this time.

## 2018-03-26 NOTE — PROGRESS NOTES
Attempted to reach patient via telephone, left message to call this nurse to schedule hosp f/u appt with PCP.

## 2018-03-27 ENCOUNTER — TELEPHONE (OUTPATIENT)
Dept: CARDIOLOGY CLINIC | Age: 68
End: 2018-03-27

## 2018-03-27 NOTE — TELEPHONE ENCOUNTER
Patient called. Verified identity. States that she is going to stop taking Amiodarone that was prescribed to her while in the hospital being treated for an infection. She states she has never had an arrhythmia before and believes it was brought on by the infection. She does not want to continue the medication after reading the side effects. Informed her that I will forward this information to Dr. Josesito Will.

## 2018-03-29 NOTE — TELEPHONE ENCOUNTER
Pt called and asked Dr. Jhon La to call her back at her convenience. Pt can be reached @ [phone]. Thanks!   Steven Peterson

## 2018-03-30 NOTE — TELEPHONE ENCOUNTER
Oh I think it is fine to stop. If the SVT comes back we can always restart it       Called patient, left a message to return my call.

## 2018-04-02 NOTE — TELEPHONE ENCOUNTER
Called patient. Left another message stating that Dr. Josesito Will is ok with her not taking Amiodarone. Advised the patient to call the office with any questions.

## 2018-05-08 ENCOUNTER — OFFICE VISIT (OUTPATIENT)
Dept: CARDIOLOGY CLINIC | Age: 68
End: 2018-05-08

## 2018-05-08 VITALS
RESPIRATION RATE: 16 BRPM | HEIGHT: 65 IN | HEART RATE: 76 BPM | SYSTOLIC BLOOD PRESSURE: 122 MMHG | BODY MASS INDEX: 17.03 KG/M2 | WEIGHT: 102.2 LBS | DIASTOLIC BLOOD PRESSURE: 80 MMHG

## 2018-05-08 DIAGNOSIS — I49.9 IRREGULAR HEART RATE: Primary | ICD-10-CM

## 2018-05-08 NOTE — PROGRESS NOTES
[facility] Progress Note    5/8/2018 8:30 AM  Admit Date: (Not on file)  Admit Diagnosis: Bradycardia [R00.1]    No palpitations, feeling well since she left the hospital. HR running 70-80s at home when she checks it. No dizziness, She stopped her amio and prefers to avoid for now. She had some low o2 but did not want to take it at home and O2 at home is no 90s. No longer using the bedside commode or walker, She thinks she is finally getting back to normal.  She took 20 days of mag and K and is no longer on them. Will need to recheck her levels and restart supp if needed. [employer]! Assessment/Plan     1. SVT- due to extenuating circumstances. No recurrence, she continues off amio for now  2. GIB- Hgb stable after PRBC infusions, patient reports that GI is not planning to do any inpatient procedures, on PPI   3. Abnormal TSH- T4 ok at 9.6  4. UTI/urosepsis- resolved  5. Troponin elevation- nonspecific in current setting, TTE normal  6. Protein calorie malnutrition PTA Body mass index is 17.01 kg/(m^2). . prealbumin 6.4    7. BNP elevation- appropriate for degeree of tachycardia, etc  8. Hypomagnesemia - recheck, replete  9. Hypotension - stable now  10. Hypokalemia - replete prn, recheck  11. Etoh use - possibly significant use PTA, pt avoids discussion    Echo 3/18 - LVEF 55 % to 60 %, no WMA, grade 1 dd, mildly dilated LA, mild to mod MR, mild TR, mild pulmonary HTN  Soc no tob   FHx + SVT    Subjective:     Veronica Yanez denies chest pain, dyspnea, palpitations. Still on IV Amiodarone for rhythm control. Says GI is not planning any inpatient procedures for now. Wants to go home. Discussed medical management of SVT and EP consult today. She is agreeable to this.       Objective:      Physical Exam:  Visit Vitals    /80 (BP 1 Location: Left arm, BP Patient Position: Sitting)    Pulse 76    Resp 16    Ht 5' 5\" (1.651 m)    Wt 102 lb 3.2 oz (46.4 kg)    BMI 17.01 kg/m2     General Appearance:  Well developed, well nourished, alert and oriented x 3, in no acute distress. Ears/Nose/Mouth/Throat:   Hearing grossly normal.         Neck: Supple. Left IJ line in place   Chest:   Lungs clear to auscultation bilaterally. Cardiovascular:  Normal rate and regular rhythm, S1, S2 normal, no murmur. Abdomen:   Soft, non-tender, bowel sounds are active. Extremities: No edema bilaterally. Skin: Warm and dry. Telemetry: normal sinus rhythm    Data Review:   Labs:    No results found for this or any previous visit (from the past 24 hour(s)). Current Outpatient Prescriptions   Medication Sig    amiodarone (CORDARONE) 200 mg tablet Take 1 Tab by mouth daily.  furosemide (LASIX) 20 mg tablet Take one tablet daily    thiamine (B-1) 100 mg tablet Take 1 Tab by mouth daily.  folic acid (FOLVITE) 1 mg tablet Take 1 Tab by mouth daily.  potassium chloride SR (KLOR-CON 10) 10 mEq tablet Take 1 Tab by mouth daily.  ascorbic acid, vitamin C, (VITAMIN C) 500 mg tablet Take 500 mg by mouth.  cholecalciferol (VITAMIN D3) 1,000 unit tablet Take 1,000 Units by mouth daily. No current facility-administered medications for this visit.         Marisa De Souza MD  [facility] [address] [place] [address] [address] [place] [address],[address]  [facility]  [phone]

## 2018-05-08 NOTE — MR AVS SNAPSHOT
[address] [facility] [address] [place] 
[phone] Patient: Rafa Celaya MRN: [id number] EXC:6/01/9806 Visit Information Date & Time Provider Department Dept. Phone Encounter #  
 5/8/2018  8:20 AM Glendy Doran MD [facility] [place] [phone] [id number] Your Appointments 11/20/2018  9:00 AM  
ESTABLISHED PATIENT with Glendy Doran MD  
[facility] ([facility]) Appt Note: 6 mo f/u per Dr. Mort Olszewski [address]  [address] [place] [address]  
[address] [address] Upcoming Health Maintenance Date Due Hepatitis C Screening 1950 DTaP/Tdap/Td series (1 - Tdap) 9/10/1969 BREAST CANCER SCRN MAMMOGRAM 9/10/1998 ZOSTER VACCINE AGE 60> 7/10/2008 GLAUCOMA SCREENING Q2Y 9/10/2013 Bone Densitometry (Dexa) Screening 9/10/2013 Pneumococcal 65+ Low/Medium Risk (1 of 2 - PCV13) 9/10/2013 MEDICARE YEARLY EXAM 3/14/2018 Influenza Age 5 to Adult 8/1/2018 FOBT Q 1 YEAR AGE 50-75 3/13/2019 Allergies as of 5/8/2018  Review Complete On: 5/8/2018 By: Zen Gutierrez No Known Allergies Current Immunizations  Reviewed on 3/24/2018 No immunizations on file. Not reviewed this visit You Were Diagnosed With   
  
 Codes Comments Irregular heart rate    -  Primary ICD-10-CM: I49.9 ICD-9-CM: 427.9 Vitals BP Pulse Resp Height(growth percentile) Weight(growth percentile) BMI  
 122/80 (BP 1 Location: Left arm, BP Patient Position: Sitting) 76 16 5' 5\" (1.651 m) 102 lb 3.2 oz (46.4 kg) 17.01 kg/m2 Smoking Status Never Smoker Vitals History BMI and BSA Data Body Mass Index Body Surface Area 17.01 kg/m 2 1.46 m 2 Your Updated Medication List  
  
   
 This list is accurate as of 5/8/18  9:07 AM.  Always use your most recent med list.  
  
  
  
  
 amiodarone 200 mg tablet Commonly known as:  CORDARONE Take 1 Tab by mouth daily. folic acid 1 mg tablet Commonly known as:  Google Take 1 Tab by mouth daily. furosemide 20 mg tablet Commonly known as:  LASIX Take one tablet daily  
  
 potassium chloride SR 10 mEq tablet Commonly known as:  KLOR-CON 10 Take 1 Tab by mouth daily. thiamine 100 mg tablet Commonly known as:  B-1 Take 1 Tab by mouth daily. VITAMIN C 500 mg tablet Generic drug:  ascorbic acid (vitamin C) Take 500 mg by mouth. VITAMIN D3 1,000 unit tablet Generic drug:  cholecalciferol Take 1,000 Units by mouth daily. We Performed the Following MAGNESIUM B1739082 CPT(R)] METABOLIC PANEL, COMPREHENSIVE [88789 CPT(R)] Introducing [facility] SERVICES! [employer] introduces [address] patient portal. Now you can access parts of your medical record, email your doctor's office, and request medication refills online. 1. In your internet browser, go to [email or web address] 2. Click on the First Time User? Click Here link in the Sign In box. You will see the New Member Sign Up page. 3. Enter your Suite101 Access Code exactly as it appears below. You will not need to use this code after youve completed the sign-up process. If you do not sign up before the expiration date, you must request a new code. · Suite101 Access Code: [email or web address] Expires: 6/14/2018  1:46 PM 
 
4. Enter the last four digits of your Social Security Number (xxxx) and Date of Birth (mm/dd/yyyy) as indicated and click Submit. You will be taken to the next sign-up page. 5. Create a Textict ID. This will be your Suite101 login ID and cannot be changed, so think of one that is secure and easy to remember. 6. Create a Textict password. You can change your password at any time. 7. Enter your Password Reset Question and Answer. This can be used at a later time if you forget your password. 8. Enter your e-mail address. You will receive e-mail notification when new information is available in [address] 9. Click Sign Up. You can now view and download portions of your medical record. 10. Click the Download Summary menu link to download a portable copy of your medical information. If you have questions, please visit the Frequently Asked Questions section of the DiversityDoctor website. Remember, DiversityDoctor is NOT to be used for urgent needs. For medical emergencies, dial 911. Now available from your iPhone and Android! Please provide this summary of care documentation to your next provider. Your primary care clinician is listed as NONE. If you have any questions after today's visit, please call [phone].

## 2018-05-09 LAB
ALBUMIN SERPL-MCNC: 4.4 G/DL (ref 3.6–4.8)
ALBUMIN/GLOB SERPL: 1.3 {RATIO} (ref 1.2–2.2)
ALP SERPL-CCNC: 80 IU/L (ref 39–117)
ALT SERPL-CCNC: 13 IU/L (ref 0–32)
AST SERPL-CCNC: 33 IU/L (ref 0–40)
BILIRUB SERPL-MCNC: 0.6 MG/DL (ref 0–1.2)
BUN SERPL-MCNC: 17 MG/DL (ref 8–27)
BUN/CREAT SERPL: 31 (ref 12–28)
CALCIUM SERPL-MCNC: 9.5 MG/DL (ref 8.7–10.3)
CHLORIDE SERPL-SCNC: 96 MMOL/L (ref 96–106)
CO2 SERPL-SCNC: 26 MMOL/L (ref 18–29)
CREAT SERPL-MCNC: 0.55 MG/DL (ref 0.57–1)
GFR SERPLBLD CREATININE-BSD FMLA CKD-EPI: 111 ML/MIN/1.73
GFR SERPLBLD CREATININE-BSD FMLA CKD-EPI: 96 ML/MIN/1.73
GLOBULIN SER CALC-MCNC: 3.3 G/DL (ref 1.5–4.5)
GLUCOSE SERPL-MCNC: 154 MG/DL (ref 65–99)
MAGNESIUM SERPL-MCNC: 1.5 MG/DL (ref 1.6–2.3)
POTASSIUM SERPL-SCNC: 3.9 MMOL/L (ref 3.5–5.2)
PROT SERPL-MCNC: 7.7 G/DL (ref 6–8.5)
SODIUM SERPL-SCNC: 140 MMOL/L (ref 134–144)

## 2018-05-17 ENCOUNTER — TELEPHONE (OUTPATIENT)
Dept: CARDIOLOGY CLINIC | Age: 68
End: 2018-05-17

## 2018-05-17 RX ORDER — LANOLIN ALCOHOL/MO/W.PET/CERES
400 CREAM (GRAM) TOPICAL DAILY
COMMUNITY
End: 2018-05-17 | Stop reason: SDUPTHER

## 2018-05-17 NOTE — TELEPHONE ENCOUNTER
----- Message from Erin Will NP sent at 5/17/2018  3:03 PM EDT -----  Magnesium level is low, please begin Mg Oxide 400mg daily      LVM for patient to return call at earliest convenience. Patient returned call, 2 pt identifiers used  Above lab results given. Requested Prescriptions     Signed Prescriptions Disp Refills    magnesium oxide (MAG-OX) 400 mg tablet 30 Tab 11     Sig: Take 1 Tab by mouth daily.      Authorizing Provider: Kevin Evans     Ordering User: Gabriel Freedman     Per orders

## 2018-05-18 RX ORDER — LANOLIN ALCOHOL/MO/W.PET/CERES
400 CREAM (GRAM) TOPICAL DAILY
Qty: 30 TAB | Refills: 11 | Status: ON HOLD | OUTPATIENT
Start: 2018-05-18 | End: 2018-07-04

## 2018-07-03 ENCOUNTER — TELEPHONE (OUTPATIENT)
Dept: CARDIOLOGY CLINIC | Age: 68
End: 2018-07-03

## 2018-07-03 ENCOUNTER — HOSPITAL ENCOUNTER (OUTPATIENT)
Age: 68
Setting detail: OBSERVATION
Discharge: HOME | End: 2018-07-04
Attending: EMERGENCY MEDICINE | Admitting: INTERNAL MEDICINE
Payer: MEDICARE

## 2018-07-03 DIAGNOSIS — I47.1 SVT (SUPRAVENTRICULAR TACHYCARDIA) (HCC): Primary | ICD-10-CM

## 2018-07-03 LAB
ALBUMIN SERPL-MCNC: 3.7 G/DL (ref 3.5–5)
ALBUMIN/GLOB SERPL: 0.9 {RATIO} (ref 1.1–2.2)
ALP SERPL-CCNC: 98 U/L (ref 45–117)
ALT SERPL-CCNC: 78 U/L (ref 12–78)
ANION GAP SERPL CALC-SCNC: 13 MMOL/L (ref 5–15)
AST SERPL-CCNC: 172 U/L (ref 15–37)
ATRIAL RATE: 102 BPM
ATRIAL RATE: 118 BPM
BASOPHILS # BLD: 0 K/UL (ref 0–0.1)
BASOPHILS NFR BLD: 1 % (ref 0–1)
BILIRUB SERPL-MCNC: 0.7 MG/DL (ref 0.2–1)
BUN SERPL-MCNC: 9 MG/DL (ref 6–20)
BUN/CREAT SERPL: 18 (ref 12–20)
CALCIUM SERPL-MCNC: 8.4 MG/DL (ref 8.5–10.1)
CALCULATED P AXIS, ECG09: 21 DEGREES
CALCULATED R AXIS, ECG10: -6 DEGREES
CALCULATED R AXIS, ECG10: 86 DEGREES
CALCULATED T AXIS, ECG11: 41 DEGREES
CALCULATED T AXIS, ECG11: 94 DEGREES
CHLORIDE SERPL-SCNC: 99 MMOL/L (ref 97–108)
CO2 SERPL-SCNC: 23 MMOL/L (ref 21–32)
CREAT SERPL-MCNC: 0.5 MG/DL (ref 0.55–1.02)
DIAGNOSIS, 93000: NORMAL
DIAGNOSIS, 93000: NORMAL
DIFFERENTIAL METHOD BLD: ABNORMAL
EOSINOPHIL # BLD: 0.1 K/UL (ref 0–0.4)
EOSINOPHIL NFR BLD: 2 % (ref 0–7)
ERYTHROCYTE [DISTWIDTH] IN BLOOD BY AUTOMATED COUNT: 17 % (ref 11.5–14.5)
GLOBULIN SER CALC-MCNC: 3.9 G/DL (ref 2–4)
GLUCOSE SERPL-MCNC: 143 MG/DL (ref 65–100)
HCT VFR BLD AUTO: 38.9 % (ref 35–47)
HGB BLD-MCNC: 12.4 G/DL (ref 11.5–16)
IMM GRANULOCYTES # BLD: 0 K/UL (ref 0–0.04)
IMM GRANULOCYTES NFR BLD AUTO: 0 % (ref 0–0.5)
LYMPHOCYTES # BLD: 1.3 K/UL (ref 0.8–3.5)
LYMPHOCYTES NFR BLD: 26 % (ref 12–49)
MAGNESIUM SERPL-MCNC: 1.2 MG/DL (ref 1.6–2.4)
MCH RBC QN AUTO: 29.9 PG (ref 26–34)
MCHC RBC AUTO-ENTMCNC: 31.9 G/DL (ref 30–36.5)
MCV RBC AUTO: 93.7 FL (ref 80–99)
MONOCYTES # BLD: 0.5 K/UL (ref 0–1)
MONOCYTES NFR BLD: 10 % (ref 5–13)
NEUTS SEG # BLD: 2.9 K/UL (ref 1.8–8)
NEUTS SEG NFR BLD: 60 % (ref 32–75)
NRBC # BLD: 0 K/UL (ref 0–0.01)
NRBC BLD-RTO: 0 PER 100 WBC
P-R INTERVAL, ECG05: 146 MS
PLATELET # BLD AUTO: 119 K/UL (ref 150–400)
PMV BLD AUTO: 10.4 FL (ref 8.9–12.9)
POTASSIUM SERPL-SCNC: 3.3 MMOL/L (ref 3.5–5.1)
PROT SERPL-MCNC: 7.6 G/DL (ref 6.4–8.2)
Q-T INTERVAL, ECG07: 270 MS
Q-T INTERVAL, ECG07: 338 MS
QRS DURATION, ECG06: 104 MS
QRS DURATION, ECG06: 114 MS
QTC CALCULATION (BEZET), ECG08: 473 MS
QTC CALCULATION (BEZET), ECG08: 492 MS
RBC # BLD AUTO: 4.15 M/UL (ref 3.8–5.2)
SODIUM SERPL-SCNC: 135 MMOL/L (ref 136–145)
T4 SERPL-MCNC: 7.9 UG/DL (ref 4.8–13.9)
TROPONIN I SERPL-MCNC: <0.05 NG/ML
TSH SERPL DL<=0.05 MIU/L-ACNC: 5.96 UIU/ML (ref 0.36–3.74)
VENTRICULAR RATE, ECG03: 118 BPM
VENTRICULAR RATE, ECG03: 200 BPM
WBC # BLD AUTO: 4.8 K/UL (ref 3.6–11)

## 2018-07-03 PROCEDURE — 99218 HC RM OBSERVATION: CPT

## 2018-07-03 PROCEDURE — 85025 COMPLETE CBC W/AUTO DIFF WBC: CPT | Performed by: EMERGENCY MEDICINE

## 2018-07-03 PROCEDURE — 99285 EMERGENCY DEPT VISIT HI MDM: CPT

## 2018-07-03 PROCEDURE — 84443 ASSAY THYROID STIM HORMONE: CPT | Performed by: EMERGENCY MEDICINE

## 2018-07-03 PROCEDURE — 74011000250 HC RX REV CODE- 250: Performed by: EMERGENCY MEDICINE

## 2018-07-03 PROCEDURE — 83735 ASSAY OF MAGNESIUM: CPT | Performed by: EMERGENCY MEDICINE

## 2018-07-03 PROCEDURE — 74011000250 HC RX REV CODE- 250

## 2018-07-03 PROCEDURE — 96375 TX/PRO/DX INJ NEW DRUG ADDON: CPT

## 2018-07-03 PROCEDURE — 84484 ASSAY OF TROPONIN QUANT: CPT | Performed by: EMERGENCY MEDICINE

## 2018-07-03 PROCEDURE — 74011000258 HC RX REV CODE- 258: Performed by: EMERGENCY MEDICINE

## 2018-07-03 PROCEDURE — 74011250637 HC RX REV CODE- 250/637: Performed by: INTERNAL MEDICINE

## 2018-07-03 PROCEDURE — 84436 ASSAY OF TOTAL THYROXINE: CPT | Performed by: PHYSICIAN ASSISTANT

## 2018-07-03 PROCEDURE — 93005 ELECTROCARDIOGRAM TRACING: CPT

## 2018-07-03 PROCEDURE — 80053 COMPREHEN METABOLIC PANEL: CPT | Performed by: EMERGENCY MEDICINE

## 2018-07-03 PROCEDURE — 96368 THER/DIAG CONCURRENT INF: CPT

## 2018-07-03 PROCEDURE — 36415 COLL VENOUS BLD VENIPUNCTURE: CPT | Performed by: EMERGENCY MEDICINE

## 2018-07-03 PROCEDURE — 74011250636 HC RX REV CODE- 250/636: Performed by: PHYSICIAN ASSISTANT

## 2018-07-03 PROCEDURE — 74011250636 HC RX REV CODE- 250/636: Performed by: EMERGENCY MEDICINE

## 2018-07-03 PROCEDURE — 96366 THER/PROPH/DIAG IV INF ADDON: CPT

## 2018-07-03 PROCEDURE — 74011250636 HC RX REV CODE- 250/636

## 2018-07-03 PROCEDURE — 96365 THER/PROPH/DIAG IV INF INIT: CPT

## 2018-07-03 PROCEDURE — 96376 TX/PRO/DX INJ SAME DRUG ADON: CPT

## 2018-07-03 PROCEDURE — 74011250637 HC RX REV CODE- 250/637: Performed by: PHYSICIAN ASSISTANT

## 2018-07-03 RX ORDER — POTASSIUM CHLORIDE 750 MG/1
40 TABLET, FILM COATED, EXTENDED RELEASE ORAL DAILY
Status: DISCONTINUED | OUTPATIENT
Start: 2018-07-03 | End: 2018-07-03 | Stop reason: SDUPTHER

## 2018-07-03 RX ORDER — ACETAMINOPHEN 325 MG/1
650 TABLET ORAL
Status: DISCONTINUED | OUTPATIENT
Start: 2018-07-03 | End: 2018-07-04 | Stop reason: HOSPADM

## 2018-07-03 RX ORDER — SODIUM CHLORIDE 0.9 % (FLUSH) 0.9 %
5-10 SYRINGE (ML) INJECTION EVERY 8 HOURS
Status: DISCONTINUED | OUTPATIENT
Start: 2018-07-03 | End: 2018-07-04 | Stop reason: HOSPADM

## 2018-07-03 RX ORDER — LANOLIN ALCOHOL/MO/W.PET/CERES
400 CREAM (GRAM) TOPICAL 2 TIMES DAILY
Status: DISCONTINUED | OUTPATIENT
Start: 2018-07-03 | End: 2018-07-04

## 2018-07-03 RX ORDER — DILTIAZEM HYDROCHLORIDE 60 MG/1
60 TABLET, FILM COATED ORAL
Status: DISCONTINUED | OUTPATIENT
Start: 2018-07-03 | End: 2018-07-03

## 2018-07-03 RX ORDER — ONDANSETRON 2 MG/ML
4 INJECTION INTRAMUSCULAR; INTRAVENOUS
Status: DISCONTINUED | OUTPATIENT
Start: 2018-07-03 | End: 2018-07-04 | Stop reason: HOSPADM

## 2018-07-03 RX ORDER — DILTIAZEM HYDROCHLORIDE 5 MG/ML
10 INJECTION INTRAVENOUS
Status: COMPLETED | OUTPATIENT
Start: 2018-07-03 | End: 2018-07-03

## 2018-07-03 RX ORDER — ADENOSINE 3 MG/ML
6 INJECTION, SOLUTION INTRAVENOUS
Status: COMPLETED | OUTPATIENT
Start: 2018-07-03 | End: 2018-07-03

## 2018-07-03 RX ORDER — DILTIAZEM HYDROCHLORIDE 5 MG/ML
15 INJECTION INTRAVENOUS
Status: COMPLETED | OUTPATIENT
Start: 2018-07-03 | End: 2018-07-03

## 2018-07-03 RX ORDER — SODIUM CHLORIDE 0.9 % (FLUSH) 0.9 %
5-10 SYRINGE (ML) INJECTION AS NEEDED
Status: DISCONTINUED | OUTPATIENT
Start: 2018-07-03 | End: 2018-07-04 | Stop reason: HOSPADM

## 2018-07-03 RX ORDER — DILTIAZEM HYDROCHLORIDE 30 MG/1
30 TABLET, FILM COATED ORAL EVERY 8 HOURS
Status: DISCONTINUED | OUTPATIENT
Start: 2018-07-03 | End: 2018-07-04

## 2018-07-03 RX ORDER — METOPROLOL TARTRATE 25 MG/1
25 TABLET, FILM COATED ORAL EVERY 12 HOURS
Status: DISCONTINUED | OUTPATIENT
Start: 2018-07-03 | End: 2018-07-04

## 2018-07-03 RX ORDER — DILTIAZEM HYDROCHLORIDE 5 MG/ML
INJECTION INTRAVENOUS
Status: COMPLETED
Start: 2018-07-03 | End: 2018-07-03

## 2018-07-03 RX ORDER — MAGNESIUM SULFATE HEPTAHYDRATE 40 MG/ML
2 INJECTION, SOLUTION INTRAVENOUS ONCE
Status: COMPLETED | OUTPATIENT
Start: 2018-07-03 | End: 2018-07-03

## 2018-07-03 RX ORDER — POTASSIUM CHLORIDE 750 MG/1
40 TABLET, FILM COATED, EXTENDED RELEASE ORAL
Status: COMPLETED | OUTPATIENT
Start: 2018-07-03 | End: 2018-07-03

## 2018-07-03 RX ORDER — ADENOSINE 3 MG/ML
INJECTION, SOLUTION INTRAVENOUS
Status: COMPLETED
Start: 2018-07-03 | End: 2018-07-03

## 2018-07-03 RX ORDER — POTASSIUM CHLORIDE 750 MG/1
40 TABLET, FILM COATED, EXTENDED RELEASE ORAL DAILY
Status: DISCONTINUED | OUTPATIENT
Start: 2018-07-04 | End: 2018-07-04

## 2018-07-03 RX ADMIN — Medication 10 ML: at 21:21

## 2018-07-03 RX ADMIN — ONDANSETRON 4 MG: 2 INJECTION INTRAMUSCULAR; INTRAVENOUS at 12:22

## 2018-07-03 RX ADMIN — Medication 10 ML: at 15:35

## 2018-07-03 RX ADMIN — DILTIAZEM HYDROCHLORIDE 10 MG/HR: 5 INJECTION INTRAVENOUS at 09:30

## 2018-07-03 RX ADMIN — ADENOSINE 6 MG: 3 INJECTION, SOLUTION INTRAVENOUS at 09:13

## 2018-07-03 RX ADMIN — POTASSIUM CHLORIDE 40 MEQ: 750 TABLET, EXTENDED RELEASE ORAL at 10:40

## 2018-07-03 RX ADMIN — MAGNESIUM SULFATE HEPTAHYDRATE 2 G: 40 INJECTION, SOLUTION INTRAVENOUS at 10:17

## 2018-07-03 RX ADMIN — MAGNESIUM SULFATE HEPTAHYDRATE 2 G: 40 INJECTION, SOLUTION INTRAVENOUS at 11:36

## 2018-07-03 RX ADMIN — METOPROLOL TARTRATE 25 MG: 25 TABLET ORAL at 18:13

## 2018-07-03 RX ADMIN — POTASSIUM CHLORIDE 40 MEQ: 750 TABLET, EXTENDED RELEASE ORAL at 12:06

## 2018-07-03 RX ADMIN — DILTIAZEM HYDROCHLORIDE 60 MG: 60 TABLET, FILM COATED ORAL at 12:06

## 2018-07-03 RX ADMIN — DILTIAZEM HYDROCHLORIDE 15 MG: 5 INJECTION INTRAVENOUS at 09:22

## 2018-07-03 RX ADMIN — DILTIAZEM HYDROCHLORIDE 10 MG: 5 INJECTION INTRAVENOUS at 10:07

## 2018-07-03 RX ADMIN — DILTIAZEM HYDROCHLORIDE 60 MG: 60 TABLET, FILM COATED ORAL at 16:22

## 2018-07-03 RX ADMIN — MAGNESIUM OXIDE TAB 400 MG (241.3 MG ELEMENTAL MG) 400 MG: 400 (241.3 MG) TAB at 18:13

## 2018-07-03 NOTE — IP AVS SNAPSHOT
[address] [facility] [address] 
[phone] Patient: Vik Brizuela MRN: [id number] OEQ:1/24/8495 A check sangeetha indicates which time of day the medication should be taken. My Medications START taking these medications Instructions Each Dose to Equal  
 Morning Noon Evening Bedtime  
 dilTIAZem  mg ER capsule Commonly known as:  CARDIZEM CD Your last dose was: Your next dose is: Take 1 Cap by mouth nightly. 120 mg  
    
   
   
   
  
 metoprolol succinate 25 mg XL tablet Commonly known as:  TOPROL-XL Start taking on:  7/5/2018 Your last dose was: Your next dose is: Take 1 Tab by mouth daily. 25 mg  
    
   
   
   
  
 potassium chloride SR 20 mEq tablet Commonly known as:  K-TAB Start taking on:  7/5/2018 Your last dose was: Your next dose is: Take 1 Tab by mouth daily. 20 mEq CHANGE how you take these medications Instructions Each Dose to Equal  
 Morning Noon Evening Bedtime  
 magnesium oxide 400 mg tablet Commonly known as:  MAG-OX What changed:  how much to take Your last dose was: Your next dose is: Take 0.5 Tabs by mouth daily. 200 mg CONTINUE taking these medications Instructions Each Dose to Equal  
 Morning Noon Evening Bedtime CRANBERRY CONCENTRATE PO Your last dose was: Your next dose is: Take 1 Tab by mouth daily. 1 Tab VITAMIN C 500 mg tablet Generic drug:  ascorbic acid (vitamin C) Your last dose was: Your next dose is: Take 500 mg by mouth daily. 500 mg  
    
   
   
   
  
 VITAMIN D3 1,000 unit tablet Generic drug:  cholecalciferol Your last dose was: Your next dose is: Take 1,000 Units by mouth daily. 1000 Units Where to Get Your Medications These medications were sent to [facility]/pharmacy #[id number] Seferino Romero, [address] [place] [address] Phone:  [phone]  
  dilTIAZem  mg ER capsule  
 magnesium oxide 400 mg tablet  
 metoprolol succinate 25 mg XL tablet  
 potassium chloride SR 20 mEq tablet

## 2018-07-03 NOTE — IP AVS SNAPSHOT
[address] 
[phone] Patient: Waleska Jacobson MRN: [id number] LQC:7/73/1486 About your hospitalization You were admitted on:  July 3, 2018 You last received care in the:  [facility] You were discharged on:  July 4, 2018 Why you were hospitalized Your primary diagnosis was:  Not on File Your diagnoses also included:  Svt (Supraventricular Tachycardia) (Hcc) Follow-up Information Follow up With Details Comments Contact Info Kristin Saunders PA-C On 7/16/2018 2:20 PM [address] [facility] 57 
[phone] None   None (395) Patient stated that they have no PCP Discharge Orders None A check sangeetha indicates which time of day the medication should be taken. My Medications START taking these medications Instructions Each Dose to Equal  
 Morning Noon Evening Bedtime  
 dilTIAZem  mg ER capsule Commonly known as:  CARDIZEM CD Your last dose was: Your next dose is: Take 1 Cap by mouth nightly. 120 mg  
    
   
   
   
  
 metoprolol succinate 25 mg XL tablet Commonly known as:  TOPROL-XL Start taking on:  7/5/2018 Your last dose was: Your next dose is: Take 1 Tab by mouth daily. 25 mg  
    
   
   
   
  
 potassium chloride SR 20 mEq tablet Commonly known as:  K-TAB Start taking on:  7/5/2018 Your last dose was: Your next dose is: Take 1 Tab by mouth daily. 20 mEq CHANGE how you take these medications Instructions Each Dose to Equal  
 Morning Noon Evening Bedtime  
 magnesium oxide 400 mg tablet Commonly known as:  MAG-OX What changed:  how much to take Your last dose was: Your next dose is: Take 0.5 Tabs by mouth daily.   
 200 mg  
    
   
   
   
  
  
 CONTINUE taking these medications Instructions Each Dose to Equal  
 Morning Noon Evening Bedtime CRANBERRY CONCENTRATE PO Your last dose was: Your next dose is: Take 1 Tab by mouth daily. 1 Tab VITAMIN C 500 mg tablet Generic drug:  ascorbic acid (vitamin C) Your last dose was: Your next dose is: Take 500 mg by mouth daily. 500 mg  
    
   
   
   
  
 VITAMIN D3 1,000 unit tablet Generic drug:  cholecalciferol Your last dose was: Your next dose is: Take 1,000 Units by mouth daily. 1000 Units Where to Get Your Medications These medications were sent to [facility]/pharmacy #[id number] Seferino Reddynatalie, [address] [place] [address] Phone:  [phone]  
  dilTIAZem  mg ER capsule  
 magnesium oxide 400 mg tablet  
 metoprolol succinate 25 mg XL tablet  
 potassium chloride SR 20 mEq tablet Discharge Instructions None PresenterNet Announcement We are excited to announce that we are making your provider's discharge notes available to you in PresenterNet. You will see these notes when they are completed and signed by the physician that discharged you from your recent hospital stay. If you have any questions or concerns about any information you see in PresenterNet, please call the Health Information Department where you were seen or reach out to your Primary Care Provider for more information about your plan of care. Introducing [facility] & HEALTH SERVICES! [employer] introduces PresenterNet patient portal. Now you can access parts of your medical record, email your doctor's office, and request medication refills online. 1. In your internet browser, go to [email or web address] 2. Click on the First Time User? Click Here link in the Sign In box. You will see the New Member Sign Up page. 3. Enter your [employer] Access Code exactly as it appears below. You will not need to use this code after youve completed the sign-up process. If you do not sign up before the expiration date, you must request a new code. · [employer] Access Code: [email or web address] Expires: 9/23/2018  5:22 AM 
 
4. Enter the last four digits of your Social Security Number (xxxx) and Date of Birth (mm/dd/yyyy) as indicated and click Submit. You will be taken to the next sign-up page. 5. Create a [email or web address].comt ID. This will be your [employer] login ID and cannot be changed, so think of one that is secure and easy to remember. 6. Create a [employer] password. You can change your password at any time. 7. Enter your Password Reset Question and Answer. This can be used at a later time if you forget your password. 8. Enter your e-mail address. You will receive e-mail notification when new information is available in [address] 9. Click Sign Up. You can now view and download portions of your medical record. 10. Click the Download Summary menu link to download a portable copy of your medical information. If you have questions, please visit the Frequently Asked Questions section of the [employer] website. Remember, [employer] is NOT to be used for urgent needs. For medical emergencies, dial 911. Now available from your iPhone and Android! Introducing Dariusz Singh As a Gerber Brower patient, I wanted to make you aware of our electronic visit tool called Dariusz Justinomary carmen. Gerber Brower 24/7 allows you to connect within minutes with a medical provider 24 hours a day, seven days a week via a mobile device or tablet or logging into a secure website from your computer. You can access Dariusz Justinonaderfin from anywhere in the [place].  
 
A virtual visit might be right for you when you have a simple condition and feel like you just dont want to get out of bed, or cant get away from work for an appointment, when your regular Mariza Barnard provider is not available (evenings, weekends or holidays), or when youre out of town and need minor care. Electronic visits cost only $49 and if the Mariza Barnard 24/7 provider determines a prescription is needed to treat your condition, one can be electronically transmitted to a nearby pharmacy*. Please take a moment to enroll today if you have not already done so. The enrollment process is free and takes just a few minutes. To enroll, please download the Mariza Akil 24/7 santo to your tablet or phone, or visit [email or web address] to enroll on your computer. And, as an [address] [place] [address] patient with a [employer] account, the results of your visits will be scanned into your electronic medical record and your primary care provider will be able to view the scanned results. We urge you to continue to see your regular Mariza Barnard provider for your ongoing medical care. And while your primary care provider may not be the one available when you seek a Dariusz Singh virtual visit, the peace of mind you get from getting a real diagnosis real time can be priceless. For more information on Dariusz HUNT Mobile Adsnaderfin, view our Frequently Asked Questions (FAQs) at [email or web address]. Sincerely, 
 
Ortiz Figueroa MD 
Chief Medical Officer [employer] *:  certain medications cannot be prescribed via Dariusz Singh Providers Seen During Your Hospitalization Provider Specialty Primary office phone Erlinda Schaefer MD Emergency Medicine [phone] Alexi Bernstein MD Cardiology [phone] Your Primary Care Physician (PCP) Primary Care Physician Office Phone Office Fax NONE ** None ** ** None ** You are allergic to the following No active allergies Recent Documentation Height Weight BMI Smoking Status 1.626 m 46.9 kg 17.75 kg/m2 Never Smoker Emergency Contacts Name Discharge Info Relation Home Work Mobile Rohith Rosen DISCHARGE CAREGIVER [3] Spouse [3] [phone] Patient Belongings The following personal items are in your possession at time of discharge: 
  Dental Appliances: None  Visual Aid: Glasses, At bedside      Home Medications: None   Jewelry: None  Clothing: At bedside    Other Valuables: Cell Phone, With patient Please provide this summary of care documentation to your next provider. Signatures-by signing, you are acknowledging that this After Visit Summary has been reviewed with you and you have received a copy. Patient Signature:  ____________________________________________________________ Date:  ____________________________________________________________  
  
[facility] Lizbeth Provider Signature:  ____________________________________________________________ Date:  ____________________________________________________________

## 2018-07-03 NOTE — PROGRESS NOTES
Bedside and Verbal shift change report given to Jony Beasley RN (oncoming nurse) by Jessica Watson RN (offgoing nurse). Report included the following information SBAR, Kardex, MAR and Recent Results.

## 2018-07-03 NOTE — H&P
Cardiology Admission Note      Patient Name: Waleska Jacobson  :  MRN: [id number]  Date: 7/3/2018  Time: 11:13 AM    Admit Diagnosis: SVT (supraventricular tachycardia) [facility])    Admitting Cardiologist: Danyel Leblanc. Edson Cadena M.D. Attending Cardiologist: Danyel Leblanc. Edson Cadena M.D.     HPI:  Waleska Jacobson is a 69 y.o. female admitted on 7/3/2018  for SVT (supraventricular tachycardia) ([facility] [address]   H/o GIB, SVT, urosepsis and hypomagnesemia, presents to ED for rapid heart beat. She awoke this morning and noticed her heart was racing. No CP or SOB. She knew immediately her SVT had returned and thus reported to the ED. EKG showed SVT with . She received 6 mg of Adenocard and returned to sinus tachycardia. Lab studies find Mg 1.2, K 3.3 and TSH of 5.96    Subjective:  Denies CP or SOB. No longer with racing heart, but feels some anxiousness. Assessment and Plan     1. SVT -    - now in sinus tachycardia following Adenocard    - Diltiazem gtt - change to Cardizem IR 60 mg TID  2. Hypomagnesemia   - Mg 1.2   - 4g total of IV mag ordered   - recheck in am  3. Hypokalemia   - K 3.3   - Oral replacement 40 meq x 2    - BMP in am  4. Elevated TSH   - check T4   - suspect subclinical hypothyroidism  5. H/O GIB   - Hgb 12.4 - stable  6. ETOH use   - suspect to excess, but patient vague    Admitted to observation. Replace electrolytes and monitor on telemetry. IF HR and BP remain stable over night, plan for am d/c. Offered follow up with Dr. Jeremiah Werner for review of records and re discussion of SVT ablation, but patient respectfully declines. Offered for patient to follow up as outpatient with Edwardo Wellington MD.  Check am labs, HR and BP. Saw and evaluated pt and agree with above assessment and plan. Admit for SVT. HR still labile. Will add po CCB to transition off gtt. Options for rate control limited by BP and suspect she will need ablation.   She prefers EP consultation at [facility], will set up outpt with Dr. Asuncion Kumar. Possible d/c tomorrow if HR/BP ok. Kevin Knight MD    No specialty comments available. Review of Systems:     GENERAL   Recent weight loss - no   Fever -----------------   no   Chills -----------------   no     EYES, VISION   Visual Changes - no     EARS, NOSE, THROAT   Hearing loss ----------- no   Swallowing difficulties - no     CARDIOVASCULAR   Chest pain/pressure ---- no   Arrhythmia/palpitations - no       RESPIRATORY   Cough ------------------ no   Shortness of breath - no   Wheezing -------------- no   GASTROINTESTINAL   Abdominal pain - no   Heartburn -------- no   Bloody stool ----- no     GENITOURINARY   Frequent urination - no   Urgency -------------- no     MUSCULOSKELETAL   Joint pain/swelling ---- no   Musculoskeletal pain - no     SKIN & INTEGUMENTARY   Rashes - no   Sores --- no         NEUROLOGICAL   Numbness/tingling - no   Sensation loss ------ no     PSYCHIATRIC   Nervousness/anxiety - no   Depression -------------- no     ENDOCRINE   Heat/cold intolerance - no   Excessive thirst -------- no     HEMATOLOGIC/LYMPHATIC   Abnormal bleeding - no     ALL/IMMUN   Allergic reaction ------ no   Recurrent infections - no       Previous treatment/evaluation includes   echocardiogram .  Cardiac risk factors:   post-menopausal.    History reviewed. No pertinent past medical history.   Past Surgical History:   Procedure Laterality Date    HX ORTHOPAEDIC      HX TONSILLECTOMY      HX WISDOM TEETH EXTRACTION       Current Facility-Administered Medications   Medication Dose Route Frequency    dilTIAZem (CARDIZEM) 125 mg in dextrose 5% 125 mL infusion  0-15 mg/hr IntraVENous TITRATE    magnesium sulfate 2 g/50 ml IVPB (premix or compounded)  2 g IntraVENous ONCE    magnesium sulfate 2 g/50 ml IVPB (premix or compounded)  2 g IntraVENous ONCE    potassium chloride SR (KLOR-CON 10) tablet 40 mEq  40 mEq Oral Q2H    sodium chloride (NS) flush 5-10 mL  5-10 mL IntraVENous Q8H    sodium chloride (NS) flush 5-10 mL  5-10 mL IntraVENous PRN    acetaminophen (TYLENOL) tablet 650 mg  650 mg Oral Q4H PRN    dilTIAZem (CARDIZEM) IR tablet 60 mg  60 mg Oral TIDAC     Current Outpatient Prescriptions   Medication Sig    cranberry fruit extract (CRANBERRY CONCENTRATE PO) Take 1 Tab by mouth daily.  ascorbic acid, vitamin C, (VITAMIN C) 500 mg tablet Take 500 mg by mouth daily.  cholecalciferol (VITAMIN D3) 1,000 unit tablet Take 1,000 Units by mouth daily.  magnesium oxide (MAG-OX) 400 mg tablet Take 1 Tab by mouth daily. No Known Allergies   History reviewed. No pertinent family history. Social History     Social History    Marital status:      Spouse name: N/A    Number of children: N/A    Years of education: N/A     Social History Main Topics    Smoking status: Never Smoker    Smokeless tobacco: Never Used    Alcohol use Yes      Comment: occasional wine    Drug use: No    Sexual activity: Not Asked     Other Topics Concern    None     Social History Narrative       Objective:    Physical Exam    Vitals:   Vitals:    07/03/18 1015 07/03/18 1018 07/03/18 1029 07/03/18 1030   BP: 115/52   111/70   Pulse: (!) 146 (!) 108  (!) 110   Resp: 21 20  14   Temp:       SpO2: 99% 99% 100% 100%   Weight:       Height:           General:    Alert, cooperative, no distress, appears stated age. Neck:   Supple, no carotid bruit and no JVD. Back:     Symmetric, normal curvature. Lungs:     clear to auscultation bilaterally. Heart[de-identified]    Regular rate and rhythm, S1, S2 normal, no murmur, click, rub or gallop. Abdomen:     Soft, non-tender. Bowel sounds normal.    Extremities:   Extremities normal, atraumatic, no cyanosis or edema. Vascular:   Pulses - 2+ radials   Skin:   Skin color normal. No rashes or lesions   Neurologic:   CN II-XII grossly intact.         Telemetry: normal sinus rhythm, tachy    ECG:   EKG Results Procedure 720 Value Units Date/Time    EKG, 12 LEAD, INITIAL [[id number]] Collected:  07/03/18 0905    Order Status:  Completed Updated:  07/03/18 1001     Ventricular Rate 200 BPM      Atrial Rate 102 BPM      QRS Duration 104 ms      Q-T Interval 270 ms      QTC Calculation (Bezet) 492 ms      Calculated R Axis 86 degrees      Calculated T Axis 94 degrees      Diagnosis --     Supraventricular tachycardia  Right ventricular hypertrophy with repolarization abnormality  Marked ST abnormality, possible lateral subendocardial injury  When compared with ECG of 19-MAR-2018 13:20,  Vent. rate has increased  BPM  Right bundle branch block is no longer present      EKG 12 LEAD INITIAL [[id number]] Collected:  07/03/18 0925    Order Status:  Completed Updated:  07/03/18 0943     Ventricular Rate 118 BPM      Atrial Rate 118 BPM      P-R Interval 146 ms      QRS Duration 114 ms      Q-T Interval 338 ms      QTC Calculation (Bezet) 473 ms      Calculated P Axis 21 degrees      Calculated R Axis -6 degrees      Calculated T Axis 41 degrees      Diagnosis --     Sinus tachycardia  Right bundle branch block  When compared with ECG of 19-MAR-2018 13:20,  Nonspecific T wave abnormality, improved in Inferior leads  T wave inversion less evident in Anterior leads              Data Review:     Radiology:   XR Results (most recent):    Results from [place] encounter on 03/13/18   XR CHEST PA LAT   Narrative INDICATION:  Pneumonia. COMPARISON:  3/17/2018. FINDINGS:  PA and lateral views were obtained of the chest.    The left jugular line remains in place. The heart is normal in size. The aorta is atherosclerotic. Mild to moderate effusions with basilar atelectasis are seen. A T12 superior endplate vertebral body compression deformity and degenerative  change of the spine are noted. Impression IMPRESSION:    Bilateral pleural effusions with basilar atelectasis.           Recent Labs 07/03/18   0921   TROIQ  <0.05     Recent Labs      07/03/18 0921   NA  135*   K  3.3*   CL  99   CO2  23   BUN  9   CREA  0.50*   GLU  143*   CA  8.4*     Recent Labs      07/03/18 0921   WBC  4.8   HGB  12.4   HCT  38.9   PLT  119*     Recent Labs      07/03/18 0921   SGOT  172*   AP  98     No results for input(s): CHOL, LDLC in the last 72 hours. No lab exists for component: TGL, HDLC,  HBA1C  Recent Labs      07/03/18 0921   TSH  5.96*       Flaquito Stearns M.D.           [facility] [address] [place] [address] Rd[address] [facility] [address]     Andry Chappell     [phone]    CC:None

## 2018-07-03 NOTE — ED TRIAGE NOTES
Triage Note: Patient is coming in with tachycardia that started this morning. Patient has been taken off Amiodarone in the last month. Patient was seen by Dr. Thelma Colon on Monday and was released to come back in 6 months.

## 2018-07-03 NOTE — ED PROVIDER NOTES
HPI Comments: 69 y.o. female with past medical history significant for urosepsis and SVT who presents from home with chief complaint of palpitations. Patient reports palpitations started approximately 1 hour ago. Patient was started on amiodarone during an admission for urosepsis several months ago, but was taken off it ~3 months ago. Patient arrives with heart rate around 200 bpm.  Patient denies chest pain, shortness of breath, diaphoresis. There are no other acute medical concerns at this time. Social hx: Nonsmoker  PCP: None  Cardiologist: Partha Ventura MD    Note written by Monica Josue. Khadra Mcarthur, as dictated by Hernan Comer MD 9:15 AM      The history is provided by the patient. History reviewed. No pertinent past medical history. Past Surgical History:   Procedure Laterality Date    HX ORTHOPAEDIC      HX TONSILLECTOMY      HX WISDOM TEETH EXTRACTION           History reviewed. No pertinent family history. Social History     Social History    Marital status:      Spouse name: N/A    Number of children: N/A    Years of education: N/A     Occupational History    Not on file. Social History Main Topics    Smoking status: Never Smoker    Smokeless tobacco: Never Used    Alcohol use Yes      Comment: occasional wine    Drug use: No    Sexual activity: Not on file     Other Topics Concern    Not on file     Social History Narrative         ALLERGIES: Review of patient's allergies indicates no known allergies. Review of Systems   Constitutional: Negative for appetite change, chills and fever. HENT: Negative for rhinorrhea, sore throat and trouble swallowing. Eyes: Negative for photophobia. Respiratory: Negative for cough and shortness of breath. Cardiovascular: Positive for palpitations. Negative for chest pain. Gastrointestinal: Negative for abdominal pain, nausea and vomiting. Genitourinary: Negative for dysuria, frequency and hematuria. Musculoskeletal: Negative for arthralgias. Neurological: Negative for dizziness, syncope and weakness. Psychiatric/Behavioral: Negative for behavioral problems. The patient is not nervous/anxious. All other systems reviewed and are negative. Vitals:    07/03/18 0945 07/03/18 1000 07/03/18 1015 07/03/18 1018   BP: 125/73 112/69 115/52    Pulse: (!) 111 (!) 111 (!) 146 (!) 108   Resp: 17 16 21 20   Temp:       SpO2: 99% 99% 99% 99%   Weight:       Height:                Physical Exam   Constitutional: She appears well-developed and well-nourished. HENT:   Head: Normocephalic and atraumatic. Mouth/Throat: Oropharynx is clear and moist.   Eyes: EOM are normal. Pupils are equal, round, and reactive to light. Neck: Normal range of motion. Neck supple. Cardiovascular: Regular rhythm, normal heart sounds and intact distal pulses. Tachycardia present. Exam reveals no gallop and no friction rub. No murmur heard.  bpm   Pulmonary/Chest: Effort normal. No respiratory distress. She has no wheezes. She has no rales. Abdominal: Soft. There is no tenderness. There is no rebound. Musculoskeletal: Normal range of motion. She exhibits no tenderness. Neurological: She is alert. No cranial nerve deficit. Motor; symmetric   Skin: No erythema. Psychiatric: She has a normal mood and affect. Her behavior is normal.   Nursing note and vitals reviewed. Note written by Norma Ojeda. Srinivas Roberts, as dictated by Erlinda Schaefer MD 9:17 AM       MDM  Number of Diagnoses or Management Options  Critical Care  Total time providing critical care: 30-74 minutes (Total critical care time spent exclusive of procedures:  40 minutes  )        ED Course       Procedures    CONSULT NOTE:  10:18 AM Erlinda Schaefer MD spoke with Dr. Kiara Genao, Consult for Cardiology. Discussed available diagnostic tests and clinical findings. Dr. Kiara Genao says right now the plan is to admit to his service.

## 2018-07-03 NOTE — ROUTINE PROCESS
TRANSFER - OUT REPORT:    Verbal report given to Sia Garland(name) on Max Congress  being transferred to [facility]) for routine progression of care       Report consisted of patients Situation, Background, Assessment and   Recommendations(SBAR). Information from the following report(s) SBAR, Kardex, ED Summary and MAR was reviewed with the receiving nurse. Lines:   Peripheral IV 07/03/18 Right Forearm (Active)   Site Assessment Clean, dry, & intact 7/3/2018 10:19 AM   Phlebitis Assessment 0 7/3/2018 10:19 AM   Infiltration Assessment 0 7/3/2018 10:19 AM   Dressing Status Clean, dry, & intact 7/3/2018 10:19 AM   Dressing Type 4 X 4 7/3/2018 10:19 AM   Hub Color/Line Status Pink 7/3/2018 10:19 AM       Peripheral IV 07/03/18 Left Antecubital (Active)   Site Assessment Clean, dry, & intact 7/3/2018 10:20 AM   Phlebitis Assessment 0 7/3/2018 10:20 AM   Infiltration Assessment 0 7/3/2018 10:20 AM   Dressing Status Clean, dry, & intact 7/3/2018 10:20 AM   Dressing Type 4 X 4 7/3/2018 10:20 AM   Hub Color/Line Status Green 7/3/2018 10:20 AM        Opportunity for questions and clarification was provided.       Patient transported with:   Monitor  Tech

## 2018-07-03 NOTE — TELEPHONE ENCOUNTER
Pt called in stated that she has a racing heart beat. Pt stated that her heart rate is 190's to 200's. Recommended that patient go to the emergency room due to the fast heart rate. Pt was instructed to not drive and have someone take her to the emergency room. Pt verbalized understanding and denies any further questions at this time.

## 2018-07-03 NOTE — PROGRESS NOTES
Admission Medication Reconciliation:    Information obtained from: Patient, RX query    Significant PMH/Disease States:   History reviewed. No pertinent past medical history. Chief Complaint for this Admission:  Palpitations    Allergies:  Review of patient's allergies indicates no known allergies. Prior to Admission Medications:   Prior to Admission Medications   Prescriptions Last Dose Informant Patient Reported? Taking?   ascorbic acid, vitamin C, (VITAMIN C) 500 mg tablet 7/2/2018 at Unknown time  Yes Yes   Sig: Take 500 mg by mouth daily. cholecalciferol (VITAMIN D3) 1,000 unit tablet 7/2/2018 at Unknown time  Yes Yes   Sig: Take 1,000 Units by mouth daily. cranberry fruit extract (CRANBERRY CONCENTRATE PO) 7/2/2018 at Unknown time  Yes Yes   Sig: Take 1 Tab by mouth daily. magnesium oxide (MAG-OX) 400 mg tablet   No yes   Sig: Take 1 Tab by mouth daily. Facility-Administered Medications: None         Comments/Recommendations: Removed amiodarone, folic acid, furosemide, KCl, thiamine.

## 2018-07-03 NOTE — PROGRESS NOTES
TRANSFER - IN REPORT:    Verbal report received from RAEANN Balderas(name) on Jose Hands  being received from ER(unit) for routine progression of care      Report consisted of patients Situation, Background, Assessment and   Recommendations(SBAR). Information from the following report(s) SBAR, Kardex, [facility] and Recent Results was reviewed with the receiving nurse. Opportunity for questions and clarification was provided. Assessment completed upon patients arrival to unit and care assumed.      Primary Nurse Carine Lin RN and Jacob Zhu RN performed a dual skin assessment on this patient No impairment noted

## 2018-07-03 NOTE — PROGRESS NOTES
Spiritual Care Assessment/Progress Note  [facility]      NAME: Myra Granger      MRN: [id number]  AGE: 69 y.o. SEX: female  Advent Affiliation: Muslim   Language: English     7/3/2018     Total Time (in minutes): 5     Spiritual Assessment begun in [address] through conversation with:         [x]Patient        [x] Family    [] Friend(s)        Reason for Consult: Emergency Department visit     Spiritual beliefs: (Please include comment if needed)     [] Identifies with a asaf tradition:         [x] Supported by a asaf community:            [] Claims no spiritual orientation:           [] Seeking spiritual identity:                [] Adheres to an individual form of spirituality:           [] Not able to assess:                           Identified resources for coping:      [x] Prayer                               [] Music                  [] Guided Imagery     [x] Family/friends                 [] Pet visits     [] Devotional reading                         [] Unknown     [] Other:                                              Interventions offered during this visit: (See comments for more details)    Patient Interventions: Affirmation of asaf, Affirmation of emotions/emotional suffering, Initial visit, Guided imagery, Prayer (assurance of)           Plan of Care:     [] Support spiritual and/or cultural needs    [] Support AMD and/or advance care planning process      [] Support grieving process   [] Coordinate Rites and/or Rituals    [] Coordination with community clergy   [] No spiritual needs identified at this time   [] Detailed Plan of Care below (See Comments)  [] Make referral to Music Therapy  [] Make referral to Pet Therapy     [] Make referral to Addiction services  [] Make referral to [facility]  [] Make referral to Spiritual Care Partner  [] No future visits requested        [x] Follow up visits as needed     Comments: Initial spiritual assessment in ED Room 06.  Patient was laying in bed,in good spirits, warm and inviting. Sharing about her health concerns. Her  was at her bed side. Provided prayer of assurance to Pt. Advised of  Availability if needed. Rev. Ousmane Melchor.  Ping Card Intern [address]

## 2018-07-04 VITALS
SYSTOLIC BLOOD PRESSURE: 123 MMHG | HEART RATE: 90 BPM | RESPIRATION RATE: 18 BRPM | WEIGHT: 103.4 LBS | BODY MASS INDEX: 17.65 KG/M2 | HEIGHT: 64 IN | TEMPERATURE: 97.8 F | OXYGEN SATURATION: 97 % | DIASTOLIC BLOOD PRESSURE: 71 MMHG

## 2018-07-04 LAB
ANION GAP SERPL CALC-SCNC: 7 MMOL/L (ref 5–15)
BUN SERPL-MCNC: 9 MG/DL (ref 6–20)
BUN/CREAT SERPL: 18 (ref 12–20)
CALCIUM SERPL-MCNC: 8.5 MG/DL (ref 8.5–10.1)
CHLORIDE SERPL-SCNC: 105 MMOL/L (ref 97–108)
CO2 SERPL-SCNC: 25 MMOL/L (ref 21–32)
CREAT SERPL-MCNC: 0.5 MG/DL (ref 0.55–1.02)
GLUCOSE SERPL-MCNC: 130 MG/DL (ref 65–100)
MAGNESIUM SERPL-MCNC: 1.9 MG/DL (ref 1.6–2.4)
POTASSIUM SERPL-SCNC: 4.3 MMOL/L (ref 3.5–5.1)
SODIUM SERPL-SCNC: 137 MMOL/L (ref 136–145)

## 2018-07-04 PROCEDURE — 36415 COLL VENOUS BLD VENIPUNCTURE: CPT | Performed by: INTERNAL MEDICINE

## 2018-07-04 PROCEDURE — 83735 ASSAY OF MAGNESIUM: CPT | Performed by: INTERNAL MEDICINE

## 2018-07-04 PROCEDURE — 80048 BASIC METABOLIC PNL TOTAL CA: CPT | Performed by: INTERNAL MEDICINE

## 2018-07-04 PROCEDURE — 99218 HC RM OBSERVATION: CPT

## 2018-07-04 PROCEDURE — 74011250637 HC RX REV CODE- 250/637: Performed by: INTERNAL MEDICINE

## 2018-07-04 PROCEDURE — 74011250636 HC RX REV CODE- 250/636: Performed by: PHYSICIAN ASSISTANT

## 2018-07-04 PROCEDURE — 96376 TX/PRO/DX INJ SAME DRUG ADON: CPT

## 2018-07-04 PROCEDURE — 74011250637 HC RX REV CODE- 250/637: Performed by: NURSE PRACTITIONER

## 2018-07-04 RX ORDER — DILTIAZEM HYDROCHLORIDE 120 MG/1
120 CAPSULE, COATED, EXTENDED RELEASE ORAL
Status: DISCONTINUED | OUTPATIENT
Start: 2018-07-04 | End: 2018-07-04 | Stop reason: HOSPADM

## 2018-07-04 RX ORDER — LANOLIN ALCOHOL/MO/W.PET/CERES
200 CREAM (GRAM) TOPICAL DAILY
Status: DISCONTINUED | OUTPATIENT
Start: 2018-07-04 | End: 2018-07-04 | Stop reason: HOSPADM

## 2018-07-04 RX ORDER — DILTIAZEM HYDROCHLORIDE 120 MG/1
120 CAPSULE, COATED, EXTENDED RELEASE ORAL
Qty: 30 CAP | Refills: 2 | Status: SHIPPED | OUTPATIENT
Start: 2018-07-04 | End: 2018-07-16

## 2018-07-04 RX ORDER — POTASSIUM CHLORIDE 750 MG/1
20 TABLET, FILM COATED, EXTENDED RELEASE ORAL DAILY
Status: DISCONTINUED | OUTPATIENT
Start: 2018-07-05 | End: 2018-07-04 | Stop reason: HOSPADM

## 2018-07-04 RX ORDER — LANOLIN ALCOHOL/MO/W.PET/CERES
200 CREAM (GRAM) TOPICAL DAILY
Qty: 45 TAB | Refills: 3 | Status: SHIPPED | OUTPATIENT
Start: 2018-07-04 | End: 2018-07-18 | Stop reason: SDUPTHER

## 2018-07-04 RX ORDER — MAGNESIUM SULFATE 1 G/100ML
1 INJECTION INTRAVENOUS ONCE
Status: DISCONTINUED | OUTPATIENT
Start: 2018-07-04 | End: 2018-07-04

## 2018-07-04 RX ORDER — LANOLIN ALCOHOL/MO/W.PET/CERES
200 CREAM (GRAM) TOPICAL DAILY
Status: DISCONTINUED | OUTPATIENT
Start: 2018-07-04 | End: 2018-07-04

## 2018-07-04 RX ORDER — LANOLIN ALCOHOL/MO/W.PET/CERES
200 CREAM (GRAM) TOPICAL 2 TIMES DAILY
Status: DISCONTINUED | OUTPATIENT
Start: 2018-07-04 | End: 2018-07-04

## 2018-07-04 RX ORDER — POTASSIUM CHLORIDE 1500 MG/1
20 TABLET, FILM COATED, EXTENDED RELEASE ORAL DAILY
Qty: 30 TAB | Refills: 2 | Status: SHIPPED | OUTPATIENT
Start: 2018-07-05 | End: 2018-07-05 | Stop reason: SDUPTHER

## 2018-07-04 RX ORDER — METOPROLOL SUCCINATE 25 MG/1
25 TABLET, EXTENDED RELEASE ORAL DAILY
Qty: 30 TAB | Refills: 2 | Status: SHIPPED | OUTPATIENT
Start: 2018-07-05 | End: 2018-11-20 | Stop reason: SDUPTHER

## 2018-07-04 RX ORDER — METOPROLOL SUCCINATE 25 MG/1
25 TABLET, EXTENDED RELEASE ORAL DAILY
Status: DISCONTINUED | OUTPATIENT
Start: 2018-07-04 | End: 2018-07-04 | Stop reason: HOSPADM

## 2018-07-04 RX ADMIN — ONDANSETRON 4 MG: 2 INJECTION INTRAMUSCULAR; INTRAVENOUS at 00:28

## 2018-07-04 RX ADMIN — Medication 10 ML: at 06:16

## 2018-07-04 RX ADMIN — Medication 200 MG: at 09:35

## 2018-07-04 RX ADMIN — DILTIAZEM HYDROCHLORIDE 30 MG: 30 TABLET, FILM COATED ORAL at 06:15

## 2018-07-04 RX ADMIN — METOPROLOL SUCCINATE 25 MG: 25 TABLET, EXTENDED RELEASE ORAL at 09:37

## 2018-07-04 NOTE — DISCHARGE SUMMARY
Cardiology Discharge Summary     Patient ID:  Meghann Lobo  [id number]  69 y.o.  1950    Admit Date: 7/3/2018    Discharge Date: 7/4/2018    Admitting Physician: Satish Ham MD     Discharge Physician: Staish Ham MD    Admission Diagnoses:   SVT (supraventricular tachycardia) [facility])    Discharge Diagnoses: Active Problems:    SVT (supraventricular tachycardia) ([address] (7/3/2018)    Hypomagnesemia  Hypokalemia    Discharge Condition: Stable    Cardiology Procedures this Admission:  none    Consults: None    Hospital Course:     Meghann Lobo is a 69 y.o. female admitted on 7/3/2018  for SVT (supraventricular tachycardia)  H/o GIB, SVT, urosepsis and hypomagnesemia  She presented to ED for rapid heart beat at a rate of 200bpm.  She awoke the morning of admission with palpitations. She denied any chest pain or dyspnea. She called the cardiology office and was referred to ER. 12 lead ECG showed SVT with HR 200bpm and she received 6 mg of Adenocard and returned to NSR. She was also found to have low Mg (1.2) and low K (3.3). She was admitted for SVT management and electrolyte repletion.       Assessment and Plan      1. SVT - HR 200bpm s/p Adenocard, now in NSR without recurrence of SVT, will discharge to home on Diltiazem CD 120mg daily and Toprol XL 25mg daily  -she will follow up with JASON Samaniego on 7/16/18 and we will refer her to [facility]/EP per her request for consultation with Dr. Shana Zamudio  -she declined to see Dr. Donte Smith  2. Hypomagnesemia - Mg 1.2 on admission, had only been taking Mg Oxide 200mg daily at home due to diarrhea on 400mg daily, will discharge to home on Mg Oxide 200mg daily   -will plan to recheck Mg level at her follow up visit 7/16/18   3. Hypokalemia - K 3.3 on admission, possibly due to recent diarrhea, will discharge to home on KCL 20meq daily   -will plan to recheck K level at her follow up visit 7/16/18  4. Elevated TSH but T4 ok, ?subclinical hypothyroidism  5.  H/O GIB - Hgb 12.4 and stable  6. ETOH use - suspect to excess but patient vague about quantity     Saw and evaluated pt and agree with above assessment and plan. HR ok on dilt/toprol. Jaimier Jaylen for hospital d/c. Outpt f/u with Dr. Eloina Sanchez. Considering EP consult at [facility] or her preference. Garfield Anglin MD    Visit Vitals    /71    Pulse 90    Temp 97.8 °F (36.6 °C)    Resp 18    Ht 5' 4\" (1.626 m)    Wt 103 lb 6.3 oz (46.9 kg)    SpO2 97%    BMI 17.75 kg/m2       Physical Exam  Abdomen: soft, non-tender. Bowel sounds normal.  Extremities: no LE edema, + PP bilaterally   Heart: regular rate and rhythm, S1, S2 normal, no murmurs, clicks, rubs or gallops  Lungs: clear to auscultation bilaterally  Neck: supple, symmetrical, trachea midline, no JVD, no carotid bruits  Neurologic: Grossly normal  Pulses: 2+ and symmetrical    Labs:   Recent Labs      07/03/18   0921   WBC  4.8   HGB  12.4   HCT  38.9   PLT  119*     Recent Labs      07/04/18   0401  07/03/18   0921   NA  137  135*   K  4.3  3.3*   CL  105  99   CO2  25  23   GLU  130*  143*   BUN  9  9   CREA  0.50*  0.50*   CA  8.5  8.4*   MG  1.9  1.2*   ALB   --   3.7   SGOT   --   172*   ALT   --   78       Recent Labs      07/03/18   0921   TROIQ  <0.05     Disposition: home    Patient Instructions:   Current Discharge Medication List      START taking these medications    Details   dilTIAZem CD (CARDIZEM CD) 120 mg ER capsule Take 1 Cap by mouth nightly. Qty: 30 Cap, Refills: 2      metoprolol succinate (TOPROL-XL) 25 mg XL tablet Take 1 Tab by mouth daily. Qty: 30 Tab, Refills: 2      potassium chloride SR (K-TAB) 20 mEq tablet Take 1 Tab by mouth daily. Qty: 30 Tab, Refills: 2         CONTINUE these medications which have CHANGED    Details   magnesium oxide (MAG-OX) 400 mg tablet Take 0.5 Tabs by mouth daily.   Qty: 45 Tab, Refills: 3         CONTINUE these medications which have NOT CHANGED    Details   cranberry fruit extract (CRANBERRY CONCENTRATE PO) Take 1 Tab by mouth daily. ascorbic acid, vitamin C, (VITAMIN C) 500 mg tablet Take 500 mg by mouth daily. cholecalciferol (VITAMIN D3) 1,000 unit tablet Take 1,000 Units by mouth daily. Reference discharge instructions provided by nursing for diet and activity.     Follow-up with JASON Wright on 7/16/18 at 2:20 PM   Future Appointments  Date Time Provider Jamie Allen   11/20/2018 9:00 AM Junito Murcia  [address]     Signed:  Joaquina Villalba NP

## 2018-07-04 NOTE — PROGRESS NOTES
0730 - Bedside and Verbal shift change report given to sherrie cortez (oncoming nurse) by Estanislao Sacks (offgoing nurse). Report included the following information SBAR, Kardex, Intake/Output, MAR, Recent Results and Cardiac Rhythm NSR.   1020 - patient discharged at this time.

## 2018-07-04 NOTE — PROGRESS NOTES
Problem: Falls - Risk of  Goal: *Absence of Falls  Document Joseph Fall Risk and appropriate interventions in the flowsheet.    Outcome: Progressing Towards Goal  Fall Risk Interventions:            Medication Interventions: Evaluate medications/consider consulting pharmacy, Patient to call before getting OOB

## 2018-07-05 ENCOUNTER — TELEPHONE (OUTPATIENT)
Dept: CARDIOLOGY CLINIC | Age: 68
End: 2018-07-05

## 2018-07-05 RX ORDER — POTASSIUM CHLORIDE 1500 MG/1
20 TABLET, FILM COATED, EXTENDED RELEASE ORAL DAILY
Qty: 30 TAB | Refills: 2 | Status: SHIPPED | OUTPATIENT
Start: 2018-07-05 | End: 2018-11-20 | Stop reason: SDUPTHER

## 2018-07-05 NOTE — TELEPHONE ENCOUNTER
Advised patient script is available for . She will come by the office this afternoon to .   2 pt identifiers used

## 2018-07-05 NOTE — TELEPHONE ENCOUNTER
Patient would like to  a hard copy Rx for potassium. Please call when ready. She can be reached at [phone].  Thank you

## 2018-07-05 NOTE — TELEPHONE ENCOUNTER
----- Message from Elicia Hancock NP sent at 7/4/2018  8:01 AM EDT -----  Regarding: needs referral  Needs to see Dr. Angelika Melgar at [facility] for SVT ablation  Please call and make her appt and then call her at home with appt. Being discharged today 7/4    Thanks  Epifanio Brodyty       Patient does not want to make an appointment at this time. She states she wants to do more research on who she would like to see prior to scheduling. She will call back if assistance is needed.   2 pt identifiers used

## 2018-07-11 ENCOUNTER — TELEPHONE (OUTPATIENT)
Dept: CARDIOLOGY CLINIC | Age: 68
End: 2018-07-11

## 2018-07-11 NOTE — TELEPHONE ENCOUNTER
Pt needing Morris Smart to call her back about her bp and medications. She can be reached at [phone].  [employer]

## 2018-07-11 NOTE — TELEPHONE ENCOUNTER
Returned call to patient, 2 pt identifiers used  Advised per Dr. Kingsley Hawthorne she may stop Cardizem.  She should continue to monitor her BP and HR and f/u as scheduled:  Future Appointments  Date Time Provider Jamie Tiffany   7/16/2018 2:20 PM Funmilayo Florentino PA-C [address] 11/20/2018 9:00 AM Nicki Love  [address]

## 2018-07-11 NOTE — TELEPHONE ENCOUNTER
Returned patient's call, 2 pt identifiers used  Patient states she has been having low BP and believes it is due to Cardizem. She would like to stop that medication if possible. Did encourage patient to see EP. She understands that but has not made an appt. She is scheduled to f/u with JASON Lott on Monday.      BP readings: 91/54, 95/65    Please advise

## 2018-07-16 ENCOUNTER — OFFICE VISIT (OUTPATIENT)
Dept: CARDIOLOGY CLINIC | Age: 68
End: 2018-07-16

## 2018-07-16 VITALS
BODY MASS INDEX: 17.24 KG/M2 | DIASTOLIC BLOOD PRESSURE: 72 MMHG | WEIGHT: 101 LBS | HEIGHT: 64 IN | HEART RATE: 88 BPM | SYSTOLIC BLOOD PRESSURE: 120 MMHG

## 2018-07-16 DIAGNOSIS — I47.1 SVT (SUPRAVENTRICULAR TACHYCARDIA) (HCC): Primary | ICD-10-CM

## 2018-07-16 NOTE — MR AVS SNAPSHOT
[address] [facility] [address] 
[phone] Patient: Vik Brizuela MRN: [id number] GBT:[birth year] Visit Information Date & Time Provider Department Dept. Phone Encounter #  
 7/16/2018  2:20 PM Natalia Oropeza PA-C [facility] Kristi Members [phone] [id number] Your Appointments 11/20/2018  9:00 AM  
ESTABLISHED PATIENT with Bernardo Milan MD  
[facility] ([facility]) Appt Note: 6 mo f/u per Dr. Alona Russell [address] [facility] [address] [facility] [id number] Upcoming Health Maintenance Date Due Hepatitis C Screening 1950 DTaP/Tdap/Td series (1 - Tdap) 9/10/1969 BREAST CANCER SCRN MAMMOGRAM 9/10/1998 ZOSTER VACCINE AGE 60> 7/10/2008 GLAUCOMA SCREENING Q2Y 9/10/2013 Bone Densitometry (Dexa) Screening 9/10/2013 Pneumococcal 65+ Low/Medium Risk (1 of 2 - PCV13) 9/10/2013 MEDICARE YEARLY EXAM 3/14/2018 Influenza Age 5 to Adult 8/1/2018 FOBT Q 1 YEAR AGE 50-75 3/13/2019 Allergies as of 7/16/2018  Review Complete On: 7/16/2018 By: Aubree Bryan LPN No Known Allergies Current Immunizations  Reviewed on 3/24/2018 No immunizations on file. Not reviewed this visit You Were Diagnosed With   
  
 Codes Comments [facility]    -  Primary ICD-10-CM: I47.1 ICD-9-CM: 427.89 Vitals BP Pulse Height(growth percentile) Weight(growth percentile) BMI Smoking Status 120/72 (BP 1 Location: Right arm, BP Patient Position: Sitting) 88 5' 4\" (1.626 m) 101 lb (45.8 kg) 17.34 kg/m2 Never Smoker Vitals History BMI and BSA Data Body Mass Index Body Surface Area  
 17.34 kg/m 2 1.44 m 2 Preferred Pharmacy Pharmacy Name Phone [id number] Wilder Bashir [phone] Your Updated Medication List  
  
   
This list is accurate as of 7/16/18  2:50 PM.  Always use your most recent med list.  
  
  
  
  
 CRANBERRY CONCENTRATE PO Take 1 Tab by mouth daily. magnesium oxide 400 mg tablet Commonly known as:  MAG-OX Take 0.5 Tabs by mouth daily. metoprolol succinate 25 mg XL tablet Commonly known as:  TOPROL-XL Take 1 Tab by mouth daily. potassium chloride SR 20 mEq tablet Commonly known as:  K-TAB Take 1 Tab by mouth daily. VITAMIN C 500 mg tablet Generic drug:  ascorbic acid (vitamin C) Take 500 mg by mouth daily. VITAMIN D3 1,000 unit tablet Generic drug:  cholecalciferol Take 1,000 Units by mouth daily. We Performed the Following MAGNESIUM [id number] CPT(R)] METABOLIC PANEL, BASIC [80012 CPT(R)] Introducing [facility]! [employer] introduces [employer] patient portal. Now you can access parts of your medical record, email your doctor's office, and request medication refills online. 1. In your internet browser, go to [email or web address] 2. Click on the First Time User? Click Here link in the Sign In box. You will see the New Member Sign Up page. 3. Enter your [employer] Access Code exactly as it appears below. You will not need to use this code after youve completed the sign-up process. If you do not sign up before the expiration date, you must request a new code. · [employer] Access Code: [email or web address] Expires: 9/23/2018  5:22 AM 
 
4. Enter the last four digits of your Social Security Number (xxxx) and Date of Birth (mm/dd/yyyy) as indicated and click Submit. You will be taken to the next sign-up page. 5. Create a [employer] ID. This will be your [employer] login ID and cannot be changed, so think of one that is secure and easy to remember. 6. Create a [employer]ipl password. You can change your password at any time. 7. Enter your Password Reset Question and Answer. This can be used at a later time if you forget your password. 8. Enter your e-mail address. You will receive e-mail notification when new information is available in [address] 9. Click Sign Up. You can now view and download portions of your medical record. 10. Click the Download Summary menu link to download a portable copy of your medical information. If you have questions, please visit the Frequently Asked Questions section of the [employer] website. Remember, [employer] is NOT to be used for urgent needs. For medical emergencies, dial 911. Now available from your iPhone and Android! Please provide this summary of care documentation to your next provider. Your primary care clinician is listed as NONE. If you have any questions after today's visit, please call [phone].

## 2018-07-16 NOTE — PROGRESS NOTES
Cardiology Office Progress Note            Patient: Mookie Cruz  Diagnosis:     ICD-10-CM ICD-9-CM    1. SVT (supraventricular tachycardia) ([facility]) P37.0 545.01 METABOLIC PANEL, BASIC      MAGNESIUM     Date: 7/18/2018     Time: 10:32 AM     Assessment and Plan     1. SVT - HR 88 - NSR                        - She does not like how the BB makes her feel. Her Cardizem was stopped for Hypotension             - Wean off BB and restart Cardizem PO for rate control - she is agreeable to this  2. Hypomagnesemia                        - daily 200 mg daily of oral magnesium             - will recheck today  5. H/O GIB                        - Hgb 12.4 - stable on last check  6. ETOH use                        - suspect to excess, but patient vague    She ultimately wants to stop all her medications. I explained the reason why she is on them. She does not want to continue with Toprol XL because she feels \"awful\" on it. She was agreeable to weaning off her BB and resuming her Cardizem which was stopped at outpt for hypotension. Will recheck her Mg level as this was low on her last admission. She wants to follow up at [facility] for EP needs. I advised her to call and make an appointment for sooner rather than later. the following changes in treatment are made: stop Toprol and restart Cardizem    Future Appointments  Date Time Provider Jamie Allen   11/20/2018 9:00 AM Deonna Thibodeaux  [address]       Patient Active Problem List   Diagnosis Code    [facility] I47.1        Subjective:   Mookie Cruz denies chest pain, palpitations, syncope, dizziness. ROS:  A comprehensive review of systems was negative except for that written in the HPI.     Objective:      Physical Exam:                Visit Vitals    /72 (BP 1 Location: Right arm, BP Patient Position: Sitting)    Pulse 88    Ht 5' 4\" (1.626 m)    Wt 101 lb (45.8 kg)    BMI 17.34 kg/m2        General Appearance:   Well developed, well nourished,alert and oriented x 3, and   individual in no acute distress. Ears/Nose/Mouth/Throat:    Hearing grossly normal.         Neck:  Supple. Chest:    Lungs clear to auscultation bilaterally. Cardiovascular:   Regular rate and rhythm, S1, S2 normal, no murmur. Abdomen:    Soft, non-tender, bowel sounds are active. Extremities:  No edema bilaterally. Skin:  Warm and dry. Data Review:    Labs:  No results found for this or any previous visit (from the past 24 hour(s)). Radiology:        Current Outpatient Prescriptions   Medication Sig    potassium chloride SR (K-TAB) 20 mEq tablet Take 1 Tab by mouth daily.  magnesium oxide (MAG-OX) 400 mg tablet Take 0.5 Tabs by mouth daily.  metoprolol succinate (TOPROL-XL) 25 mg XL tablet Take 1 Tab by mouth daily.  cranberry fruit extract (CRANBERRY CONCENTRATE PO) Take 1 Tab by mouth daily.  ascorbic acid, vitamin C, (VITAMIN C) 500 mg tablet Take 500 mg by mouth daily.  cholecalciferol (VITAMIN D3) 1,000 unit tablet Take 1,000 Units by mouth daily. No current facility-administered medications for this visit.            Natalia Oropeza PA-C     [facility], [address] [place] [address] [address] 993   [facility]   [phone]

## 2018-07-17 LAB
BUN SERPL-MCNC: 11 MG/DL (ref 8–27)
BUN/CREAT SERPL: 28 (ref 12–28)
CALCIUM SERPL-MCNC: 9.4 MG/DL (ref 8.7–10.3)
CHLORIDE SERPL-SCNC: 98 MMOL/L (ref 96–106)
CO2 SERPL-SCNC: 25 MMOL/L (ref 20–29)
CREAT SERPL-MCNC: 0.4 MG/DL (ref 0.57–1)
GLUCOSE SERPL-MCNC: 149 MG/DL (ref 65–99)
MAGNESIUM SERPL-MCNC: 1.4 MG/DL (ref 1.6–2.3)
POTASSIUM SERPL-SCNC: 3.8 MMOL/L (ref 3.5–5.2)
SODIUM SERPL-SCNC: 141 MMOL/L (ref 134–144)

## 2018-07-18 RX ORDER — LANOLIN ALCOHOL/MO/W.PET/CERES
400 CREAM (GRAM) TOPICAL DAILY
Qty: 45 TAB | Refills: 3
Start: 2018-07-18 | End: 2018-07-18 | Stop reason: SDUPTHER

## 2018-07-18 RX ORDER — LANOLIN ALCOHOL/MO/W.PET/CERES
400 CREAM (GRAM) TOPICAL 2 TIMES DAILY
Qty: 180 TAB | Refills: 1
Start: 2018-07-18 | End: 2018-11-20 | Stop reason: SDUPTHER

## 2018-07-24 ENCOUNTER — TELEPHONE (OUTPATIENT)
Dept: CARDIOLOGY CLINIC | Age: 68
End: 2018-07-24

## 2018-07-24 NOTE — TELEPHONE ENCOUNTER
Spoke with Ms. Oni Yancey regarding her lab result questions. The patient verbalized understanding and will call our office with any further questions or concerns.

## 2018-10-09 NOTE — PROGRESS NOTES
Problem: Falls - Risk of  Goal: *Absence of Falls  Document Joseph Fall Risk and appropriate interventions in the flowsheet.    Outcome: Progressing Towards Goal  Fall Risk Interventions: STAN HOSPITALIST  Progress Note     Ashley Gravely Patient Status:  Inpatient    1947 MRN [id number]   [facility] 4NW-A Attending Jenny Castillo MD   Hosp Day # 2 PCP Mayank Joshi MD     Chief Complaint:     S: Patient    Able to 28.0   --   27.0   ALKPHO  285*  220*   --   164*   AST  13*  12*   --   3*   ALT  12*  9*   --   6*   BILT  1.3  1.2   --   0.9   TP  5.6*  5.0*   --   4.6*       Estimated Creatinine Clearance: 52.6 mL/min (based on SCr of 0.87 mg/dL).     Recent Labs   L CONCLUSION:    Continued opacification of the lower half of the right chest from combination of lung consolidation and large right pleural effusion. There may be slight increase in aeration of the right upper lung.   Rounded mass projects over the lower

## 2018-11-20 ENCOUNTER — OFFICE VISIT (OUTPATIENT)
Dept: CARDIOLOGY CLINIC | Age: 68
End: 2018-11-20

## 2018-11-20 VITALS
RESPIRATION RATE: 16 BRPM | DIASTOLIC BLOOD PRESSURE: 60 MMHG | WEIGHT: 102.4 LBS | BODY MASS INDEX: 17.48 KG/M2 | HEIGHT: 64 IN | SYSTOLIC BLOOD PRESSURE: 110 MMHG | HEART RATE: 80 BPM

## 2018-11-20 DIAGNOSIS — I47.1 SVT (SUPRAVENTRICULAR TACHYCARDIA) (HCC): Primary | ICD-10-CM

## 2018-11-20 DIAGNOSIS — R64 CACHEXIA (HCC): ICD-10-CM

## 2018-11-20 RX ORDER — POTASSIUM CHLORIDE 1500 MG/1
20 TABLET, FILM COATED, EXTENDED RELEASE ORAL DAILY
Qty: 30 TAB | Refills: 2 | Status: SHIPPED | OUTPATIENT
Start: 2018-11-20 | End: 2018-11-20 | Stop reason: SDUPTHER

## 2018-11-20 RX ORDER — METOPROLOL SUCCINATE 25 MG/1
25 TABLET, EXTENDED RELEASE ORAL DAILY
Qty: 30 TAB | Refills: 2 | Status: SHIPPED | OUTPATIENT
Start: 2018-11-20 | End: 2018-11-20

## 2018-11-20 RX ORDER — LANOLIN ALCOHOL/MO/W.PET/CERES
400 CREAM (GRAM) TOPICAL 2 TIMES DAILY
Qty: 180 TAB | Refills: 1 | Status: SHIPPED | OUTPATIENT
Start: 2018-11-20 | End: 2019-05-21

## 2018-11-20 RX ORDER — LANOLIN ALCOHOL/MO/W.PET/CERES
400 CREAM (GRAM) TOPICAL 2 TIMES DAILY
Qty: 180 TAB | Refills: 1 | Status: SHIPPED | OUTPATIENT
Start: 2018-11-20 | End: 2018-11-20 | Stop reason: SDUPTHER

## 2018-11-20 RX ORDER — MELATONIN
1000 DAILY
Qty: 90 TAB | Refills: 3 | Status: SHIPPED | OUTPATIENT
Start: 2018-11-20

## 2018-11-20 RX ORDER — POTASSIUM CHLORIDE 1500 MG/1
20 TABLET, FILM COATED, EXTENDED RELEASE ORAL DAILY
Qty: 30 TAB | Refills: 2 | Status: SHIPPED | OUTPATIENT
Start: 2018-11-20

## 2018-11-20 RX ORDER — METOPROLOL SUCCINATE 25 MG/1
25 TABLET, EXTENDED RELEASE ORAL
Qty: 30 TAB | Refills: 2 | Status: SHIPPED | OUTPATIENT
Start: 2018-11-20 | End: 2019-11-10 | Stop reason: SDUPTHER

## 2018-11-20 NOTE — TELEPHONE ENCOUNTER
Requested Prescriptions     Signed Prescriptions Disp Refills    potassium chloride SR (K-TAB) 20 mEq tablet 30 Tab 2     Sig: Take 1 Tab by mouth daily. Authorizing Provider: Aleksandra Stringer     Ordering User: Chip [place]    magnesium oxide (MAG-OX) 400 mg tablet 180 Tab 1     Sig: Take 1 Tab by mouth two (2) times a day. Authorizing Provider: Aleksandra Lopez User: Chip [place]     Prescription e-scribed. Written copies given to patient for back-up.

## 2018-11-20 NOTE — PROGRESS NOTES
[facility] Progress Note    11/20/2018 8:30 AM  Admit Date: (Not on file)  Admit Diagnosis: Supraventricular tachycardia (HCC) [I47.1]    No palpitations, feeling well since she left the hospital. Had planned to follow-up with Ep at [facility], to talk to Dr. Anitra Dong about SVT ablation. She did not go see him but has only had one episode in the last few months in August, lasted a few hours. She did take the bblocker but declined the cardizem. She will switch to the evening. Continues to take the K and mag supplements. Bp is running 110/60 or so. HR is 80-90. She is walking daily, feeling well, energy level is pretty good. Doing some yoga/taichi. [employer]! Assessment/Plan     1. SVT- due to extenuating circumstances. One isolated recurrence she continues off amio for now, on Toprol XL will change to hs.   2. GIB- Hgb stable after PRBC infusions, patient reports that GI is not planning to do any inpatient procedures, on PPI   3. Abnormal TSH- T4 ok at 9.6  4. UTI/urosepsis- resolved  5. Troponin elevation- nonspecific in current setting, TTE normal  6. Protein calorie malnutrition PTA Body mass index is 17.58 kg/m². Randy Copeland prealbumin 6.4    7. BNP elevation- appropriate for degeree of tachycardia, etc  8. Hypomagnesemia - recheck, replete  9. Hypotension - stable now  10. Hypokalemia - replete prn, recheck  11. Etoh use - possibly significant use? Not able to quantitate    Echo 3/18 - LVEF 55 % to 60 %, no WMA, grade 1 dd, mildly dilated LA, mild to mod MR, mild TR, mild pulmonary HTN  Soc no tob   FHx + SVT    Subjective:     Jeff  denies chest pain, dyspnea, palpitations. Still on IV Amiodarone for rhythm control. Says GI is not planning any inpatient procedures for now. Wants to go home. Discussed medical management of SVT and EP consult today. She is agreeable to this.       Objective:      Physical Exam:  Visit Vitals  /60 (BP 1 Location: Left arm, BP Patient Position: Sitting)   Pulse 80   Resp 16   Ht 5' 4\" (1.626 m)   Wt 102 lb 6.4 oz (46.4 kg)   BMI 17.58 kg/m²     General Appearance:  Well developed, well nourished, alert and oriented x 3, in no acute distress. Ears/Nose/Mouth/Throat:   Hearing grossly normal.         Neck: Supple. Left IJ line in place   Chest:   Lungs clear to auscultation bilaterally. Cardiovascular:  Normal rate and regular rhythm, S1, S2 normal, no murmur. Abdomen:   Soft, non-tender, bowel sounds are active. Extremities: No edema bilaterally. Skin: Warm and dry. Telemetry: normal sinus rhythm    Data Review:   Labs:    No results found for this or any previous visit (from the past 24 hour(s)). Current Outpatient Medications   Medication Sig    magnesium oxide (MAG-OX) 400 mg tablet Take 1 Tab by mouth two (2) times a day. (Patient taking differently: Take 400 mg by mouth daily.)    potassium chloride SR (K-TAB) 20 mEq tablet Take 1 Tab by mouth daily.  metoprolol succinate (TOPROL-XL) 25 mg XL tablet Take 1 Tab by mouth daily.  cranberry fruit extract (CRANBERRY CONCENTRATE PO) Take 1 Tab by mouth daily.  ascorbic acid, vitamin C, (VITAMIN C) 500 mg tablet Take 500 mg by mouth daily.  cholecalciferol (VITAMIN D3) 1,000 unit tablet Take 1,000 Units by mouth daily. No current facility-administered medications for this visit.         Be Escobedo MD  [facility] [address] [place] [address] 13, [address] [facility] [address] 106  [facility], [address]  [phone]

## 2018-11-21 PROBLEM — R64 CACHEXIA (HCC): Status: ACTIVE | Noted: 2018-11-21

## 2019-05-21 ENCOUNTER — OFFICE VISIT (OUTPATIENT)
Dept: CARDIOLOGY CLINIC | Age: 69
End: 2019-05-21

## 2019-05-21 VITALS
RESPIRATION RATE: 16 BRPM | HEART RATE: 85 BPM | BODY MASS INDEX: 17.34 KG/M2 | HEIGHT: 64 IN | OXYGEN SATURATION: 97 % | SYSTOLIC BLOOD PRESSURE: 110 MMHG | WEIGHT: 101.6 LBS | DIASTOLIC BLOOD PRESSURE: 70 MMHG

## 2019-05-21 DIAGNOSIS — E83.42 HYPOMAGNESEMIA: ICD-10-CM

## 2019-05-21 DIAGNOSIS — I47.1 SVT (SUPRAVENTRICULAR TACHYCARDIA) (HCC): Primary | ICD-10-CM

## 2019-05-21 DIAGNOSIS — I34.0 MITRAL VALVE INSUFFICIENCY, UNSPECIFIED ETIOLOGY: ICD-10-CM

## 2019-05-21 DIAGNOSIS — Z87.19 HISTORY OF GI BLEED: ICD-10-CM

## 2019-05-21 DIAGNOSIS — I34.0 NON-RHEUMATIC MITRAL REGURGITATION: ICD-10-CM

## 2019-05-21 DIAGNOSIS — R64 CACHEXIA (HCC): ICD-10-CM

## 2019-05-21 DIAGNOSIS — E87.6 HYPOKALEMIA: ICD-10-CM

## 2019-05-21 DIAGNOSIS — R79.89 ELEVATED TSH: ICD-10-CM

## 2019-05-21 RX ORDER — LANOLIN ALCOHOL/MO/W.PET/CERES
400 CREAM (GRAM) TOPICAL DAILY
COMMUNITY
End: 2019-05-22 | Stop reason: SDUPTHER

## 2019-05-21 NOTE — PATIENT INSTRUCTIONS
Please have labs drawn at [facility] 
 
Please make a new patient appointment with Dr. Keon Monique to establish primary care

## 2019-05-21 NOTE — PROGRESS NOTES
[facility] Office Note    5/21/2019 8:30 AM    This note will not be viewable in [address] Subjective     She is doing well  Denies any palpitations, says her heart rate is always a little fast but she doesn't notice it   She denies any chest pain   No dyspnea with exertion or PND  No dizziness or syncope  No LE edema   She does fina chi and yoga daily, says it helps with her balance, energy level is reasonable, does what she wants to do. Has not seen Dr. Regino Castle about SVT ablation because she has not had a recurrence, last episode in August 2018  Taking Toprol XL, declined the cardizem in the past  Taking Mg supplement daily   Says her BP has been well controlled, not too low, no dizzy seplls etc. Energy level is pretty good  Does not have a PCP but looking into it    [employer]! Assessment/Plan     1. SVT- due to extenuating circumstances, last episode August 2018  -continues off amio for now, on Toprol XL at bedtime   -taking potassium and magnesium supplements  -she will follow up here in 6 months   2. GIB- Hgb 12.4 in 7/18, not currently on PPI, will check CBC now   3. Elevated TSH - TSH 5.9, T4 7.9 in 7/18, discussed establishing care with a PCP, will check TSH/T4 now  4. Protein calorie malnutrition PTA Body mass index is 17.44 kg/m². 5. Hypomagnesemia - on magnesium oxide 400mg daily, will check Mg level now  6. Hypokalemia - on potassium supplement, will check CMP now   7. Borderline hypotension - stable and asymptomatic on Toprol XL   8. Etoh use - possibly significant use? she does not quantify amount  9.  Mod MR by TTE 3/18  -repeat next visit/for sx    Echo 3/18 - LVEF 55 % to 60 %, no WMA, grade 1 dd, mildly dilated LA, mild to mod MR, mild TR, mild pulmonary HTN    Soc no tob   FHx + SVT    Objective:      Physical Exam:  Visit Vitals  /70 (BP 1 Location: Left arm, BP Patient Position: Sitting)   Pulse 85   Resp 16   Ht 5' 4\" (1.626 m)   Wt 101 lb 9.6 oz (46.1 kg)   SpO2 97%   BMI 17.44 kg/m²     General Appearance:  Well developed, well nourished, alert and oriented x 3, in no acute distress. Ears/Nose/Mouth/Throat:   Hearing grossly normal.         Neck: Supple. Chest:   Lungs clear to auscultation bilaterally. Cardiovascular:  Normal rate and regular rhythm, S1, S2 normal, no murmur. Abdomen:   Soft, non-tender, bowel sounds are active. Extremities: No edema bilaterally. + varicose veins with some staining     Skin: Warm and dry. Data Review:   Labs:    No results found for this or any previous visit (from the past 24 hour(s)). Current Outpatient Medications   Medication Sig    magnesium oxide (MAG-OX) 400 mg tablet Take 400 mg by mouth daily.  cholecalciferol (VITAMIN D3) 1,000 unit tablet Take 1 Tab by mouth daily.  metoprolol succinate (TOPROL-XL) 25 mg XL tablet Take 1 Tab by mouth nightly.  potassium chloride SR (K-TAB) 20 mEq tablet Take 1 Tab by mouth daily.  cranberry fruit extract (CRANBERRY CONCENTRATE PO) Take 1 Tab by mouth daily.  ascorbic acid, vitamin C, (VITAMIN C) 500 mg tablet Take 500 mg by mouth daily. No current facility-administered medications for this visit.         Nedra Zapata MD  [facility] [address] [place] [address] 13, [address] [place] [address] [place] [address]  [phone]

## 2019-05-22 LAB
ALBUMIN SERPL-MCNC: 3.9 G/DL (ref 3.5–4.8)
ALBUMIN/GLOB SERPL: 1.3 {RATIO} (ref 1.2–2.2)
ALP SERPL-CCNC: 88 IU/L (ref 39–117)
ALT SERPL-CCNC: 32 IU/L (ref 0–32)
AST SERPL-CCNC: 96 IU/L (ref 0–40)
BILIRUB SERPL-MCNC: 0.5 MG/DL (ref 0–1.2)
BUN SERPL-MCNC: 10 MG/DL (ref 8–27)
BUN/CREAT SERPL: 24 (ref 12–28)
CALCIUM SERPL-MCNC: 8.9 MG/DL (ref 8.7–10.3)
CHLORIDE SERPL-SCNC: 103 MMOL/L (ref 96–106)
CO2 SERPL-SCNC: 22 MMOL/L (ref 20–29)
CREAT SERPL-MCNC: 0.42 MG/DL (ref 0.57–1)
ERYTHROCYTE [DISTWIDTH] IN BLOOD BY AUTOMATED COUNT: 19.5 % (ref 12.3–15.4)
GLOBULIN SER CALC-MCNC: 2.9 G/DL (ref 1.5–4.5)
GLUCOSE SERPL-MCNC: 111 MG/DL (ref 65–99)
HCT VFR BLD AUTO: 35.6 % (ref 34–46.6)
HGB BLD-MCNC: 11.4 G/DL (ref 11.1–15.9)
MAGNESIUM SERPL-MCNC: 1.3 MG/DL (ref 1.6–2.3)
MCH RBC QN AUTO: 29.1 PG (ref 26.6–33)
MCHC RBC AUTO-ENTMCNC: 32 G/DL (ref 31.5–35.7)
MCV RBC AUTO: 91 FL (ref 79–97)
PLATELET # BLD AUTO: 137 X10E3/UL (ref 150–450)
POTASSIUM SERPL-SCNC: 3.9 MMOL/L (ref 3.5–5.2)
PROT SERPL-MCNC: 6.8 G/DL (ref 6–8.5)
RBC # BLD AUTO: 3.92 X10E6/UL (ref 3.77–5.28)
SODIUM SERPL-SCNC: 144 MMOL/L (ref 134–144)
T4 SERPL-MCNC: 6.1 UG/DL (ref 4.5–12)
TSH SERPL DL<=0.005 MIU/L-ACNC: 3.52 UIU/ML (ref 0.45–4.5)
WBC # BLD AUTO: 4.9 X10E3/UL (ref 3.4–10.8)

## 2019-05-22 RX ORDER — LANOLIN ALCOHOL/MO/W.PET/CERES
400 CREAM (GRAM) TOPICAL 2 TIMES DAILY
Qty: 180 TAB | Refills: 3 | Status: SHIPPED | OUTPATIENT
Start: 2019-05-22 | End: 2021-02-05

## 2019-05-22 NOTE — PROGRESS NOTES
Labs are ok. Mg is very low. Please advise her to increase Mg Oxide to 400mg twice daily. Liver function (AST) is still elevated but better. Please ask her to cut back on her alcohol intake. That will help both of these abnormalities. Platelet count has improved slightly as well.

## 2019-11-11 RX ORDER — METOPROLOL SUCCINATE 25 MG/1
TABLET, EXTENDED RELEASE ORAL
Qty: 30 TAB | Refills: 2 | Status: SHIPPED | OUTPATIENT
Start: 2019-11-11 | End: 2020-06-01

## 2020-06-01 RX ORDER — METOPROLOL SUCCINATE 25 MG/1
TABLET, EXTENDED RELEASE ORAL
Qty: 30 TAB | Refills: 2 | Status: SHIPPED | OUTPATIENT
Start: 2020-06-01 | End: 2021-02-09

## 2020-12-03 ENCOUNTER — HOSPITAL ENCOUNTER (INPATIENT)
Age: 70
LOS: 3 days | Discharge: HOME | DRG: 381 | End: 2020-12-06
Attending: EMERGENCY MEDICINE | Admitting: HOSPITALIST
Payer: MEDICARE

## 2020-12-03 ENCOUNTER — ANESTHESIA (OUTPATIENT)
Dept: ENDOSCOPY | Age: 70
DRG: 381 | End: 2020-12-03
Payer: MEDICARE

## 2020-12-03 ENCOUNTER — APPOINTMENT (OUTPATIENT)
Dept: GENERAL RADIOLOGY | Age: 70
DRG: 381 | End: 2020-12-03
Attending: EMERGENCY MEDICINE
Payer: MEDICARE

## 2020-12-03 ENCOUNTER — ANESTHESIA EVENT (OUTPATIENT)
Dept: ENDOSCOPY | Age: 70
DRG: 381 | End: 2020-12-03
Payer: MEDICARE

## 2020-12-03 DIAGNOSIS — E83.42 HYPOMAGNESEMIA: ICD-10-CM

## 2020-12-03 DIAGNOSIS — K92.2 UPPER GI BLEED: Primary | ICD-10-CM

## 2020-12-03 DIAGNOSIS — E87.6 HYPOKALEMIA: ICD-10-CM

## 2020-12-03 LAB
ALBUMIN SERPL-MCNC: 2.3 G/DL (ref 3.5–5)
ALBUMIN/GLOB SERPL: 0.7 {RATIO} (ref 1.1–2.2)
ALP SERPL-CCNC: 64 U/L (ref 45–117)
ALT SERPL-CCNC: 19 U/L (ref 12–78)
ANION GAP SERPL CALC-SCNC: 18 MMOL/L (ref 5–15)
APTT PPP: 23.7 SEC (ref 22.1–31)
AST SERPL-CCNC: 32 U/L (ref 15–37)
ATRIAL RATE: 122 BPM
BASOPHILS # BLD: 0 K/UL (ref 0–0.1)
BASOPHILS # BLD: 0 K/UL (ref 0–0.1)
BASOPHILS NFR BLD: 0 % (ref 0–1)
BASOPHILS NFR BLD: 0 % (ref 0–1)
BILIRUB SERPL-MCNC: 1.2 MG/DL (ref 0.2–1)
BUN SERPL-MCNC: 20 MG/DL (ref 6–20)
BUN/CREAT SERPL: 32 (ref 12–20)
CALCIUM SERPL-MCNC: 7.9 MG/DL (ref 8.5–10.1)
CALCULATED P AXIS, ECG09: 61 DEGREES
CALCULATED R AXIS, ECG10: 101 DEGREES
CALCULATED T AXIS, ECG11: -169 DEGREES
CHLORIDE SERPL-SCNC: 104 MMOL/L (ref 97–108)
CO2 SERPL-SCNC: 19 MMOL/L (ref 21–32)
CREAT SERPL-MCNC: 0.62 MG/DL (ref 0.55–1.02)
DIAGNOSIS, 93000: NORMAL
DIFFERENTIAL METHOD BLD: ABNORMAL
DIFFERENTIAL METHOD BLD: ABNORMAL
EOSINOPHIL # BLD: 0 K/UL (ref 0–0.4)
EOSINOPHIL # BLD: 0 K/UL (ref 0–0.4)
EOSINOPHIL NFR BLD: 0 % (ref 0–7)
EOSINOPHIL NFR BLD: 0 % (ref 0–7)
ERYTHROCYTE [DISTWIDTH] IN BLOOD BY AUTOMATED COUNT: 20 % (ref 11.5–14.5)
ERYTHROCYTE [DISTWIDTH] IN BLOOD BY AUTOMATED COUNT: 23.6 % (ref 11.5–14.5)
GLOBULIN SER CALC-MCNC: 3.2 G/DL (ref 2–4)
GLUCOSE SERPL-MCNC: 147 MG/DL (ref 65–100)
HCT VFR BLD AUTO: 16 % (ref 35–47)
HCT VFR BLD AUTO: 22.2 % (ref 35–47)
HCT VFR BLD AUTO: 23 % (ref 35–47)
HEMOCCULT STL QL: POSITIVE
HGB BLD-MCNC: 4.5 G/DL (ref 11.5–16)
HGB BLD-MCNC: 7 G/DL (ref 11.5–16)
HGB BLD-MCNC: 7.1 G/DL (ref 11.5–16)
HISTORY CHECKED?,CKHIST: NORMAL
IMM GRANULOCYTES # BLD AUTO: 0.1 K/UL (ref 0–0.04)
IMM GRANULOCYTES # BLD AUTO: 0.1 K/UL (ref 0–0.04)
IMM GRANULOCYTES NFR BLD AUTO: 1 % (ref 0–0.5)
IMM GRANULOCYTES NFR BLD AUTO: 1 % (ref 0–0.5)
INR PPP: 2 (ref 0.9–1.1)
LACTATE SERPL-SCNC: 1.9 MMOL/L (ref 0.4–2)
LACTATE SERPL-SCNC: 5 MMOL/L (ref 0.4–2)
LYMPHOCYTES # BLD: 0.7 K/UL (ref 0.8–3.5)
LYMPHOCYTES # BLD: 1 K/UL (ref 0.8–3.5)
LYMPHOCYTES NFR BLD: 10 % (ref 12–49)
LYMPHOCYTES NFR BLD: 8 % (ref 12–49)
MAGNESIUM SERPL-MCNC: 1.1 MG/DL (ref 1.6–2.4)
MCH RBC QN AUTO: 19.5 PG (ref 26–34)
MCH RBC QN AUTO: 24 PG (ref 26–34)
MCHC RBC AUTO-ENTMCNC: 28.1 G/DL (ref 30–36.5)
MCHC RBC AUTO-ENTMCNC: 31.5 G/DL (ref 30–36.5)
MCV RBC AUTO: 69.3 FL (ref 80–99)
MCV RBC AUTO: 76 FL (ref 80–99)
MONOCYTES # BLD: 0.5 K/UL (ref 0–1)
MONOCYTES # BLD: 0.6 K/UL (ref 0–1)
MONOCYTES NFR BLD: 5 % (ref 5–13)
MONOCYTES NFR BLD: 8 % (ref 5–13)
NEUTS SEG # BLD: 11.1 K/UL (ref 1.8–8)
NEUTS SEG # BLD: 5.4 K/UL (ref 1.8–8)
NEUTS SEG NFR BLD: 81 % (ref 32–75)
NEUTS SEG NFR BLD: 86 % (ref 32–75)
NRBC # BLD: 0 K/UL (ref 0–0.01)
NRBC # BLD: 0.02 K/UL (ref 0–0.01)
NRBC BLD-RTO: 0 PER 100 WBC
NRBC BLD-RTO: 0.2 PER 100 WBC
P-R INTERVAL, ECG05: 132 MS
PATH REV BLD -IMP: ABNORMAL
PLATELET # BLD AUTO: 114 K/UL (ref 150–400)
PLATELET # BLD AUTO: 145 K/UL (ref 150–400)
PMV BLD AUTO: 10.3 FL (ref 8.9–12.9)
POTASSIUM SERPL-SCNC: 2.9 MMOL/L (ref 3.5–5.1)
PROT SERPL-MCNC: 5.5 G/DL (ref 6.4–8.2)
PROTHROMBIN TIME: 19.9 SEC (ref 9–11.1)
Q-T INTERVAL, ECG07: 334 MS
QRS DURATION, ECG06: 118 MS
QTC CALCULATION (BEZET), ECG08: 475 MS
RBC # BLD AUTO: 2.31 M/UL (ref 3.8–5.2)
RBC # BLD AUTO: 2.92 M/UL (ref 3.8–5.2)
RBC MORPH BLD: ABNORMAL
SODIUM SERPL-SCNC: 141 MMOL/L (ref 136–145)
THERAPEUTIC RANGE,PTTT: NORMAL SECS (ref 58–77)
TSH SERPL DL<=0.05 MIU/L-ACNC: 5.57 UIU/ML (ref 0.36–3.74)
VENTRICULAR RATE, ECG03: 122 BPM
WBC # BLD AUTO: 12.8 K/UL (ref 3.6–11)
WBC # BLD AUTO: 6.7 K/UL (ref 3.6–11)

## 2020-12-03 PROCEDURE — 96374 THER/PROPH/DIAG INJ IV PUSH: CPT

## 2020-12-03 PROCEDURE — 83735 ASSAY OF MAGNESIUM: CPT

## 2020-12-03 PROCEDURE — 71045 X-RAY EXAM CHEST 1 VIEW: CPT

## 2020-12-03 PROCEDURE — 74011250636 HC RX REV CODE- 250/636: Performed by: EMERGENCY MEDICINE

## 2020-12-03 PROCEDURE — 65660000001 HC RM ICU INTERMED STEPDOWN

## 2020-12-03 PROCEDURE — 99283 EMERGENCY DEPT VISIT LOW MDM: CPT

## 2020-12-03 PROCEDURE — 82272 OCCULT BLD FECES 1-3 TESTS: CPT

## 2020-12-03 PROCEDURE — C9113 INJ PANTOPRAZOLE SODIUM, VIA: HCPCS | Performed by: EMERGENCY MEDICINE

## 2020-12-03 PROCEDURE — 74011250636 HC RX REV CODE- 250/636: Performed by: HOSPITALIST

## 2020-12-03 PROCEDURE — 3E0G8GC INTRODUCTION OF OTHER THERAPEUTIC SUBSTANCE INTO UPPER GI, VIA NATURAL OR ARTIFICIAL OPENING ENDOSCOPIC: ICD-10-PCS | Performed by: INTERNAL MEDICINE

## 2020-12-03 PROCEDURE — 99284 EMERGENCY DEPT VISIT MOD MDM: CPT

## 2020-12-03 PROCEDURE — 74011250636 HC RX REV CODE- 250/636: Performed by: NURSE ANESTHETIST, CERTIFIED REGISTERED

## 2020-12-03 PROCEDURE — 76060000032 HC ANESTHESIA 0.5 TO 1 HR: Performed by: INTERNAL MEDICINE

## 2020-12-03 PROCEDURE — 85025 COMPLETE CBC W/AUTO DIFF WBC: CPT

## 2020-12-03 PROCEDURE — 36415 COLL VENOUS BLD VENIPUNCTURE: CPT

## 2020-12-03 PROCEDURE — 36430 TRANSFUSION BLD/BLD COMPNT: CPT

## 2020-12-03 PROCEDURE — 85018 HEMOGLOBIN: CPT

## 2020-12-03 PROCEDURE — 80053 COMPREHEN METABOLIC PANEL: CPT

## 2020-12-03 PROCEDURE — 83605 ASSAY OF LACTIC ACID: CPT

## 2020-12-03 PROCEDURE — 74011250637 HC RX REV CODE- 250/637: Performed by: EMERGENCY MEDICINE

## 2020-12-03 PROCEDURE — 2709999900 HC NON-CHARGEABLE SUPPLY: Performed by: INTERNAL MEDICINE

## 2020-12-03 PROCEDURE — 86923 COMPATIBILITY TEST ELECTRIC: CPT

## 2020-12-03 PROCEDURE — 77030030073 HC APPL ENDOSC CLP AMR -C: Performed by: INTERNAL MEDICINE

## 2020-12-03 PROCEDURE — 74011250636 HC RX REV CODE- 250/636

## 2020-12-03 PROCEDURE — 85610 PROTHROMBIN TIME: CPT

## 2020-12-03 PROCEDURE — 74011000258 HC RX REV CODE- 258: Performed by: HOSPITALIST

## 2020-12-03 PROCEDURE — 0W3P8ZZ CONTROL BLEEDING IN GASTROINTESTINAL TRACT, VIA NATURAL OR ARTIFICIAL OPENING ENDOSCOPIC: ICD-10-PCS | Performed by: INTERNAL MEDICINE

## 2020-12-03 PROCEDURE — P9016 RBC LEUKOCYTES REDUCED: HCPCS

## 2020-12-03 PROCEDURE — 86900 BLOOD TYPING SEROLOGIC ABO: CPT

## 2020-12-03 PROCEDURE — 85730 THROMBOPLASTIN TIME PARTIAL: CPT

## 2020-12-03 PROCEDURE — 30233N1 TRANSFUSION OF NONAUTOLOGOUS RED BLOOD CELLS INTO PERIPHERAL VEIN, PERCUTANEOUS APPROACH: ICD-10-PCS | Performed by: INTERNAL MEDICINE

## 2020-12-03 PROCEDURE — C9113 INJ PANTOPRAZOLE SODIUM, VIA: HCPCS | Performed by: HOSPITALIST

## 2020-12-03 PROCEDURE — 74011000250 HC RX REV CODE- 250: Performed by: HOSPITALIST

## 2020-12-03 PROCEDURE — 93005 ELECTROCARDIOGRAM TRACING: CPT

## 2020-12-03 PROCEDURE — 77030040684 HC NDL ENDOSC NEEDLEMASTR OCOA -B: Performed by: INTERNAL MEDICINE

## 2020-12-03 PROCEDURE — 74011000250 HC RX REV CODE- 250: Performed by: NURSE ANESTHETIST, CERTIFIED REGISTERED

## 2020-12-03 PROCEDURE — 84443 ASSAY THYROID STIM HORMONE: CPT

## 2020-12-03 PROCEDURE — 96361 HYDRATE IV INFUSION ADD-ON: CPT

## 2020-12-03 PROCEDURE — 96375 TX/PRO/DX INJ NEW DRUG ADDON: CPT

## 2020-12-03 PROCEDURE — 76040000007: Performed by: INTERNAL MEDICINE

## 2020-12-03 RX ORDER — PANTOPRAZOLE SODIUM 40 MG/10ML
40 INJECTION, POWDER, LYOPHILIZED, FOR SOLUTION INTRAVENOUS
Status: COMPLETED | OUTPATIENT
Start: 2020-12-03 | End: 2020-12-03

## 2020-12-03 RX ORDER — MAGNESIUM SULFATE HEPTAHYDRATE 40 MG/ML
2 INJECTION, SOLUTION INTRAVENOUS ONCE
Status: COMPLETED | OUTPATIENT
Start: 2020-12-03 | End: 2020-12-03

## 2020-12-03 RX ORDER — SODIUM CHLORIDE 0.9 % (FLUSH) 0.9 %
5-40 SYRINGE (ML) INJECTION AS NEEDED
Status: CANCELLED | OUTPATIENT
Start: 2020-12-03

## 2020-12-03 RX ORDER — ONDANSETRON 2 MG/ML
4 INJECTION INTRAMUSCULAR; INTRAVENOUS
Status: DISCONTINUED | OUTPATIENT
Start: 2020-12-03 | End: 2020-12-06 | Stop reason: HOSPADM

## 2020-12-03 RX ORDER — PHENYLEPHRINE HCL IN 0.9% NACL 0.4MG/10ML
SYRINGE (ML) INTRAVENOUS AS NEEDED
Status: DISCONTINUED | OUTPATIENT
Start: 2020-12-03 | End: 2020-12-03 | Stop reason: HOSPADM

## 2020-12-03 RX ORDER — SODIUM CHLORIDE 9 MG/ML
250 INJECTION, SOLUTION INTRAVENOUS AS NEEDED
Status: DISCONTINUED | OUTPATIENT
Start: 2020-12-03 | End: 2020-12-06 | Stop reason: HOSPADM

## 2020-12-03 RX ORDER — LORAZEPAM 2 MG/ML
2 INJECTION INTRAMUSCULAR
Status: DISCONTINUED | OUTPATIENT
Start: 2020-12-03 | End: 2020-12-06 | Stop reason: HOSPADM

## 2020-12-03 RX ORDER — HYDROCORTISONE ACETATE 25 MG/1
25 SUPPOSITORY RECTAL
Status: COMPLETED | OUTPATIENT
Start: 2020-12-03 | End: 2020-12-03

## 2020-12-03 RX ORDER — SODIUM CHLORIDE, SODIUM LACTATE, POTASSIUM CHLORIDE, CALCIUM CHLORIDE 600; 310; 30; 20 MG/100ML; MG/100ML; MG/100ML; MG/100ML
125 INJECTION, SOLUTION INTRAVENOUS CONTINUOUS
Status: DISCONTINUED | OUTPATIENT
Start: 2020-12-03 | End: 2020-12-04

## 2020-12-03 RX ORDER — SODIUM CHLORIDE 0.9 % (FLUSH) 0.9 %
5-40 SYRINGE (ML) INJECTION AS NEEDED
Status: DISCONTINUED | OUTPATIENT
Start: 2020-12-03 | End: 2020-12-06 | Stop reason: HOSPADM

## 2020-12-03 RX ORDER — FLUMAZENIL 0.1 MG/ML
0.2 INJECTION INTRAVENOUS
Status: CANCELLED | OUTPATIENT
Start: 2020-12-03 | End: 2020-12-03

## 2020-12-03 RX ORDER — ONDANSETRON 2 MG/ML
4 INJECTION INTRAMUSCULAR; INTRAVENOUS
Status: COMPLETED | OUTPATIENT
Start: 2020-12-03 | End: 2020-12-03

## 2020-12-03 RX ORDER — EPINEPHRINE 0.1 MG/ML
1 INJECTION INTRACARDIAC; INTRAVENOUS ONCE
Status: COMPLETED | OUTPATIENT
Start: 2020-12-03 | End: 2020-12-03

## 2020-12-03 RX ORDER — SODIUM CHLORIDE 9 MG/ML
50 INJECTION, SOLUTION INTRAVENOUS CONTINUOUS
Status: CANCELLED | OUTPATIENT
Start: 2020-12-03 | End: 2020-12-03

## 2020-12-03 RX ORDER — EPINEPHRINE 0.1 MG/ML
1 INJECTION INTRACARDIAC; INTRAVENOUS
Status: CANCELLED | OUTPATIENT
Start: 2020-12-03 | End: 2020-12-04

## 2020-12-03 RX ORDER — SODIUM CHLORIDE 9 MG/ML
50 INJECTION, SOLUTION INTRAVENOUS CONTINUOUS
Status: DISCONTINUED | OUTPATIENT
Start: 2020-12-03 | End: 2020-12-03 | Stop reason: HOSPADM

## 2020-12-03 RX ORDER — SODIUM CHLORIDE 0.9 % (FLUSH) 0.9 %
5-40 SYRINGE (ML) INJECTION EVERY 8 HOURS
Status: CANCELLED | OUTPATIENT
Start: 2020-12-03

## 2020-12-03 RX ORDER — NALOXONE HYDROCHLORIDE 0.4 MG/ML
0.4 INJECTION, SOLUTION INTRAMUSCULAR; INTRAVENOUS; SUBCUTANEOUS
Status: CANCELLED | OUTPATIENT
Start: 2020-12-03 | End: 2020-12-03

## 2020-12-03 RX ORDER — ATROPINE SULFATE 0.1 MG/ML
0.5 INJECTION INTRAVENOUS
Status: CANCELLED | OUTPATIENT
Start: 2020-12-03 | End: 2020-12-04

## 2020-12-03 RX ORDER — EPINEPHRINE 0.1 MG/ML
INJECTION INTRACARDIAC; INTRAVENOUS
Status: COMPLETED
Start: 2020-12-03 | End: 2020-12-03

## 2020-12-03 RX ORDER — LIDOCAINE HYDROCHLORIDE 20 MG/ML
INJECTION, SOLUTION EPIDURAL; INFILTRATION; INTRACAUDAL; PERINEURAL AS NEEDED
Status: DISCONTINUED | OUTPATIENT
Start: 2020-12-03 | End: 2020-12-03 | Stop reason: HOSPADM

## 2020-12-03 RX ORDER — FENTANYL CITRATE 50 UG/ML
100 INJECTION, SOLUTION INTRAMUSCULAR; INTRAVENOUS
Status: CANCELLED | OUTPATIENT
Start: 2020-12-03

## 2020-12-03 RX ORDER — POTASSIUM CHLORIDE 7.45 MG/ML
10 INJECTION INTRAVENOUS
Status: COMPLETED | OUTPATIENT
Start: 2020-12-03 | End: 2020-12-03

## 2020-12-03 RX ORDER — PROPOFOL 10 MG/ML
INJECTION, EMULSION INTRAVENOUS AS NEEDED
Status: DISCONTINUED | OUTPATIENT
Start: 2020-12-03 | End: 2020-12-03 | Stop reason: HOSPADM

## 2020-12-03 RX ORDER — DEXTROMETHORPHAN/PSEUDOEPHED 2.5-7.5/.8
1.2 DROPS ORAL
Status: CANCELLED | OUTPATIENT
Start: 2020-12-03

## 2020-12-03 RX ORDER — MIDAZOLAM HYDROCHLORIDE 1 MG/ML
.25-1 INJECTION, SOLUTION INTRAMUSCULAR; INTRAVENOUS
Status: CANCELLED | OUTPATIENT
Start: 2020-12-03 | End: 2020-12-03

## 2020-12-03 RX ORDER — SODIUM CHLORIDE 0.9 % (FLUSH) 0.9 %
5-40 SYRINGE (ML) INJECTION EVERY 8 HOURS
Status: DISCONTINUED | OUTPATIENT
Start: 2020-12-03 | End: 2020-12-06 | Stop reason: HOSPADM

## 2020-12-03 RX ADMIN — Medication 120 MCG: at 13:26

## 2020-12-03 RX ADMIN — PROPOFOL 10 MG: 10 INJECTION, EMULSION INTRAVENOUS at 13:28

## 2020-12-03 RX ADMIN — MAGNESIUM SULFATE HEPTAHYDRATE 2 G: 40 INJECTION, SOLUTION INTRAVENOUS at 10:24

## 2020-12-03 RX ADMIN — PROPOFOL 10 MG: 10 INJECTION, EMULSION INTRAVENOUS at 13:38

## 2020-12-03 RX ADMIN — PANTOPRAZOLE SODIUM 40 MG: 40 INJECTION, POWDER, FOR SOLUTION INTRAVENOUS at 08:07

## 2020-12-03 RX ADMIN — PANTOPRAZOLE SODIUM 40 MG: 40 INJECTION, POWDER, FOR SOLUTION INTRAVENOUS at 21:09

## 2020-12-03 RX ADMIN — SODIUM CHLORIDE, SODIUM LACTATE, POTASSIUM CHLORIDE, AND CALCIUM CHLORIDE 125 ML/HR: 600; 310; 30; 20 INJECTION, SOLUTION INTRAVENOUS at 11:14

## 2020-12-03 RX ADMIN — EPINEPHRINE 0.6 MG: 0.1 INJECTION INTRACARDIAC; INTRAVENOUS at 13:30

## 2020-12-03 RX ADMIN — SODIUM CHLORIDE, SODIUM LACTATE, POTASSIUM CHLORIDE, AND CALCIUM CHLORIDE 125 ML/HR: 600; 310; 30; 20 INJECTION, SOLUTION INTRAVENOUS at 15:31

## 2020-12-03 RX ADMIN — PROPOFOL 10 MG: 10 INJECTION, EMULSION INTRAVENOUS at 13:21

## 2020-12-03 RX ADMIN — Medication 40 MCG: at 13:20

## 2020-12-03 RX ADMIN — Medication 80 MCG: at 13:22

## 2020-12-03 RX ADMIN — HYDROCORTISONE ACETATE 25 MG: 25 SUPPOSITORY RECTAL at 11:09

## 2020-12-03 RX ADMIN — Medication 80 MCG: at 13:19

## 2020-12-03 RX ADMIN — SODIUM CHLORIDE 250 ML: 900 INJECTION, SOLUTION INTRAVENOUS at 10:27

## 2020-12-03 RX ADMIN — LIDOCAINE HYDROCHLORIDE 75 MG: 20 INJECTION, SOLUTION EPIDURAL; INFILTRATION; INTRACAUDAL; PERINEURAL at 13:15

## 2020-12-03 RX ADMIN — PROPOFOL 10 MG: 10 INJECTION, EMULSION INTRAVENOUS at 13:37

## 2020-12-03 RX ADMIN — SODIUM CHLORIDE 500 ML: 900 INJECTION, SOLUTION INTRAVENOUS at 08:08

## 2020-12-03 RX ADMIN — PROPOFOL 10 MG: 10 INJECTION, EMULSION INTRAVENOUS at 13:22

## 2020-12-03 RX ADMIN — PANTOPRAZOLE SODIUM 80 MG: 40 INJECTION, POWDER, FOR SOLUTION INTRAVENOUS at 12:10

## 2020-12-03 RX ADMIN — PROPOFOL 10 MG: 10 INJECTION, EMULSION INTRAVENOUS at 13:20

## 2020-12-03 RX ADMIN — PROPOFOL 10 MG: 10 INJECTION, EMULSION INTRAVENOUS at 13:26

## 2020-12-03 RX ADMIN — PROPOFOL 25 MG: 10 INJECTION, EMULSION INTRAVENOUS at 13:17

## 2020-12-03 RX ADMIN — SODIUM CHLORIDE, SODIUM LACTATE, POTASSIUM CHLORIDE, AND CALCIUM CHLORIDE 125 ML/HR: 600; 310; 30; 20 INJECTION, SOLUTION INTRAVENOUS at 20:24

## 2020-12-03 RX ADMIN — PROPOFOL 50 MG: 10 INJECTION, EMULSION INTRAVENOUS at 13:15

## 2020-12-03 RX ADMIN — EPINEPHRINE 0.6 MG: 0.1 INJECTION, SOLUTION ENDOTRACHEAL; INTRACARDIAC; INTRAVENOUS at 13:30

## 2020-12-03 RX ADMIN — PROPOFOL 20 MG: 10 INJECTION, EMULSION INTRAVENOUS at 13:32

## 2020-12-03 RX ADMIN — POTASSIUM CHLORIDE 10 MEQ: 10 INJECTION, SOLUTION INTRAVENOUS at 16:54

## 2020-12-03 RX ADMIN — Medication 10 ML: at 22:00

## 2020-12-03 RX ADMIN — PROPOFOL 25 MG: 10 INJECTION, EMULSION INTRAVENOUS at 13:19

## 2020-12-03 RX ADMIN — PROPOFOL 10 MG: 10 INJECTION, EMULSION INTRAVENOUS at 13:24

## 2020-12-03 RX ADMIN — PHYTONADIONE 10 MG: 10 INJECTION, EMULSION INTRAMUSCULAR; INTRAVENOUS; SUBCUTANEOUS at 15:27

## 2020-12-03 RX ADMIN — POTASSIUM CHLORIDE 10 MEQ: 10 INJECTION, SOLUTION INTRAVENOUS at 15:28

## 2020-12-03 RX ADMIN — POTASSIUM CHLORIDE 10 MEQ: 10 INJECTION, SOLUTION INTRAVENOUS at 12:12

## 2020-12-03 RX ADMIN — PROPOFOL 10 MG: 10 INJECTION, EMULSION INTRAVENOUS at 13:30

## 2020-12-03 RX ADMIN — PROPOFOL 10 MG: 10 INJECTION, EMULSION INTRAVENOUS at 13:36

## 2020-12-03 RX ADMIN — ONDANSETRON 4 MG: 2 INJECTION INTRAMUSCULAR; INTRAVENOUS at 08:07

## 2020-12-03 RX ADMIN — PROPOFOL 20 MG: 10 INJECTION, EMULSION INTRAVENOUS at 13:35

## 2020-12-03 RX ADMIN — Medication 80 MCG: at 13:30

## 2020-12-03 NOTE — ANESTHESIA PREPROCEDURE EVALUATION
Relevant Problems   No relevant active problems       Anesthetic History   No history of anesthetic complications            Review of Systems / Medical History  Patient summary reviewed, nursing notes reviewed and pertinent labs reviewed    Pulmonary  Within defined limits                 Neuro/Psych   Within defined limits           Cardiovascular            Dysrhythmias : SVT           GI/Hepatic/Renal               Comments: GI bleed Endo/Other        Anemia     Other Findings              Physical Exam    Airway  Mallampati: II  TM Distance: > 6 cm  Neck ROM: normal range of motion   Mouth opening: Normal     Cardiovascular  Regular rate and rhythm,  S1 and S2 normal,  no murmur, click, rub, or gallop             Dental  No notable dental hx       Pulmonary  Breath sounds clear to auscultation               Abdominal  GI exam deferred       Other Findings            Anesthetic Plan    ASA: 3, emergent  Anesthesia type: MAC            Anesthetic plan and risks discussed with: Patient

## 2020-12-03 NOTE — ROUTINE PROCESS
Michael Arthur  1950  [id number]    Situation:  Verbal report received from: Minda  Procedure: Procedure(s) with comments:  ESOPHAGOGASTRODUODENOSCOPY (EGD)  INJECTION  RESOLUTION CLIP - x4    Background:    Preoperative diagnosis: hematemesis, melena  Postoperative diagnosis: UGI BLEEDING,ESOPHAGEAL ULCER, HIATAL HERNIA    :  Dr. Viktoria Price  Assistant(s): Endoscopy Technician-1: Zeenat Kaur  Endoscopy RN-1: Abe Haley RN    Specimens: no  H.  Pylori  no    Assessment:  Intra-procedure medications   Anesthesia gave intra-procedure sedation and medications, see anesthesia flow sheet    Intravenous fluids: NS@ KVO     Vital signs stable    Abdominal assessment: round and soft     Recommendation:    Return to floor -ED

## 2020-12-03 NOTE — ED PROVIDER NOTES
HPI .  Patient has a history of malnutrition, nonrheumatic mitral regurgitation, SVT, hypomagnesemia, hypokalemia, vitamin D deficiency. She was admitted March 2018 with E. coli UTI sepsis, SVT, and GI bleeding. Patient has been vomiting red blood for 2 days. EMS came and she refused transportation to the hospital initially. Patient had a spell possibly syncope and EMS was called again and this time she agreed to come to the hospital.  She denies having abdominal pain. She admits to having melena. Patient does feel weak and dizzy. EMS blood pressure was as low as 86/51. It came up to 126 systolic with a saline bolus. No past medical history on file. Past Surgical History:   Procedure Laterality Date    HX ORTHOPAEDIC      HX TONSILLECTOMY      HX WISDOM TEETH EXTRACTION           No family history on file.     Social History     Socioeconomic History    Marital status:      Spouse name: Not on file    Number of children: Not on file    Years of education: Not on file    Highest education level: Not on file   Occupational History    Not on file   Social Needs    Financial resource strain: Not on file    Food insecurity     Worry: Not on file     Inability: Not on file    Transportation needs     Medical: Not on file     Non-medical: Not on file   Tobacco Use    Smoking status: Never Smoker    Smokeless tobacco: Never Used   Substance and Sexual Activity    Alcohol use: Yes     Frequency: Monthly or less     Drinks per session: 1 or 2     Binge frequency: Never     Comment: occasional wine    Drug use: No    Sexual activity: Not on file   Lifestyle    Physical activity     Days per week: Not on file     Minutes per session: Not on file    Stress: Not on file   Relationships    Social connections     Talks on phone: Not on file     Gets together: Not on file     Attends [employer] service: Not on file     Active member of club or organization: Not on file     Attends meetings of clubs or organizations: Not on file     Relationship status: Not on file    Intimate partner violence     Fear of current or ex partner: Not on file     Emotionally abused: Not on file     Physically abused: Not on file     Forced sexual activity: Not on file   Other Topics Concern    Not on file   Social History Narrative    Not on file         ALLERGIES: Patient has no known allergies. Review of Systems   Constitutional: Negative for fever. Eyes: Negative for redness. Respiratory: Negative for cough. Cardiovascular: Negative for chest pain. Gastrointestinal: Negative for abdominal pain. Genitourinary: Negative for difficulty urinating. Neurological: Negative for speech difficulty. Psychiatric/Behavioral: Negative for agitation and behavioral problems. The patient is not nervous/anxious. Vitals:    12/03/20 0712   BP: (!) 94/55   Pulse: (!) 112   Resp: 20   SpO2: 95%   Weight: 48.5 kg (107 lb)   Height: 5' 4\" (1.626 m)            Physical Exam  Vitals signs and nursing note reviewed. Constitutional:       Appearance: She is well-developed. Comments: Thin; sitting up spitting bright red blood into an emesis bag   HENT:      Head: Normocephalic and atraumatic. Eyes:      Pupils: Pupils are equal, round, and reactive to light. Neck:      Musculoskeletal: Normal range of motion and neck supple. Cardiovascular:      Rate and Rhythm: Regular rhythm. Heart sounds: Normal heart sounds. No murmur. No friction rub. No gallop. Comments: Pulse rate 110  Pulmonary:      Effort: Pulmonary effort is normal. No respiratory distress. Breath sounds: No wheezing or rales. Abdominal:      Palpations: Abdomen is soft. Tenderness: There is no abdominal tenderness. There is no rebound. Musculoskeletal: Normal range of motion. General: No tenderness. Skin:     Findings: No erythema. Neurological:      Mental Status: She is alert.       Cranial Nerves: No cranial nerve deficit. Comments: Motor; symmetric   Psychiatric:         Behavior: Behavior normal.          MDM  Number of Diagnoses or Management Options  Hypokalemia:   Hypomagnesemia:   Upper GI bleed: Total critical care time spent exclusive of procedures:  60 minutes         Procedures      Perfect Serve Consult for Admission  9:14 AM    ED Room Number: ER09/09  Patient Name and age: Bashir Renae 65 y.o.  female  Working Diagnosis:   1. Upper GI bleed    2. Hypomagnesemia    3. Hypokalemia        COVID-19 Suspicion:  no  Sepsis present:  no  Reassessment needed: yes  Code Status:  Full Code  Readmission: no  Isolation Requirements:  no  Recommended Level of Care:  step down  Department:[facility] Adult ED - 21   Other:               ED EKG interpretation:  Rhythm: sinus tachycardia; and regular . Rate (approx.): 120; Axis: normal; P wave: normal; QRS interval: prolonged; ST/T wave: non-specific changes; in  Lead: ; Other findings: RBBB;low voltage. This EKG was interpreted by Niki Perera MD,ED Provider.  Niki Perera MD

## 2020-12-03 NOTE — PROCEDURES
[address]  [address]  [address] [facility], [address]  [phone]                            NAME:  Garland Leone   :      MRN:   [id number]     Date/Time:  12/3/2020 1:40 PM    Esophagogastroduodenoscopy (EGD) Procedure Note    :  Vita Schuler MD    Staff: Endoscopy Technician-1: Ayleen Barraza  Endoscopy RN-1: Yen Young RN    Referring Provider:  None    Anethesia/Sedation:  MAC anesthesia    Procedure Details   After infomed consent was obtained for the procedure, with all risks and benefits of procedure explained the patient was taken to the endoscopy suite and placed in the left lateral decubitus position. Following sequential administration of sedation as per above, the gastroscope was inserted into the mouth and advanced under direct vision to second portion of the duodenum. A careful inspection was made as the gastroscope was withdrawn, including a retroflexed view of the proximal stomach; findings and interventions are described below. Findings:  Esophagus: severe ulcerated esophagitis involving majority of esophagus. At [address] [facility] junction there is deep ulceration with active bleeding from vessel. Surrounding mucosa is edematous and very friable. Injected with 8 cc of epinephrine with cessation of bleeding. Attempted to place one clip distal to this area, though as mucosa was so friable it dislodged. No bleeding at end of case. Medium sized hiatal hernia  Stomach:5 mm clean based ulceration in cardia, closed with 2 clips. Old blood in stomach. Edematous gastritis in antrum  Duodenum/jejunum:mild duodenitis    Interventions:  Injection and clip           Specimens Removed:  * No specimens in log *    Complications: None.      EBL:  Minimal     Impression:    See Postoperative diagnosis above    Recommendations:   - IV PPI gtt  - NPO  - Trend CBC, support as needed  - If recurrent bleeding may need to involve interventional radiology as large ulcerated area is deep and friable, and not likely amenable to clipping  - Will need repeat egd in 6 weeks    Ronald Treviño MD

## 2020-12-03 NOTE — H&P
History & Physical    Primary Care Provider: None  Source of Information: Patient     History of Presenting Illness:   Jamie Flores is a 65 y.o. female who presents with vomiting blood and black tarry stools     Patient has a history of malnutrition, nonrheumatic mitral regurgitation, SVT, hypomagnesemia, hypokalemia, vitamin D deficiency. She was admitted March 2018 with E. coli UTI sepsis, SVT, and GI bleeding. Patient has been vomiting red blood for 2 days. EMS came and she refused transportation to the hospital initially. Patient had a spell possibly syncope and EMS was called again and this time she agreed to come to the hospital.  She denies having abdominal pain. She admits to having melena. Last emesis and  BM was this morning  Patient does feel weak and dizzy. EMS blood pressure was as low as 86/51. It came up to 050 systolic with a saline bolus.     She lives with  at home. Ambulating independently. She was drinking a glass of wine 2-3 times a week   Denied covid 19 contact history      Review of Systems:  General: HPI, no fever, no changes of weight  HEENT: no headache, no vision changes, no nose discharge, no hearing changes   RES: no wheezing, no cough, no sob  CVS: no cp, no palpitation. Muscular: no joint swelling, no muscle pain, no leg swelling  Skin: no rash, no itching   GI: hpi   : no dysuria, no hematuria  Hemo: no gum bleeding, no petechial   Neuro: no sensation changes, no focal weakness , hpi,   Endo: no polydipsia   Psych: denied depression     No past medical history on file. Past Surgical History:   Procedure Laterality Date    HX ORTHOPAEDIC      HX TONSILLECTOMY      HX WISDOM TEETH EXTRACTION       Prior to Admission medications    Medication Sig Start Date End Date Taking?  Authorizing Provider   metoprolol succinate (TOPROL-XL) 25 mg XL tablet TAKE 1 TABLET BY MOUTH EVERY DAY AT NIGHT 6/1/20   Reginald Rivers MD magnesium oxide (MAG-OX) 400 mg tablet Take 1 Tab by mouth two (2) times a day. 5/22/19   Kyung Gupta NP   cholecalciferol (VITAMIN D3) 1,000 unit tablet Take 1 Tab by mouth daily. 11/20/18   Filiberto Jennings MD   potassium chloride SR (K-TAB) 20 mEq tablet Take 1 Tab by mouth daily. 11/20/18   Filiberto Jennings MD   cranberry fruit extract (CRANBERRY CONCENTRATE PO) Take 1 Tab by mouth daily. Provider, Historical   ascorbic acid, vitamin C, (VITAMIN C) 500 mg tablet Take 500 mg by mouth daily. Provider, Historical     No Known Allergies   No family history on file. SOCIAL HISTORY:  Patient resides:  Independently x   Assisted Living    SNF    With family care       Smoking history:   None X   Former    CXhronic      Alcohol history:   None    Social X   Chronic      Ambulates:   Independently X   w/cane    w/walker    w/wc    CODE STATUS:  DNR    Full X   Other      Objective:     Physical Exam:     Visit Vitals  BP (!) 107/52   Pulse (!) 116   Temp 98.5 °F (36.9 °C)   Resp 16   Ht 5' 4\" (1.626 m)   Wt 48.5 kg (107 lb)   SpO2 98%   BMI 18.37 kg/m²      O2 Device: Room air    General:  Alert, cooperative, no distress, appears stated age. palor    Head:  Normocephalic, without obvious abnormality, atraumatic. Eyes:  Conjunctivae/corneas clear. PERRL, EOMs intact. Nose: Nares normal. Septum midline. Mucosa normal. No drainage or sinus tenderness. Throat: Lips, mucosa, and tongue normal. Teeth and gums normal.   Neck: Supple, symmetrical, trachea midline, no adenopathy, thyroid: no enlargement/tenderness/nodules, no carotid bruit and no JVD. Back:   Symmetric, no curvature. ROM normal. No CVA tenderness. Lungs:   Clear to auscultation bilaterally. Chest wall:  No tenderness or deformity. Heart:  Regular rate and tachy,  S1, S2 normal, no murmur, click, rub or gallop. Abdomen:   Soft, non-tender. Bowel sounds normal. No masses,  No organomegaly.    Extremities: Extremities normal, atraumatic, no cyanosis or edema. Pulses: 2+ and symmetric all extremities. Skin: Skin color, texture, turgor normal. No rashes or lesions   Neurologic: CNII-XII intact. No focal weakness            Data Review:     Recent Days:  Recent Labs     12/03/20  0759   WBC 6.7   HGB 4.5*   HCT 16.0*   *     Recent Labs     12/03/20  0759      K 2.9*      CO2 19*   *   BUN 20   CREA 0.62   CA 7.9*   MG 1.1*   ALB 2.3*   ALT 19   INR 2.0*     No results for input(s): PH, PCO2, PO2, HCO3, FIO2 in the last 72 hours. 24 Hour Results:  Recent Results (from the past 24 hour(s))   CBC WITH AUTOMATED DIFF    Collection Time: 12/03/20  7:59 AM   Result Value Ref Range    WBC 6.7 3.6 - 11.0 K/uL    RBC 2.31 (L) 3.80 - 5.20 M/uL    HGB 4.5 (LL) 11.5 - 16.0 g/dL    HCT 16.0 (LL) 35.0 - 47.0 %    MCV 69.3 (L) 80.0 - 99.0 FL    MCH 19.5 (L) 26.0 - 34.0 PG    MCHC 28.1 (L) 30.0 - 36.5 g/dL    RDW 20.0 (H) 11.5 - 14.5 %    PLATELET 418 (L) 641 - 400 K/uL    MPV 10.3 8.9 - 12.9 FL    NRBC 0.0 0  WBC    ABSOLUTE NRBC 0.00 0.00 - 0.01 K/uL    NEUTROPHILS 81 (H) 32 - 75 %    LYMPHOCYTES 10 (L) 12 - 49 %    MONOCYTES 8 5 - 13 %    EOSINOPHILS 0 0 - 7 %    BASOPHILS 0 0 - 1 %    IMMATURE GRANULOCYTES 1 (H) 0.0 - 0.5 %    ABS. NEUTROPHILS 5.4 1.8 - 8.0 K/UL    ABS. LYMPHOCYTES 0.7 (L) 0.8 - 3.5 K/UL    ABS. MONOCYTES 0.5 0.0 - 1.0 K/UL    ABS. EOSINOPHILS 0.0 0.0 - 0.4 K/UL    ABS. BASOPHILS 0.0 0.0 - 0.1 K/UL    ABS. IMM.  GRANS. 0.1 (H) 0.00 - 0.04 K/UL    DF AUTOMATED      RBC COMMENTS ANISOCYTOSIS  2+        RBC COMMENTS HYPOCHROMIA  3+        RBC COMMENTS SCHISTOCYTES  1+        Pathologist review Pathology Review Requested     METABOLIC PANEL, COMPREHENSIVE    Collection Time: 12/03/20  7:59 AM   Result Value Ref Range    Sodium 141 136 - 145 mmol/L    Potassium 2.9 (L) 3.5 - 5.1 mmol/L    Chloride 104 97 - 108 mmol/L    CO2 19 (L) 21 - 32 mmol/L    Anion gap 18 (H) 5 - 15 mmol/L Glucose 147 (H) 65 - 100 mg/dL    BUN 20 6 - 20 MG/DL    Creatinine 0.62 0.55 - 1.02 MG/DL    BUN/Creatinine ratio 32 (H) 12 - 20      GFR est AA >60 >60 ml/min/1.73m2    GFR est non-AA >60 >60 ml/min/1.73m2    Calcium 7.9 (L) 8.5 - 10.1 MG/DL    Bilirubin, total 1.2 (H) 0.2 - 1.0 MG/DL    ALT (SGPT) 19 12 - 78 U/L    AST (SGOT) 32 15 - 37 U/L    Alk.  phosphatase 64 45 - 117 U/L    Protein, total 5.5 (L) 6.4 - 8.2 g/dL    Albumin 2.3 (L) 3.5 - 5.0 g/dL    Globulin 3.2 2.0 - 4.0 g/dL    A-G Ratio 0.7 (L) 1.1 - 2.2     PROTHROMBIN TIME + INR    Collection Time: 12/03/20  7:59 AM   Result Value Ref Range    INR 2.0 (H) 0.9 - 1.1      Prothrombin time 19.9 (H) 9.0 - 11.1 sec   PTT    Collection Time: 12/03/20  7:59 AM   Result Value Ref Range    aPTT 23.7 22.1 - 31.0 sec    aPTT, therapeutic range     58.0 - 77.0 SECS   MAGNESIUM    Collection Time: 12/03/20  7:59 AM   Result Value Ref Range    Magnesium 1.1 (L) 1.6 - 2.4 mg/dL   TYPE & SCREEN    Collection Time: 12/03/20  7:59 AM   Result Value Ref Range    Crossmatch Expiration 12/06/2020,2359     ABO/Rh(D) O POSITIVE     Antibody screen NEG     Unit number [id number]     Blood component type [facility]     Unit division 00     Status of unit ISSUED     Crossmatch result Compatible     Unit number [id number]     Blood component type [facility]     Unit division 00     Status of unit ALLOCATED     Crossmatch result Compatible    TSH 3RD GENERATION    Collection Time: 12/03/20  7:59 AM   Result Value Ref Range    TSH 5.57 (H) 0.36 - 3.74 uIU/mL   EKG, 12 LEAD, INITIAL    Collection Time: 12/03/20  9:15 AM   Result Value Ref Range    Ventricular Rate 122 BPM    Atrial Rate 122 BPM    P-R Interval 132 ms    QRS Duration 118 ms    Q-T Interval 334 ms    QTC Calculation (Bezet) 475 ms    Calculated P Axis 61 degrees    Calculated R Axis 101 degrees    Calculated T Axis -169 degrees    Diagnosis       Sinus tachycardia  Low voltage QRS  Right bundle branch block  T wave abnormality, consider lateral ischemia  When compared with ECG of 03-JUL-2018 09:25,  Questionable change in QRS axis  Nonspecific T wave abnormality, worse in Inferior leads  T wave inversion more evident in Anterolateral leads     OCCULT BLOOD, STOOL    Collection Time: 12/03/20 10:06 AM   Result Value Ref Range    Occult blood, stool Positive (A) NEG     LACTIC ACID    Collection Time: 12/03/20 10:06 AM   Result Value Ref Range    Lactic acid 5.0 (HH) 0.4 - 2.0 MMOL/L         Imaging:   Xr Chest Port    Result Date: 12/3/2020  Impression: Small right pleural effusion with underlying infiltrate. Assessment:     Active Problems:    GIB (gastrointestinal bleeding) (12/3/2020)           Plan:     1. UGIB: stat gi consult, npo, iv PPI. Trending CBC. Transfuse 2 units for now, will repeat cbc after transfusion, may need more. 2. Acute blood lost anemia with hypovolemic hypotension due to above: transfuse now, iv LR   Monitor BP. 3. H/o SVT: hold metoprolol due to low BP.   4. Alcohol abuse: she denied daily use, but possible more frequently as she admits. Low k, mag, and elevated INR, may due to malnutrition, alcohol abuse, CIWA protocal. Follow LFT, check liver US. 5. Hypomagnesia: iv mag 2 g, will follow  6. Hypokalemia: iv kcl 10meg x4.   7. Coagulopathy: INR 2, not on any ac. Possible due to malnutrition, alcohol abuse.  Give 10mg vit k and follow INR        Signed By: Dayday Roberts MD     December 3, 2020

## 2020-12-03 NOTE — PROGRESS NOTES
TRANSFER - OUT REPORT:    Verbal report given to kayleen rn(name) on Patricia Campo  being transferred to er9(unit) for routine post - op       Report consisted of patients Situation, Background, Assessment and   Recommendations(SBAR). Information from the following report(s) Procedure Summary was reviewed with the receiving nurse. Lines:   Peripheral IV 12/03/20 Left Forearm (Active)   Site Assessment Clean, dry, & intact 12/03/20 0715   Phlebitis Assessment 0 12/03/20 0715   Infiltration Assessment 0 12/03/20 0715   Dressing Status Clean, dry, & intact 12/03/20 0715   Dressing Type 4 X 4 12/03/20 0715   Hub Color/Line Status Pink 12/03/20 0715       Peripheral IV 12/03/20 Right Forearm (Active)        Opportunity for questions and clarification was provided.       Patient transported with:   [employer]

## 2020-12-03 NOTE — H&P
[address]  [address] [place] [address] [address]  [place], [address]   [phone]                                History and Physical     NAME: Mary Galicia   :  1950   MRN:  [id number]     HPI:  The patient was seen and examined. Past Surgical History:   Procedure Laterality Date    HX ORTHOPAEDIC      HX TONSILLECTOMY      HX WISDOM TEETH EXTRACTION       History reviewed. No pertinent past medical history. Social History     Tobacco Use    Smoking status: Never Smoker    Smokeless tobacco: Never Used   Substance Use Topics    Alcohol use: Yes     Frequency: Monthly or less     Drinks per session: 1 or 2     Binge frequency: Never     Comment: occasional wine    Drug use: No     No Known Allergies  History reviewed. No pertinent family history. Current Facility-Administered Medications   Medication Dose Route Frequency    0.9% sodium chloride infusion 250 mL  250 mL IntraVENous PRN    lactated Ringers infusion  125 mL/hr IntraVENous CONTINUOUS    pantoprazole (PROTONIX) 40 mg in 0.9% sodium chloride 10 mL injection  40 mg IntraVENous Q12H    potassium chloride 10 mEq in 100 ml IVPB  10 mEq IntraVENous Q1H    phytonadione (vitamin K1) (AQUA-MEPHYTON) 10 mg in 0.9% sodium chloride 50 mL IVPB  10 mg IntraVENous ONCE    0.9% sodium chloride infusion 250 mL  250 mL IntraVENous PRN     Current Outpatient Medications   Medication Sig    metoprolol succinate (TOPROL-XL) 25 mg XL tablet TAKE 1 TABLET BY MOUTH EVERY DAY AT NIGHT    magnesium oxide (MAG-OX) 400 mg tablet Take 1 Tab by mouth two (2) times a day.  cholecalciferol (VITAMIN D3) 1,000 unit tablet Take 1 Tab by mouth daily.  potassium chloride SR (K-TAB) 20 mEq tablet Take 1 Tab by mouth daily.  cranberry fruit extract (CRANBERRY CONCENTRATE PO) Take 1 Tab by mouth daily.  ascorbic acid, vitamin C, (VITAMIN C) 500 mg tablet Take 500 mg by mouth daily. PHYSICAL EXAM:  General: WD, WN.  Alert, cooperative, no acute distress    HEENT: NC, Atraumatic. PERRLA, EOMI. Anicteric sclerae. Lungs:  CTA Bilaterally. No Wheezing/Rhonchi/Rales. Heart:  Regular  rhythm,  No murmur, No Rubs, No Gallops  Abdomen: Soft, Non distended, Non tender. +Bowel sounds, no HSM  Extremities: No c/c/e  Neurologic:  CN 2-12 gi, Alert and oriented X 3. No acute neurological distress   Psych:   Good insight. Not anxious nor agitated. The heart, lungs and mental status were satisfactory for the administration of MAC sedation and for the procedure. Mallampati score: 2     The patient was counseled at length about the risks of clarice Covid-19 in the maciej-operative and post-operative states including the recovery window of their procedure. The patient was made aware that clarice Covid-19 after a surgical procedure may worsen their prognosis for recovering from the virus and lend to a higher morbidity and or mortality risk. The patient was given the options of postponing their procedure. All of the risks, benefits, and alternatives were discussed. The patient does wish to proceed with the procedure.       Assessment:   · Upper gi bleed    Plan:   · Endoscopic procedure : EGD  · MAC

## 2020-12-03 NOTE — ANESTHESIA POSTPROCEDURE EVALUATION
Post-Anesthesia Evaluation and Assessment    Patient: Garland Leone MRN: [id number]  SSN: [id number]    YOB: 1950  Age: 65 y.o. Sex: female      I have evaluated the patient and they are stable and ready for discharge from the PACU. Cardiovascular Function/Vital Signs  Visit Vitals  BP (!) 142/54   Pulse (!) 125   Temp 36.6 °C (97.8 °F)   Resp 23   Ht 5' 4\" (1.626 m)   Wt 48.5 kg (107 lb)   SpO2 94%   Breastfeeding No   BMI 18.37 kg/m²       Patient is status post MAC anesthesia for Procedure(s) with comments:  ESOPHAGOGASTRODUODENOSCOPY (EGD)  INJECTION  RESOLUTION CLIP - x4. Nausea/Vomiting: None    Postoperative hydration reviewed and adequate. Pain:  Pain Scale 1: Numeric (0 - 10) (12/03/20 1208)  Pain Intensity 1: 0 (12/03/20 1208)   Managed    Neurological Status: At baseline    Mental Status, Level of Consciousness: Alert and  oriented to person, place, and time    Pulmonary Status:   O2 Device: CO2 nasal cannula (12/03/20 1339)   Adequate oxygenation and airway patent    Complications related to anesthesia: None    Post-anesthesia assessment completed. No concerns    Signed By: Kristy Hayden MD     December 3, 2020              Procedure(s):  ESOPHAGOGASTRODUODENOSCOPY (EGD)  INJECTION  RESOLUTION CLIP. MAC    <[email or web address]>    INITIAL Post-op Vital signs: No vitals data found for the desired time range.

## 2020-12-03 NOTE — ED TRIAGE NOTES
Client from home, reports having multiple episodes of blood vomit. Client reports dark tarry stools. AXOX4. Denies pain or discomfort.

## 2020-12-03 NOTE — CONSULTS
[address]  [address] [facility] [address]  [phone]                     GI CONSULTATION NOTE      NAME:  Niesha Miller   :      MRN:   [id number]       Referring Physician: Dr. Enrique Fowler Date: 12/3/2020     Chief Complaint: GI Bleed    History of Present Illness:  Patient is a 65 y.o. who is seen in consultation at the request of Dr. Dewayne Claude for GI Bleed. Patient presented to emergency room from home via EMS with complaint of weakness and dizziness. Patient stated she has been vomiting bright red blood and having black stools for the past 2 days. Started with hematemesis with dark blood on Tuesday then developed bright red blood with more vomiting. Stated she was vomiting blood multiple times a day. Stated she has had several black loose stools, has developed a hemorrhoid. Also complaint of nausea. Denies abdominal pain, no fever, no chills, no chest pain, no shortness of of breath. Reports lost of appetite. Patient with past medical history of SVT, nonrheumatic mitral regurgitation, hypomagnesemia, hypokalemia, vitamin D deficiency. Denies smoking, no NSAID usage. Alcohol consumption 1-3 glasses of wine a week. Denies any previous EGD or colonoscopy. Denies any GI cancers or colon polyps in family. PMH:  No past medical history on file. PSH:  Past Surgical History:   Procedure Laterality Date    HX ORTHOPAEDIC      HX TONSILLECTOMY      HX WISDOM TEETH EXTRACTION         Allergies:  No Known Allergies    Home Medications:  Prior to Admission Medications   Prescriptions Last Dose Informant Patient Reported? Taking?   ascorbic acid, vitamin C, (VITAMIN C) 500 mg tablet   Yes No   Sig: Take 500 mg by mouth daily. cholecalciferol (VITAMIN D3) 1,000 unit tablet   No No   Sig: Take 1 Tab by mouth daily. cranberry fruit extract (CRANBERRY CONCENTRATE PO)   Yes No   Sig: Take 1 Tab by mouth daily.    magnesium oxide (MAG-OX) 400 mg tablet   No No   Sig: Take 1 Tab by mouth two (2) times a day. metoprolol succinate (TOPROL-XL) 25 mg XL tablet   No No   Sig: TAKE 1 TABLET BY MOUTH EVERY DAY AT NIGHT   potassium chloride SR (K-TAB) 20 mEq tablet   No No   Sig: Take 1 Tab by mouth daily. Facility-Administered Medications: None       Hospital Medications:  Current Facility-Administered Medications   Medication Dose Route Frequency    0.9% sodium chloride infusion 250 mL  250 mL IntraVENous PRN    lactated Ringers infusion  125 mL/hr IntraVENous CONTINUOUS    pantoprazole (PROTONIX) 80 mg in 0.9% sodium chloride 20 mL injection  80 mg IntraVENous ONCE    pantoprazole (PROTONIX) 40 mg in 0.9% sodium chloride 10 mL injection  40 mg IntraVENous Q12H    potassium chloride 10 mEq in 100 ml IVPB  10 mEq IntraVENous Q1H    phytonadione (vitamin K1) (AQUA-MEPHYTON) 10 mg in 0.9% sodium chloride 50 mL IVPB  10 mg IntraVENous ONCE     Current Outpatient Medications   Medication Sig    metoprolol succinate (TOPROL-XL) 25 mg XL tablet TAKE 1 TABLET BY MOUTH EVERY DAY AT NIGHT    magnesium oxide (MAG-OX) 400 mg tablet Take 1 Tab by mouth two (2) times a day.  cholecalciferol (VITAMIN D3) 1,000 unit tablet Take 1 Tab by mouth daily.  potassium chloride SR (K-TAB) 20 mEq tablet Take 1 Tab by mouth daily.  cranberry fruit extract (CRANBERRY CONCENTRATE PO) Take 1 Tab by mouth daily.  ascorbic acid, vitamin C, (VITAMIN C) 500 mg tablet Take 500 mg by mouth daily. Social History:  Social History     Tobacco Use    Smoking status: Never Smoker    Smokeless tobacco: Never Used   Substance Use Topics    Alcohol use: Yes     Frequency: Monthly or less     Drinks per session: 1 or 2     Binge frequency: Never     Comment: occasional wine       Family History:  No family history on file.     Review of Systems:    Constitutional: negative fever, negative chills, + weight loss  Eyes:   negative visual changes  ENT:   negative sore throat, tongue or lip swelling  Respiratory:  negative cough, negative dyspnea  Cards:  negative for chest pain, palpitations, lower extremity edema  GI:   See HPI  :  negative for frequency, dysuria  Integument:  negative for rash and pruritus  Heme:  negative for easy bruising and gum/nose bleeding  Musculoskel: negative for myalgias,  back pain and muscle weakness  Neuro: negative for headaches, dizziness, vertigo  Psych:  negative for feelings of anxiety, depression      Objective:     Patient Vitals for the past 8 hrs:   BP Temp Pulse Resp SpO2 Height Weight   12/03/20 1130 (!) 106/57  (!) 112 21 97 %     12/03/20 1100 (!) 106/55  (!) 116 18 92 %     12/03/20 1030 (!) 107/52  (!) 116 16 98 %     12/03/20 1027 (!) 105/55 98.5 °F (36.9 °C) (!) 114 20      12/03/20 1001 (!) 101/53 98.4 °F (36.9 °C) (!) 115 15 99 %     12/03/20 1000 (!) 101/53  (!) 116 18      12/03/20 0830 (!) 99/57  (!) 114 17 98 %     12/03/20 0800 (!) 98/53         12/03/20 0712 (!) 94/55  (!) 112 20 95 % 5' 4\" (1.626 m) 48.5 kg (107 lb)     12/03 0701 - 12/03 1900  In: 500 [I.V.:500]  Out: -   No intake/output data recorded. PHYSICAL EXAM:  General: WD, WN. Alert, cooperative, no acute distress, pale, thin  HEENT: NC, Atraumatic. PERRLA, EOMI. Anicteric sclerae. Lungs:  CTA Bilaterally. No Wheezing/Rhonchi/Rales. Heart:  Regular  rhythm,   Abdomen: Soft, Non distended, Non tender.  +Bowel sounds, no palpable masses  Extremities: No edema  Neurologic:  CN 2-12 gi, Alert and oriented X 3. No acute neurological distress   Psych:   Good insight. Not anxious nor agitated.     Data Review     Recent Labs     12/03/20  0759   WBC 6.7   HGB 4.5*   HCT 16.0*   *     Recent Labs     12/03/20  0759      K 2.9*      CO2 19*   BUN 20   CREA 0.62   *   CA 7.9*     Recent Labs     12/03/20  0759   AP 64   TP 5.5*   ALB 2.3*   GLOB 3.2     Recent Labs     12/03/20  0759   INR 2.0*   PTP 19.9*   APTT 23.7 Assessment:   Patient Active Problem List   Diagnosis Code    SVT [facility]ra[facility]ricular tachycardia) ([facility]) I47.1    Cachexia ([facility] [address] R64    GIB (gastrointestinal bleeding) K92.2            Plan:   Hematemesis/Melena/GI bleed:  -Hemoglobin on admission 4.5, receiving 2 unit of PRBC's now, STAT H &H  -Continue to monitor H&H  -IV Protonix BID  -Plans for EGD today for evaluation of gastric ulcer, AVM, etc.  -Anti-emetics   -Heme + stools    SVT, nonrheumatic mitral regurgitation, hypomagnesemia, hypokalemia:  -Chest x-ray: Impression: Small right pleural effusion with underlying infiltrate.  -Magnesium and potassium repletion per hospitalist  -IV resuscitation       Discuss with Dr. Merrill Caba

## 2020-12-03 NOTE — PROGRESS NOTES
TRANSFER - IN REPORT:    Verbal report received from ANGEL RN(name) on Michael Arthur  being received from ER 9(unit) for ordered procedure      Report consisted of patients Situation, Background, Assessment and   Recommendations(SBAR). Information from the following report(s) SBAR was reviewed with the receiving nurse. Opportunity for questions and clarification was provided. Assessment completed upon patients arrival to unit and care assumed.

## 2020-12-04 ENCOUNTER — APPOINTMENT (OUTPATIENT)
Dept: ULTRASOUND IMAGING | Age: 70
DRG: 381 | End: 2020-12-04
Attending: HOSPITALIST
Payer: MEDICARE

## 2020-12-04 LAB
ALBUMIN SERPL-MCNC: 2.3 G/DL (ref 3.5–5)
ALBUMIN/GLOB SERPL: 0.8 {RATIO} (ref 1.1–2.2)
ALP SERPL-CCNC: 67 U/L (ref 45–117)
ALT SERPL-CCNC: 20 U/L (ref 12–78)
ANION GAP SERPL CALC-SCNC: 11 MMOL/L (ref 5–15)
ANION GAP SERPL CALC-SCNC: 12 MMOL/L (ref 5–15)
AST SERPL-CCNC: 48 U/L (ref 15–37)
BASOPHILS # BLD: 0 K/UL (ref 0–0.1)
BASOPHILS NFR BLD: 0 % (ref 0–1)
BILIRUB SERPL-MCNC: 2.1 MG/DL (ref 0.2–1)
BUN SERPL-MCNC: 16 MG/DL (ref 6–20)
BUN SERPL-MCNC: 17 MG/DL (ref 6–20)
BUN/CREAT SERPL: 36 (ref 12–20)
BUN/CREAT SERPL: 40 (ref 12–20)
CALCIUM SERPL-MCNC: 7.4 MG/DL (ref 8.5–10.1)
CALCIUM SERPL-MCNC: 7.5 MG/DL (ref 8.5–10.1)
CHLORIDE SERPL-SCNC: 105 MMOL/L (ref 97–108)
CHLORIDE SERPL-SCNC: 105 MMOL/L (ref 97–108)
CO2 SERPL-SCNC: 23 MMOL/L (ref 21–32)
CO2 SERPL-SCNC: 24 MMOL/L (ref 21–32)
CREAT SERPL-MCNC: 0.42 MG/DL (ref 0.55–1.02)
CREAT SERPL-MCNC: 0.44 MG/DL (ref 0.55–1.02)
DIFFERENTIAL METHOD BLD: ABNORMAL
EOSINOPHIL # BLD: 0 K/UL (ref 0–0.4)
EOSINOPHIL NFR BLD: 0 % (ref 0–7)
ERYTHROCYTE [DISTWIDTH] IN BLOOD BY AUTOMATED COUNT: 22.5 % (ref 11.5–14.5)
ERYTHROCYTE [DISTWIDTH] IN BLOOD BY AUTOMATED COUNT: 22.9 % (ref 11.5–14.5)
ERYTHROCYTE [DISTWIDTH] IN BLOOD BY AUTOMATED COUNT: 23.8 % (ref 11.5–14.5)
GLOBULIN SER CALC-MCNC: 2.9 G/DL (ref 2–4)
GLUCOSE SERPL-MCNC: 104 MG/DL (ref 65–100)
GLUCOSE SERPL-MCNC: 109 MG/DL (ref 65–100)
HCT VFR BLD AUTO: 20.5 % (ref 35–47)
HCT VFR BLD AUTO: 27 % (ref 35–47)
HCT VFR BLD AUTO: 27.4 % (ref 35–47)
HCT VFR BLD AUTO: 27.9 % (ref 35–47)
HGB BLD-MCNC: 6.4 G/DL (ref 11.5–16)
HGB BLD-MCNC: 8.7 G/DL (ref 11.5–16)
HGB BLD-MCNC: 8.7 G/DL (ref 11.5–16)
HISTORY CHECKED?,CKHIST: NORMAL
IMM GRANULOCYTES # BLD AUTO: 0.1 K/UL (ref 0–0.04)
IMM GRANULOCYTES NFR BLD AUTO: 1 % (ref 0–0.5)
INR PPP: 1.5 (ref 0.9–1.1)
LYMPHOCYTES # BLD: 1.1 K/UL (ref 0.8–3.5)
LYMPHOCYTES NFR BLD: 10 % (ref 12–49)
MAGNESIUM SERPL-MCNC: 1.2 MG/DL (ref 1.6–2.4)
MCH RBC QN AUTO: 23.5 PG (ref 26–34)
MCH RBC QN AUTO: 24.9 PG (ref 26–34)
MCH RBC QN AUTO: 24.9 PG (ref 26–34)
MCHC RBC AUTO-ENTMCNC: 31.2 G/DL (ref 30–36.5)
MCHC RBC AUTO-ENTMCNC: 31.2 G/DL (ref 30–36.5)
MCHC RBC AUTO-ENTMCNC: 32.2 G/DL (ref 30–36.5)
MCV RBC AUTO: 75.4 FL (ref 80–99)
MCV RBC AUTO: 77.4 FL (ref 80–99)
MCV RBC AUTO: 79.7 FL (ref 80–99)
MONOCYTES # BLD: 0.7 K/UL (ref 0–1)
MONOCYTES NFR BLD: 6 % (ref 5–13)
NEUTS SEG # BLD: 9.1 K/UL (ref 1.8–8)
NEUTS SEG NFR BLD: 83 % (ref 32–75)
NRBC # BLD: 0 K/UL (ref 0–0.01)
NRBC BLD-RTO: 0 PER 100 WBC
PHOSPHATE SERPL-MCNC: 2.1 MG/DL (ref 2.6–4.7)
PLATELET # BLD AUTO: 100 K/UL (ref 150–400)
PLATELET # BLD AUTO: 100 K/UL (ref 150–400)
PLATELET # BLD AUTO: 105 K/UL (ref 150–400)
PLATELET COMMENTS,PCOM: ABNORMAL
PMV BLD AUTO: 10.2 FL (ref 8.9–12.9)
PMV BLD AUTO: 10.3 FL (ref 8.9–12.9)
POTASSIUM SERPL-SCNC: 3 MMOL/L (ref 3.5–5.1)
POTASSIUM SERPL-SCNC: 3 MMOL/L (ref 3.5–5.1)
PROT SERPL-MCNC: 5.2 G/DL (ref 6.4–8.2)
PROTHROMBIN TIME: 15.3 SEC (ref 9–11.1)
RBC # BLD AUTO: 2.72 M/UL (ref 3.8–5.2)
RBC # BLD AUTO: 3.49 M/UL (ref 3.8–5.2)
RBC # BLD AUTO: 3.5 M/UL (ref 3.8–5.2)
RBC MORPH BLD: ABNORMAL
SODIUM SERPL-SCNC: 140 MMOL/L (ref 136–145)
SODIUM SERPL-SCNC: 140 MMOL/L (ref 136–145)
WBC # BLD AUTO: 11 K/UL (ref 3.6–11)
WBC # BLD AUTO: 11 K/UL (ref 3.6–11)
WBC # BLD AUTO: 9.9 K/UL (ref 3.6–11)

## 2020-12-04 PROCEDURE — 74011250637 HC RX REV CODE- 250/637: Performed by: NURSE PRACTITIONER

## 2020-12-04 PROCEDURE — 74011250636 HC RX REV CODE- 250/636: Performed by: NURSE PRACTITIONER

## 2020-12-04 PROCEDURE — 36430 TRANSFUSION BLD/BLD COMPNT: CPT

## 2020-12-04 PROCEDURE — 85014 HEMATOCRIT: CPT

## 2020-12-04 PROCEDURE — 74011250636 HC RX REV CODE- 250/636: Performed by: INTERNAL MEDICINE

## 2020-12-04 PROCEDURE — 85610 PROTHROMBIN TIME: CPT

## 2020-12-04 PROCEDURE — 85027 COMPLETE CBC AUTOMATED: CPT

## 2020-12-04 PROCEDURE — 74011000250 HC RX REV CODE- 250: Performed by: HOSPITALIST

## 2020-12-04 PROCEDURE — 74011250637 HC RX REV CODE- 250/637: Performed by: INTERNAL MEDICINE

## 2020-12-04 PROCEDURE — 83735 ASSAY OF MAGNESIUM: CPT

## 2020-12-04 PROCEDURE — 36415 COLL VENOUS BLD VENIPUNCTURE: CPT

## 2020-12-04 PROCEDURE — 74011250636 HC RX REV CODE- 250/636: Performed by: HOSPITALIST

## 2020-12-04 PROCEDURE — 84100 ASSAY OF PHOSPHORUS: CPT

## 2020-12-04 PROCEDURE — P9016 RBC LEUKOCYTES REDUCED: HCPCS

## 2020-12-04 PROCEDURE — C9113 INJ PANTOPRAZOLE SODIUM, VIA: HCPCS | Performed by: HOSPITALIST

## 2020-12-04 PROCEDURE — 80053 COMPREHEN METABOLIC PANEL: CPT

## 2020-12-04 PROCEDURE — 65270000032 HC RM SEMIPRIVATE

## 2020-12-04 PROCEDURE — 76705 ECHO EXAM OF ABDOMEN: CPT

## 2020-12-04 RX ORDER — FOLIC ACID 1 MG/1
1 TABLET ORAL DAILY
Status: DISCONTINUED | OUTPATIENT
Start: 2020-12-04 | End: 2020-12-06 | Stop reason: HOSPADM

## 2020-12-04 RX ORDER — POTASSIUM CHLORIDE 750 MG/1
40 TABLET, FILM COATED, EXTENDED RELEASE ORAL
Status: COMPLETED | OUTPATIENT
Start: 2020-12-04 | End: 2020-12-04

## 2020-12-04 RX ORDER — POTASSIUM CHLORIDE 750 MG/1
20 TABLET, FILM COATED, EXTENDED RELEASE ORAL ONCE
Status: COMPLETED | OUTPATIENT
Start: 2020-12-04 | End: 2020-12-04

## 2020-12-04 RX ORDER — LANOLIN ALCOHOL/MO/W.PET/CERES
100 CREAM (GRAM) TOPICAL DAILY
Status: DISCONTINUED | OUTPATIENT
Start: 2020-12-04 | End: 2020-12-06 | Stop reason: HOSPADM

## 2020-12-04 RX ORDER — SODIUM CHLORIDE 9 MG/ML
250 INJECTION, SOLUTION INTRAVENOUS AS NEEDED
Status: DISCONTINUED | OUTPATIENT
Start: 2020-12-04 | End: 2020-12-06 | Stop reason: HOSPADM

## 2020-12-04 RX ORDER — PANTOPRAZOLE SODIUM 40 MG/1
40 TABLET, DELAYED RELEASE ORAL
Status: DISCONTINUED | OUTPATIENT
Start: 2020-12-04 | End: 2020-12-06 | Stop reason: HOSPADM

## 2020-12-04 RX ADMIN — SODIUM CHLORIDE 250 ML: 900 INJECTION, SOLUTION INTRAVENOUS at 01:50

## 2020-12-04 RX ADMIN — PANTOPRAZOLE SODIUM 40 MG: 40 INJECTION, POWDER, FOR SOLUTION INTRAVENOUS at 09:21

## 2020-12-04 RX ADMIN — PANTOPRAZOLE SODIUM 40 MG: 40 TABLET, DELAYED RELEASE ORAL at 16:53

## 2020-12-04 RX ADMIN — Medication 10 ML: at 22:00

## 2020-12-04 RX ADMIN — POTASSIUM CHLORIDE 40 MEQ: 750 TABLET, FILM COATED, EXTENDED RELEASE ORAL at 11:50

## 2020-12-04 RX ADMIN — POTASSIUM CHLORIDE 20 MEQ: 750 TABLET, FILM COATED, EXTENDED RELEASE ORAL at 16:53

## 2020-12-04 RX ADMIN — SODIUM CHLORIDE 500 ML: 900 INJECTION, SOLUTION INTRAVENOUS at 13:42

## 2020-12-04 RX ADMIN — Medication 10 ML: at 15:33

## 2020-12-04 RX ADMIN — FOLIC ACID 1 MG: 1 TABLET ORAL at 11:50

## 2020-12-04 RX ADMIN — Medication 100 MG: at 15:32

## 2020-12-04 NOTE — PROGRESS NOTES
Transition of Care Plan   RUR- Low  DISPOSITION: CM to follow for needs. Transportation:  By car at this point          First IM letter completed. Reason for Admission:   Gastro-bleed                   RUR Score:     14%                Plan for utilizing home health:      TBD/ patient is hoping not to have [facility], but will consider is needed. PCP: First and Last name:  kobe Chi list for PCP   Name of Practice:    Are you a current patient: Yes/No:    Approximate date of last visit:    Can you participate in a virtual visit with your PCP:                     Current Advanced Directive/Advance Care Plan: full code, no ACP in file                         Transition of Care Plan: This CM met with patient to explain CM role and to complete initial assessment. Patient explained that she lives with her  who she stated will transport patient home at [place].  is Elyse Ellis and is best reachable at [phone]. Patient explained that she is independent at home and does not use any DME. CM will need to follow for needs. Care Management Interventions  PCP Verified by CM: Yes  Palliative Care Criteria Met (RRAT>21 & CHF Dx)?: No  Mode of Transport at Discharge: (by car with )  Transition of Care Consult (CM Consult): (TBD/ patient hopes to return home without [facility] ,but willing to consider)  [place] Signup: No  Discharge Durable Medical Equipment: No  Health Maintenance Reviewed: Yes  Physical Therapy Consult: No  Occupational Therapy Consult: No  Current Support Network: Lives with Spouse, Own Home  Confirm Follow Up Transport: Family  Discharge Location  Discharge Placement: (TBD/ Cm will need to follow)    Laly Hammond  . now

## 2020-12-04 NOTE — PROGRESS NOTES
4616 Post transfusion H&H due at this time, patient is off the floor for abdominal ultrasound    0706 Patient returned to floor after ultrasound. H&H drawn    H&H 8.7, 27.0. Patient resting, denies any complaints. .SPO2 at 91-92%% on room. Patient declines oxygen use, Dr Khanh Minor rounding on patient states it's okay for patient to be off oxygen. BEDSIDE_VERBAL_RECORDED_WRITTEN:51411::\"Bedside\"} shift change report given to Leidy (oncoming nurse) by Rina Tracey (offgoing nurse). Report included the following information SBAR, Kardex and MAR.

## 2020-12-04 NOTE — PROGRESS NOTES
Physical Therapy Screening:    An [facility] screening referral was triggered for physical therapy based on results obtained during the nursing admission assessment. The patients chart was reviewed and the patient is appropriate for a skilled therapy evaluation if there is a decline in functional mobility from baseline. Please order a consult for physical therapy if you are in agreement and would like an evaluation to be completed. Thank you.     José Miguel Kaz, PT  3/17/18  Prior Level of Function/Home Situation: Ind without A device  Personal factors and/or comorbidities impacting plan of care:      Home Situation  Home Environment: Private residence  One/Two Story Residence: Two story  Living Alone: No  Support Systems: Child(aram), Family member(s), Friends \ neighbors  Patient Expects to be Discharged to[de-identified] Private residence  Current DME Used/Available at Home: None

## 2020-12-04 NOTE — PROGRESS NOTES
[address] [facility] Adult  Hospitalist Group                                                                                          Hospitalist Progress Note  Lauren Short DO [place]  Answering service: [phone] from in house phone        Date of Service:  2020  NAME:  Rachel Whitaker  :  1950  MRN:  [id number]      Admission Summary:   65 y.o. female who presents with vomiting blood and black tarry stools   Patient has a history of malnutrition, nonrheumatic mitral regurgitation, SVT, hypomagnesemia, hypokalemia, vitamin D deficiency. Sarath Erazo was admitted 2018 with E. coli UTI sepsis, SVT, and GI bleeding.  Patient has been vomiting red blood for 2 days.  EMS came and she refused transportation to the hospital initially. Emmy Nguyen had a spell possibly syncope and EMS was called again and this time she agreed to come to the hospital. Sarath Erazo denies having abdominal pain.  She admits to having melena. Last emesis and  BM was this morning  Patient does feel weak and dizzy.  EMS blood pressure was as low as 86/51.  It came up to 765 systolic with a saline bolus. Interval history / Subjective: Follow up GI bleed. Patient seen and examined. No acute complaints. Requests apple juice. Denies vomiting or BM overnight. H/H stable. Discussed EGD findings with patient and . Assessment & Plan:     GI bleed:   Acute blood loss anemia:   -GI consulted. S/p EGD 12/3 with severe ulcerated esophagitis, deep ulceration at GE junction (s/p epi and attempted clip but unsuccessful), gastric 5mm non bleeding ulcer treated with clip  -Change H/H q 12 hours, s/p 3 units pRBCs  -continue PPI  -will need EGD in 6 weeks  -if rebleeding, plan to consult IR  -advance to clear liquids     H/o SVT: hold metoprolol due to low BP. Alcohol abuse:  -suspect related to above  -continue CIWA  Hypomagnesia: repleted  Hypokalemia: repleted   Coagulopathy: INR 2, not on any ac.  Possible due to malnutrition, alcohol abuse.  -s/p vit K, repeat INR 1.5     Code status: full   DVT prophylaxis: holding    Care Plan discussed with: discussed in depth with patient and   Anticipated Disposition: Home w/Family  Anticipated Discharge: 24 hours to 48 hours     Hospital Problems  Date Reviewed: 7/18/2018          Codes Class Noted POA    GIB (gastrointestinal bleeding) ICD-10-CM: K92.2  ICD-9-CM: 578.9  12/3/2020 Unknown                Review of Systems:   Negative unless stated above      Vital Signs:    Last 24hrs VS reviewed since prior progress note. Most recent are:  Visit Vitals  /67 (BP 1 Location: Right arm, BP Patient Position: At rest)   Pulse (!) 104   Temp 98.1 °F (36.7 °C)   Resp 22   Ht 5' 4\" (1.626 m)   Wt 48.5 kg (107 lb)   SpO2 91%   Breastfeeding No   BMI 18.37 kg/m²         Intake/Output Summary (Last 24 hours) at 12/4/2020 1028  Last data filed at 12/4/2020 0439  Gross per 24 hour   Intake 1660 ml   Output 275 ml   Net 1385 ml        Physical Examination:     I had a face to face encounter with this patient and independently examined them on 12/4/2020 as outlined below:          Constitutional:  No acute distress, cooperative, pleasant, thin   ENT:  Oral mucosa moist, oropharynx benign. Resp:  CTA bilaterally. No wheezing/rhonchi/rales. No accessory muscle use   CV:  tachycardic, no murmurs, gallops, rubs    GI:  Soft, non distended, non tender.  normoactive bowel sounds, no hepatosplenomegaly     Musculoskeletal:  No edema, warm, 2+ pulses throughout    Neurologic:  Moves all extremities           Data Review:    Review and/or order of clinical lab test  Review and/or order of tests in the radiology section of CPT  Review and/or order of tests in the medicine section of CPT      Labs:     Recent Labs     12/04/20  0706 12/03/20  2347   WBC 11.0 11.0   HGB 8.7* 6.4*   HCT 27.0* 20.5*   * 100*     Recent Labs     12/04/20  0936 12/04/20  0706 12/03/20  0759    140 141   K 3.0* 3.0* 2.9*    105 104   CO2 24 23 19*   BUN 17 16 20   CREA 0.42* 0.44* 0.62   * 104* 147*   CA 7.4* 7.5* 7.9*   MG  --   --  1.1*     Recent Labs     12/04/20  0706 12/03/20  0759   ALT 20 19   AP 67 64   TBILI 2.1* 1.2*   TP 5.2* 5.5*   ALB 2.3* 2.3*   GLOB 2.9 3.2     Recent Labs     12/04/20  0706 12/03/20  0759   INR 1.5* 2.0*   PTP 15.3* 19.9*   APTT  --  23.7      No results for input(s): FE, TIBC, PSAT, FERR in the last 72 hours. Lab Results   Component Value Date/Time    Folate 3.7 (L) 03/14/2018 05:07 AM      No results for input(s): PH, PCO2, PO2 in the last 72 hours. No results for input(s): CPK, CKNDX, TROIQ in the last 72 hours.     No lab exists for component: CPKMB  Lab Results   Component Value Date/Time    Cholesterol, total 87 03/14/2018 05:07 AM    HDL Cholesterol 35 03/14/2018 05:07 AM    LDL, calculated 37 03/14/2018 05:07 AM    Triglyceride 75 03/14/2018 05:07 AM    CHOL/HDL Ratio 2.5 03/14/2018 05:07 AM     No results found for: [facility]  Lab Results   Component Value Date/Time    Color YELLOW/STRAW 03/13/2018 11:16 PM    Appearance CLOUDY (A) 03/13/2018 11:16 PM    Specific gravity 1.021 03/13/2018 11:16 PM    pH (UA) 6.0 03/13/2018 11:16 PM    Protein 100 (A) 03/13/2018 11:16 PM    Glucose 250 (A) 03/13/2018 11:16 PM    Ketone 15 (A) 03/13/2018 11:16 PM    Bilirubin NEGATIVE  03/13/2018 11:16 PM    Urobilinogen 0.2 03/13/2018 11:16 PM    Nitrites NEGATIVE  03/13/2018 11:16 PM    Leukocyte Esterase LARGE (A) 03/13/2018 11:16 PM    Epithelial cells FEW 03/13/2018 11:16 PM    Bacteria 4+ (A) 03/13/2018 11:16 PM    WBC >100 (H) 03/13/2018 11:16 PM    RBC >100 (H) 03/13/2018 11:16 PM         Medications Reviewed:     Current Facility-Administered Medications   Medication Dose Route Frequency    0.9% sodium chloride infusion 250 mL  250 mL IntraVENous PRN    0.9% sodium chloride infusion 250 mL  250 mL IntraVENous PRN    lactated Ringers infusion  125 mL/hr IntraVENous CONTINUOUS    sodium chloride (NS) flush 5-40 mL  5-40 mL IntraVENous Q8H    sodium chloride (NS) flush 5-40 mL  5-40 mL IntraVENous PRN    ondansetron (ZOFRAN) injection 4 mg  4 mg IntraVENous Q4H PRN    pantoprazole (PROTONIX) 40 mg in 0.9% sodium chloride 10 mL injection  40 mg IntraVENous Q12H    LORazepam (ATIVAN) injection 2 mg  2 mg IntraVENous Q1H PRN    0.9% sodium chloride infusion 250 mL  250 mL IntraVENous PRN     Current Outpatient Medications   Medication Sig    metoprolol succinate (TOPROL-XL) 25 mg XL tablet TAKE 1 TABLET BY MOUTH EVERY DAY AT NIGHT    magnesium oxide (MAG-OX) 400 mg tablet Take 1 Tab by mouth two (2) times a day.  cholecalciferol (VITAMIN D3) 1,000 unit tablet Take 1 Tab by mouth daily.  potassium chloride SR (K-TAB) 20 mEq tablet Take 1 Tab by mouth daily.  cranberry fruit extract (CRANBERRY CONCENTRATE PO) Take 1 Tab by mouth daily.  ascorbic acid, vitamin C, (VITAMIN C) 500 mg tablet Take 500 mg by mouth daily.      ______________________________________________________________________  EXPECTED LENGTH OF STAY: - - -  ACTUAL LENGTH OF STAY:          [address] [facility]

## 2020-12-04 NOTE — PROGRESS NOTES
1239: TRANSFER - OUT REPORT:    Verbal report given to RAEANN Ambrose(name) on Christiano Garcia  being transferred to [facility](unit) for routine progression of care       Report consisted of patients Situation, Background, Assessment and   Recommendations(SBAR). Information from the following report(s) SBAR, MAR and Recent Results was reviewed with the receiving nurse. Lines:   Peripheral IV 12/03/20 Left Forearm (Active)   Site Assessment Clean, dry, & intact 12/04/20 0900   Phlebitis Assessment 0 12/04/20 0900   Infiltration Assessment 0 12/04/20 0900   Dressing Status Clean, dry, & intact 12/04/20 0900   Dressing Type Transparent 12/04/20 0900   Hub Color/Line Status Patent 12/04/20 0900   Action Taken Open ports on tubing capped 12/04/20 0400       Peripheral IV 12/03/20 Right Forearm (Active)   Site Assessment Clean, dry, & intact 12/04/20 0900   Phlebitis Assessment 0 12/04/20 0900   Infiltration Assessment 0 12/04/20 0900   Dressing Status Clean, dry, & intact 12/04/20 0900   Dressing Type Transparent 12/04/20 0900   Hub Color/Line Status Patent; Infusing 12/04/20 0900        Opportunity for questions and clarification was provided.       Patient transported with:   [employer]

## 2020-12-04 NOTE — PROGRESS NOTES
TRANSFER - IN REPORT:    Verbal report received from Salima(name) on Waleska Jacobson  being received from [facility](unit) for routine progression of care      Report consisted of patients Situation, Background, Assessment and   Recommendations(SBAR). Information from the following report(s) SBAR was reviewed with the receiving nurse. Opportunity for questions and clarification was provided. Assessment completed upon patients arrival to unit and care assumed.

## 2020-12-04 NOTE — PROGRESS NOTES
0211 [facility] BEDSIDE_VERBAL_RECORDED_WRITTEN:45320::\"Bedside\"} shift change report given to Jhon Krishnamurthy RN (oncoming nurse) by Macario Harris (offgoing nurse). Report included the following information SBAR, Kardex, Intake/Output, MAR and Recent Results     Patient receiving blood at time of shift resport, patient tolerating blood transfusion without any concern. Call bell within reach     [phone] Transfusion completed, unable to complete blood on flowsheet at this as not permitted by system. No transfusion reaction noted. H&H to be drawn in 2hours per protocol and order. Patient is resting in bed without any concerns at this time, call bell within reach, instructed to use call bell for assistance.

## 2020-12-04 NOTE — PROGRESS NOTES
[address]  [address] [facility] 140 Carlos Chappell, [address]  [phone]                GI PROGRESS NOTE      NAME:   Kem Auguste   :    1950   MRN:    [id number]     Assessment/Plan   Hematemesis/Melena/GI bleed:  -Hemoglobin on admission 4.5, receiving 2 unit of PRBC's  -Hemoglobin 8.7 this AM, received another unit of PRBC's  -Continue to monitor H&H  -IV Protonix BID, transition to oral Protonix.   -Anti-emetics   -Heme + stools  -EGD 12/3/2020: Severe ulcerated esophagitis involving majority of esophagus. At [address] [place] [address] & [facility] junction there is deep ulceration with active bleeding from vessel. Surrounding mucosa is edematous and very friable. Injected with 8 cc of epinephrine with cessation of bleeding. Attempted to place one clip distal to this area, though as mucosa was so friable it dislodged. No bleeding at end of case. Medium sized hiatal hernia  Stomach:5 mm clean based ulceration in cardia, closed with 2 clips. Old blood in stomach. Edematous gastritis in antrum Duodenum/jejunum:mild duodenitis. GI will sign off at time, call if needing additional assistance, patient to continue oral PPI twice daily. Patient will need repeat EGD in 6 weeks  -Advance diet as tolerated      SVT, nonrheumatic mitral regurgitation, hypomagnesemia, hypokalemia:  -Chest x-ray: Impression: Small right pleural effusion with underlying infiltrate.  -Magnesium and potassium repletion per hospitalist  -IV resuscitation         Discuss with Dr. Rubens Albert     Patient Active Problem List   Diagnosis Code    SVT (supraventricular tachycardia) ([facility]) I47.1    Cachexia ([facility] R64    GIB (gastrointestinal bleeding) K92.2       Subjective:     Kem Auguste is a 65 y.o.  female Patient stated feeling much better. No nausea, no vomiting, no abdominal pain. No bowel movements overnight. Tolerating liquids      Objective:     VITALS:   Last 24hrs VS reviewed since prior hospitalist progress note.  Most recent are:  Visit Vitals  /67 (BP 1 Location: Right arm, BP Patient Position: At rest)   Pulse (!) 104   Temp 98.1 °F (36.7 °C)   Resp 22   Ht 5' 4\" (1.626 m)   Wt 48.5 kg (107 lb)   SpO2 91%   Breastfeeding No   BMI 18.37 kg/m²       Intake/Output Summary (Last 24 hours) at 12/4/2020 1023  Last data filed at 12/4/2020 0439  Gross per 24 hour   Intake 1660 ml   Output 275 ml   Net 1385 ml        PHYSICAL EXAM:  General:          WD, WN. Alert, cooperative, no acute distress, thin  HEENT:           NC, Atraumatic. PERRLA, EOMI. Anicteric sclerae. Lungs:             CTA Bilaterally. No Wheezing/Rhonchi/Rales. Heart:              Regular  rhythm,   Abdomen:        Soft, Non distended, Non tender.  +Bowel sounds, no palpable masses  Extremities:     No edema  Neurologic:      CN 2-12 gi, Alert and oriented X 3. No acute neurological distress   Psych:             Good insight. Not anxious nor agitated. Lab Data   Recent Results (from the past 12 hour(s))   CBC WITH AUTOMATED DIFF    Collection Time: 12/03/20 11:47 PM   Result Value Ref Range    WBC 11.0 3.6 - 11.0 K/uL    RBC 2.72 (L) 3.80 - 5.20 M/uL    HGB 6.4 (L) 11.5 - 16.0 g/dL    HCT 20.5 (L) 35.0 - 47.0 %    MCV 75.4 (L) 80.0 - 99.0 FL    MCH 23.5 (L) 26.0 - 34.0 PG    MCHC 31.2 30.0 - 36.5 g/dL    RDW 23.8 (H) 11.5 - 14.5 %    PLATELET 477 (L) 444 - 400 K/uL    MPV 10.3 8.9 - 12.9 FL    NRBC 0.0 0  WBC    ABSOLUTE NRBC 0.00 0.00 - 0.01 K/uL    NEUTROPHILS 83 (H) 32 - 75 %    LYMPHOCYTES 10 (L) 12 - 49 %    MONOCYTES 6 5 - 13 %    EOSINOPHILS 0 0 - 7 %    BASOPHILS 0 0 - 1 %    IMMATURE GRANULOCYTES 1 (H) 0.0 - 0.5 %    ABS. NEUTROPHILS 9.1 (H) 1.8 - 8.0 K/UL    ABS. LYMPHOCYTES 1.1 0.8 - 3.5 K/UL    ABS. MONOCYTES 0.7 0.0 - 1.0 K/UL    ABS. EOSINOPHILS 0.0 0.0 - 0.4 K/UL    ABS. BASOPHILS 0.0 0.0 - 0.1 K/UL    ABS. IMM.  GRANS. 0.1 (H) 0.00 - 0.04 K/UL    DF SMEAR SCANNED      PLATELET COMMENTS Large Platelets      RBC COMMENTS ANISOCYTOSIS  2+        RBC COMMENTS HYPOCHROMIA  1+        RBC COMMENTS MICROCYTOSIS  1+        RBC COMMENTS OVALOCYTES  1+        RBC COMMENTS POLYCHROMASIA  1+        RBC COMMENTS TARGET CELLS  PRESENT       RBC, ALLOCATE    Collection Time: 12/04/20  1:00 AM   Result Value Ref Range    HISTORY CHECKED? Historical check performed    CBC W/O DIFF    Collection Time: 12/04/20  7:06 AM   Result Value Ref Range    WBC 11.0 3.6 - 11.0 K/uL    RBC 3.49 (L) 3.80 - 5.20 M/uL    HGB 8.7 (L) 11.5 - 16.0 g/dL    HCT 27.0 (L) 35.0 - 47.0 %    MCV 77.4 (L) 80.0 - 99.0 FL    MCH 24.9 (L) 26.0 - 34.0 PG    MCHC 32.2 30.0 - 36.5 g/dL    RDW 22.5 (H) 11.5 - 14.5 %    PLATELET 452 (L) 483 - 400 K/uL    MPV 10.2 8.9 - 12.9 FL    NRBC 0.0 0  WBC    ABSOLUTE NRBC 0.00 0.00 - 6.99 K/uL   METABOLIC PANEL, COMPREHENSIVE    Collection Time: 12/04/20  7:06 AM   Result Value Ref Range    Sodium 140 136 - 145 mmol/L    Potassium 3.0 (L) 3.5 - 5.1 mmol/L    Chloride 105 97 - 108 mmol/L    CO2 23 21 - 32 mmol/L    Anion gap 12 5 - 15 mmol/L    Glucose 104 (H) 65 - 100 mg/dL    BUN 16 6 - 20 MG/DL    Creatinine 0.44 (L) 0.55 - 1.02 MG/DL    BUN/Creatinine ratio 36 (H) 12 - 20      GFR est AA >60 >60 ml/min/1.73m2    GFR est non-AA >60 >60 ml/min/1.73m2    Calcium 7.5 (L) 8.5 - 10.1 MG/DL    Bilirubin, total 2.1 (H) 0.2 - 1.0 MG/DL    ALT (SGPT) 20 12 - 78 U/L    AST (SGOT) 48 (H) 15 - 37 U/L    Alk.  phosphatase 67 45 - 117 U/L    Protein, total 5.2 (L) 6.4 - 8.2 g/dL    Albumin 2.3 (L) 3.5 - 5.0 g/dL    Globulin 2.9 2.0 - 4.0 g/dL    A-G Ratio 0.8 (L) 1.1 - 2.2     PROTHROMBIN TIME + INR    Collection Time: 12/04/20  7:06 AM   Result Value Ref Range    INR 1.5 (H) 0.9 - 1.1      Prothrombin time 15.3 (H) 9.0 - 14.4 sec   METABOLIC PANEL, BASIC    Collection Time: 12/04/20  9:36 AM   Result Value Ref Range    Sodium 140 136 - 145 mmol/L    Potassium 3.0 (L) 3.5 - 5.1 mmol/L    Chloride 105 97 - 108 mmol/L    CO2 24 21 - 32 mmol/L    Anion gap 11 5 - 15 mmol/L    Glucose 109 (H) 65 - 100 mg/dL    BUN 17 6 - 20 MG/DL    Creatinine 0.42 (L) 0.55 - 1.02 MG/DL    BUN/Creatinine ratio 40 (H) 12 - 20      GFR est AA >60 >60 ml/min/1.73m2    GFR est non-AA >60 >60 ml/min/1.73m2    Calcium 7.4 (L) 8.5 - 10.1 MG/DL         Medications: Reviewed  Boom Cerda, NP

## 2020-12-05 LAB
ABO + RH BLD: NORMAL
ANION GAP SERPL CALC-SCNC: 6 MMOL/L (ref 5–15)
BLD PROD TYP BPU: NORMAL
BLOOD GROUP ANTIBODIES SERPL: NORMAL
BPU ID: NORMAL
BUN SERPL-MCNC: 15 MG/DL (ref 6–20)
BUN/CREAT SERPL: 31 (ref 12–20)
CALCIUM SERPL-MCNC: 7.9 MG/DL (ref 8.5–10.1)
CHLORIDE SERPL-SCNC: 109 MMOL/L (ref 97–108)
CO2 SERPL-SCNC: 25 MMOL/L (ref 21–32)
CREAT SERPL-MCNC: 0.48 MG/DL (ref 0.55–1.02)
CROSSMATCH RESULT,%XM: NORMAL
ERYTHROCYTE [DISTWIDTH] IN BLOOD BY AUTOMATED COUNT: 23.5 % (ref 11.5–14.5)
GLUCOSE SERPL-MCNC: 94 MG/DL (ref 65–100)
HCT VFR BLD AUTO: 26.6 % (ref 35–47)
HCT VFR BLD AUTO: 26.7 % (ref 35–47)
HGB BLD-MCNC: 8.3 G/DL (ref 11.5–16)
MCH RBC QN AUTO: 24.8 PG (ref 26–34)
MCHC RBC AUTO-ENTMCNC: 31.1 G/DL (ref 30–36.5)
MCV RBC AUTO: 79.7 FL (ref 80–99)
NRBC # BLD: 0 K/UL (ref 0–0.01)
NRBC BLD-RTO: 0 PER 100 WBC
PLATELET # BLD AUTO: 124 K/UL (ref 150–400)
PMV BLD AUTO: 10.9 FL (ref 8.9–12.9)
POTASSIUM SERPL-SCNC: 3.6 MMOL/L (ref 3.5–5.1)
RBC # BLD AUTO: 3.35 M/UL (ref 3.8–5.2)
SODIUM SERPL-SCNC: 140 MMOL/L (ref 136–145)
SPECIMEN EXP DATE BLD: NORMAL
STATUS OF UNIT,%ST: NORMAL
UNIT DIVISION, %UDIV: 0
WBC # BLD AUTO: 6.3 K/UL (ref 3.6–11)

## 2020-12-05 PROCEDURE — 80048 BASIC METABOLIC PNL TOTAL CA: CPT

## 2020-12-05 PROCEDURE — 65270000032 HC RM SEMIPRIVATE

## 2020-12-05 PROCEDURE — 74011250636 HC RX REV CODE- 250/636: Performed by: INTERNAL MEDICINE

## 2020-12-05 PROCEDURE — 85027 COMPLETE CBC AUTOMATED: CPT

## 2020-12-05 PROCEDURE — 74011250637 HC RX REV CODE- 250/637: Performed by: INTERNAL MEDICINE

## 2020-12-05 PROCEDURE — 36415 COLL VENOUS BLD VENIPUNCTURE: CPT

## 2020-12-05 PROCEDURE — 85014 HEMATOCRIT: CPT

## 2020-12-05 PROCEDURE — 74011250637 HC RX REV CODE- 250/637: Performed by: NURSE PRACTITIONER

## 2020-12-05 RX ORDER — SODIUM CHLORIDE 9 MG/ML
75 INJECTION, SOLUTION INTRAVENOUS CONTINUOUS
Status: DISCONTINUED | OUTPATIENT
Start: 2020-12-05 | End: 2020-12-06

## 2020-12-05 RX ORDER — OMEPRAZOLE 40 MG/1
40 CAPSULE, DELAYED RELEASE ORAL 2 TIMES DAILY
Qty: 60 CAP | Refills: 1 | Status: SHIPPED | OUTPATIENT
Start: 2020-12-05 | End: 2020-12-28

## 2020-12-05 RX ORDER — METOPROLOL SUCCINATE 25 MG/1
25 TABLET, EXTENDED RELEASE ORAL DAILY
Status: DISCONTINUED | OUTPATIENT
Start: 2020-12-05 | End: 2020-12-06 | Stop reason: HOSPADM

## 2020-12-05 RX ADMIN — Medication 10 ML: at 06:43

## 2020-12-05 RX ADMIN — PANTOPRAZOLE SODIUM 40 MG: 40 TABLET, DELAYED RELEASE ORAL at 06:43

## 2020-12-05 RX ADMIN — METOPROLOL SUCCINATE 25 MG: 25 TABLET, EXTENDED RELEASE ORAL at 16:47

## 2020-12-05 RX ADMIN — MULTIPLE VITAMINS W/ MINERALS TAB 1 TABLET: TAB at 09:13

## 2020-12-05 RX ADMIN — PANTOPRAZOLE SODIUM 40 MG: 40 TABLET, DELAYED RELEASE ORAL at 16:47

## 2020-12-05 RX ADMIN — Medication 100 MG: at 09:14

## 2020-12-05 RX ADMIN — FOLIC ACID 1 MG: 1 TABLET ORAL at 09:14

## 2020-12-05 RX ADMIN — Medication 10 ML: at 20:47

## 2020-12-05 RX ADMIN — SODIUM CHLORIDE 250 ML: 900 INJECTION, SOLUTION INTRAVENOUS at 09:15

## 2020-12-05 NOTE — PROGRESS NOTES
8:46 PM: Patient has ambulated twice to bathroom during shift. Had 2 small loose BMs, no bright red blood noted. Voiding appropriately and no complaints of pain or discomfort. Tolerating diet well. Bedside and Verbal shift change report given to Tanya Sung RN (oncoming nurse) by Navjot Edwards RN (offgoing nurse). Report included the following information SBAR, Kardex, Intake/Output, MAR and Recent Results.

## 2020-12-05 NOTE — PROGRESS NOTES
Spiritual Care Assessment/Progress Note  [facility]      NAME: Bren Spain      MRN: [id number]  AGE: 65 y.o.  SEX: female  Protestant Affiliation: Episcopalian   Language: English     12/5/2020     Total Time (in minutes): 28     Spiritual Assessment begun in [employer] [facility] through conversation with:         [x]Patient        [x] Family    [] Friend(s)        Reason for Consult: Advance medical directive consult     Spiritual beliefs: (Please include comment if needed)     [] Identifies with a asaf tradition:         [] Supported by a asaf community:            [] Claims no spiritual orientation:           [] Seeking spiritual identity:                [] Adheres to an individual form of spirituality:           [x] Not able to assess:                           Identified resources for coping:      [] Prayer                               [] Music                  [] Guided Imagery     [x] Family/friends                 [] Pet visits     [] Devotional reading                         [] Unknown     [] Other:                                             Interventions offered during this visit: (See comments for more details)    Patient Interventions: Advance medical directive consult, Affirmation of emotions/emotional suffering     Family/Friend(s): Advance medical directive consult     Plan of Care:     [] Support spiritual and/or cultural needs    [x] Support AMD and/or advance care planning process      [] Support grieving process   [] Coordinate Rites and/or Rituals    [] Coordination with community clergy   [] No spiritual needs identified at this time   [] Detailed Plan of Care below (See Comments)  [] Make referral to Music Therapy  [] Make referral to Pet Therapy     [] Make referral to Addiction services  [] Make referral to [facility]  [] Make referral to Spiritual Care Partner  [] No future visits requested        [x] Follow up upon further referrals     Visited pt in response to an In-Basket request to assist with an Advance Medical Directive (AMD). Gave an overview of the document to patient, allowing opportunity for questions and discussion. Pt's  Ana Maria Enriquez was present in the room for the discussion. Pt chose not to complete an AMD at this time. She was encouraged to have spiritual care services paged if she has additional questions or if she later chooses to actually complete an AMD.    Advised nurse to contact [facility] for any further referrals.     Chaplain Ybarra MDiv, MS, [facility]  [phone]

## 2020-12-05 NOTE — ACP (ADVANCE CARE PLANNING)
Visited pt in response to an In-Basket request to assist with an Advance Medical Directive (AMD). Gave an overview of the document to patient, allowing opportunity for questions and discussion. Pt's  Zachery Canseco was present in the room for the discussion. Pt chose not to complete an AMD at this time. She was encouraged to have spiritual care services paged if she has additional questions or if she later chooses to actually complete an AMD.    Advised nurse to contact [facility] for any further referrals.     Chaplain Porter MDiv, MS, [facility]  [phone]

## 2020-12-05 NOTE — PROGRESS NOTES
Bedside and Verbal shift change report given to Zachary Owens RN (oncoming nurse) by Kem Membreno RN (offgoing nurse). Report included the following information SBAR, Kardex, ED Summary, Intake/Output, MAR and Recent Results.

## 2020-12-05 NOTE — PROGRESS NOTES
[facility] Adult  Hospitalist Group                                                                                          Hospitalist Progress Note  Lauren BainsDO  Answering service: [phone] from in house phone        Date of Service:  2020  NAME:  Rayshawn Joe  :  1950  MRN:  [id number]      Admission Summary:   65 y.o. female who presents with vomiting blood and black tarry stools   Patient has a history of malnutrition, nonrheumatic mitral regurgitation, SVT, hypomagnesemia, hypokalemia, vitamin D deficiency. Caroline Caldwell was admitted 2018 with E. coli UTI sepsis, SVT, and GI bleeding.  Patient has been vomiting red blood for 2 days.  EMS came and she refused transportation to the hospital initially. Bhavik Crawford had a spell possibly syncope and EMS was called again and this time she agreed to come to the hospital. Caroline Caldwell denies having abdominal pain.  She admits to having melena. Last emesis and  BM was this morning  Patient does feel weak and dizzy.  EMS blood pressure was as low as 86/51.  It came up to 353 systolic with a saline bolus. Interval history / Subjective: Follow up GI bleed. Patient seen and examined. No acute complaints. Initial plan for possible discharge today but patient with worsening tachycardia. H/H stable     Assessment & Plan:     GI bleed:   Acute blood loss anemia:   -GI consulted.  S/p EGD 12/3 with severe ulcerated esophagitis, deep ulceration at GE junction (s/p epi and attempted clip but unsuccessful), gastric 5mm non bleeding ulcer treated with clip  -Change H/H q 12 hours, s/p 3 units pRBCs  -continue PPI BID  -will need EGD in 6 weeks  -if rebleeding, plan to consult IR  -continue GI lite diet    Tachycardia:   -suspect due to volume depletion and acute bleed  -initiate IVFs  -reinitiate home metoprolol     H/o SVT: home metoprolol   Alcohol use:  -suspect related to above  -continue CIWA  Hypomagnesia: repleted  Hypokalemia: repleted   Coagulopathy: INR 2, not on any ac. Possible due to malnutrition, alcohol use.  -s/p vit K, repeat INR 1.5     Code status: full   DVT prophylaxis: holding    Care Plan discussed with: family   Anticipated Disposition: Home w/Family  Anticipated Discharge: 24 hours to 48 hours     Hospital Problems  Date Reviewed: 7/18/2018          Codes Class Noted POA    GIB (gastrointestinal bleeding) ICD-10-CM: K92.2  ICD-9-CM: 578.9  12/3/2020 Unknown                Review of Systems:   Negative unless stated above      Vital Signs:    Last 24hrs VS reviewed since prior progress note. Most recent are:  Visit Vitals  /69   Pulse (!) 130   Temp 98.8 °F (37.1 °C)   Resp 16   Ht 5' 4\" (1.626 m)   Wt 48.5 kg (107 lb)   SpO2 (!) 87%   Breastfeeding No   BMI 18.37 kg/m²       No intake or output data in the 24 hours ending 12/05/20 4569     Physical Examination:     I had a face to face encounter with this patient and independently examined them on 12/5/2020 as outlined below:          Constitutional:  No acute distress, cooperative, pleasant, thin   ENT:  Oral mucosa moist, oropharynx benign. Resp:  CTA bilaterally. No wheezing/rhonchi/rales. No accessory muscle use   CV:  tachycardic, no murmurs, gallops, rubs    GI:  Soft, non distended, non tender.  normoactive bowel sounds, no hepatosplenomegaly     Musculoskeletal:  No edema, warm, 2+ pulses throughout    Neurologic:  Moves all extremities           Data Review:    Review and/or order of clinical lab test  Review and/or order of tests in the radiology section of CPT  Review and/or order of tests in the medicine section of CPT      Labs:     Recent Labs     12/05/20  0642 12/04/20  1900 12/04/20  1129   WBC 6.3  --  9.9   HGB 8.3*  --  8.7*   HCT 26.7*  26.6* 27.4* 27.9*   *  --  100*     Recent Labs     12/05/20  0645 12/04/20  0936 12/04/20  0706 12/03/20  0759    140 140 141   K 3.6 3.0* 3.0* 2.9*   * 105 105 104   CO2 25 24 23 19*   BUN 15 17 16 20   CREA 0.48* 0.42* 0.44* 0.62   GLU 94 109* 104* 147*   CA 7.9* 7.4* 7.5* 7.9*   MG  --  1.2*  --  1.1*   PHOS  --  2.1*  --   --      Recent Labs     12/04/20  0706 12/03/20  0759   ALT 20 19   AP 67 64   TBILI 2.1* 1.2*   TP 5.2* 5.5*   ALB 2.3* 2.3*   GLOB 2.9 3.2     Recent Labs     12/04/20  0706 12/03/20  0759   INR 1.5* 2.0*   PTP 15.3* 19.9*   APTT  --  23.7      No results for input(s): FE, TIBC, PSAT, FERR in the last 72 hours. Lab Results   Component Value Date/Time    Folate 3.7 (L) 03/14/2018 05:07 AM      No results for input(s): PH, PCO2, PO2 in the last 72 hours. No results for input(s): CPK, CKNDX, TROIQ in the last 72 hours.     No lab exists for component: CPKMB  Lab Results   Component Value Date/Time    Cholesterol, total 87 03/14/2018 05:07 AM    HDL Cholesterol 35 03/14/2018 05:07 AM    LDL, calculated 37 03/14/2018 05:07 AM    Triglyceride 75 03/14/2018 05:07 AM    CHOL/HDL Ratio 2.5 03/14/2018 05:07 AM     No results found for: [facility]  Lab Results   Component Value Date/Time    Color YELLOW/STRAW 03/13/2018 11:16 PM    Appearance CLOUDY (A) 03/13/2018 11:16 PM    Specific gravity 1.021 03/13/2018 11:16 PM    pH (UA) 6.0 03/13/2018 11:16 PM    Protein 100 (A) 03/13/2018 11:16 PM    Glucose 250 (A) 03/13/2018 11:16 PM    Ketone 15 (A) 03/13/2018 11:16 PM    Bilirubin NEGATIVE  03/13/2018 11:16 PM    Urobilinogen 0.2 03/13/2018 11:16 PM    Nitrites NEGATIVE  03/13/2018 11:16 PM    Leukocyte Esterase LARGE (A) 03/13/2018 11:16 PM    Epithelial cells FEW 03/13/2018 11:16 PM    Bacteria 4+ (A) 03/13/2018 11:16 PM    WBC >100 (H) 03/13/2018 11:16 PM    RBC >100 (H) 03/13/2018 11:16 PM         Medications Reviewed:     Current Facility-Administered Medications   Medication Dose Route Frequency    0.9% sodium chloride infusion  75 mL/hr IntraVENous CONTINUOUS    0.9% sodium chloride infusion 250 mL  250 mL IntraVENous PRN    pantoprazole (PROTONIX) tablet 40 mg  40 mg Oral ACB&D    thiamine HCL (B-1) tablet 100 mg  100 mg Oral DAILY    folic acid (FOLVITE) tablet 1 mg  1 mg Oral DAILY    multivitamin, tx-iron-ca-min (THERA-M w/ IRON) tablet 1 Tab  1 Tab Oral DAILY    influenza vaccine 2020-21 (6 mos+)(PF) (FLUARIX/FLULAVAL/FLUZONE QUAD) injection 0.5 mL  0.5 mL IntraMUSCular PRIOR TO DISCHARGE    0.9% sodium chloride infusion 250 mL  250 mL IntraVENous PRN    sodium chloride (NS) flush 5-40 mL  5-40 mL IntraVENous Q8H    sodium chloride (NS) flush 5-40 mL  5-40 mL IntraVENous PRN    ondansetron (ZOFRAN) injection 4 mg  4 mg IntraVENous Q4H PRN    LORazepam (ATIVAN) injection 2 mg  2 mg IntraVENous Q1H PRN    0.9% sodium chloride infusion 250 mL  250 mL IntraVENous PRN     ______________________________________________________________________  EXPECTED LENGTH OF STAY: 3d 4h  ACTUAL LENGTH OF STAY:          [address] Dallas Hong DO

## 2020-12-06 VITALS
TEMPERATURE: 98.5 F | RESPIRATION RATE: 16 BRPM | HEIGHT: 64 IN | WEIGHT: 107 LBS | HEART RATE: 95 BPM | OXYGEN SATURATION: 94 % | BODY MASS INDEX: 18.27 KG/M2 | SYSTOLIC BLOOD PRESSURE: 95 MMHG | DIASTOLIC BLOOD PRESSURE: 60 MMHG

## 2020-12-06 LAB
ANION GAP SERPL CALC-SCNC: 7 MMOL/L (ref 5–15)
BUN SERPL-MCNC: 17 MG/DL (ref 6–20)
BUN/CREAT SERPL: 38 (ref 12–20)
CALCIUM SERPL-MCNC: 7.3 MG/DL (ref 8.5–10.1)
CHLORIDE SERPL-SCNC: 108 MMOL/L (ref 97–108)
CO2 SERPL-SCNC: 26 MMOL/L (ref 21–32)
CREAT SERPL-MCNC: 0.45 MG/DL (ref 0.55–1.02)
ERYTHROCYTE [DISTWIDTH] IN BLOOD BY AUTOMATED COUNT: 24 % (ref 11.5–14.5)
GLUCOSE SERPL-MCNC: 90 MG/DL (ref 65–100)
HCT VFR BLD AUTO: 25.3 % (ref 35–47)
HGB BLD-MCNC: 7.8 G/DL (ref 11.5–16)
MAGNESIUM SERPL-MCNC: 1.1 MG/DL (ref 1.6–2.4)
MCH RBC QN AUTO: 24.5 PG (ref 26–34)
MCHC RBC AUTO-ENTMCNC: 30.8 G/DL (ref 30–36.5)
MCV RBC AUTO: 79.3 FL (ref 80–99)
NRBC # BLD: 0 K/UL (ref 0–0.01)
NRBC BLD-RTO: 0 PER 100 WBC
PHOSPHATE SERPL-MCNC: 2.2 MG/DL (ref 2.6–4.7)
PLATELET # BLD AUTO: 108 K/UL (ref 150–400)
PMV BLD AUTO: 10.4 FL (ref 8.9–12.9)
POTASSIUM SERPL-SCNC: 3.4 MMOL/L (ref 3.5–5.1)
RBC # BLD AUTO: 3.19 M/UL (ref 3.8–5.2)
SODIUM SERPL-SCNC: 141 MMOL/L (ref 136–145)
WBC # BLD AUTO: 4.1 K/UL (ref 3.6–11)

## 2020-12-06 PROCEDURE — 36415 COLL VENOUS BLD VENIPUNCTURE: CPT

## 2020-12-06 PROCEDURE — 74011250637 HC RX REV CODE- 250/637: Performed by: NURSE PRACTITIONER

## 2020-12-06 PROCEDURE — 85027 COMPLETE CBC AUTOMATED: CPT

## 2020-12-06 PROCEDURE — 74011250637 HC RX REV CODE- 250/637: Performed by: INTERNAL MEDICINE

## 2020-12-06 PROCEDURE — 83735 ASSAY OF MAGNESIUM: CPT

## 2020-12-06 PROCEDURE — 84100 ASSAY OF PHOSPHORUS: CPT

## 2020-12-06 PROCEDURE — 80048 BASIC METABOLIC PNL TOTAL CA: CPT

## 2020-12-06 RX ORDER — POTASSIUM CHLORIDE 750 MG/1
20 TABLET, FILM COATED, EXTENDED RELEASE ORAL
Status: COMPLETED | OUTPATIENT
Start: 2020-12-06 | End: 2020-12-06

## 2020-12-06 RX ADMIN — PANTOPRAZOLE SODIUM 40 MG: 40 TABLET, DELAYED RELEASE ORAL at 06:37

## 2020-12-06 RX ADMIN — Medication 100 MG: at 09:14

## 2020-12-06 RX ADMIN — FOLIC ACID 1 MG: 1 TABLET ORAL at 09:15

## 2020-12-06 RX ADMIN — MULTIPLE VITAMINS W/ MINERALS TAB 1 TABLET: TAB at 09:14

## 2020-12-06 RX ADMIN — Medication 10 ML: at 06:37

## 2020-12-06 RX ADMIN — POTASSIUM CHLORIDE 20 MEQ: 750 TABLET, FILM COATED, EXTENDED RELEASE ORAL at 09:14

## 2020-12-06 NOTE — PROGRESS NOTES
Problem: Pressure Injury - Risk of  Goal: *Prevention of pressure injury  Description: Document Maximo Scale and appropriate interventions in the flowsheet.   Outcome: Resolved/Met     Problem: Patient Education: Go to Patient Education Activity  Goal: Patient/Family Education  Outcome: Resolved/Met

## 2020-12-06 NOTE — PROGRESS NOTES
Bedside and Verbal shift change report given to [facility], RN (oncoming nurse) by Robbie Sánchez RN (offgoing nurse). Report included the following information SBAR, Kardex, ED Summary, Intake/Output, MAR and Recent Results.

## 2020-12-06 NOTE — PROGRESS NOTES
Patient discharged home with . Reviewed discharge instructions and home medications. Patient verbalized understanding.

## 2020-12-06 NOTE — DISCHARGE SUMMARY
Discharge Summary       PATIENT ID: Guido Oliveros  MRN: [id number]   YOB: 1950    DATE OF ADMISSION: 12/3/2020  7:02 AM    DATE OF DISCHARGE: 12/6/2020  PRIMARY CARE PROVIDER: None     ATTENDING PHYSICIAN: Jerome Bellamy DO  DISCHARGING PROVIDER: Jerome Bellamy DO    To contact this individual call [phone] and ask the  to page. If unavailable ask to be transferred the Adult Hospitalist Department. CONSULTATIONS: IP CONSULT TO GASTROENTEROLOGY    PROCEDURES/SURGERIES: Procedure(s) with comments:  ESOPHAGOGASTRODUODENOSCOPY (EGD)  INJECTION  RESOLUTION CLIP - x4    [address] COURSE:     68-year-old female with past medical history SVT presenting to [facility] with hematemesis and melena x2 days. She was admitted for further evaluation. GI consulted. She underwent EGD on 12/3 and demonstrated severe ulcerated esophagitis, deep ulceration at GE junction (s/p epi and attempted clip but unsuccessful), gastric 5mm non bleeding ulcer treated with clip. She received 3 units PRBCs during hospitalization. Patient improved postoperatively. She was stable to discharge and instructed to continue PPI twice daily, follow-up with GI in 6 weeks for repeat EGD, and reduce alcohol intake. Patient and her  voiced understanding agree with the discharge. Endoscopy 12/3/2020:  Esophagus: severe ulcerated esophagitis involving majority of esophagus. At [address] [facility] junction there is deep ulceration with active bleeding from vessel. Surrounding mucosa is edematous and very friable. Injected with 8 cc of epinephrine with cessation of bleeding. Attempted to place one clip distal to this area, though as mucosa was so friable it dislodged. No bleeding at end of case. Medium sized hiatal hernia  Stomach:5 mm clean based ulceration in cardia, closed with 2 clips. Old blood in stomach.  Edematous gastritis in antrum  Duodenum/jejunum:mild duodenitis         DISCHARGE DIAGNOSES / PLAN:      GI bleed:   Acute blood loss anemia:   -GI consulted. S/p EGD 12/3 with severe ulcerated esophagitis, deep ulceration at GE junction (s/p epi and attempted clip but unsuccessful), gastric 5mm non bleeding ulcer treated with clip  -s/p 3 units pRBCs  -continue PPI BID  -will need EGD in 6 weeks     Tachycardia: Resolved  -reinitiate home metoprolol     H/o SVT: home metoprolol   Alcohol use:  -Possibly contributing to GI findings  -No evidence of withdrawal during hospitalization  Hypomagnesia: repleted  Hypokalemia: repleted   Coagulopathy: INR 2, not on any anticoagulation. Possible due to malnutrition, alcohol use.  -s/p vit K, repeat INR 1.5      ADDITIONAL CARE RECOMMENDATIONS:     New medications:   1. Omeprazole. Take twice daily until you follow up with Dr. Donna Hoffman    Return to the hospital for any recurrent bleeding. Stop alcohol intake.     PENDING TEST RESULTS:   At the time of discharge the following test results are still pending: none    FOLLOW UP APPOINTMENTS:    Follow-up Information     Follow up With Specialties Details Why Contact Info    Flora Antonio MD Gastroenterology  Please schedule appointment to repeat EGD (endoscopy) in 6 weeks [address]      None    None ([phone]) Patient stated that they have no PCP             DISCHARGE MEDICATIONS:  Discharge Medication List as of 12/6/2020 10:15 AM      START taking these medications    Details   omeprazole (PRILOSEC) 40 mg capsule Take 1 Cap by mouth two (2) times a day., Normal, Disp-60 Cap,R-1         CONTINUE these medications which have NOT CHANGED    Details   metoprolol succinate (TOPROL-XL) 25 mg XL tablet TAKE 1 TABLET BY MOUTH EVERY DAY AT NIGHT, Normal, Disp-30 Tab,R-2      magnesium oxide (MAG-OX) 400 mg tablet Take 1 Tab by mouth two (2) times a day., Normal, Disp-180 Tab,R-3      cholecalciferol (VITAMIN D3) 1,000 unit tablet Take 1 Tab by mouth daily., Normal, Disp-90 Tab, R-3      potassium chloride SR (K-TAB) 20 mEq tablet Take 1 Tab by mouth daily. , Print, Disp-30 Tab, R-2      cranberry fruit extract (CRANBERRY CONCENTRATE PO) Take 1 Tab by mouth daily. , Historical Med      ascorbic acid, vitamin C, (VITAMIN C) 500 mg tablet Take 500 mg by mouth daily. , Historical Med               NOTIFY YOUR PHYSICIAN FOR ANY OF THE FOLLOWING:   Fever over 101 degrees for 24 hours. Chest pain, shortness of breath, fever, chills, nausea, vomiting, diarrhea, change in mentation, falling, weakness, bleeding. Severe pain or pain not relieved by medications. Or, any other signs or symptoms that you may have questions about. DISPOSITION:   x Home With:   OT  PT  HH  RN       Long term SNF/Inpatient Rehab    Independent/assisted living    Hospice    Other:       PATIENT CONDITION AT DISCHARGE:     Functional status    Poor     Deconditioned    x Independent      Cognition   x  Lucid     Forgetful     Dementia      Catheters/lines (plus indication)    [place]     PICC     PEG    x None      Code status    x Full code     DNR      PHYSICAL EXAMINATION AT DISCHARGE:  Constitutional:  No acute distress, cooperative, pleasant, thin   ENT:  Oral mucosa moist, oropharynx benign. Resp:  CTA bilaterally. No wheezing/rhonchi/rales. No accessory muscle use   CV:  RRR, no murmurs, gallops, rubs    GI:  Soft, non distended, non tender.  normoactive bowel sounds, no hepatosplenomegaly     Musculoskeletal:  No edema, warm, 2+ pulses throughout    Neurologic:  Moves all extremities                       CHRONIC MEDICAL DIAGNOSES:  Problem List as of 12/6/2020 Date Reviewed: 7/18/2018          Codes Class Noted - Resolved    GIB (gastrointestinal bleeding) ICD-10-CM: K92.2  ICD-9-CM: 578.9  12/3/2020 - Present        [place] ([facility] ICD-10-CM: R64  ICD-9-CM: 799.4  11/21/2018 - Present        SVT (supraventricular tachycardia) ([facility] ICD-10-CM: I47.1  ICD-9-CM: 427.89  7/3/2018 - Present        RESOLVED: SVT (supraventricular tachycardia) ([facility] ICD-10-CM: I47.1  ICD-9-CM: 427.89  3/13/2018 - 3/21/2018              Greater than 30 minutes were spent with the patient on counseling and coordination of care    Signed:   Keli Zamudio DO  12/6/2020  4:35 PM

## 2020-12-06 NOTE — DISCHARGE INSTRUCTIONS
Discharge Instructions       PATIENT ID: Angely Benavides  MRN: [id number]   YOB: 1950    DATE OF ADMISSION: 12/3/2020  7:02 AM    DATE OF DISCHARGE: 12/5/2020    PRIMARY CARE PROVIDER: None     ATTENDING PHYSICIAN: Mamta Zhou DO  DISCHARGING PROVIDER: Molina Land DO    To contact this individual call [phone] and ask the  to page. If unavailable ask to be transferred the Adult Hospitalist Department. DISCHARGE DIAGNOSES     GI bleed  Ulcer  Acute blood loss anemia    CONSULTATIONS: IP CONSULT TO GASTROENTEROLOGY    PROCEDURES/SURGERIES: Procedure(s) with comments:  ESOPHAGOGASTRODUODENOSCOPY (EGD)  INJECTION  RESOLUTION CLIP - x4    PENDING TEST RESULTS:   At the time of discharge the following test results are still pending: none    FOLLOW UP APPOINTMENTS:   Follow-up Information     Follow up With Specialties Details Why Contact Info    Seth Nguyen MD Gastroenterology  Please schedule appointment to repeat EGD (endoscopy) in 6 weeks [address] [place] [address]  [phone]             ADDITIONAL CARE RECOMMENDATIONS:     New medications:   1. Omeprazole. Take twice daily until you follow up with Dr. Liat Holland    Return to the hospital for any recurrent bleeding. Stop alcohol intake. DISCHARGE MEDICATIONS:   See Medication Reconciliation Form    · It is important that you take the medication exactly as they are prescribed. · Keep your medication in the bottles provided by the pharmacist and keep a list of the medication names, dosages, and times to be taken in your wallet. · Do not take other medications without consulting your doctor. NOTIFY YOUR PHYSICIAN FOR ANY OF THE FOLLOWING:   Fever over 101 degrees for 24 hours. Chest pain, shortness of breath, fever, chills, nausea, vomiting, diarrhea, change in mentation, falling, weakness, bleeding. Severe pain or pain not relieved by medications.   Or, any other signs or symptoms that you may have questions about.         Signed:   Molina Land DO  12/5/2020  2:23 PM

## 2020-12-08 ENCOUNTER — TELEPHONE (OUTPATIENT)
Dept: CARDIOLOGY CLINIC | Age: 70
End: 2020-12-08

## 2020-12-08 RX ORDER — POTASSIUM CHLORIDE 1500 MG/1
20 TABLET, FILM COATED, EXTENDED RELEASE ORAL DAILY
Qty: 30 TAB | Refills: 2 | OUTPATIENT
Start: 2020-12-08

## 2020-12-08 NOTE — TELEPHONE ENCOUNTER
Returned patient's call, 2 pt identifiers used    Patient last seen 5/2019. Scheduled patient an in clinic appt for further evaluation of swollen ankles.      Future Appointments   Date Time Provider Jamie Allen   12/28/2020  1:40 PM Nikolas Gupta NP [facility] AMB

## 2020-12-08 NOTE — TELEPHONE ENCOUNTER
Requested Prescriptions     Refused Prescriptions Disp Refills    potassium chloride SR (K-TAB) 20 mEq tablet 30 Tab 2     Sig: Take 1 Tab by mouth daily.      Refused By: Little Ma     Reason for Refusal: Appt required, please call patient     Last seen 5/19

## 2020-12-08 NOTE — PROGRESS NOTES
Physician Progress Note      PATIENT:               Jerry Gutierrez  CSN #:                  [id number]  :                       1950  ADMIT DATE:       12/3/2020 7:02 AM  DISCH DATE:        2020 10:30 AM  RESPONDING  PROVIDER #:        Kavin Pettit DO          QUERY TEXT:    Dear Discharging Provider,    Pt admitted with GI bleed and acute blood loss anemia. Pt noted to have severe ulcerated esophagitis, deep ulceration at GE junction, and gastric 5mm non bleeding ulcer treated with clip. If possible, please document in progress notes and discharge summary the relationship, if any, between deep ulceration at GE junction and GI bleed. The medical record reflects the following:  Risk Factors: 71yo with severe ulcerated esophagitis, deep ulceration at GE junction, and gastric 5mm non bleeding ulcer  Clinical Indicators:  -  GI note: -EGD 12/3/2020: Severe ulcerated esophagitis involving majority of esophagus. At [address] [facility] junction there is deep ulceration with active bleeding from vessel. Surrounding mucosa is edematous and very friable. Injected with 8 cc of epinephrine with cessation of bleeding. Attempted to place one clip distal to this area, though as mucosa was so friable it dislodged. No bleeding at end of case. Medium sized hiatal hernia  Treatment: followed by GI, EGD, epi injected at deep ulceration at GE junction, attempted clip at ulceration site but unsuccessful, PPI BID, 3 units pRBC, H&H lab work    Thank you,  [place]  [phone]  Options provided:  -- GI bleed due to deep ulceration at GE junction  -- GI bleed unrelated to deep ulceration at GE junction  -- Other - I will add my own diagnosis  -- Disagree - Not applicable / Not valid  -- Disagree - Clinically unable to determine / Unknown  -- Refer to Clinical Documentation Reviewer    PROVIDER RESPONSE TEXT:    This patient has GI bleed due to deep ulceration at GE junction.     Query created by: Eneida Friedman on 12/7/2020 8:47 AM      Electronically signed by:  Frida Dodson DO 12/8/2020 8:38 AM

## 2020-12-28 ENCOUNTER — OFFICE VISIT (OUTPATIENT)
Dept: CARDIOLOGY CLINIC | Age: 70
End: 2020-12-28
Payer: MEDICARE

## 2020-12-28 VITALS
HEIGHT: 64 IN | SYSTOLIC BLOOD PRESSURE: 108 MMHG | DIASTOLIC BLOOD PRESSURE: 52 MMHG | HEART RATE: 91 BPM | WEIGHT: 122 LBS | OXYGEN SATURATION: 95 % | BODY MASS INDEX: 20.83 KG/M2

## 2020-12-28 DIAGNOSIS — E87.6 HYPOKALEMIA: ICD-10-CM

## 2020-12-28 DIAGNOSIS — I34.0 NON-RHEUMATIC MITRAL REGURGITATION: Primary | ICD-10-CM

## 2020-12-28 DIAGNOSIS — I89.0 SECONDARY LYMPHEDEMA: ICD-10-CM

## 2020-12-28 DIAGNOSIS — I47.1 SVT (SUPRAVENTRICULAR TACHYCARDIA) (HCC): ICD-10-CM

## 2020-12-28 DIAGNOSIS — Z87.19 HISTORY OF GI BLEED: ICD-10-CM

## 2020-12-28 DIAGNOSIS — E83.42 HYPOMAGNESEMIA: ICD-10-CM

## 2020-12-28 PROCEDURE — G8427 DOCREV CUR MEDS BY ELIG CLIN: HCPCS | Performed by: NURSE PRACTITIONER

## 2020-12-28 PROCEDURE — G8420 CALC BMI NORM PARAMETERS: HCPCS | Performed by: NURSE PRACTITIONER

## 2020-12-28 PROCEDURE — 1090F PRES/ABSN URINE INCON ASSESS: CPT | Performed by: NURSE PRACTITIONER

## 2020-12-28 PROCEDURE — 1101F PT FALLS ASSESS-DOCD LE1/YR: CPT | Performed by: NURSE PRACTITIONER

## 2020-12-28 PROCEDURE — 1111F DSCHRG MED/CURRENT MED MERGE: CPT | Performed by: NURSE PRACTITIONER

## 2020-12-28 PROCEDURE — 3017F COLORECTAL CA SCREEN DOC REV: CPT | Performed by: NURSE PRACTITIONER

## 2020-12-28 PROCEDURE — 99214 OFFICE O/P EST MOD 30 MIN: CPT | Performed by: NURSE PRACTITIONER

## 2020-12-28 PROCEDURE — G8536 NO DOC ELDER MAL SCRN: HCPCS | Performed by: NURSE PRACTITIONER

## 2020-12-28 PROCEDURE — G8432 DEP SCR NOT DOC, RNG: HCPCS | Performed by: NURSE PRACTITIONER

## 2020-12-28 PROCEDURE — G8400 PT W/DXA NO RESULTS DOC: HCPCS | Performed by: NURSE PRACTITIONER

## 2020-12-28 RX ORDER — BUMETANIDE 0.5 MG/1
0.5 TABLET ORAL DAILY
Qty: 30 TAB | Refills: 0 | Status: SHIPPED | OUTPATIENT
Start: 2020-12-28 | End: 2021-02-04

## 2020-12-28 NOTE — PROGRESS NOTES
[facility] Office Note    12/30/2020     Subjective     Her last OV was in 5/19  She is having significant LE edema which she says started after her hospitalization for a GI bleed during which they gave her multiple transfusions and fluid hydration  She is up 21 lbs from her last visit and reports extreme discomfort with LE edema   She has a follow up with GI on 1/17 with Dr. Antelmo Sharp she could not take omeprazole due to diarrhea, we discussed how this may be due to her magnesium supplement as well  I encouraged her to call Dr. Emily Hu office to discuss being off her PPI  Asked about etoh use with significant hypomagnesemia - she said she drinks wine occasionally, not nightly  Denies any palpitations or chest pain   Has not seen Dr. Sunil Abarca about SVT ablation because she has not had a recurrence, last episode in August 2018  No dyspnea with exertion or PND  No dizziness or syncope  Taking Toprol XL, declined the cardizem in the past  BP runs borderline low  Energy level is pretty good    [facility] Assessment/Plan     1. SVT- due to extenuating circumstances, last episode August 2018  -continues off amiodarone for now, on Toprol XL at bedtime   -taking potassium and magnesium supplements as well  2. GIB- recent/Dec 2020, last Hgb 7.8, not currently on PPI, advised her to call GI to discuss her issues with her PPI that was prescribed at discharge    3. Elevated TSH - TSH 5.5 in 12/20   4. Protein calorie malnutrition Body mass index is 20.94 kg/m². 5. Hypomagnesemia - on magnesium oxide 400mg BID but having diarrhea, will check Mg level now  6. Hypokalemia - on KCL 20meq daily, will check BMP now    7. Borderline hypotension - stable and asymptomatic on Toprol XL   8. Etoh use - possibly significant use?  -Documented by Dr. Kasia Barker in the past, she does not confirm this today   9. Mod MR by TTE 3/18  - will repeat TTE at follow up visit in 1 month   10.   Secondary lymphedema - will check TTE to assess EF, valves, patient wants to wait to do this until her follow up visit in 1 month   -will begin bumex 0.5mg daily and plan to check Mg level again in 2 weeks   -encouraged her to elevate her legs, walk as much as possible, limit sodium and wearing compression stockings daily  -will reassess at visit in 1 month and order lymphapress if edema has not improved     Echo 3/18 - LVEF 55 % to 60 %, no WMA, grade 1 dd, mildly dilated LA, mild to mod MR, mild TR, mild pulmonary HTN    Soc no tob + etoh  FHx + SVT    Objective:      Physical Exam:  Visit Vitals  BP (!) 108/52   Pulse 91   Ht 5' 4\" (1.626 m)   Wt 122 lb (55.3 kg)   SpO2 95%   BMI 20.94 kg/m²     General Appearance:  Well developed, well nourished, alert and oriented x 3, in no acute distress. Ears/Nose/Mouth/Throat:   Hearing grossly normal.         Neck: Supple. Chest:   Lungs clear to auscultation bilaterally. Cardiovascular:  Normal rate and regular rhythm, S1, S2 normal, no murmur. Abdomen:   Soft, non-tender, bowel sounds are active. Extremities: 2+ LE edema bilaterally. + varicose veins with some staining     Skin: Warm and dry. Data Review:   Labs:    No results found for this or any previous visit (from the past 24 hour(s)). Current Outpatient Medications   Medication Sig    bumetanide (BUMEX) 0.5 mg tablet Take 1 Tab by mouth daily.  metoprolol succinate (TOPROL-XL) 25 mg XL tablet TAKE 1 TABLET BY MOUTH EVERY DAY AT NIGHT    magnesium oxide (MAG-OX) 400 mg tablet Take 1 Tab by mouth two (2) times a day.  cholecalciferol (VITAMIN D3) 1,000 unit tablet Take 1 Tab by mouth daily.  potassium chloride SR (K-TAB) 20 mEq tablet Take 1 Tab by mouth daily.  cranberry fruit extract (CRANBERRY CONCENTRATE PO) Take 1 Tab by mouth daily.  ascorbic acid, vitamin C, (VITAMIN C) 500 mg tablet Take 500 mg by mouth daily. No current facility-administered medications for this visit. Jace Lipscomb NP  [facility] [address] [place] [address] [address], [address] [facility] [address]  Tash Chappell  [phone]

## 2020-12-28 NOTE — PATIENT INSTRUCTIONS
Please have labs drawn today in lab downstairs You may start bumex 0.5mg daily for your swelling Please limit your sodium intake, elevate your legs frequently, wear compression stockings daily (put them on every morning and take them off at bedtime)

## 2020-12-29 ENCOUNTER — TELEPHONE (OUTPATIENT)
Dept: CARDIOLOGY CLINIC | Age: 70
End: 2020-12-29

## 2020-12-29 DIAGNOSIS — E83.42 HYPOMAGNESEMIA: Primary | ICD-10-CM

## 2020-12-29 NOTE — TELEPHONE ENCOUNTER
Verified patient with two types of identifiers. Notified patient of results and NP recommendations. Will mail lab slip for updated lab value. Verified address. Patient verbalized understanding and will call with any other questions.       Future Appointments   Date Time Provider Jamie Allen   1/28/2021  1:00 PM KAVITA CHAVEZ BS AMB   1/28/2021  1:40 PM Yo Gupta, MILY CASTILLO BS AMB   3/3/2021  8:20 AM MD JONATHAN Almendarez BS AMB

## 2020-12-29 NOTE — TELEPHONE ENCOUNTER
----- Message from Dianelys Mcguire NP sent at 12/29/2020  9:27 AM EST -----  Her potassium is ok but her Mg level is still very low. Please ask her to refrain from any alcohol intake and continue her magnesium oxide 400mg BID. Please ask her to have her Mg level rechecked in 2 weeks after starting bumex.

## 2021-01-22 ENCOUNTER — TELEPHONE (OUTPATIENT)
Dept: CARDIOLOGY CLINIC | Age: 71
End: 2021-01-22

## 2021-01-22 DIAGNOSIS — I47.1 SVT (SUPRAVENTRICULAR TACHYCARDIA) (HCC): Primary | ICD-10-CM

## 2021-01-22 DIAGNOSIS — E87.6 HYPOKALEMIA: ICD-10-CM

## 2021-01-22 RX ORDER — BUMETANIDE 0.5 MG/1
TABLET ORAL
Qty: 30 TAB | Refills: 0 | OUTPATIENT
Start: 2021-01-22

## 2021-01-22 NOTE — TELEPHONE ENCOUNTER
Please ask her to have Mg level and BMP checked next week  Will not refill diuretic until I can see lab results

## 2021-01-22 NOTE — TELEPHONE ENCOUNTER
Called patient and advised she needs to have BMP and Magnesium prior to refill on Bumex. Patient verbalized understanding.      2 pt identifiers used

## 2021-02-03 NOTE — PROGRESS NOTES
[facility] Office Note    2/4/2021     Subjective     At her last visit she had significant LE edema which she said started after her hospitalization for a GI bleed during which they gave her multiple transfusions and fluid hydration  I started her on bumex and she has lost 14 lbs since that time  The edema in her legs fully resolved but she ran out of bumex at the end of last month since it was only for 30 days and she has a little LE edema today  She did not have repeat labs drawn but will do so today  She is using compression socks, elevating legs   Limiting sodium , staying active  She denies any chest pain or palpitations  She denies any dyspnea with exertion, no PND  No dizziness or syncope  She has a follow up with GI/Dr. Estela Wharton at the end of the month - not taking her PPI as prescribed  Last visit I asked about etoh use with significant hypomagnesemia - she said she drinks wine occasionally, not nightly  Has not seen Dr. Baldemar Urrutia about SVT ablation because she has not had a recurrence, last episode in August 2018  Taking Toprol XL, declined the cardizem in the past    Jordy's Chapo! Assessment/Plan     1. SVT- due to extenuating circumstances, last episode August 2018  -continues off amiodarone for now, on Toprol XL at bedtime   -taking potassium and magnesium supplements as well  2. GIB- recent/Dec 2020, last Hgb 7.8, not currently taking PPI as prescribed, has follow up with GI later this month     3. Elevated TSH - TSH 5.5 in 12/20  4. Protein calorie malnutrition Body mass index is 18.54 kg/m². 5. Hypomagnesemia - on magnesium oxide 400mg BID, will check Mg level now  6. Hypokalemia - on KCL 20meq daily, will check BMP   7. Borderline hypotension - stable and asymptomatic on Toprol XL   8. Etoh use - reported minimal use at her last visit    9. Mod MR by TTE 3/18  - will repeat TTE at follow up visit   10.   Secondary lymphedema - will check TTE to assess EF, valves  -encouraged her to continue elevation, exercise, limiting sodium and wearing compression stockings daily, will hold diuretic for now until labs can be rechecked  -will order lymphapress for secondary lymphedema since edema still present with above interventions    Echo 3/18 - LVEF 55 % to 60 %, no WMA, grade 1 dd, mildly dilated LA, mild to mod MR, mild TR, mild pulmonary HTN    Soc no tob + etoh  FHx + SVT    Objective:      Physical Exam:  Visit Vitals  /68   Pulse 86   Ht 5' 4\" (1.626 m)   Wt 108 lb (49 kg)   SpO2 98%   BMI 18.54 kg/m²     General Appearance:  Well developed, well nourished, alert and oriented x 3, in no acute distress. Ears/Nose/Mouth/Throat:   Hearing grossly normal.         Neck: Supple. Chest:   Lungs clear to auscultation bilaterally. Cardiovascular:  Normal rate and regular rhythm, S1, S2 normal, no murmur. Abdomen:   Soft, non-tender, bowel sounds are active. Extremities: 1+ LE edema bilaterally. + varicose veins with some staining     Skin: Warm and dry. Data Review:   Labs:    No results found for this or any previous visit (from the past 24 hour(s)). Current Outpatient Medications   Medication Sig    metoprolol succinate (TOPROL-XL) 25 mg XL tablet TAKE 1 TABLET BY MOUTH EVERY DAY AT NIGHT    magnesium oxide (MAG-OX) 400 mg tablet Take 1 Tab by mouth two (2) times a day.  cholecalciferol (VITAMIN D3) 1,000 unit tablet Take 1 Tab by mouth daily.  potassium chloride SR (K-TAB) 20 mEq tablet Take 1 Tab by mouth daily.  cranberry fruit extract (CRANBERRY CONCENTRATE PO) Take 1 Tab by mouth daily.  ascorbic acid, vitamin C, (VITAMIN C) 500 mg tablet Take 500 mg by mouth daily. No current facility-administered medications for this visit.         Cindy Patino NP  [facility] [address] [facility] [address] [facility] [address]  Andry Chappell  [phone]

## 2021-02-04 ENCOUNTER — OFFICE VISIT (OUTPATIENT)
Dept: CARDIOLOGY CLINIC | Age: 71
End: 2021-02-04
Payer: MEDICARE

## 2021-02-04 VITALS
HEART RATE: 86 BPM | BODY MASS INDEX: 18.44 KG/M2 | WEIGHT: 108 LBS | DIASTOLIC BLOOD PRESSURE: 68 MMHG | HEIGHT: 64 IN | OXYGEN SATURATION: 98 % | SYSTOLIC BLOOD PRESSURE: 110 MMHG

## 2021-02-04 DIAGNOSIS — I34.0 NON-RHEUMATIC MITRAL REGURGITATION: ICD-10-CM

## 2021-02-04 DIAGNOSIS — I89.0 SECONDARY LYMPHEDEMA: Primary | ICD-10-CM

## 2021-02-04 DIAGNOSIS — E87.6 HYPOKALEMIA: ICD-10-CM

## 2021-02-04 DIAGNOSIS — I47.1 SVT (SUPRAVENTRICULAR TACHYCARDIA) (HCC): ICD-10-CM

## 2021-02-04 DIAGNOSIS — E83.42 HYPOMAGNESEMIA: ICD-10-CM

## 2021-02-04 PROCEDURE — 1101F PT FALLS ASSESS-DOCD LE1/YR: CPT | Performed by: NURSE PRACTITIONER

## 2021-02-04 PROCEDURE — 3017F COLORECTAL CA SCREEN DOC REV: CPT | Performed by: NURSE PRACTITIONER

## 2021-02-04 PROCEDURE — G8420 CALC BMI NORM PARAMETERS: HCPCS | Performed by: NURSE PRACTITIONER

## 2021-02-04 PROCEDURE — G8432 DEP SCR NOT DOC, RNG: HCPCS | Performed by: NURSE PRACTITIONER

## 2021-02-04 PROCEDURE — 1090F PRES/ABSN URINE INCON ASSESS: CPT | Performed by: NURSE PRACTITIONER

## 2021-02-04 PROCEDURE — G8536 NO DOC ELDER MAL SCRN: HCPCS | Performed by: NURSE PRACTITIONER

## 2021-02-04 PROCEDURE — 99214 OFFICE O/P EST MOD 30 MIN: CPT | Performed by: NURSE PRACTITIONER

## 2021-02-04 PROCEDURE — G8427 DOCREV CUR MEDS BY ELIG CLIN: HCPCS | Performed by: NURSE PRACTITIONER

## 2021-02-04 PROCEDURE — G8400 PT W/DXA NO RESULTS DOC: HCPCS | Performed by: NURSE PRACTITIONER

## 2021-02-05 ENCOUNTER — TELEPHONE (OUTPATIENT)
Dept: CARDIOLOGY CLINIC | Age: 71
End: 2021-02-05

## 2021-02-05 RX ORDER — LANOLIN ALCOHOL/MO/W.PET/CERES
CREAM (GRAM) TOPICAL
Qty: 270 TAB | Refills: 1 | Status: SHIPPED | OUTPATIENT
Start: 2021-02-05

## 2021-02-05 NOTE — TELEPHONE ENCOUNTER
----- Message from Shahid Kunz NP sent at 2/5/2021 11:20 AM EST -----  Her magnesium level is still low. Mg 1.5. Please ask her to increase her Mg Oxide to 800mg in AM and 400mg in PM.  If she develops diarrhea then please have her call the office.

## 2021-02-05 NOTE — TELEPHONE ENCOUNTER
Attempted to reach patient by telephone (home #). Unable to leave message-mailbox full. Attempted to reach patient by telephone (cell phone )  A message was left for return call.

## 2021-02-05 NOTE — TELEPHONE ENCOUNTER
Identifiers x 2. Informed patient of the recommendation for magnesium. Verbalized understanding.      Requested Prescriptions     Signed Prescriptions Disp Refills    magnesium oxide (MAG-OX) 400 mg tablet 270 Tab 1     Sig: To take 2 tablets (800 mg) in am and 1 tablet (400 mg ) in pm.     Authorizing Provider: Sandra Grimes     Ordering User: ROMA Hodges     Refused Prescriptions Disp Refills    bumetanide (BUMEX) 0.5 mg tablet [Pharmacy Med Name: BUMETANIDE 0.5 MG TABLET] 30 Tab 0     Sig: TAKE 1 TABLET BY MOUTH EVERY DAY     Refused By: Sandra Grimes     Reason for Refusal: Have pt. call me     Verbal order per Odette Phoenix, NP.

## 2021-02-09 RX ORDER — METOPROLOL SUCCINATE 25 MG/1
TABLET, EXTENDED RELEASE ORAL
Qty: 30 TAB | Refills: 2 | Status: SHIPPED | OUTPATIENT
Start: 2021-02-09 | End: 2021-08-01

## 2021-03-03 ENCOUNTER — ANCILLARY PROCEDURE (OUTPATIENT)
Dept: CARDIOLOGY CLINIC | Age: 71
End: 2021-03-03

## 2021-03-03 ENCOUNTER — OFFICE VISIT (OUTPATIENT)
Dept: CARDIOLOGY CLINIC | Age: 71
End: 2021-03-03
Payer: MEDICARE

## 2021-03-03 VITALS — HEIGHT: 64 IN | BODY MASS INDEX: 19.46 KG/M2 | WEIGHT: 114 LBS

## 2021-03-03 VITALS
HEART RATE: 98 BPM | DIASTOLIC BLOOD PRESSURE: 70 MMHG | RESPIRATION RATE: 13 BRPM | OXYGEN SATURATION: 98 % | HEIGHT: 64 IN | BODY MASS INDEX: 19.46 KG/M2 | WEIGHT: 114 LBS | SYSTOLIC BLOOD PRESSURE: 100 MMHG

## 2021-03-03 DIAGNOSIS — E87.6 HYPOKALEMIA: ICD-10-CM

## 2021-03-03 DIAGNOSIS — R18.8 OTHER ASCITES: ICD-10-CM

## 2021-03-03 DIAGNOSIS — I36.1 NONRHEUMATIC TRICUSPID VALVE REGURGITATION: ICD-10-CM

## 2021-03-03 DIAGNOSIS — Z87.19 HISTORY OF GI BLEED: ICD-10-CM

## 2021-03-03 DIAGNOSIS — E83.42 HYPOMAGNESEMIA: ICD-10-CM

## 2021-03-03 DIAGNOSIS — I34.0 NON-RHEUMATIC MITRAL REGURGITATION: ICD-10-CM

## 2021-03-03 DIAGNOSIS — I89.0 SECONDARY LYMPHEDEMA: ICD-10-CM

## 2021-03-03 DIAGNOSIS — R79.89 ELEVATED TSH: ICD-10-CM

## 2021-03-03 DIAGNOSIS — I47.1 SVT (SUPRAVENTRICULAR TACHYCARDIA) (HCC): Primary | ICD-10-CM

## 2021-03-03 DIAGNOSIS — J90 PLEURAL EFFUSION, BILATERAL: ICD-10-CM

## 2021-03-03 PROCEDURE — G8510 SCR DEP NEG, NO PLAN REQD: HCPCS | Performed by: INTERNAL MEDICINE

## 2021-03-03 PROCEDURE — G8427 DOCREV CUR MEDS BY ELIG CLIN: HCPCS | Performed by: INTERNAL MEDICINE

## 2021-03-03 PROCEDURE — G8420 CALC BMI NORM PARAMETERS: HCPCS | Performed by: INTERNAL MEDICINE

## 2021-03-03 PROCEDURE — G8400 PT W/DXA NO RESULTS DOC: HCPCS | Performed by: INTERNAL MEDICINE

## 2021-03-03 PROCEDURE — 93000 ELECTROCARDIOGRAM COMPLETE: CPT | Performed by: INTERNAL MEDICINE

## 2021-03-03 PROCEDURE — 1090F PRES/ABSN URINE INCON ASSESS: CPT | Performed by: INTERNAL MEDICINE

## 2021-03-03 PROCEDURE — 3017F COLORECTAL CA SCREEN DOC REV: CPT | Performed by: INTERNAL MEDICINE

## 2021-03-03 PROCEDURE — G8536 NO DOC ELDER MAL SCRN: HCPCS | Performed by: INTERNAL MEDICINE

## 2021-03-03 PROCEDURE — 99215 OFFICE O/P EST HI 40 MIN: CPT | Performed by: INTERNAL MEDICINE

## 2021-03-03 PROCEDURE — 1101F PT FALLS ASSESS-DOCD LE1/YR: CPT | Performed by: INTERNAL MEDICINE

## 2021-03-03 PROCEDURE — 93306 TTE W/DOPPLER COMPLETE: CPT | Performed by: INTERNAL MEDICINE

## 2021-03-03 RX ORDER — BUMETANIDE 0.5 MG/1
0.5 TABLET ORAL DAILY
Qty: 30 TAB | Refills: 2 | Status: SHIPPED | OUTPATIENT
Start: 2021-03-03 | End: 2022-10-17

## 2021-03-03 RX ORDER — PHENOL/SODIUM PHENOLATE
20 AEROSOL, SPRAY (ML) MUCOUS MEMBRANE AS NEEDED
Status: ON HOLD | COMMUNITY
End: 2022-10-10 | Stop reason: SDUPTHER

## 2021-03-03 NOTE — PATIENT INSTRUCTIONS
Please take bumex 1mg daily x 3 days and continue twice daily magnesium  After 3 days please reduce your bumex to 0.5mg daily and reduce your magnesium to once daily     Please have CT scans done in 1 week     Please have labs drawn in 3 weeks at the [facility]

## 2021-03-03 NOTE — PROGRESS NOTES
Best served by seeing MD 42 minutes total time    HPI: Mayur Astorga, a 65y.o. year-old who presents for evaluation of moderate MR  She had significant LE edema which she said started after her hospitalization for a GI bleed in 12/20 during which they gave her multiple transfusions and fluid hydration  I started her on bumex and she lost 14 lbs  The edema in her legs had almost fully resolved at her visit last month but she had run out of bumex a few days before  Due to her electrolyte abnormalities the bumex was not restarted since she had minimal edema  Today she presents with a 6 lb weight gain and abdominal ascites and LE edema  She is using compression socks, elevating legs   Limiting sodium , staying active  Her echo shows normal EF, mod-severe MR, dilated LA, mod TR, pleural effusion and ascites  She denies any chest pain or palpitations  She denies any dyspnea with exertion, no PND  She does not feel more out of breath, O2 Sats 95% at home  She c/o abdominal pain related to her swelling  She is having some dizziness with position changes, BP lower than usual today at 100/70  No syncope  Asked about etoh use with significant hypomagnesemia - she said she drinks wine occasionally, not nightly  Has not seen Dr. Siddhartha Marquez about SVT ablation because she has not had a recurrence, last episode in August 2018  Taking Toprol XL, declined the cardizem in the past    So today in a nutshell she looks extremely swollen her abdomen is hard and protuberant which is a completely new finding for her her legs have 3+ edema. She does not feel short of breath and her lungs are clear. However this is a big change for her for the worse since her last visit with me. In addition her echo reveals worsening mitral valve regurgitation and we will need to reevaluate that afte she has lost some fluid weight.   Given the appearance of her pleural effusions with collapse of her left lower lobe as well as significant abdominal ascites think we need to do a CT scan of her chest abdomen and pelvis to look at other contributing factors as her fluid overload is not completely explained by the appearance of her echo. Her ejection fraction is preserved but her pulmonary pressures are only at the upper limit of normal.  If this is all cardiac fluid retention then her mitral valve regurgitation must be more severe than it appears. Jordy's Chapo! Assessment & Plan:  1. SVT- due to extenuating circumstances, last episode August 2018  -continues off amiodarone for now, on Toprol XL daily   -taking potassium and magnesium supplements as well  2. GIB- recent/Dec 2020, not on PPI, management per GI, will check CBC prior to next visit    3. Elevated TSH - TSH 5.5 in 12/20, will recheck TSH and T4 prior to next visit   4. Protein calorie malnutrition Body mass index is 19.57 kg/m². 5. Hypomagnesemia - on magnesium oxide 400mg BID now and will reduce to magnesium oxide 400mg daily after 3 days of a higher diuretic dose , will check Mg level prior to next appointment   6. Hypokalemia - on KCL 20meq daily, will check CMP prior to next appointment   7. Borderline hypotension - stable and asymptomatic on Toprol XL   8. Etoh use - reported minimal use  9. Mod-severe MR/mod TR by TTE - obviously fluid overloaded, will diurese and then repeat TTE in 6 weeks at her follow up visit with me    10. Secondary lymphedema - possible related to #9  -encouraged her to continue elevation, exercise, limiting sodium and wearing compression stockings daily, will resume bumex 1mg daily x 3 days and then advised her to reduce her bumex dose to 0.5mg daily, will check labs prior to next appointment  will hold diuretic for now until labs can be rechecked  -previously ordered lymphapress for secondary lymphedema  11. Pleural effusions - will order Chest CT to evaluate  12.   Abdominal ascites - will check CMP prior to next appointment, will order Abd/Pelvic CT to evaluate Echo 3/21 (prelim) - EF normal, dilated LA, mod-severe MR, mod TR, abdominal ascites, pleural effusions  Echo 3/18 - LVEF 55 % to 60 %, no WMA, grade 1 dd, mildly dilated LA, mild to mod MR, mild TR, mild pulmonary HTN    Soc no tob + etoh  FHx + SVT    She  has no past medical history on file. Cardiovascular ROS: no chest pain or dyspnea on exertion  Respiratory ROS: no cough, shortness of breath, or wheezing  Neurological ROS: no TIA or stroke symptoms  All other systems negative except as above. PE  Vitals:    03/03/21 0845   BP: 100/70   Pulse: 98   Resp: 13   SpO2: 98%   Weight: 114 lb (51.7 kg)   Height: 5' 4\" (1.626 m)    Body mass index is 19.57 kg/m².    General appearance - alert, well appearing, and in no distress  Mental status - affect appropriate to mood  Eyes - sclera anicteric, moist mucous membranes  Neck - supple  Lymphatics - not assessed  Chest - diminished bases bilaterally   Heart - normal rate, regular rhythm, normal S1, S2, no murmurs, rubs, clicks or gallops  Abdomen - tight, firm, + ascites   Back exam - full range of motion, no tenderness  Neurological - cranial nerves II through XII grossly intact, no focal deficit  Musculoskeletal - no muscular tenderness noted, normal strength  Extremities - peripheral pulses normal, 2+ LE edema  Skin - normal coloration  no rashes    Recent Labs:  Lab Results   Component Value Date/Time    Cholesterol, total 87 03/14/2018 05:07 AM    HDL Cholesterol 35 03/14/2018 05:07 AM    LDL, calculated 37 03/14/2018 05:07 AM    Triglyceride 75 03/14/2018 05:07 AM    CHOL/HDL Ratio 2.5 03/14/2018 05:07 AM     Lab Results   Component Value Date/Time    Creatinine 0.46 (L) 02/04/2021 02:45 PM     Lab Results   Component Value Date/Time    BUN 12 02/04/2021 02:45 PM     Lab Results   Component Value Date/Time    Potassium 4.6 02/04/2021 02:45 PM     Lab Results   Component Value Date/Time    Hemoglobin A1c 4.7 03/13/2018 11:16 PM     Lab Results   Component Value Date/Time    HGB 7.8 (L) 12/06/2020 03:21 AM     Lab Results   Component Value Date/Time    PLATELET 461 (L) 47/55/9069 03:21 AM       Reviewed:  No past medical history on file. Social History     Tobacco Use   Smoking Status Never Smoker   Smokeless Tobacco Never Used     Social History     Substance and Sexual Activity   Alcohol Use Yes    Frequency: 2-3 times a week    Drinks per session: 1 or 2    Binge frequency: Never    Comment: occasional wine     No Known Allergies    Current Outpatient Medications   Medication Sig    Omeprazole delayed release (PRILOSEC D/R) 20 mg tablet Take 20 mg by mouth as needed.  bumetanide (BUMEX) 0.5 mg tablet Take 1 Tab by mouth daily.  metoprolol succinate (TOPROL-XL) 25 mg XL tablet TAKE 1 TABLET BY MOUTH EVERY DAY AT NIGHT    magnesium oxide (MAG-OX) 400 mg tablet To take 2 tablets (800 mg) in am and 1 tablet (400 mg ) in pm. (Patient taking differently: Take 400 mg by mouth two (2) times a day. To take 2 tablets (800 mg) in am and 1 tablet (400 mg ) in pm.)    cholecalciferol (VITAMIN D3) 1,000 unit tablet Take 1 Tab by mouth daily.  potassium chloride SR (K-TAB) 20 mEq tablet Take 1 Tab by mouth daily.  cranberry fruit extract (CRANBERRY CONCENTRATE PO) Take 1 Tab by mouth daily.  ascorbic acid, vitamin C, (VITAMIN C) 500 mg tablet Take 500 mg by mouth daily. No current facility-administered medications for this visit.         Marilu Mcdonald MD  [facility] [address] [facility] [address], [address] [facility] [address]  [facility]  [phone]

## 2021-03-04 LAB
ECHO AO ASC DIAM: 3.45 CM
ECHO AO ROOT DIAM: 2.75 CM
ECHO AV AREA PEAK VELOCITY: 1.81 CM2
ECHO AV AREA VTI: 1.73 CM2
ECHO AV AREA/BSA PEAK VELOCITY: 1.2 CM2/M2
ECHO AV AREA/BSA VTI: 1.1 CM2/M2
ECHO AV MEAN GRADIENT: 4.71 MMHG
ECHO AV PEAK GRADIENT: 7.78 MMHG
ECHO AV PEAK VELOCITY: 139.51 CM/S
ECHO AV VTI: 28.01 CM
ECHO IVC PROX: 1.36 CM
ECHO LA AREA 4C: 25.54 CM2
ECHO LA MAJOR AXIS: 3.3 CM
ECHO LA MINOR AXIS: 2.14 CM
ECHO LA VOL 2C: 73.97 ML (ref 22–52)
ECHO LA VOL 4C: 77.27 ML (ref 22–52)
ECHO LA VOL BP: 87.69 ML (ref 22–52)
ECHO LA VOL/BSA BIPLANE: 56.93 ML/M2 (ref 16–28)
ECHO LA VOLUME INDEX A2C: 48.02 ML/M2 (ref 16–28)
ECHO LA VOLUME INDEX A4C: 50.16 ML/M2 (ref 16–28)
ECHO LV E' LATERAL VELOCITY: 7.31 CM/S
ECHO LV E' SEPTAL VELOCITY: 6.74 CM/S
ECHO LV EDV A2C: 50.14 ML
ECHO LV EDV A4C: 58.19 ML
ECHO LV EDV BP: 55.6 ML (ref 56–104)
ECHO LV EDV INDEX A4C: 37.8 ML/M2
ECHO LV EDV INDEX BP: 36.1 ML/M2
ECHO LV EDV NDEX A2C: 32.6 ML/M2
ECHO LV EJECTION FRACTION A2C: 72 PERCENT
ECHO LV EJECTION FRACTION A4C: 64 PERCENT
ECHO LV EJECTION FRACTION BIPLANE: 68.8 PERCENT (ref 55–100)
ECHO LV ESV A2C: 14.29 ML
ECHO LV ESV A4C: 20.94 ML
ECHO LV ESV BP: 17.32 ML (ref 19–49)
ECHO LV ESV INDEX A2C: 9.3 ML/M2
ECHO LV ESV INDEX A4C: 13.6 ML/M2
ECHO LV ESV INDEX BP: 11.2 ML/M2
ECHO LV INTERNAL DIMENSION DIASTOLIC: 4.49 CM (ref 3.9–5.3)
ECHO LV INTERNAL DIMENSION SYSTOLIC: 2.94 CM
ECHO LV IVSD: 0.99 CM (ref 0.6–0.9)
ECHO LV MASS 2D: 160.2 G (ref 67–162)
ECHO LV MASS INDEX 2D: 104 G/M2 (ref 43–95)
ECHO LV POSTERIOR WALL DIASTOLIC: 1.08 CM (ref 0.6–0.9)
ECHO LVOT DIAM: 1.88 CM
ECHO LVOT PEAK GRADIENT: 3.27 MMHG
ECHO LVOT PEAK VELOCITY: 90.44 CM/S
ECHO LVOT SV: 48.4 ML
ECHO LVOT VTI: 17.35 CM
ECHO MV A VELOCITY: 76.94 CM/S
ECHO MV E DECELERATION TIME (DT): 142.19 MS
ECHO MV E VELOCITY: 89.66 CM/S
ECHO MV E/A RATIO: 1.17
ECHO MV E/E' LATERAL: 12.27
ECHO MV E/E' RATIO (AVERAGED): 12.78
ECHO MV E/E' SEPTAL: 13.3
ECHO MV EROA PISA: 0.1 CM2
ECHO MV REGURGITANT RADIUS PISA: 0.46 CM
ECHO MV REGURGITANT VOLUME: 14.79 ML
ECHO MV REGURGITANT VTIA: 153.14 CM
ECHO PV MAX VELOCITY: 71.88 CM/S
ECHO PV PEAK INSTANTANEOUS GRADIENT SYSTOLIC: 2.08 MMHG
ECHO PV REGURGITANT MAX VELOCITY: 133.74 CM/S
ECHO RA MINOR AXIS: 3.9 CM
ECHO RV INTERNAL DIMENSION: 3.76 CM
ECHO RV TAPSE: 2.29 CM (ref 1.5–2)
ECHO TV REGURGITANT MAX VELOCITY: 295.59 CM/S
ECHO TV REGURGITANT PEAK GRADIENT: 34.95 MMHG
LA VOL DISK BP: 80.72 ML (ref 22–52)
MR PISA PV: 513.97 CM/S

## 2021-03-16 ENCOUNTER — TELEPHONE (OUTPATIENT)
Dept: CARDIOLOGY CLINIC | Age: 71
End: 2021-03-16

## 2021-03-16 NOTE — TELEPHONE ENCOUNTER
Sheeba Weber from [facility] [employer] [facility] requesting to speak to Nicko Moe to offer peer to peer review. States it is regarding the denial for pneumatic compression pump. States that offer is until 03/196 12 pm and requesting a call back whether it is accepted or denied.     Direct Phone: [phone]

## 2021-04-19 ENCOUNTER — TELEPHONE (OUTPATIENT)
Dept: CARDIOLOGY CLINIC | Age: 71
End: 2021-04-19

## 2021-04-19 NOTE — TELEPHONE ENCOUNTER
Patient requesting to reschedule 04/21 echo and appointment with Dr. Checo Castillo.      Phone: [phone]

## 2021-05-14 ENCOUNTER — VIRTUAL VISIT (OUTPATIENT)
Dept: CARDIOLOGY CLINIC | Age: 71
End: 2021-05-14

## 2021-05-14 ENCOUNTER — ANCILLARY PROCEDURE (OUTPATIENT)
Dept: CARDIOLOGY CLINIC | Age: 71
End: 2021-05-14
Payer: MEDICARE

## 2021-05-14 VITALS — HEIGHT: 64 IN | WEIGHT: 114 LBS | BODY MASS INDEX: 19.46 KG/M2

## 2021-05-14 DIAGNOSIS — I34.0 NON-RHEUMATIC MITRAL REGURGITATION: ICD-10-CM

## 2021-05-14 DIAGNOSIS — I89.0 SECONDARY LYMPHEDEMA: ICD-10-CM

## 2021-05-14 DIAGNOSIS — Z87.19 HISTORY OF GI BLEED: Primary | ICD-10-CM

## 2021-05-14 DIAGNOSIS — I47.1 SVT (SUPRAVENTRICULAR TACHYCARDIA) (HCC): ICD-10-CM

## 2021-05-14 DIAGNOSIS — R64 CACHEXIA (HCC): ICD-10-CM

## 2021-05-14 DIAGNOSIS — R18.8 OTHER ASCITES: ICD-10-CM

## 2021-05-14 DIAGNOSIS — I34.0 MITRAL VALVE INSUFFICIENCY, UNSPECIFIED ETIOLOGY: ICD-10-CM

## 2021-05-14 DIAGNOSIS — J90 PLEURAL EFFUSION, BILATERAL: ICD-10-CM

## 2021-05-14 DIAGNOSIS — I36.1 NONRHEUMATIC TRICUSPID VALVE REGURGITATION: ICD-10-CM

## 2021-05-14 LAB
ECHO AO ASC DIAM: 3.31 CM
ECHO AO ROOT DIAM: 3.04 CM
ECHO AV AREA PEAK VELOCITY: 1.43 CM2
ECHO AV AREA VTI: 1.44 CM2
ECHO AV AREA/BSA PEAK VELOCITY: 0.9 CM2/M2
ECHO AV AREA/BSA VTI: 0.9 CM2/M2
ECHO AV MEAN GRADIENT: 4.29 MMHG
ECHO AV PEAK GRADIENT: 7.88 MMHG
ECHO AV PEAK VELOCITY: 140.4 CM/S
ECHO AV VTI: 28.58 CM
ECHO LA AREA 4C: 26.36 CM2
ECHO LA MAJOR AXIS: 3.5 CM
ECHO LA MINOR AXIS: 2.27 CM
ECHO LA VOL 2C: 67.28 ML (ref 22–52)
ECHO LA VOL 4C: 77.51 ML (ref 22–52)
ECHO LA VOL BP: 83.93 ML (ref 22–52)
ECHO LA VOL/BSA BIPLANE: 54.49 ML/M2 (ref 16–28)
ECHO LA VOLUME INDEX A2C: 43.68 ML/M2 (ref 16–28)
ECHO LA VOLUME INDEX A4C: 50.32 ML/M2 (ref 16–28)
ECHO LV E' LATERAL VELOCITY: 7.55 CM/S
ECHO LV E' SEPTAL VELOCITY: 6.59 CM/S
ECHO LV EDV A2C: 83.9 ML
ECHO LV EDV A4C: 83.68 ML
ECHO LV EDV BP: 84.29 ML (ref 56–104)
ECHO LV EDV INDEX A4C: 54.3 ML/M2
ECHO LV EDV INDEX BP: 54.7 ML/M2
ECHO LV EDV NDEX A2C: 54.5 ML/M2
ECHO LV EJECTION FRACTION A2C: 60 PERCENT
ECHO LV EJECTION FRACTION A4C: 69 PERCENT
ECHO LV EJECTION FRACTION BIPLANE: 63.9 PERCENT (ref 55–100)
ECHO LV ESV A2C: 33.77 ML
ECHO LV ESV A4C: 25.65 ML
ECHO LV ESV BP: 30.41 ML (ref 19–49)
ECHO LV ESV INDEX A2C: 21.9 ML/M2
ECHO LV ESV INDEX A4C: 16.7 ML/M2
ECHO LV ESV INDEX BP: 19.7 ML/M2
ECHO LV INTERNAL DIMENSION DIASTOLIC: 5.32 CM (ref 3.9–5.3)
ECHO LV INTERNAL DIMENSION SYSTOLIC: 3.4 CM
ECHO LV IVSD: 0.88 CM (ref 0.6–0.9)
ECHO LV MASS 2D: 164.4 G (ref 67–162)
ECHO LV MASS INDEX 2D: 106.7 G/M2 (ref 43–95)
ECHO LV POSTERIOR WALL DIASTOLIC: 0.83 CM (ref 0.6–0.9)
ECHO LVOT DIAM: 1.82 CM
ECHO LVOT PEAK GRADIENT: 2.4 MMHG
ECHO LVOT PEAK VELOCITY: 77.41 CM/S
ECHO LVOT SV: 41.1 ML
ECHO LVOT VTI: 15.87 CM
ECHO MV A VELOCITY: 81.37 CM/S
ECHO MV AREA PHT: 4 CM2
ECHO MV E DECELERATION TIME (DT): 189.72 MS
ECHO MV E VELOCITY: 86.7 CM/S
ECHO MV E/A RATIO: 1.07
ECHO MV E/E' LATERAL: 11.48
ECHO MV E/E' RATIO (AVERAGED): 12.32
ECHO MV E/E' SEPTAL: 13.16
ECHO MV EROA PISA: 0.08 CM2
ECHO MV PRESSURE HALF TIME (PHT): 55.02 MS
ECHO MV REGURGITANT RADIUS PISA: 0.48 CM
ECHO MV REGURGITANT VOLUME: 13.09 ML
ECHO MV REGURGITANT VTIA: 163.86 CM
ECHO RA AREA 4C: 13.28 CM2
ECHO RV INTERNAL DIMENSION: 3.79 CM
ECHO RV TAPSE: 2.12 CM (ref 1.5–2)
ECHO TV REGURGITANT MAX VELOCITY: 233.83 CM/S
ECHO TV REGURGITANT PEAK GRADIENT: 21.87 MMHG
LA VOL DISK BP: 76.03 ML (ref 22–52)
MR PISA PV: 507.7 CM/S

## 2021-05-14 PROCEDURE — 3017F COLORECTAL CA SCREEN DOC REV: CPT | Performed by: INTERNAL MEDICINE

## 2021-05-14 PROCEDURE — G8400 PT W/DXA NO RESULTS DOC: HCPCS | Performed by: INTERNAL MEDICINE

## 2021-05-14 PROCEDURE — 1101F PT FALLS ASSESS-DOCD LE1/YR: CPT | Performed by: INTERNAL MEDICINE

## 2021-05-14 PROCEDURE — G8432 DEP SCR NOT DOC, RNG: HCPCS | Performed by: INTERNAL MEDICINE

## 2021-05-14 PROCEDURE — 93306 TTE W/DOPPLER COMPLETE: CPT | Performed by: INTERNAL MEDICINE

## 2021-05-14 PROCEDURE — 99214 OFFICE O/P EST MOD 30 MIN: CPT | Performed by: INTERNAL MEDICINE

## 2021-05-14 PROCEDURE — G8427 DOCREV CUR MEDS BY ELIG CLIN: HCPCS | Performed by: INTERNAL MEDICINE

## 2021-05-14 PROCEDURE — 1090F PRES/ABSN URINE INCON ASSESS: CPT | Performed by: INTERNAL MEDICINE

## 2021-05-14 NOTE — PATIENT INSTRUCTIONS
MD Winter Tarango, RN  
  
   
  
Please give her dr Mekhi Gallegos Fu with me in 6 mos ipv thanks Future Appointments Date Time Provider Jamie Tiffany 11/17/2021  8:00 AM MD JONATHAN Tarango AMB Dr. Claudia Carver contact information provided to patient.

## 2021-05-14 NOTE — PROGRESS NOTES
Maggi Gonzalez who was evaluated through a synchronous (real-time) audio-video encounter, and/or her healthcare decision maker, is aware that it is a billable service, with coverage as determined by her insurance carrier. She provided verbal consent to proceed: Yes, and patient identification was verified. It was conducted pursuant to the emergency declaration under the [facility], [address] [place] [address] and the [employer] Act. A caregiver was present when appropriate. Ability to conduct physical exam was limited. I was at the office. The patient was at home. HPI: Maggi Gonzalez, a 65y.o. year-old who presents for evaluation of moderate MR    She feels like her fluid is under control, not really having a lot of swelling and that is going well. She elevates them and is drinking plenty of water. Echo shows moderate MR but better than prior. She developed stomach ulcers and took PPI/omeprazole and now on it prn for heartburn. Taking vitamins and doing fine. She has a bad hemorrhoid and is frustrated with that. Signed up for a bidet at [email or web address]  Got her Jand J vaccine as well. So we reviewed her echo results today and the fact that there is moderate mitral regurgitation but everything looks better than on the prior one which goes along with her clinical improvement. She is not currently taking her diuretic any longer and will reserve that as needed. Due to the prior history of electrolyte abnormalities. She will continue to follow-up with me every 6 months entirely possible she is going to require intervention on her valve but she would like to defer that for as long as possible.       HIstory  She had significant LE edema which she said started after her hospitalization for a GI bleed in 12/20 during which they gave her multiple transfusions and fluid hydration  I started her on bumex and she lost 14 lbs  The edema in her legs had almost fully resolved at her visit last month but she had run out of bumex a few days before  Due to her electrolyte abnormalities the bumex was not restarted since she had minimal edema  Today she presents with a 6 lb weight gain and abdominal ascites and LE edema  She is using compression socks, elevating legs   Limiting sodium , staying active  Her echo shows normal EF, mod-severe MR, dilated LA, mod TR, pleural effusion and ascites  She denies any chest pain or palpitations  She denies any dyspnea with exertion, no PND  She does not feel more out of breath, O2 Sats 95% at home  She c/o abdominal pain related to her swelling  She is having some dizziness with position changes, BP lower than usual today at 100/70  No syncope  Asked about etoh use with significant hypomagnesemia - she said she drinks wine occasionally, not nightly  Has not seen Dr. Richard Carranza about SVT ablation because she has not had a recurrence, last episode in August 2018  Taking Toprol XL, declined the cardizem in the past    Earlier this year:  So today in a nutshell she looks extremely swollen her abdomen is hard and protuberant which is a completely new finding for her her legs have 3+ edema. She does not feel short of breath and her lungs are clear. However this is a big change for her for the worse since her last visit with me. In addition her echo reveals worsening mitral valve regurgitation and we will need to reevaluate that afte she has lost some fluid weight. Given the appearance of her pleural effusions with collapse of her left lower lobe as well as significant abdominal ascites think we need to do a CT scan of her chest abdomen and pelvis to look at other contributing factors as her fluid overload is not completely explained by the appearance of her echo.   Her ejection fraction is preserved but her pulmonary pressures are only at the upper limit of normal.  If this is all cardiac fluid retention then her mitral valve regurgitation must be more severe than it appears. Jordy's Chapo! Assessment & Plan:  1. SVT- due to extenuating circumstances, last episode August 2018  -continues off amiodarone for now, on Toprol XL daily   -taking potassium and magnesium supplements as well  2. GIB- recent/Dec 2020, not on PPI, management per GI, will check CBC prior to next visit    3. Elevated TSH - TSH 5.5 in 12/20, will recheck TSH and T4 prior to next visit   4. Protein calorie malnutrition There is no height or weight on file to calculate BMI. 5. Hypomagnesemia - on magnesium oxide 400mg BID now and will reduce to magnesium oxide 400mg daily after 3 days of a higher diuretic dose , will check Mg level prior to next appointment   6. Hypokalemia - on KCL 20meq daily, will check CMP prior to next appointment   7. Borderline hypotension - stable and asymptomatic on Toprol XL   8. Etoh use - reported minimal use  9. Mod-severe MR/mod TR by TTE - obviously fluid overloaded, will diurese and then repeat TTE in 6 weeks at her follow up visit with me    10. Secondary lymphedema - possible related to #9  -encouraged her to continue elevation, exercise, limiting sodium and wearing compression stockings daily, will resume bumex 1mg daily x 3 days and then advised her to reduce her bumex dose to 0.5mg daily, will check labs prior to next appointment  will hold diuretic for now until labs can be rechecked  -previously ordered lymphapress for secondary lymphedema  11. Pleural effusions - will order Chest CT to evaluate  12.   Abdominal ascites - will check CMP prior to next appointment, will order Abd/Pelvic CT to evaluate     Echo 3/21 (prelim) - EF normal, dilated LA, mod-severe MR, mod TR, abdominal ascites, pleural effusions  Echo 3/18 - LVEF 55 % to 60 %, no WMA, grade 1 dd, mildly dilated LA, mild to mod MR, mild TR, mild pulmonary HTN    Soc no tob + etoh  FHx + SVT    She  has no past medical history on file.    Cardiovascular ROS: no chest pain or dyspnea on exertion  Respiratory ROS: no cough, shortness of breath, or wheezing  Neurological ROS: no TIA or stroke symptoms  All other systems negative except as above. PE  Mood is normal affect appropriate thought process logical speech is clear respirations unlabored    Recent Labs:  Lab Results   Component Value Date/Time    Cholesterol, total 87 03/14/2018 05:07 AM    HDL Cholesterol 35 03/14/2018 05:07 AM    LDL, calculated 37 03/14/2018 05:07 AM    Triglyceride 75 03/14/2018 05:07 AM    CHOL/HDL Ratio 2.5 03/14/2018 05:07 AM     Lab Results   Component Value Date/Time    Creatinine 0.46 (L) 02/04/2021 02:45 PM     Lab Results   Component Value Date/Time    BUN 12 02/04/2021 02:45 PM     Lab Results   Component Value Date/Time    Potassium 4.6 02/04/2021 02:45 PM     Lab Results   Component Value Date/Time    Hemoglobin A1c 4.7 03/13/2018 11:16 PM     Lab Results   Component Value Date/Time    HGB 7.8 (L) 12/06/2020 03:21 AM     Lab Results   Component Value Date/Time    PLATELET 500 (L) 54/41/8090 03:21 AM       Reviewed:  No past medical history on file. Social History     Tobacco Use   Smoking Status Never Smoker   Smokeless Tobacco Never Used     Social History     Substance and Sexual Activity   Alcohol Use Yes    Frequency: 2-3 times a week    Drinks per session: 1 or 2    Binge frequency: Never    Comment: occasional wine     No Known Allergies    Current Outpatient Medications   Medication Sig    Omeprazole delayed release (PRILOSEC D/R) 20 mg tablet Take 20 mg by mouth as needed.  metoprolol succinate (TOPROL-XL) 25 mg XL tablet TAKE 1 TABLET BY MOUTH EVERY DAY AT NIGHT    magnesium oxide (MAG-OX) 400 mg tablet To take 2 tablets (800 mg) in am and 1 tablet (400 mg ) in pm. (Patient taking differently: Take 400 mg by mouth two (2) times a day.  To take 2 tablets (800 mg) in am and 1 tablet (400 mg ) in pm.)    cholecalciferol (VITAMIN D3) 1,000 unit tablet Take 1 Tab by mouth daily.  potassium chloride SR (K-TAB) 20 mEq tablet Take 1 Tab by mouth daily.  cranberry fruit extract (CRANBERRY CONCENTRATE PO) Take 1 Tab by mouth daily.  ascorbic acid, vitamin C, (VITAMIN C) 500 mg tablet Take 500 mg by mouth daily.  bumetanide (BUMEX) 0.5 mg tablet Take 1 Tab by mouth daily. No current facility-administered medications for this visit.         Ada Leggett MD  [facility] [address] [place] [address] [address], [address] [place] [address]  [facility]  [phone]

## 2021-06-04 NOTE — TELEPHONE ENCOUNTER
Returned patient's call, 2 pt identifiers used    Rescheduled patient's appt. She will have an echo in clinic then same day VV with Dr. Damaris Osullivan.     Future Appointments   Date Time Provider Jamie Allen   5/14/2021  8:00 AM VASCULAR, KAVITA GUTIERREZ AMB   5/14/2021 10:45 AM MD JONATHAN Johnston AMB Patient is a 53y old  Male who presents with a chief complaint of SOB (03 Jun 2021 10:54)    pt seen in icu [  ], reg med floor scu [ x  ], bed [ x ], chair at bedside [   ], awake and responsive [ x ], mildly sedated, [  ],    nad [x  ]      Allergies    No Known Allergies        Vitals    T(F): 97.9 (06-04-21 @ 06:00), Max: 98.5 (06-03-21 @ 20:41)  HR: 86 (06-04-21 @ 06:00) (73 - 86)  BP: 132/78 (06-04-21 @ 06:00) (130/70 - 140/83)  RR: 19 (06-04-21 @ 06:00) (18 - 20)  SpO2: 97% (06-04-21 @ 06:00) (97% - 100%)  Wt(kg): --  CAPILLARY BLOOD GLUCOSE      POCT Blood Glucose.: 121 mg/dL (04 Jun 2021 06:06)      Labs                          9.3    7.03  )-----------( 228      ( 02 Jun 2021 08:23 )             26.3       06-02    140  |  100  |  16  ----------------------------<  97  3.8   |  31  |  0.90    Ca    8.9      02 Jun 2021 08:23  Phos  2.9     06-02  Mg     1.8     06-02    TPro  6.3  /  Alb  2.8<L>  /  TBili  0.6  /  DBili  x   /  AST  20  /  ALT  33  /  AlkPhos  70  06-02            .Sputum Sputum  04-16 @ 04:42   Moderate Enterobacter aerogenes (Carbapenem Resistant)  Normal Respiratory Monserrat present  --  Enterobacter aerogenes (Carbapenem Resistant)      .Urine Clean Catch (Midstream)  03-15 @ 00:52   No growth  --  --          Radiology Results      Meds    MEDICATIONS  (STANDING):  ascorbic acid 1000 milliGRAM(s) Oral daily  BACItracin   Ointment 1 Application(s) Topical two times a day  calcium carbonate 1250 mG  + Vitamin D (OsCal 500 + D) 1 Tablet(s) Oral daily  chlorhexidine 2% Cloths 1 Application(s) Topical <User Schedule>  cholecalciferol 1000 Unit(s) Oral daily  clonazePAM  Tablet 0.5 milliGRAM(s) Oral every 12 hours  enoxaparin Injectable 40 milliGRAM(s) SubCutaneous every 24 hours  gabapentin 100 milliGRAM(s) Oral every 8 hours  labetalol 100 milliGRAM(s) Enteral Tube two times a day  methadone    Tablet 5 milliGRAM(s) Oral daily  naloxegol 12.5 milliGRAM(s) Oral daily  pantoprazole  Injectable 40 milliGRAM(s) IV Push daily  predniSONE   Tablet 15 milliGRAM(s) Oral daily  QUEtiapine 50 milliGRAM(s) Oral two times a day  senna 2 Tablet(s) Oral at bedtime  trimethoprim  40 mG/sulfamethoxazole 200 mG Suspension 160 milliGRAM(s) Oral <User Schedule>      MEDICATIONS  (PRN):  acetaminophen    Suspension .. 650 milliGRAM(s) Enteral Tube every 12 hours PRN Temp greater or equal to 38C (100.4F), Mild Pain (1 - 3)  ALBUTerol    90 MICROgram(s) HFA Inhaler 2 Puff(s) Inhalation every 6 hours PRN Shortness of Breath and/or Wheezing  artificial  tears Solution 1 Drop(s) Both EYES every 4 hours PRN Dry Eyes  bisacodyl Suppository 10 milliGRAM(s) Rectal daily PRN Constipation  ondansetron Injectable 4 milliGRAM(s) IV Push every 6 hours PRN Nausea and/or Vomiting  polyethylene glycol 3350 17 Gram(s) Oral daily PRN Constipation  sodium chloride 0.9% lock flush 10 milliLiter(s) IV Push every 1 hour PRN Pre/post blood products, medications, blood draw, and to maintain line patency      Physical Exam    Neuro :  no focal deficits  Respiratory: CTA B/L  CV: RRR, S1S2, no murmurs,   Abdominal: Soft, NT, ND +BS, gastrostomy tube inplace  Extremities: edema of extrem, + peripheral pulses      ASSESSMENT      Hypoxemia 2nd to covid pna   transaminitis  prediabetes  h/o appendectomy  cholecystectomy        PLAN    cont cont precautions  covid 5/14/21 neg noted above  d/c remdesevir given covid ab positive noted   completed dexamethasone   started pulse steroids for 3 days - 250mg solumedrol bid now tapered off 4/14/21   prednisone 20 daily d/c 6/1/21   cont on bactrim empirically, TIW   cont asa, vit c,    cont albuterol inhaler   pulm f/u  procalcitonin, D-dimer, crp, ldh, ferritin, lactate noted ,    cont tylenol prn,   cont robitussin prn   pt off precedex    pt on methadone   pain mgmt eval noted   off phenylephrine drip for hypotension  clonidine held 2nd to low bp  s/p intubation 3/29/21   s/p tracheostomy 4/21/21  O2 sat 100% (97% - 100%) mv 40%  O2 via mech vent   cont wean as tolerated   vent mgmt as per scu  thoracic surg f/u   s/p L chest tube replacement 4/18/21 for recurrence of left pneumothorax   cxr 4/20/21 with Unchanged advanced infiltrates and catheter left chest tube noted   CXR 4/11/21 with : No interval change compared to one day prior. No pneumothorax noted.   unable to flush or aspirate tube fully, noted debris in tubing which was milked out.   Once material removed from tubing able to aspirated and flush fully. Also changed dressing on pigtail which appears to be in good position.   Monitor O2 status   s/p tracheostomy 4/21/21   cxr 4/21/21 with No evidence of active chest disease. Tracheostomy tube in place otherwise no significant change noted   cxr 4/25/21 with A 40% LEFT pneumothorax. LEFT multi-sidehole pigtail catheter overlies LEFT lower hemithorax.. Bilateral multifocal and diffuse ill-defined airspace opacities..  Follow-up AP portable chest radiograph 4/25/2021 AT 8:58 AM: Residual LEFT 30% upper zone pneumothorax. Otherwise no interval change.noted    s/p 2nd chest tube 5/5/21  cxr 5/6/21 with Tracheostomy and catheter left chest tubes remain. , There are significant diffuse advanced infiltrates again noted. No pneumothorax. Above findings are similar to study earlier in the day. Present film shows a left jugular line inserted   with tip entering the superior vena cava noted   cxr 5/30/21 with Tracheostomy cannula left chest catheter reidentified in position. No change small simple left apical pneumothorax. No change bilateral airspace opacities. Trace left pleural effusion suggested noted   cxr 5/31/21 with Status post tracheostomy. Status post left chest pigtail catheter. Trace left pneumothorax is unchanged. Cardiomegaly. Moderate to severe multifocal patchy opacificationof both lungs, left greater than right, is unchanged. Nonobstructive bowel gas pattern. No dilated loops of small or large bowel noted   s/p removal of L pigtail.   CXR 6/1/21 post l pig tail removal with Left pigtail catheter seen earlier in the day has been removed. Persistent amorphous infiltrate at the site is seen. No pneumothorax noted above.  No acute thoracic surgical intervention at this time,  s/p surgical placement of gastrostomy tube 4/23/2021.   abd xray 5/10/21 with ileus noted   dressing changed daily for seroma   abd xray 5/21/21 with Decreased bowel ileus compared to prior 5/20/2021 exam noted above.   abd xray 5/22/21 with No dilated loops of bowel noted above.   cont tube feeding   CT scan of the chest 5/13/21 demonstrates diffuse bilateral infiltrates with a region of consolidation at the left lung base. Small left pneumothorax. 2 left chest tubes noted in place noted above.  CT scan of the abdomen and pelvis 5/13/21 demonstrates diffuse dilatation of small and large bowel loops most consistent with ileus noted   xray abd with  5/14/21 with Ingested contrast within nonobstructed large bowel to the level of rectum. No acute radiographic intra-abdominal findings noted   ct abd-pelv 5/27/21 with Delayed nephrographic phase enhancement of the nonobstructed bilateral kidneys, suggestive of acute renal insufficiency. No evidence of bowel obstruction. Proctitis noted above.   id f/u   patient completed course of Meropenem  leukocytosis resolved  sputum cx with Enterobacter aerogenes (Carbapenem Resistant) noted above   afebrile  Completed  meropenem, s/p 1 dose of Vancomycin   completed zosyn  lispro ss   s/p 4 units prbc for anemia  f/u h/h    transfuse prbc as needed  heme onc f/u  Haptoglobin normal  no hemolysis, Fe/B12/folate adequate  hemolysis is unlikely even his baseline haptoglobin may be very elevated due to COVID  direct pamella neg noted    psych f/u/  C/w Klonopin to 1 mg q8h standing (no PRNs), with plan to further taper as tolerated  cont Seroquel to 50 mg BID standing starting tonight  Continue delirium precautions: Frequent reorientation, familiar pictures and objects at bedside, natural light in daytime,   consistent sleep/wake schedule, adequate hydration and nutrition, sensory aids (hearing aids, glasses) present if needed, minimizing noise and overstimulation,   clustering care to minimize overnight interruptions, judicious use of deliriogenic medications (anticholinergics, benzodiazepines and opioid analgesics), minimize use of restraints  phys tx eval  cont current meds   mgmt as per scu         Patient is a 53y old  Male who presents with a chief complaint of SOB (03 Jun 2021 10:54)    pt seen in icu [  ], reg med floor scu [ x  ], bed [ x ], chair at bedside [   ], awake and responsive [ x ], mildly sedated, [  ],    nad [x  ]      Allergies    No Known Allergies        Vitals    T(F): 97.9 (06-04-21 @ 06:00), Max: 98.5 (06-03-21 @ 20:41)  HR: 86 (06-04-21 @ 06:00) (73 - 86)  BP: 132/78 (06-04-21 @ 06:00) (130/70 - 140/83)  RR: 19 (06-04-21 @ 06:00) (18 - 20)  SpO2: 97% (06-04-21 @ 06:00) (97% - 100%)  Wt(kg): --  CAPILLARY BLOOD GLUCOSE      POCT Blood Glucose.: 121 mg/dL (04 Jun 2021 06:06)      Labs                          9.3    7.03  )-----------( 228      ( 02 Jun 2021 08:23 )             26.3       06-02    140  |  100  |  16  ----------------------------<  97  3.8   |  31  |  0.90    Ca    8.9      02 Jun 2021 08:23  Phos  2.9     06-02  Mg     1.8     06-02    TPro  6.3  /  Alb  2.8<L>  /  TBili  0.6  /  DBili  x   /  AST  20  /  ALT  33  /  AlkPhos  70  06-02            .Sputum Sputum  04-16 @ 04:42   Moderate Enterobacter aerogenes (Carbapenem Resistant)  Normal Respiratory Monserrat present  --  Enterobacter aerogenes (Carbapenem Resistant)      .Urine Clean Catch (Midstream)  03-15 @ 00:52   No growth  --  --          Radiology Results      Meds    MEDICATIONS  (STANDING):  ascorbic acid 1000 milliGRAM(s) Oral daily  BACItracin   Ointment 1 Application(s) Topical two times a day  calcium carbonate 1250 mG  + Vitamin D (OsCal 500 + D) 1 Tablet(s) Oral daily  chlorhexidine 2% Cloths 1 Application(s) Topical <User Schedule>  cholecalciferol 1000 Unit(s) Oral daily  clonazePAM  Tablet 0.5 milliGRAM(s) Oral every 12 hours  enoxaparin Injectable 40 milliGRAM(s) SubCutaneous every 24 hours  gabapentin 100 milliGRAM(s) Oral every 8 hours  labetalol 100 milliGRAM(s) Enteral Tube two times a day  methadone    Tablet 5 milliGRAM(s) Oral daily  naloxegol 12.5 milliGRAM(s) Oral daily  pantoprazole  Injectable 40 milliGRAM(s) IV Push daily  predniSONE   Tablet 15 milliGRAM(s) Oral daily  QUEtiapine 50 milliGRAM(s) Oral two times a day  senna 2 Tablet(s) Oral at bedtime  trimethoprim  40 mG/sulfamethoxazole 200 mG Suspension 160 milliGRAM(s) Oral <User Schedule>      MEDICATIONS  (PRN):  acetaminophen    Suspension .. 650 milliGRAM(s) Enteral Tube every 12 hours PRN Temp greater or equal to 38C (100.4F), Mild Pain (1 - 3)  ALBUTerol    90 MICROgram(s) HFA Inhaler 2 Puff(s) Inhalation every 6 hours PRN Shortness of Breath and/or Wheezing  artificial  tears Solution 1 Drop(s) Both EYES every 4 hours PRN Dry Eyes  bisacodyl Suppository 10 milliGRAM(s) Rectal daily PRN Constipation  ondansetron Injectable 4 milliGRAM(s) IV Push every 6 hours PRN Nausea and/or Vomiting  polyethylene glycol 3350 17 Gram(s) Oral daily PRN Constipation  sodium chloride 0.9% lock flush 10 milliLiter(s) IV Push every 1 hour PRN Pre/post blood products, medications, blood draw, and to maintain line patency      Physical Exam    Neuro :  no focal deficits  Respiratory: CTA B/L  CV: RRR, S1S2, no murmurs,   Abdominal: Soft, NT, ND +BS, gastrostomy tube inplace  Extremities: edema of extrem, + peripheral pulses      ASSESSMENT      Hypoxemia 2nd to covid pna   transaminitis  prediabetes  h/o appendectomy  cholecystectomy        PLAN    cont cont precautions  covid 5/14/21 neg noted above  d/c remdesevir given covid ab positive noted   completed dexamethasone   started pulse steroids for 3 days - 250mg solumedrol bid now tapered off 4/14/21   prednisone 20 daily d/c 6/1/21   cont on bactrim empirically, TIW   cont asa, vit c,    cont albuterol inhaler   pulm f/u  procalcitonin, D-dimer, crp, ldh, ferritin, lactate noted ,    cont tylenol prn,   cont robitussin prn   pt off precedex    pt on methadone   pain mgmt eval noted   off phenylephrine drip for hypotension  clonidine held 2nd to low bp  s/p intubation 3/29/21   s/p tracheostomy 4/21/21  O2 sat  97% (97% - 100%) mv 40%  O2 via mech vent   cont wean as tolerated   vent mgmt as per scu  thoracic surg f/u   s/p L chest tube replacement 4/18/21 for recurrence of left pneumothorax   cxr 4/20/21 with Unchanged advanced infiltrates and catheter left chest tube noted   CXR 4/11/21 with : No interval change compared to one day prior. No pneumothorax noted.   unable to flush or aspirate tube fully, noted debris in tubing which was milked out.   Once material removed from tubing able to aspirated and flush fully. Also changed dressing on pigtail which appears to be in good position.   Monitor O2 status   s/p tracheostomy 4/21/21   cxr 4/21/21 with No evidence of active chest disease. Tracheostomy tube in place otherwise no significant change noted   cxr 4/25/21 with A 40% LEFT pneumothorax. LEFT multi-sidehole pigtail catheter overlies LEFT lower hemithorax.. Bilateral multifocal and diffuse ill-defined airspace opacities..  Follow-up AP portable chest radiograph 4/25/2021 AT 8:58 AM: Residual LEFT 30% upper zone pneumothorax. Otherwise no interval change.noted    s/p 2nd chest tube 5/5/21  cxr 5/6/21 with Tracheostomy and catheter left chest tubes remain. , There are significant diffuse advanced infiltrates again noted. No pneumothorax. Above findings are similar to study earlier in the day. Present film shows a left jugular line inserted   with tip entering the superior vena cava noted   cxr 5/30/21 with Tracheostomy cannula left chest catheter reidentified in position. No change small simple left apical pneumothorax. No change bilateral airspace opacities. Trace left pleural effusion suggested noted   cxr 5/31/21 with Status post tracheostomy. Status post left chest pigtail catheter. Trace left pneumothorax is unchanged. Cardiomegaly. Moderate to severe multifocal patchy opacificationof both lungs, left greater than right, is unchanged. Nonobstructive bowel gas pattern. No dilated loops of small or large bowel noted   s/p removal of L pigtail.   CXR 6/1/21 post l pig tail removal with Left pigtail catheter seen earlier in the day has been removed. Persistent amorphous infiltrate at the site is seen. No pneumothorax noted above.  No acute thoracic surgical intervention at this time,  s/p surgical placement of gastrostomy tube 4/23/2021.   abd xray 5/10/21 with ileus noted   dressing changed daily for seroma   abd xray 5/21/21 with Decreased bowel ileus compared to prior 5/20/2021 exam noted above.   abd xray 5/22/21 with No dilated loops of bowel noted above.   cont tube feeding   CT scan of the chest 5/13/21 demonstrates diffuse bilateral infiltrates with a region of consolidation at the left lung base. Small left pneumothorax. 2 left chest tubes noted in place noted above.  CT scan of the abdomen and pelvis 5/13/21 demonstrates diffuse dilatation of small and large bowel loops most consistent with ileus noted   xray abd with  5/14/21 with Ingested contrast within nonobstructed large bowel to the level of rectum. No acute radiographic intra-abdominal findings noted   ct abd-pelv 5/27/21 with Delayed nephrographic phase enhancement of the nonobstructed bilateral kidneys, suggestive of acute renal insufficiency. No evidence of bowel obstruction. Proctitis noted above.   id f/u   patient completed course of Meropenem  leukocytosis resolved  sputum cx with Enterobacter aerogenes (Carbapenem Resistant) noted above   afebrile  Completed  meropenem, s/p 1 dose of Vancomycin   completed zosyn  lispro ss   s/p 4 units prbc for anemia  f/u h/h    transfuse prbc as needed  heme onc f/u  Haptoglobin normal  no hemolysis, Fe/B12/folate adequate  hemolysis is unlikely even his baseline haptoglobin may be very elevated due to COVID  direct pamella neg noted    psych f/u/  C/w Klonopin to 1 mg q8h standing (no PRNs), with plan to further taper as tolerated  cont Seroquel to 50 mg BID standing starting tonight  Continue delirium precautions: Frequent reorientation, familiar pictures and objects at bedside, natural light in daytime,   consistent sleep/wake schedule, adequate hydration and nutrition, sensory aids (hearing aids, glasses) present if needed, minimizing noise and overstimulation,   clustering care to minimize overnight interruptions, judicious use of deliriogenic medications (anticholinergics, benzodiazepines and opioid analgesics), minimize use of restraints  phys tx eval  cont current meds   mgmt as per scu

## 2021-08-01 RX ORDER — METOPROLOL SUCCINATE 25 MG/1
TABLET, EXTENDED RELEASE ORAL
Qty: 30 TABLET | Refills: 2 | Status: SHIPPED | OUTPATIENT
Start: 2021-08-01

## 2021-08-29 NOTE — TELEPHONE ENCOUNTER
Pharmacy confirmed Please inform the patient of the following abnormal results. Labs are stable, and will continue current treatment.

## 2022-03-19 PROBLEM — R64 CACHEXIA (HCC): Status: ACTIVE | Noted: 2018-11-21

## 2022-03-20 PROBLEM — I47.1 SVT (SUPRAVENTRICULAR TACHYCARDIA) (HCC): Status: ACTIVE | Noted: 2018-07-03

## 2022-03-20 PROBLEM — K92.2 GIB (GASTROINTESTINAL BLEEDING): Status: ACTIVE | Noted: 2020-12-03

## 2022-04-10 ENCOUNTER — APPOINTMENT (OUTPATIENT)
Dept: GENERAL RADIOLOGY | Age: 72
DRG: 872 | End: 2022-04-10
Attending: EMERGENCY MEDICINE
Payer: MEDICARE

## 2022-04-10 ENCOUNTER — HOSPITAL ENCOUNTER (INPATIENT)
Age: 72
LOS: 2 days | Discharge: HOME | DRG: 872 | End: 2022-04-13
Attending: EMERGENCY MEDICINE | Admitting: INTERNAL MEDICINE
Payer: MEDICARE

## 2022-04-10 DIAGNOSIS — A41.9 SEPSIS WITHOUT ACUTE ORGAN DYSFUNCTION, DUE TO UNSPECIFIED ORGANISM (HCC): ICD-10-CM

## 2022-04-10 DIAGNOSIS — E87.1 HYPONATREMIA: ICD-10-CM

## 2022-04-10 DIAGNOSIS — J18.9 COMMUNITY ACQUIRED PNEUMONIA OF RIGHT LUNG, UNSPECIFIED PART OF LUNG: ICD-10-CM

## 2022-04-10 DIAGNOSIS — N30.90 CYSTITIS: Primary | ICD-10-CM

## 2022-04-10 LAB
ALBUMIN SERPL-MCNC: 3 G/DL (ref 3.5–5)
ALBUMIN/GLOB SERPL: 0.7 {RATIO} (ref 1.1–2.2)
ALP SERPL-CCNC: 93 U/L (ref 45–117)
ALT SERPL-CCNC: 30 U/L (ref 12–78)
ANION GAP SERPL CALC-SCNC: 11 MMOL/L (ref 5–15)
AST SERPL-CCNC: 75 U/L (ref 15–37)
BASOPHILS # BLD: 0 K/UL (ref 0–0.1)
BASOPHILS NFR BLD: 0 % (ref 0–1)
BILIRUB SERPL-MCNC: 1.7 MG/DL (ref 0.2–1)
BUN SERPL-MCNC: 18 MG/DL (ref 6–20)
BUN/CREAT SERPL: 15 (ref 12–20)
CALCIUM SERPL-MCNC: 8.4 MG/DL (ref 8.5–10.1)
CHLORIDE SERPL-SCNC: 93 MMOL/L (ref 97–108)
CO2 SERPL-SCNC: 21 MMOL/L (ref 21–32)
COMMENT, HOLDF: NORMAL
CREAT SERPL-MCNC: 1.24 MG/DL (ref 0.55–1.02)
DIFFERENTIAL METHOD BLD: ABNORMAL
EOSINOPHIL # BLD: 0 K/UL (ref 0–0.4)
EOSINOPHIL NFR BLD: 0 % (ref 0–7)
ERYTHROCYTE [DISTWIDTH] IN BLOOD BY AUTOMATED COUNT: 20.6 % (ref 11.5–14.5)
GLOBULIN SER CALC-MCNC: 4.6 G/DL (ref 2–4)
GLUCOSE SERPL-MCNC: 169 MG/DL (ref 65–100)
HCT VFR BLD AUTO: 38.2 % (ref 35–47)
HGB BLD-MCNC: 12.4 G/DL (ref 11.5–16)
IMM GRANULOCYTES # BLD AUTO: 0.1 K/UL (ref 0–0.04)
IMM GRANULOCYTES NFR BLD AUTO: 1 % (ref 0–0.5)
LACTATE SERPL-SCNC: 6 MMOL/L (ref 0.4–2)
LYMPHOCYTES # BLD: 0.5 K/UL (ref 0.8–3.5)
LYMPHOCYTES NFR BLD: 6 % (ref 12–49)
MCH RBC QN AUTO: 27.7 PG (ref 26–34)
MCHC RBC AUTO-ENTMCNC: 32.5 G/DL (ref 30–36.5)
MCV RBC AUTO: 85.3 FL (ref 80–99)
MONOCYTES # BLD: 0.4 K/UL (ref 0–1)
MONOCYTES NFR BLD: 5 % (ref 5–13)
NEUTS SEG # BLD: 7.3 K/UL (ref 1.8–8)
NEUTS SEG NFR BLD: 88 % (ref 32–75)
NRBC # BLD: 0 K/UL (ref 0–0.01)
NRBC BLD-RTO: 0 PER 100 WBC
PLATELET # BLD AUTO: 118 K/UL (ref 150–400)
PMV BLD AUTO: 10.6 FL (ref 8.9–12.9)
POTASSIUM SERPL-SCNC: 5 MMOL/L (ref 3.5–5.1)
PROT SERPL-MCNC: 7.6 G/DL (ref 6.4–8.2)
RBC # BLD AUTO: 4.48 M/UL (ref 3.8–5.2)
RBC MORPH BLD: ABNORMAL
RBC MORPH BLD: ABNORMAL
SAMPLES BEING HELD,HOLD: NORMAL
SODIUM SERPL-SCNC: 125 MMOL/L (ref 136–145)
TROPONIN-HIGH SENSITIVITY: 8 NG/L (ref 0–51)
TSH SERPL DL<=0.05 MIU/L-ACNC: 3.46 UIU/ML (ref 0.36–3.74)
UR CULT HOLD, URHOLD: NORMAL
WBC # BLD AUTO: 8.3 K/UL (ref 3.6–11)

## 2022-04-10 PROCEDURE — 71045 X-RAY EXAM CHEST 1 VIEW: CPT

## 2022-04-10 PROCEDURE — 87086 URINE CULTURE/COLONY COUNT: CPT

## 2022-04-10 PROCEDURE — 85025 COMPLETE CBC W/AUTO DIFF WBC: CPT

## 2022-04-10 PROCEDURE — 84443 ASSAY THYROID STIM HORMONE: CPT

## 2022-04-10 PROCEDURE — 83605 ASSAY OF LACTIC ACID: CPT

## 2022-04-10 PROCEDURE — 99285 EMERGENCY DEPT VISIT HI MDM: CPT

## 2022-04-10 PROCEDURE — 74011250636 HC RX REV CODE- 250/636: Performed by: EMERGENCY MEDICINE

## 2022-04-10 PROCEDURE — 81001 URINALYSIS AUTO W/SCOPE: CPT

## 2022-04-10 PROCEDURE — 84484 ASSAY OF TROPONIN QUANT: CPT

## 2022-04-10 PROCEDURE — 87040 BLOOD CULTURE FOR BACTERIA: CPT

## 2022-04-10 PROCEDURE — 74011250637 HC RX REV CODE- 250/637: Performed by: EMERGENCY MEDICINE

## 2022-04-10 PROCEDURE — 80053 COMPREHEN METABOLIC PANEL: CPT

## 2022-04-10 PROCEDURE — 36415 COLL VENOUS BLD VENIPUNCTURE: CPT

## 2022-04-10 PROCEDURE — 87186 SC STD MICRODIL/AGAR DIL: CPT

## 2022-04-10 PROCEDURE — 87077 CULTURE AEROBIC IDENTIFY: CPT

## 2022-04-10 RX ORDER — ACETAMINOPHEN 500 MG
1000 TABLET ORAL ONCE
Status: COMPLETED | OUTPATIENT
Start: 2022-04-10 | End: 2022-04-10

## 2022-04-10 RX ADMIN — SODIUM CHLORIDE 1000 ML: 9 INJECTION, SOLUTION INTRAVENOUS at 23:17

## 2022-04-10 RX ADMIN — ACETAMINOPHEN 1000 MG: 500 TABLET ORAL at 23:23

## 2022-04-11 ENCOUNTER — APPOINTMENT (OUTPATIENT)
Dept: VASCULAR SURGERY | Age: 72
DRG: 872 | End: 2022-04-11
Attending: INTERNAL MEDICINE
Payer: MEDICARE

## 2022-04-11 ENCOUNTER — APPOINTMENT (OUTPATIENT)
Dept: CT IMAGING | Age: 72
DRG: 872 | End: 2022-04-11
Attending: INTERNAL MEDICINE
Payer: MEDICARE

## 2022-04-11 PROBLEM — A41.9 SEPSIS (HCC): Status: ACTIVE | Noted: 2022-04-11

## 2022-04-11 LAB
ALBUMIN SERPL-MCNC: 2.4 G/DL (ref 3.5–5)
ALBUMIN/GLOB SERPL: 0.7 {RATIO} (ref 1.1–2.2)
ALP SERPL-CCNC: 68 U/L (ref 45–117)
ALT SERPL-CCNC: 22 U/L (ref 12–78)
ANION GAP SERPL CALC-SCNC: 8 MMOL/L (ref 5–15)
APPEARANCE UR: ABNORMAL
AST SERPL-CCNC: 44 U/L (ref 15–37)
ATRIAL RATE: 90 BPM
BACTERIA URNS QL MICRO: ABNORMAL /HPF
BASOPHILS # BLD: 0 K/UL (ref 0–0.1)
BASOPHILS NFR BLD: 0 % (ref 0–1)
BILIRUB SERPL-MCNC: 1.2 MG/DL (ref 0.2–1)
BILIRUB UR QL CFM: NEGATIVE
BNP SERPL-MCNC: 1119 PG/ML
BUN SERPL-MCNC: 16 MG/DL (ref 6–20)
BUN/CREAT SERPL: 24 (ref 12–20)
CALCIUM SERPL-MCNC: 7.1 MG/DL (ref 8.5–10.1)
CALCULATED P AXIS, ECG09: -10 DEGREES
CALCULATED R AXIS, ECG10: 51 DEGREES
CALCULATED T AXIS, ECG11: 16 DEGREES
CHLORIDE SERPL-SCNC: 103 MMOL/L (ref 97–108)
CO2 SERPL-SCNC: 21 MMOL/L (ref 21–32)
COLOR UR: ABNORMAL
COMMENT, HOLDF: NORMAL
CREAT SERPL-MCNC: 0.68 MG/DL (ref 0.55–1.02)
DIAGNOSIS, 93000: NORMAL
DIFFERENTIAL METHOD BLD: ABNORMAL
EOSINOPHIL # BLD: 0 K/UL (ref 0–0.4)
EOSINOPHIL NFR BLD: 0 % (ref 0–7)
EPITH CASTS URNS QL MICRO: ABNORMAL /LPF
ERYTHROCYTE [DISTWIDTH] IN BLOOD BY AUTOMATED COUNT: 20.5 % (ref 11.5–14.5)
FOLATE SERPL-MCNC: 4.9 NG/ML (ref 5–21)
GLOBULIN SER CALC-MCNC: 3.6 G/DL (ref 2–4)
GLUCOSE SERPL-MCNC: 214 MG/DL (ref 65–100)
GLUCOSE UR STRIP.AUTO-MCNC: NEGATIVE MG/DL
HCT VFR BLD AUTO: 33.5 % (ref 35–47)
HGB BLD-MCNC: 10.8 G/DL (ref 11.5–16)
HGB UR QL STRIP: ABNORMAL
IMM GRANULOCYTES # BLD AUTO: 0 K/UL
IMM GRANULOCYTES NFR BLD AUTO: 0 %
KETONES UR QL STRIP.AUTO: 15 MG/DL
LACTATE SERPL-SCNC: 2.2 MMOL/L (ref 0.4–2)
LACTATE SERPL-SCNC: 2.6 MMOL/L (ref 0.4–2)
LACTATE SERPL-SCNC: 2.7 MMOL/L (ref 0.4–2)
LEUKOCYTE ESTERASE UR QL STRIP.AUTO: ABNORMAL
LIPASE SERPL-CCNC: 88 U/L (ref 73–393)
LYMPHOCYTES # BLD: 0.5 K/UL (ref 0.8–3.5)
LYMPHOCYTES NFR BLD: 6 % (ref 12–49)
MAGNESIUM SERPL-MCNC: 1.2 MG/DL (ref 1.6–2.4)
MCH RBC QN AUTO: 27.7 PG (ref 26–34)
MCHC RBC AUTO-ENTMCNC: 32.2 G/DL (ref 30–36.5)
MCV RBC AUTO: 85.9 FL (ref 80–99)
MONOCYTES # BLD: 0.2 K/UL (ref 0–1)
MONOCYTES NFR BLD: 2 % (ref 5–13)
NEUTS SEG # BLD: 7.5 K/UL (ref 1.8–8)
NEUTS SEG NFR BLD: 92 % (ref 32–75)
NITRITE UR QL STRIP.AUTO: NEGATIVE
NRBC # BLD: 0 K/UL (ref 0–0.01)
NRBC BLD-RTO: 0 PER 100 WBC
P-R INTERVAL, ECG05: 120 MS
PH UR STRIP: 6 [PH] (ref 5–8)
PHOSPHATE SERPL-MCNC: 1.4 MG/DL (ref 2.6–4.7)
PLATELET # BLD AUTO: 98 K/UL (ref 150–400)
PMV BLD AUTO: 10.8 FL (ref 8.9–12.9)
POTASSIUM SERPL-SCNC: 3 MMOL/L (ref 3.5–5.1)
PROCALCITONIN SERPL-MCNC: 1.02 NG/ML
PROT SERPL-MCNC: 6 G/DL (ref 6.4–8.2)
PROT UR STRIP-MCNC: 300 MG/DL
Q-T INTERVAL, ECG07: 432 MS
QRS DURATION, ECG06: 134 MS
QTC CALCULATION (BEZET), ECG08: 528 MS
RBC # BLD AUTO: 3.9 M/UL (ref 3.8–5.2)
RBC #/AREA URNS HPF: ABNORMAL /HPF (ref 0–5)
RBC MORPH BLD: ABNORMAL
RBC MORPH BLD: ABNORMAL
SAMPLES BEING HELD,HOLD: NORMAL
SODIUM SERPL-SCNC: 132 MMOL/L (ref 136–145)
SP GR UR REFRACTOMETRY: 1.01 (ref 1–1.03)
TROPONIN-HIGH SENSITIVITY: 10 NG/L (ref 0–51)
UROBILINOGEN UR QL STRIP.AUTO: 2 EU/DL (ref 0.2–1)
VENTRICULAR RATE, ECG03: 90 BPM
VIT B12 SERPL-MCNC: 320 PG/ML (ref 193–986)
WBC # BLD AUTO: 8.2 K/UL (ref 3.6–11)
WBC URNS QL MICRO: >100 /HPF (ref 0–4)

## 2022-04-11 PROCEDURE — 84100 ASSAY OF PHOSPHORUS: CPT

## 2022-04-11 PROCEDURE — 84484 ASSAY OF TROPONIN QUANT: CPT

## 2022-04-11 PROCEDURE — 74011000636 HC RX REV CODE- 636: Performed by: INTERNAL MEDICINE

## 2022-04-11 PROCEDURE — 74011250636 HC RX REV CODE- 250/636: Performed by: EMERGENCY MEDICINE

## 2022-04-11 PROCEDURE — 82607 VITAMIN B-12: CPT

## 2022-04-11 PROCEDURE — 74011000258 HC RX REV CODE- 258: Performed by: INTERNAL MEDICINE

## 2022-04-11 PROCEDURE — 65660000001 HC RM ICU INTERMED STEPDOWN

## 2022-04-11 PROCEDURE — 65660000000 HC RM CCU STEPDOWN

## 2022-04-11 PROCEDURE — 74011250636 HC RX REV CODE- 250/636: Performed by: INTERNAL MEDICINE

## 2022-04-11 PROCEDURE — 96366 THER/PROPH/DIAG IV INF ADDON: CPT

## 2022-04-11 PROCEDURE — 94760 N-INVAS EAR/PLS OXIMETRY 1: CPT

## 2022-04-11 PROCEDURE — 84145 PROCALCITONIN (PCT): CPT

## 2022-04-11 PROCEDURE — 74011250637 HC RX REV CODE- 250/637: Performed by: INTERNAL MEDICINE

## 2022-04-11 PROCEDURE — 74011250636 HC RX REV CODE- 250/636: Performed by: HOSPITALIST

## 2022-04-11 PROCEDURE — 83605 ASSAY OF LACTIC ACID: CPT

## 2022-04-11 PROCEDURE — 96375 TX/PRO/DX INJ NEW DRUG ADDON: CPT

## 2022-04-11 PROCEDURE — 96365 THER/PROPH/DIAG IV INF INIT: CPT

## 2022-04-11 PROCEDURE — 74011000250 HC RX REV CODE- 250: Performed by: HOSPITALIST

## 2022-04-11 PROCEDURE — 93005 ELECTROCARDIOGRAM TRACING: CPT

## 2022-04-11 PROCEDURE — 83880 ASSAY OF NATRIURETIC PEPTIDE: CPT

## 2022-04-11 PROCEDURE — 74011000250 HC RX REV CODE- 250: Performed by: EMERGENCY MEDICINE

## 2022-04-11 PROCEDURE — 80053 COMPREHEN METABOLIC PANEL: CPT

## 2022-04-11 PROCEDURE — 74011000250 HC RX REV CODE- 250: Performed by: INTERNAL MEDICINE

## 2022-04-11 PROCEDURE — 82746 ASSAY OF FOLIC ACID SERUM: CPT

## 2022-04-11 PROCEDURE — 83735 ASSAY OF MAGNESIUM: CPT

## 2022-04-11 PROCEDURE — 74011250637 HC RX REV CODE- 250/637: Performed by: HOSPITALIST

## 2022-04-11 PROCEDURE — 74177 CT ABD & PELVIS W/CONTRAST: CPT

## 2022-04-11 PROCEDURE — 85025 COMPLETE CBC W/AUTO DIFF WBC: CPT

## 2022-04-11 PROCEDURE — 70450 CT HEAD/BRAIN W/O DYE: CPT

## 2022-04-11 PROCEDURE — 36415 COLL VENOUS BLD VENIPUNCTURE: CPT

## 2022-04-11 PROCEDURE — 93970 EXTREMITY STUDY: CPT

## 2022-04-11 PROCEDURE — 71275 CT ANGIOGRAPHY CHEST: CPT

## 2022-04-11 PROCEDURE — 83690 ASSAY OF LIPASE: CPT

## 2022-04-11 RX ORDER — ONDANSETRON 2 MG/ML
4 INJECTION INTRAMUSCULAR; INTRAVENOUS ONCE
Status: COMPLETED | OUTPATIENT
Start: 2022-04-11 | End: 2022-04-11

## 2022-04-11 RX ORDER — VANCOMYCIN/0.9 % SOD CHLORIDE 1 G/100 ML
1 PLASTIC BAG, INJECTION (ML) INTRAVENOUS EVERY 12 HOURS
Status: DISCONTINUED | OUTPATIENT
Start: 2022-04-11 | End: 2022-04-11 | Stop reason: DRUGHIGH

## 2022-04-11 RX ORDER — ACETAMINOPHEN 325 MG/1
650 TABLET ORAL
Status: DISCONTINUED | OUTPATIENT
Start: 2022-04-11 | End: 2022-04-13 | Stop reason: HOSPADM

## 2022-04-11 RX ORDER — ACETAMINOPHEN 650 MG/1
650 SUPPOSITORY RECTAL
Status: DISCONTINUED | OUTPATIENT
Start: 2022-04-11 | End: 2022-04-13 | Stop reason: HOSPADM

## 2022-04-11 RX ORDER — SODIUM CHLORIDE 0.9 % (FLUSH) 0.9 %
5-40 SYRINGE (ML) INJECTION EVERY 8 HOURS
Status: DISCONTINUED | OUTPATIENT
Start: 2022-04-11 | End: 2022-04-13 | Stop reason: HOSPADM

## 2022-04-11 RX ORDER — ASCORBIC ACID 500 MG
500 TABLET ORAL DAILY
Status: DISCONTINUED | OUTPATIENT
Start: 2022-04-11 | End: 2022-04-13 | Stop reason: HOSPADM

## 2022-04-11 RX ORDER — ONDANSETRON 4 MG/1
4 TABLET, ORALLY DISINTEGRATING ORAL
Status: DISCONTINUED | OUTPATIENT
Start: 2022-04-11 | End: 2022-04-13 | Stop reason: HOSPADM

## 2022-04-11 RX ORDER — ONDANSETRON 2 MG/ML
INJECTION INTRAMUSCULAR; INTRAVENOUS
Status: DISPENSED
Start: 2022-04-11 | End: 2022-04-11

## 2022-04-11 RX ORDER — POLYETHYLENE GLYCOL 3350 17 G/17G
17 POWDER, FOR SOLUTION ORAL DAILY PRN
Status: DISCONTINUED | OUTPATIENT
Start: 2022-04-11 | End: 2022-04-13 | Stop reason: HOSPADM

## 2022-04-11 RX ORDER — VANCOMYCIN HYDROCHLORIDE
1250 ONCE
Status: COMPLETED | OUTPATIENT
Start: 2022-04-11 | End: 2022-04-11

## 2022-04-11 RX ORDER — SODIUM CHLORIDE 0.9 % (FLUSH) 0.9 %
5-10 SYRINGE (ML) INJECTION AS NEEDED
Status: DISCONTINUED | OUTPATIENT
Start: 2022-04-11 | End: 2022-04-13 | Stop reason: HOSPADM

## 2022-04-11 RX ORDER — MELATONIN
1000 DAILY
Status: DISCONTINUED | OUTPATIENT
Start: 2022-04-11 | End: 2022-04-13 | Stop reason: HOSPADM

## 2022-04-11 RX ORDER — SODIUM CHLORIDE 0.9 % (FLUSH) 0.9 %
5-40 SYRINGE (ML) INJECTION AS NEEDED
Status: DISCONTINUED | OUTPATIENT
Start: 2022-04-11 | End: 2022-04-13 | Stop reason: HOSPADM

## 2022-04-11 RX ORDER — MAGNESIUM SULFATE HEPTAHYDRATE 40 MG/ML
2 INJECTION, SOLUTION INTRAVENOUS ONCE
Status: COMPLETED | OUTPATIENT
Start: 2022-04-11 | End: 2022-04-11

## 2022-04-11 RX ORDER — LANOLIN ALCOHOL/MO/W.PET/CERES
400 CREAM (GRAM) TOPICAL 2 TIMES DAILY
Status: DISCONTINUED | OUTPATIENT
Start: 2022-04-11 | End: 2022-04-13 | Stop reason: HOSPADM

## 2022-04-11 RX ORDER — PANTOPRAZOLE SODIUM 40 MG/1
40 TABLET, DELAYED RELEASE ORAL
Status: DISCONTINUED | OUTPATIENT
Start: 2022-04-11 | End: 2022-04-13 | Stop reason: HOSPADM

## 2022-04-11 RX ORDER — SODIUM CHLORIDE 9 MG/ML
100 INJECTION, SOLUTION INTRAVENOUS CONTINUOUS
Status: DISCONTINUED | OUTPATIENT
Start: 2022-04-11 | End: 2022-04-12

## 2022-04-11 RX ORDER — FOLIC ACID 1 MG/1
1 TABLET ORAL DAILY
Status: DISCONTINUED | OUTPATIENT
Start: 2022-04-11 | End: 2022-04-13 | Stop reason: HOSPADM

## 2022-04-11 RX ORDER — HEPARIN SODIUM 5000 [USP'U]/ML
5000 INJECTION, SOLUTION INTRAVENOUS; SUBCUTANEOUS EVERY 8 HOURS
Status: DISCONTINUED | OUTPATIENT
Start: 2022-04-11 | End: 2022-04-13 | Stop reason: HOSPADM

## 2022-04-11 RX ORDER — VANCOMYCIN/0.9 % SOD CHLORIDE 750 MG/250
750 PLASTIC BAG, INJECTION (ML) INTRAVENOUS
Status: DISCONTINUED | OUTPATIENT
Start: 2022-04-11 | End: 2022-04-11

## 2022-04-11 RX ORDER — ONDANSETRON 2 MG/ML
4 INJECTION INTRAMUSCULAR; INTRAVENOUS
Status: DISCONTINUED | OUTPATIENT
Start: 2022-04-11 | End: 2022-04-13 | Stop reason: HOSPADM

## 2022-04-11 RX ADMIN — SODIUM CHLORIDE, PRESERVATIVE FREE 10 ML: 5 INJECTION INTRAVENOUS at 22:56

## 2022-04-11 RX ADMIN — DOXYCYCLINE 100 MG: 100 INJECTION, POWDER, LYOPHILIZED, FOR SOLUTION INTRAVENOUS at 12:05

## 2022-04-11 RX ADMIN — Medication 1000 UNITS: at 09:01

## 2022-04-11 RX ADMIN — Medication 400 MG: at 09:01

## 2022-04-11 RX ADMIN — PIPERACILLIN SODIUM AND TAZOBACTAM SODIUM 3.38 G: 3; 375 INJECTION, POWDER, FOR SOLUTION INTRAVENOUS at 12:13

## 2022-04-11 RX ADMIN — POTASSIUM PHOSPHATE, MONOBASIC POTASSIUM PHOSPHATE, DIBASIC: 224; 236 INJECTION, SOLUTION, CONCENTRATE INTRAVENOUS at 09:50

## 2022-04-11 RX ADMIN — SODIUM CHLORIDE 1000 ML: 9 INJECTION, SOLUTION INTRAVENOUS at 00:14

## 2022-04-11 RX ADMIN — OXYCODONE HYDROCHLORIDE AND ACETAMINOPHEN 500 MG: 500 TABLET ORAL at 09:01

## 2022-04-11 RX ADMIN — HEPARIN SODIUM 5000 UNITS: 5000 INJECTION INTRAVENOUS; SUBCUTANEOUS at 03:12

## 2022-04-11 RX ADMIN — PANTOPRAZOLE SODIUM 40 MG: 40 TABLET, DELAYED RELEASE ORAL at 07:17

## 2022-04-11 RX ADMIN — HEPARIN SODIUM 5000 UNITS: 5000 INJECTION INTRAVENOUS; SUBCUTANEOUS at 12:13

## 2022-04-11 RX ADMIN — FOLIC ACID 1 MG: 1 TABLET ORAL at 09:01

## 2022-04-11 RX ADMIN — IOPAMIDOL 100 ML: 755 INJECTION, SOLUTION INTRAVENOUS at 11:41

## 2022-04-11 RX ADMIN — VANCOMYCIN HYDROCHLORIDE 1250 MG: 10 INJECTION, POWDER, LYOPHILIZED, FOR SOLUTION INTRAVENOUS at 03:09

## 2022-04-11 RX ADMIN — SODIUM CHLORIDE, PRESERVATIVE FREE 10 ML: 5 INJECTION INTRAVENOUS at 14:21

## 2022-04-11 RX ADMIN — DOXYCYCLINE 100 MG: 100 INJECTION, POWDER, LYOPHILIZED, FOR SOLUTION INTRAVENOUS at 02:55

## 2022-04-11 RX ADMIN — Medication 1 CAPSULE: at 12:10

## 2022-04-11 RX ADMIN — PIPERACILLIN SODIUM AND TAZOBACTAM SODIUM 3.38 G: 3; 375 INJECTION, POWDER, FOR SOLUTION INTRAVENOUS at 20:16

## 2022-04-11 RX ADMIN — HEPARIN SODIUM 5000 UNITS: 5000 INJECTION INTRAVENOUS; SUBCUTANEOUS at 20:16

## 2022-04-11 RX ADMIN — SODIUM CHLORIDE 150 ML/HR: 9 INJECTION, SOLUTION INTRAVENOUS at 02:18

## 2022-04-11 RX ADMIN — SODIUM CHLORIDE 100 ML/HR: 9 INJECTION, SOLUTION INTRAVENOUS at 20:00

## 2022-04-11 RX ADMIN — ONDANSETRON HYDROCHLORIDE 4 MG: 2 SOLUTION INTRAMUSCULAR; INTRAVENOUS at 03:03

## 2022-04-11 RX ADMIN — AZITHROMYCIN DIHYDRATE 500 MG: 500 INJECTION, POWDER, LYOPHILIZED, FOR SOLUTION INTRAVENOUS at 00:24

## 2022-04-11 RX ADMIN — SODIUM CHLORIDE 150 ML/HR: 9 INJECTION, SOLUTION INTRAVENOUS at 08:55

## 2022-04-11 RX ADMIN — SODIUM CHLORIDE 566 ML: 9 INJECTION, SOLUTION INTRAVENOUS at 00:34

## 2022-04-11 RX ADMIN — ONDANSETRON HYDROCHLORIDE 4 MG: 2 SOLUTION INTRAMUSCULAR; INTRAVENOUS at 02:17

## 2022-04-11 RX ADMIN — CEFTRIAXONE SODIUM 2 G: 2 INJECTION, POWDER, FOR SOLUTION INTRAMUSCULAR; INTRAVENOUS at 00:15

## 2022-04-11 RX ADMIN — PIPERACILLIN SODIUM AND TAZOBACTAM SODIUM 3.38 G: 3; 375 INJECTION, POWDER, FOR SOLUTION INTRAVENOUS at 03:05

## 2022-04-11 RX ADMIN — MAGNESIUM SULFATE IN WATER 2 G: 40 INJECTION, SOLUTION INTRAVENOUS at 09:01

## 2022-04-11 RX ADMIN — Medication 400 MG: at 17:50

## 2022-04-11 NOTE — PROGRESS NOTES
Pharmacist Note - Vancomycin Dosing    Consult provided for this 66 y.o. female for indication of cystitis, CAP, sepsis. Antibiotic regimen(s): doxycycline, zosyn  Patient on vancomycin PTA? NO     Recent Labs     04/10/22  2237   WBC 8.3   CREA 1.24*   BUN 18     Frequency of BMP: daily x 3 then every other day  Height: 162.6 cm  Weight: 52.2 kg  Est CrCl: 33 ml/min  Temp (24hrs), Av.3 °F (37.9 °C), Min:98.7 °F (37.1 °C), Max:103 °F (39.4 °C)    Cultures:pending    MRSA Swab ordered (if applicable)? YES    The plan below is expected to result in a target range of AUC/KEMAL 400-600    Therapy will be initiated with a loading dose of 1250 mg IV x 1   Maintenance dose likely to be Q24H dosing. Will reasses after new AM labs. Pharmacy to follow patient daily and order levels / make dose adjustments as appropriate. If dosing by trough levels and not AUC, delete this line and all text below    *Vancomycin has been dosed used Bayesian kinetics software to target an AUC/KEMAL of 400-600, which provides adequate exposure for an assumed infection due to MRSA with an KEMAL of 1 or less while reducing the risk of nephrotoxicity as seen with traditional trough based dosing goals.

## 2022-04-11 NOTE — PROGRESS NOTES
Reviewed chart for transitions of care, and discussed in rounds. CM met with patient at bedside to explain role and offer support. Patient is alert and oriented x4, and confirmed demographics. Baseline:   ADLs/IDALS:Independent  Previous Home Health:None  Previous SNF/IPR:None  ER Contact:  Ortiz Torres [phone]    Patient lives in a 2 level house with 3 steps to enter. Patient is independent with ADLs/IDALs  Patient has no previous HH or equipment needs. Patient's preferred pharmacy is [facility] located in [place] . Patient's family is expected to transport at discharge. Reason for Admission:  Sepsis                   RUR Score:   11%               Plan for utilizing home health:   None       PCP: First and Last name:  None     Name of Practice:    Are you a current patient: Yes/No:    Approximate date of last visit:    Can you participate in a virtual visit with your PCP:                     Current Advanced Directive/Advance Care Plan: Full Code      Healthcare Decision Maker:   Click here to complete [address] including selection of the [address] Relationship (ie \"Primary\")             Primary Decision MakerSherl Hodgkins - [phone]                  Transition of Care Plan:    Care manager met  with patient to introduce self and explain role. Patient confirmed she does not have a PCP. She is independent, lives with her  and has 2 supportive children. Confirmed demographic information. CM willl follow for transitions of care.    Care Management Interventions  MyChart Signup: No  Discharge Durable Medical Equipment: No  Physical Therapy Consult: No  Occupational Therapy Consult: No  Speech Therapy Consult: No  Support Systems: Spouse/Significant Other ( Ortiz Torres [phone])  Confirm Follow Up Transport: Family  Discharge Location  Patient Expects to be Discharged to[de-identified] Home   Medicare pt has received, reviewed, and signed 1 st  IM letter informing them of their right to appeal the discharge. Signed copy has been placed on pt bedside chart.

## 2022-04-11 NOTE — ED TRIAGE NOTES
Pt arrives ambulatory from home for rapid heart rate, shortness of breath and bilateral leg weakness. She states this problem began about 2 weeks ago. Denies cp. She reports a history of \"fast heart beat\" for which she takes metoprolol daily, and she took this earlier today.  A&Ox4 on arrival.

## 2022-04-11 NOTE — ACP (ADVANCE CARE PLANNING)
Advance Care Planning     Advance Care Planning Activator (Inpatient)  Conversation Note      Date of ACP Conversation: 04/11/22     Conversation Conducted with:  Patient  ACP Activator: Galilea Andrews RN        Health Care Decision Maker:    Current Designated Health Care Decision Maker:     Click here to complete [address] including selection of the Healthcare Decision Maker Relationship (ie \"Primary\")      Care Preferences    Ventilation: \"If you were in your present state of health and suddenly became very ill and were unable to breathe on your own, what would your preference be about the use of a ventilator (breathing machine) if it were available to you? \"  Unsure    I  \"If your health worsens and it becomes clear that your chance of recovery is unlikely, what would your preference be about the use of a ventilator (breathing machine) if it were available to you? \" No      Resuscitation  \"CPR works best to restart the heart when there is a sudden event, like a heart attack, in someone who is otherwise healthy. Unfortunately, CPR does not typically restart the heart for people who have serious health conditions or who are very sick. \"    \"In the event your heart stopped as a result of an underlying serious health condition, would you want attempts to be made to restart your heart Yes    [] Yes  [x] No   Educated Patient / Decision Maker regarding differences between Advance Directives and portable DNR orders.     Length of ACP Conversation in minutes:  10    Conversation Outcomes:  [x] ACP discussion completed  [] Existing advance directive reviewed with patient; no changes to patient's previously recorded wishes     [] New Advance Directive completed   [] Portable Do Not Resuscitate prepared for Provider review and signature  [] POLST/POST/MOLST/MOST prepared for Provider review and signature      Follow-up plan:    [] Schedule follow-up conversation to continue planning  [] Referred individual to Provider for additional questions/concerns   [] Advised patient/agent/surrogate to review completed ACP document and update if needed with changes in condition, patient preferences or care setting     [] This note routed to one or more involved healthcare providers

## 2022-04-11 NOTE — H&P
[address]  HISTORY AND PHYSICAL    Name:  Sonia River  MR#:  [id number]  :  1950  ACCOUNT #:  [de-identified]  ADMIT DATE:  04/10/2022      The patient was seen, evaluated, and admitted by me on 2022. PRIMARY CARE PHYSICIAN:  Unknown. SOURCE OF INFORMATION:  The patient and review of ED and old electronic medical records. CHIEF COMPLAINT:  Palpitation. HISTORY OF PRESENT ILLNESS:  This is a 68-year-old woman with a past medical history significant for supraventricular tachycardia, who was in her usual state of health until about 2 weeks ago when the patient started experiencing a palpitation. The palpitation got worse on the day of presentation, associated with shortness of breath and bilateral leg weakness. Because of that, the patient decided to come to the emergency room for further evaluation. The patient has history of supraventricular tachycardia for which the patient takes metoprolol. The patient took routine metoprolol without any significant relief in terms of the palpitation. She denies any associated chest pain. When the patient arrived at the emergency room, the patient was found to have tachycardia. The initial lactic acid level was 6.0. Code sepsis was called and code sepsis protocol was initiated including administration of IV fluid per sepsis protocol and also antibiotics. The patient's chest x-ray shows mild patchy airspace opacities in the right lung base. Her urinalysis was also consistent with urinary tract infection, these were thought to be the source of the patient's sepsis. The patient was last admitted to the hospital from 2020 to 2020. The patient was admitted with GI bleed, underwent EGD that shows bleeding ulcer, which was clipped. The patient was discharged home in stable condition. PAST MEDICAL HISTORY:  Supraventricular tachycardia. ALLERGIES:  NO KNOWN DRUG ALLERGIES.     MEDICATIONS:  1.  Vitamin C 500 mg daily.  2.  Bumex 0.5 mg daily. 3.  Magnesium oxide 400 mg 2 tablets twice daily. 4.  Toprol XL 25 mg daily at bedtime. 5.  Prilosec 20 mg daily as needed. 6.  Potassium chloride 20 mEq daily. FAMILY HISTORY:  This was reviewed. Her mother had hypertension. PAST SURGICAL HISTORY:  This is significant for tonsillectomy, wisdom teeth extraction. SOCIAL HISTORY:  No history of alcohol or tobacco abuse. REVIEW OF SYSTEMS:  HEAD, EYES, EARS, NOSE, AND THROAT:  No headache, no dizziness, no blurring of vision, no photophobia. RESPIRATORY SYSTEM:  No cough, no shortness of breath, no hemoptysis. CARDIOVASCULAR SYSTEM:  This is positive for palpitation. No orthopnea, no chest pain. GASTROINTESTINAL SYSTEM:  No nausea or vomiting. No diarrhea. No constipation. GENITOURINARY SYSTEM:  No dysuria, no urgency, and no frequency. All other systems are reviewed and they are negative. PHYSICAL EXAMINATION:  GENERAL APPEARANCE:  The patient appeared ill, in moderate distress. VITAL SIGNS:  On arrival at the emergency room, temperature 98.7, pulse 155, respiratory rate 22, blood pressure 118/70, oxygen saturation 98% on room air. HEENT:  Head:  Normocephalic, atraumatic. Eyes:  Normal eye movement. No redness, no drainage, no discharge. Ears:  Normal external ears with no evidence of drainage. Nose:  No deformity and no drainage. Mouth and Throat:  No visible oral lesion. Dry oral mucosa. NECK:  Neck is supple. No JVD, no thyromegaly. CHEST:  Clear breath sounds. No wheezing. No crackles. HEART:  Normal S1 and S2, regular. No clinically appreciable murmur. ABDOMEN:  Soft, nontender. Normal bowel sounds. CNS:  Alert, oriented x3. No gross focal neurological deficit. EXTREMITIES:  No edema. Pulses 2+ bilaterally. MUSCULOSKELETAL SYSTEM:  No evidence of joint deformity and swelling. SKIN:  No active skin lesions seen in the exposed part of the body.   PSYCHIATRY:  Normal mood and affect. LYMPHATIC SYSTEM:  No cervical lymphadenopathy. DIAGNOSTIC DATA:  Chest x-ray shows mild patchy airspace opacity in the right lung base may represent atelectasis or developing pneumonia. EKG shows sinus tachycardia and nonspecific ST and T-waves abnormality. LABORATORY DATA:  Chemistry:  Sodium 135, potassium 5.0 this was reported as hemolyzed sample, chloride 93, CO2 21, glucose 159, BUN 18, creatinine 1.24, calcium 8.4, total bilirubin 1.7, ALT 30, AST 75 reported as hemolyzed sample, alkaline phosphatase 93, total protein 7.6, albumin level 3.0, globulin at 4.6. Cardiac Profile:  Troponin high-sensitivity 8. Hematology:  WBC 8.3, hemoglobin at 12.4, hematocrit 38.2, platelets 930. Lactic acid level 6.0.  TSH 3.47. Urinalysis: This is significant for large blood, negative nitrite, large leukocyte esterase, 4+ bacteria. Repeat lactic acid level 2.7. ASSESSMENT:  1.  Sepsis. 2.  Bacterial pneumonia. 3.  Acute cystitis with hematuria. 4.  Thrombocytopenia. 5.  Hyponatremia. 6.  Hyperglycemia. 7.  Acute kidney injury. 8.  Sinus tachycardia. 9.  Bilateral leg weakness. PLAN:  1. Sepsis. We will admit the patient for further evaluation and treatment. We will continue fluid therapy. Await blood culture and urine culture result. We will start the patient on vancomycin and Zosyn. Sepsis is most likely coming from suspected bacterial pneumonia as well as acute cystitis. We will obtain CT scan of the abdomen and pelvis to evaluate the patient for other potential source of sepsis such as colitis, especially in this patient, who initially presented with lactic acid level of 6.0. We will check lipase level as well. 2.  Bacterial pneumonia. As stated above, this is a potential source of sepsis. We will add doxycycline to cover atypical organisms. We will obtain CT scan of the chest for further evaluation of the pneumonia. 3.  Acute cystitis with hematuria.   This is also a potential source of sepsis. The patient will be started on antibiotics as stated above. We will await result of CT scan of the abdomen and pelvis. 4.  Thrombocytopenia. This is mild. The patient is asymptomatic. We will monitor the patient's platelet count. 5.  Hyponatremia. This is most likely due to volume depletion. We will carry out fluid therapy and repeat sodium level. We will await result CT scan of the chest to evaluate the patient for mass lesion. 6.  Hyperglycemia. The patient has no history of diabetes. We will check hemoglobin A1c level. 7.  Acute kidney injury. This is most likely due to volume depletion. We will carry out fluid therapy and repeat the patient's renal function. We will await result of CT scan of the abdomen and pelvis to evaluate the patient for obstructive lesion. 8.  Sinus tachycardia. The patient has history of SVT. TSH is within normal limit. We will check serial cardiac markers to rule out acute myocardial infarction. We will also check BNP level. We will await result of CTA of the chest to evaluate the patient for pulmonary embolism as another possible cause of tachycardia at rest.  9.  Bilateral leg weakness. We will check CT scan of the head to evaluate the patient for acute pathology. We will check ultrasound of the lower extremity for DVT. We will check I47 and folic acid level. 16.  Other Issues:  Code Status: The patient is a full code. We will place the patient on heparin for DVT prophylaxis, if there is a significant drop in the patient's platelet count. We will discontinue heparin and request SCD for DVT prophylaxis. FUNCTIONAL STATUS PRIOR TO ADMISSION:  The patient came from home. The patient is ambulatory with no assistive device. COVID PRECAUTION:  The patient was wearing a face mask. I was wearing a face mask and gloves for this patient's encounter.     Sepsis reassessment completed: Patient has normal capillary refill, improved cardiopulmonary examination and moist mucous membrane.       MD NANY [email or web address]  D:  04/11/2022 4:59  T:  04/11/2022 7:52  JOB #:  [id number]

## 2022-04-11 NOTE — CONSULTS
Infectious Disease Consult    Today's Date: 4/11/2022   Admit Date: 4/10/2022    Impression:   Sepsis; resolved  Bacteremia  UTI  ? CAP  - T-max 103, WBC 8.2    Blood cx (4/10) GNR; identification and sensitivity pending    U/A (4/10) large leukocyte esterase, >100 WBC with 4+ bacteria, urine cx requested 4/11    procal 1.02    CXR (4/10) Mild patchy airspace opacity in the right lung base may represent atelectasis or developing pneumonia    CT chest, abd, pel (4/11) No evidence of acute pulmonary embolus. Small bilateral pleural effusions with bilateral dependent atelectasis. No hydronephrosis. Nodular hepatic contour, compatible with cirrhosis. Moderately large hiatal hernia. Esophageal wall thickening, suggesting esophagitis. GERD  - continue with BB    Plan:   ·  continue with IV zosyn  ·  will adjust ABX prn  ·  Fever work up if temp >= 100.4    Above plan of care discussed and agreed with Dr. Lindley Angelucci:   · IV rocephin 4/11  · IV doxycycline 4/11-  · IV zosyn 4/11-  · IV vancomycin 4/11-    Subjective:   Date of Consultation:  April 11, 2022  Referring Physician: Dr. Delvin Hastings    Patient is a 66 y.o. female with medical hx of supraventricular tachycardia was admitted to the hospital on 4/10 with palpitation, frequent urination, and dysuria. Pt has been treated for UTI almost 'monthly' bases for the past a few months. CT of chest, abd, pel revealed No evidence of acute pulmonary embolus. Small bilateral pleural effusions with bilateral dependent atelectasis. No hydronephrosis. Nodular hepatic contour, compatible with cirrhosis. Moderately large hiatal hernia. Esophageal wall thickening, suggesting esophagitis. Blood cx grew GNR, U/A revealed large leukocyte esterase, >100 WBC with 4+ bacteria. Urine cx was requested at the time of consultation visit. Pt voices feeling so much better, no further dysuria at this time.  No respiratory distress at the time she arrived to ER other than uncomfortable chest feeling from palpitation. Pt had one episode of fever upon admission, since then she has been afebrile. No chills, cough, sob, saucedo, chest pain, abd pain, diarrhea, or lower extremity pain. Received COVID 19 vaccine with JJ, no booster shot. No known antibiotic allergies. No hx of smoking, occasional alcohol intake    ID team was consulted for bacteremia evaluation and its treatment recommendation. Patient Active Problem List   Diagnosis Code    SVT (supraventricular tachycardia) ([facility]) I47.1    Cachexia ([facility] [address] R64    GIB (gastrointestinal bleeding) K92.2    Sepsis ([facility] [address] A41.9     No past medical history on file. No family history on file. Social History     Tobacco Use    Smoking status: Never Smoker    Smokeless tobacco: Never Used   Substance Use Topics    Alcohol use: Yes     Comment: occasional wine     Past Surgical History:   Procedure Laterality Date    HX ORTHOPAEDIC      HX TONSILLECTOMY      HX WISDOM TEETH EXTRACTION        Prior to Admission medications    Medication Sig Start Date End Date Taking? Authorizing Provider   metoprolol succinate (TOPROL-XL) 25 mg XL tablet TAKE 1 TABLET BY MOUTH EVERY DAY AT NIGHT 8/1/21   Félix Armstrong MD   Omeprazole delayed release (PRILOSEC D/R) 20 mg tablet Take 20 mg by mouth as needed. Provider, Historical   bumetanide (BUMEX) 0.5 mg tablet Take 1 Tab by mouth daily. 3/3/21   aJilyn Gupta NP   magnesium oxide (MAG-OX) 400 mg tablet To take 2 tablets (800 mg) in am and 1 tablet (400 mg ) in pm.  Patient taking differently: Take 400 mg by mouth two (2) times a day. To take 2 tablets (800 mg) in am and 1 tablet (400 mg ) in pm. 2/5/21   Jailyn Gupta NP   cholecalciferol (VITAMIN D3) 1,000 unit tablet Take 1 Tab by mouth daily. 11/20/18   Félix Armstrong MD   potassium chloride SR (K-TAB) 20 mEq tablet Take 1 Tab by mouth daily.  11/20/18   Félix Armstrong MD   cranberry fruit extract (CRANBERRY CONCENTRATE PO) Take 1 Tab by mouth daily. Provider, Historical   ascorbic acid, vitamin C, (VITAMIN C) 500 mg tablet Take 500 mg by mouth daily. Provider, Historical     a  No Known Allergies     REVIEW OF SYSTEMS:     Total of 12 systems reviewed as follows:   I am not able to complete the review of systems because:    The patient is intubated and sedated    The patient has altered mental status due to his acute medical problems    The patient has baseline aphasia from prior stroke(s)    The patient has baseline dementia and is not reliable historian                 POSITIVE= underlined text  Negative = text not underlined  General:  fever, chills, sweats, generalized weakness, weight loss/gain,      loss of appetite   Eyes:    blurred vision, eye pain, loss of vision, double vision  ENT:    rhinorrhea, pharyngitis   Respiratory:   cough, sputum production, SOB, wheezing, CABEZAS, pleuritic pain   Cardiology:   chest pain, palpitations resolved at the time of the visit, orthopnea, PND, edema, syncope   Gastrointestinal:  abdominal pain , N/V, dysphagia, diarrhea, constipation, bleeding   Genitourinary:  frequency, urgency, dysuria, hematuria, incontinence   Muskuloskeletal :  arthralgia, myalgia   Hematology:  easy bruising, nose or gum bleeding, lymphadenopathy   Dermatological: rash, ulceration, pruritis   Endocrine:   hot flashes or polydipsia   Neurological:  headache, dizziness, confusion, focal weakness, paresthesia,     Speech difficulties, memory loss, gait disturbance  Psychological: Feelings of anxiety, depression, agitation    Objective:     Visit Vitals  /61 (BP 1 Location: Left arm, BP Patient Position: At rest)   Pulse (!) 104   Temp 97.9 °F (36.6 °C)   Resp 18   Ht 5' 4\" (1.626 m)   Wt 52.2 kg (115 lb)   SpO2 96%   BMI 19.74 kg/m²     Temp (24hrs), Av.2 °F (37.3 °C), Min:97.9 °F (36.6 °C), Max:103 °F (39.4 °C)       Lines:  Peripheral line    PHYSICAL EXAM:   General: chronically ill appearing. Alert, cooperative, no distress, appears stated age. HEENT: Atraumatic, anicteric sclerae, pink conjunctivae     No oral ulcers, mucosa moist, throat clear  Neck:  Supple, symmetrica  Lungs:   Clear in apex with decreased breath sounds at bases. No Wheezing or Rhonchi. No rales. Chest wall:  No tenderness  No Accessory muscle use. Heart:   Regular  rhythm,  No  murmur   No edema  Abdomen:   Soft, non-tender. Not distended. Bowel sounds normal  Extremities: No cyanosis. No clubbing  Skin:     Not pale. Not Jaundiced  No rashes   Psych:  Good insight. Not depressed. Not anxious or agitated. Neurologic: EOMs intact. No facial asymmetry. No aphasia or slurred speech. Alert and oriented X 4. Data Review:     CBC:  Recent Labs     04/11/22  0256 04/10/22  2237   WBC 8.2 8.3   GRANS 92* 88*   MONOS 2* 5   EOS 0 0   ANEU 7.5 7.3   ABL 0.5* 0.5*   HGB 10.8* 12.4   HCT 33.5* 38.2   PLT 98* 118*       BMP:  Recent Labs     04/11/22 0256 04/10/22  2237   CREA 0.68 1.24*   BUN 16 18   * 125*   K 3.0* 5.0    93*   CO2 21 21   AGAP 8 11   * 169*       LFTS:  Recent Labs     04/11/22  0256 04/10/22  2237   TBILI 1.2* 1.7*   ALT 22 30   AP 68 93   TP 6.0* 7.6   ALB 2.4* 3.0*       Microbiology:     All Micro Results     Procedure Component Value Units Date/Time    CULTURE, URINE [[id number]]     Order Status: Sent Specimen: Urine from Clean catch     CULTURE, BLOOD, PAIRED [[id number]]  (Abnormal) Collected: 04/10/22 2258    Order Status: Completed Specimen: Blood Updated: 04/11/22 1038     Special Requests: NO SPECIAL REQUESTS        Culture result:       GRAM NEGATIVE RODS GROWING IN 2 OF 3 BOTTLES DRAWN SITE = LAC AND LFA                  REMAINING BOTTLE(S) HAS/HAVE NO GROWTH SO FAR            (NOTE) GRAM NEGATIVE RODS GROWING IN TWO OF THREE BOTTLES CALLED TO Jenniefr France RN AT 1514 ON 4/11/22.  R    CULTURE, MRSA [[id number]] Collected: 04/11/22 0317    Order Status: Completed Specimen: Nasal from Nares Updated: 04/11/22 0733    URINE CULTURE HOLD SAMPLE [[id number]] Collected: 04/10/22 2318    Order Status: Completed Specimen: Urine from Serum Updated: 04/10/22 2324     Urine culture hold       Urine on hold in Microbiology dept for 2 days. If unpreserved urine is submitted, it cannot be used for addtional testing after 24 hours, recollection will be required.                   Signed By: Margarita Cullen NP     April 11, 2022

## 2022-04-11 NOTE — ED NOTES
TRANSFER - OUT REPORT:    Verbal report given to (name) ochoa Sanders  being transferred to   (unit) for routine progression of care       Report consisted of patients Situation, Background, Assessment and   Recommendations(SBAR). Information from the following report(s) SBAR, ED Summary, Intake/Output, MAR, Recent Results and Med Rec Status was reviewed with the receiving nurse. Lines:   Peripheral IV 04/10/22 Right Forearm (Active)   Site Assessment Clean, dry, & intact 04/10/22 2251   Phlebitis Assessment 0 04/10/22 2251   Infiltration Assessment 0 04/10/22 2251   Dressing Status Clean, dry, & intact 04/10/22 2251   Dressing Type Transparent 04/10/22 2251       Peripheral IV 04/11/22 Anterior; Left Forearm (Active)        Opportunity for questions and clarification was provided.       Patient transported with:   Registered Nurse

## 2022-04-11 NOTE — PROGRESS NOTES
Pharmacist Note - Vancomycin Dosing    Consult provided for this 66 y.o. female for indication of cystitis, CAP, sepsis. Antibiotic regimen(s): doxycycline, zosyn  Patient on vancomycin PTA? NO     Recent Labs     22  0256 04/10/22  2237   WBC 8.2 8.3   CREA 0.68 1.24*   BUN 16 18     Frequency of BMP: daily x 3 then every other day  Height: 162.6 cm  Weight: 52.2 kg  Est CrCl: 59 ml/min  Temp (24hrs), Av.3 °F (37.9 °C), Min:98.7 °F (37.1 °C), Max:103 °F (39.4 °C)    Cultures:pending      The plan below is expected to result in a target range of AUC/KEMAL 400-600    Therapy will be initiated with a loading dose of 1250 mg IV x 1   Will order maintenance dose 750mg q18h           Pharmacy to follow patient daily and order levels / make dose adjustments as appropriate.

## 2022-04-11 NOTE — ED PROVIDER NOTES
66-year-old female with past medical history significant for tachycardia presents with complaints of 2 weeks intermittent rapid heart rate with palpitations and bilateral leg weakness. Contrary to triage note patient denies shortness of breath. Denies any URI complaints. Denies cough, shortness of breath, dyspnea on exertion, leg swelling. Denies any recent fever, chills, nausea, vomiting, diarrhea, constipation. Denies urinary complaints. Patient reports she takes metoprolol for her history of tachycardia. On questioning patient does report decreased appetite yesterday with decreased fluid intake. Cardiology-Galan  Denies tobacco use, drug use  Occasional alcohol use           No past medical history on file. Past Surgical History:   Procedure Laterality Date    HX ORTHOPAEDIC      HX TONSILLECTOMY      HX WISDOM TEETH EXTRACTION           No family history on file. Social History     Socioeconomic History    Marital status:      Spouse name: Not on file    Number of children: Not on file    Years of education: Not on file    Highest education level: Not on file   Occupational History    Not on file   Tobacco Use    Smoking status: Never Smoker    Smokeless tobacco: Never Used   Substance and Sexual Activity    Alcohol use: Yes     Comment: occasional wine    Drug use: No    Sexual activity: Not on file   Other Topics Concern    Not on file   Social History Narrative    Not on file     Social Determinants of Health     Financial Resource Strain:     Difficulty of Paying Living Expenses: Not on file   Food Insecurity:     Worried About Running Out of Food in the Last Year: Not on file    [place] of Food in the Last Year: Not on file   Transportation Needs:     Lack of Transportation (Medical): Not on file    Lack of Transportation (Non-Medical):  Not on file   Physical Activity:     Days of Exercise per Week: Not on file    Minutes of Exercise per Session: Not on file Stress:     Feeling of Stress : Not on file   Social Connections:     Frequency of Communication with Friends and Family: Not on file    Frequency of Social Gatherings with Friends and Family: Not on file    Attends Zoroastrianism Services: Not on file    Active Member of Clubs or Organizations: Not on file    Attends Club or Organization Meetings: Not on file    Marital Status: Not on file   Intimate Partner Violence:     Fear of Current or Ex-Partner: Not on file    Emotionally Abused: Not on file    Physically Abused: Not on file    Sexually Abused: Not on file   Housing Stability:     Unable to Pay for Housing in the Last Year: Not on file    Number of [place] in the Last Year: Not on file    Unstable Housing in the Last Year: Not on file         ALLERGIES: Patient has no known allergies. Review of Systems   Constitutional: Positive for fatigue. Negative for chills and fever. HENT: Negative for congestion, nosebleeds and rhinorrhea. Eyes: Negative for pain and redness. Respiratory: Negative for cough and shortness of breath. Cardiovascular: Positive for palpitations. Negative for chest pain and leg swelling. Gastrointestinal: Negative for abdominal pain, nausea and vomiting. Genitourinary: Negative for dysuria, frequency, vaginal bleeding and vaginal pain. Musculoskeletal: Negative for myalgias. Skin: Negative for rash and wound. Neurological: Positive for weakness. Negative for seizures and syncope. Hematological: Does not bruise/bleed easily. Psychiatric/Behavioral: Negative for agitation, confusion, dysphoric mood and suicidal ideas. The patient is not nervous/anxious. Vitals:    04/10/22 2229   BP: 118/70   Pulse: (!) 155   Resp: 22   Temp: 98.7 °F (37.1 °C)   SpO2: 98%   Weight: 52.2 kg (115 lb)   Height: 5' 4\" (1.626 m)            Physical Exam  Vitals and nursing note reviewed. Constitutional:       Appearance: She is well-developed.       Comments: Febrile with rectal temp   HENT:      Head: Normocephalic and atraumatic. Eyes:      Pupils: Pupils are equal, round, and reactive to light. Neck:      Trachea: No tracheal deviation. Cardiovascular:      Rate and Rhythm: Regular rhythm. Tachycardia present. Heart sounds: Normal heart sounds. Pulmonary:      Effort: Pulmonary effort is normal. No respiratory distress. Breath sounds: Normal breath sounds. No stridor. No wheezing or rales. Chest:      Chest wall: No tenderness. Abdominal:      General: Bowel sounds are normal. There is no distension. Palpations: Abdomen is soft. Tenderness: There is no abdominal tenderness. There is no rebound. Musculoskeletal:         General: No tenderness. Normal range of motion. Cervical back: Normal range of motion and neck supple. Right lower leg: No edema. Left lower leg: No edema. Skin:     General: Skin is warm and dry. Coloration: Skin is not pale. Findings: No rash. Neurological:      Mental Status: She is alert and oriented to person, place, and time. Cranial Nerves: No cranial nerve deficit. MDM  Number of Diagnoses or Management Options  Diagnosis management comments: 66-year-old female with history of tachycardia presents with complaints of elevated heart rate and bilateral lower extremity weakness intermittent x2 weeks. Patient reports decreased appetite yesterday. Patient is febrile rectally with a temp of 103, heart rate 150, no respiratory distress, clear to auscultation bilaterally, normal room air oxygen saturation, abdomen soft/nontender/nondistended. Plan-fever control, IV fluid hydration, EKG, chest x-ray, CBC/CMP/lactate, blood cultures, UA./Cardiac enzymes.     Labs remarkable for sodium 125       Amount and/or Complexity of Data Reviewed  Clinical lab tests: ordered and reviewed  Tests in the radiology section of CPT®: ordered and reviewed  Independent visualization of images, tracings, or specimens: yes      ED Course as of 04/10/22 2248   Sun Apr 10, 2022   2247 AMB POC EKG ROUTINE W/ 12 LEADS, INTER & REP [LA]      ED Course User Index  [LA] Meggan Haynes MD       Procedures      ED EKG interpretation:  Rhythm: sinus tachycardia; and regular . Rate (approx.): 151; Axis: normal; P wave: normal; QRS interval: prolonged; ST/T wave: T wave inverted; Other findings: RBBB, increased HR compared to prior. This EKG was interpreted by Virlinda Najjar, MD,ED Provider. 11:23 PM  Rectal temp 103.     12:10 AM  Heart rate down to 110 after Tylenol and 1 L normal saline. Chest x-ray shows possible right sided early pneumonia. UA cloudy 4+ bacteria. Lactate 6.0. Code sepsis called. Discussed results with patient. Agreeable with plan for admission. Perfect Serve Consult for Admission  12:10 AM    ED Room Number: XZ18/26  Patient Name and age: Neptali Forbes 66 y.o.  female  Working Diagnosis:   1. Cystitis    2. Community acquired pneumonia of right lung, unspecified part of lung    3. Sepsis without acute organ dysfunction, due to unspecified organism ([address]        COVID-19 Suspicion:  no  Sepsis present:  yes  Reassessment needed: yes  Code Status:  Partial Code, patient reports she does not want intubation  Readmission: no  Isolation Requirements:  no  Recommended Level of Care:  Step down  Department:[facility] Adult ED - 21       Sepsis Re-Assessment Documentation:       Vital Signs  Level of Consciousness: Alert (0)  Temp: 99.3 °F (37.4 °C)  Temp Source: Rectal  Pulse (Heart Rate): 95  Heart Rate Source: Monitor  Cardiac Rhythm: Sinus Tachy  Resp Rate: 22  BP: 118/77  MAP (Calculated): 91  BP 1 Location: Left upper arm  BP 1 Method: Automatic  BP Patient Position: Sitting  MEWS Score: 2      1:10 AM  BP stable.   Total critical care time spent exclusive of procedures:  60min

## 2022-04-11 NOTE — PROGRESS NOTES
[facility] Adult  Hospitalist Group                                                                                          Hospitalist Progress Note  Dilip Castillo MD  Answering service: [phone] from in house phone        Date of Service:  2022  NAME:  Fatuma Barillas  :  [id number]  MRN:  [id number]      Admission Summary: This is a 78-year-old woman with a past medical history significant for supraventricular tachycardia, who was in her usual state of health until about 2 weeks ago when the patient started experiencing a palpitation. The palpitation got worse on the day of presentation, associated with shortness of breath and bilateral leg weakness. Because of that, the patient decided to come to the emergency room for further evaluation. The patient has history of supraventricular tachycardia for which the patient takes metoprolol. The patient took routine metoprolol without any significant relief in terms of the palpitation. She denies any associated chest pain. When the patient arrived at the emergency room, the patient was found to have tachycardia. The initial lactic acid level was 6.0. Code sepsis was called and code sepsis protocol was initiated including administration of IV fluid per sepsis protocol and also antibiotics. The patient's chest x-ray shows mild patchy airspace opacities in the right lung base. Her urinalysis was also consistent with urinary tract infection, these were thought to be the source of the patient's sepsis. The patient was last admitted to the hospital from 2020 to 2020. The patient was admitted with GI bleed, underwent EGD that shows bleeding ulcer, which was clipped. The patient was discharged home in stable condition. Interval history / Subjective:   Overnight admission chart reviewed     Assessment & Plan:     1. Sepsis.   With gram-negative bacteremia 2 x 3 continue Zosyn follow-up cultures possible source urosepsis  --Lactate trending down    2. Bacterial pneumonia. As stated above, this is a potential source of sepsis. We will add doxycycline to cover atypical organisms. Reviewed CT chest    3. Acute cystitis with hematuria. --Reviewed CT abdomen blood culture growing 2 x 3 gram-negative rods possible source urinary infection with pyelonephritis     4. Thrombocytopenia. From sepsis    5. Hyponatremia hypokalemia replete IV    6. Hyperglycemia. Check A1c continue SSI    7. Acute kidney injury. --Continue IV fluid no obstruction CT showing possible pyelonephritis     8. Sinus tachycardia. The patient has history of SVT. TSH is within normal limit. We will check serial cardiac markers to rule out acute myocardial infarction. CT negative for PE     9. Bilateral leg weakness. --CT head no acute issue We will check ultrasound of the lower extremity for DVT. B12 normal folate is low will give folic acid       Code status: Full  DVT prophylaxis: SCD    Care Plan discussed with: Patient/Family and Nurse  Anticipated Disposition: Home w/Family  Anticipated Discharge: Greater than 48 hours     Hospital Problems  Date Reviewed: 4/11/2022          Codes Class Noted POA    * (Principal) Sepsis ([facility] [address] ICD-10-CM: A41.9  ICD-9-CM: 038.9, 995.91  4/11/2022 Yes                Review of Systems:   Review of systems not obtained due to patient factors. Vital Signs:    Last 24hrs VS reviewed since prior progress note.  Most recent are:  Visit Vitals  /61 (BP 1 Location: Left arm, BP Patient Position: At rest)   Pulse (!) 104   Temp 97.9 °F (36.6 °C)   Resp 18   Ht 5' 4\" (1.626 m)   Wt 52.2 kg (115 lb)   SpO2 96%   BMI 19.74 kg/m²         Intake/Output Summary (Last 24 hours) at 4/11/2022 1249  Last data filed at 4/11/2022 1213  Gross per 24 hour   Intake 1944.1 ml   Output --   Net 1944.1 ml        Physical Examination:     I had a face to face encounter with this patient and independently examined them on 4/11/2022 as outlined below:          General : alert x 3, awake, no acute distress, resting in bed  HEENT: PEERL, EOMI, moist mucus membrane, TM clear  Neck: supple, no JVD, no meningeal signs  Chest: Clear to auscultation bilaterally   CVS: S1 S2 heard, Capillary refill less than 2 seconds  Abd: soft/ Non tender, non distended, BS physiological,   Ext: no clubbing, no cyanosis, no edema, brisk 2+ DP pulses  Neuro/Psych: pleasant mood and affect, CN 2-12 grossly intact    Data Review:    I personally reviewed  Image and labs      Labs:     Recent Labs     04/11/22 0256 04/10/22  2237   WBC 8.2 8.3   HGB 10.8* 12.4   HCT 33.5* 38.2   PLT 98* 118*     Recent Labs     04/11/22  0256 04/10/22  2237   * 125*   K 3.0* 5.0    93*   CO2 21 21   BUN 16 18   CREA 0.68 1.24*   * 169*   CA 7.1* 8.4*   MG 1.2*  --    PHOS 1.4*  --      Recent Labs     04/11/22  0256 04/10/22  2237   ALT 22 30   AP 68 93   TBILI 1.2* 1.7*   TP 6.0* 7.6   ALB 2.4* 3.0*   GLOB 3.6 4.6*   LPSE 88  --      No results for input(s): INR, PTP, APTT, INREXT in the last 72 hours. No results for input(s): FE, TIBC, PSAT, FERR in the last 72 hours. Lab Results   Component Value Date/Time    Folate 4.9 (L) 04/11/2022 04:05 AM      No results for input(s): PH, PCO2, PO2 in the last 72 hours. No results for input(s): CPK, CKNDX, TROIQ in the last 72 hours.     No lab exists for component: CPKMB  Lab Results   Component Value Date/Time    Cholesterol, total 87 03/14/2018 05:07 AM    HDL Cholesterol 35 03/14/2018 05:07 AM    LDL, calculated 37 03/14/2018 05:07 AM    Triglyceride 75 03/14/2018 05:07 AM    CHOL/HDL Ratio 2.5 03/14/2018 05:07 AM     No results found for: GLUCPOC  Lab Results   Component Value Date/Time    Color DARK YELLOW 04/10/2022 11:18 PM    Appearance TURBID (A) 04/10/2022 11:18 PM    Specific gravity 1.014 04/10/2022 11:18 PM    pH (UA) 6.0 04/10/2022 11:18 PM    Protein 300 (A) 04/10/2022 11:18 PM    Glucose Negative 04/10/2022 11:18 PM    Ketone 15 (A) 04/10/2022 11:18 PM    Bilirubin NEGATIVE  03/13/2018 11:16 PM    Urobilinogen 2.0 (H) 04/10/2022 11:18 PM    Nitrites Negative 04/10/2022 11:18 PM    Leukocyte Esterase LARGE (A) 04/10/2022 11:18 PM    Epithelial cells FEW 04/10/2022 11:18 PM    Bacteria 4+ (A) 04/10/2022 11:18 PM    WBC >100 (H) 04/10/2022 11:18 PM    RBC 10-20 04/10/2022 11:18 PM         Medications Reviewed:     Current Facility-Administered Medications   Medication Dose Route Frequency    sodium chloride (NS) flush 5-10 mL  5-10 mL IntraVENous PRN    0.9% sodium chloride infusion  150 mL/hr IntraVENous CONTINUOUS    ascorbic acid (vitamin C) (VITAMIN C) tablet 500 mg  500 mg Oral DAILY    cholecalciferol (VITAMIN D3) (1000 Units /25 mcg) tablet 1,000 Units  1,000 Units Oral DAILY    magnesium oxide (MAG-OX) tablet 400 mg  400 mg Oral BID    pantoprazole (PROTONIX) tablet 40 mg  40 mg Oral ACB    sodium chloride (NS) flush 5-40 mL  5-40 mL IntraVENous Q8H    sodium chloride (NS) flush 5-40 mL  5-40 mL IntraVENous PRN    acetaminophen (TYLENOL) tablet 650 mg  650 mg Oral Q6H PRN    Or    acetaminophen (TYLENOL) suppository 650 mg  650 mg Rectal Q6H PRN    polyethylene glycol (MIRALAX) packet 17 g  17 g Oral DAILY PRN    ondansetron (ZOFRAN ODT) tablet 4 mg  4 mg Oral Q8H PRN    Or    ondansetron (ZOFRAN) injection 4 mg  4 mg IntraVENous Q6H PRN    heparin (porcine) injection 5,000 Units  5,000 Units SubCUTAneous Q8H    L.acidophilus-paracasei-S.thermophil-bifidobacter (RISAQUAD) 8 billion cell capsule  1 Capsule Oral DAILY    doxycycline (VIBRAMYCIN) 100 mg in 0.9% sodium chloride (MBP/ADV) 100 mL MBP  100 mg IntraVENous Q12H    piperacillin-tazobactam (ZOSYN) 3.375 g in 0.9% sodium chloride (MBP/ADV) 100 mL MBP  3.375 g IntraVENous Q8H    Vancomycin dosing per pharmacy   Other Rx Dosing/Monitoring    prochlorperazine (COMPAZINE) with saline injection 5 mg  5 mg IntraVENous Q6H PRN  vancomycin (VANCOCIN) 750 mg in  mL infusion  750 mg IntraVENous T33A    folic acid (FOLVITE) tablet 1 mg  1 mg Oral DAILY    potassium phosphate 20 mmol in 0.9% sodium chloride 250 mL infusion   IntraVENous ONCE     ______________________________________________________________________  EXPECTED LENGTH OF STAY: - - -  ACTUAL LENGTH OF STAY:          0      Please note that this dictation was completed with [employer], the computer voice recognition software. Quite often unanticipated grammatical, syntax, homophones, and other interpretive errors are inadvertently transcribed by the computer software. Please disregard these errors. Please excuse any errors that have escaped final proofreading.                 Salvador Rodriguez MD

## 2022-04-12 LAB
ANION GAP SERPL CALC-SCNC: 8 MMOL/L (ref 5–15)
BACTERIA SPEC CULT: NORMAL
BACTERIA SPEC CULT: NORMAL
BASOPHILS # BLD: 0.1 K/UL (ref 0–0.1)
BASOPHILS NFR BLD: 1 % (ref 0–1)
BUN SERPL-MCNC: 10 MG/DL (ref 6–20)
BUN/CREAT SERPL: 23 (ref 12–20)
CALCIUM SERPL-MCNC: 7.2 MG/DL (ref 8.5–10.1)
CHLORIDE SERPL-SCNC: 108 MMOL/L (ref 97–108)
CO2 SERPL-SCNC: 22 MMOL/L (ref 21–32)
CREAT SERPL-MCNC: 0.43 MG/DL (ref 0.55–1.02)
DIFFERENTIAL METHOD BLD: ABNORMAL
EOSINOPHIL # BLD: 0.1 K/UL (ref 0–0.4)
EOSINOPHIL NFR BLD: 1 % (ref 0–7)
ERYTHROCYTE [DISTWIDTH] IN BLOOD BY AUTOMATED COUNT: 20.4 % (ref 11.5–14.5)
GLUCOSE SERPL-MCNC: 106 MG/DL (ref 65–100)
HCT VFR BLD AUTO: 31.3 % (ref 35–47)
HGB BLD-MCNC: 9.9 G/DL (ref 11.5–16)
IMM GRANULOCYTES # BLD AUTO: 0 K/UL (ref 0–0.04)
IMM GRANULOCYTES NFR BLD AUTO: 0 % (ref 0–0.5)
LACTATE SERPL-SCNC: 1.8 MMOL/L (ref 0.4–2)
LYMPHOCYTES # BLD: 0.8 K/UL (ref 0.8–3.5)
LYMPHOCYTES NFR BLD: 11 % (ref 12–49)
MCH RBC QN AUTO: 27.4 PG (ref 26–34)
MCHC RBC AUTO-ENTMCNC: 31.6 G/DL (ref 30–36.5)
MCV RBC AUTO: 86.7 FL (ref 80–99)
MONOCYTES # BLD: 0.9 K/UL (ref 0–1)
MONOCYTES NFR BLD: 13 % (ref 5–13)
NEUTS SEG # BLD: 5.2 K/UL (ref 1.8–8)
NEUTS SEG NFR BLD: 74 % (ref 32–75)
NRBC # BLD: 0 K/UL (ref 0–0.01)
NRBC BLD-RTO: 0 PER 100 WBC
PLATELET # BLD AUTO: 93 K/UL (ref 150–400)
PMV BLD AUTO: 10.4 FL (ref 8.9–12.9)
POTASSIUM SERPL-SCNC: 2.8 MMOL/L (ref 3.5–5.1)
RBC # BLD AUTO: 3.61 M/UL (ref 3.8–5.2)
RBC MORPH BLD: ABNORMAL
RBC MORPH BLD: ABNORMAL
SERVICE CMNT-IMP: NORMAL
SODIUM SERPL-SCNC: 138 MMOL/L (ref 136–145)
WBC # BLD AUTO: 7.1 K/UL (ref 3.6–11)

## 2022-04-12 PROCEDURE — 97165 OT EVAL LOW COMPLEX 30 MIN: CPT

## 2022-04-12 PROCEDURE — 74011250637 HC RX REV CODE- 250/637: Performed by: INTERNAL MEDICINE

## 2022-04-12 PROCEDURE — 74011000250 HC RX REV CODE- 250: Performed by: INTERNAL MEDICINE

## 2022-04-12 PROCEDURE — 97530 THERAPEUTIC ACTIVITIES: CPT

## 2022-04-12 PROCEDURE — 85025 COMPLETE CBC W/AUTO DIFF WBC: CPT

## 2022-04-12 PROCEDURE — 94760 N-INVAS EAR/PLS OXIMETRY 1: CPT

## 2022-04-12 PROCEDURE — 97110 THERAPEUTIC EXERCISES: CPT

## 2022-04-12 PROCEDURE — 74011000258 HC RX REV CODE- 258: Performed by: INTERNAL MEDICINE

## 2022-04-12 PROCEDURE — 74011250637 HC RX REV CODE- 250/637: Performed by: HOSPITALIST

## 2022-04-12 PROCEDURE — 65660000000 HC RM CCU STEPDOWN

## 2022-04-12 PROCEDURE — 97535 SELF CARE MNGMENT TRAINING: CPT

## 2022-04-12 PROCEDURE — 36415 COLL VENOUS BLD VENIPUNCTURE: CPT

## 2022-04-12 PROCEDURE — 74011250636 HC RX REV CODE- 250/636: Performed by: INTERNAL MEDICINE

## 2022-04-12 PROCEDURE — 74011250636 HC RX REV CODE- 250/636: Performed by: HOSPITALIST

## 2022-04-12 PROCEDURE — 80048 BASIC METABOLIC PNL TOTAL CA: CPT

## 2022-04-12 RX ORDER — POTASSIUM CHLORIDE 750 MG/1
40 TABLET, FILM COATED, EXTENDED RELEASE ORAL 2 TIMES DAILY
Status: DISCONTINUED | OUTPATIENT
Start: 2022-04-12 | End: 2022-04-13 | Stop reason: HOSPADM

## 2022-04-12 RX ORDER — POTASSIUM CHLORIDE 7.45 MG/ML
10 INJECTION INTRAVENOUS
Status: COMPLETED | OUTPATIENT
Start: 2022-04-12 | End: 2022-04-12

## 2022-04-12 RX ORDER — MAGNESIUM SULFATE 1 G/100ML
1 INJECTION INTRAVENOUS ONCE
Status: COMPLETED | OUTPATIENT
Start: 2022-04-12 | End: 2022-04-12

## 2022-04-12 RX ORDER — BALSAM PERU/CASTOR OIL
OINTMENT (GRAM) TOPICAL 2 TIMES DAILY
Status: DISCONTINUED | OUTPATIENT
Start: 2022-04-12 | End: 2022-04-13 | Stop reason: HOSPADM

## 2022-04-12 RX ADMIN — HEPARIN SODIUM 5000 UNITS: 5000 INJECTION INTRAVENOUS; SUBCUTANEOUS at 20:47

## 2022-04-12 RX ADMIN — Medication: at 19:17

## 2022-04-12 RX ADMIN — HEPARIN SODIUM 5000 UNITS: 5000 INJECTION INTRAVENOUS; SUBCUTANEOUS at 03:15

## 2022-04-12 RX ADMIN — MAGNESIUM SULFATE 1 G: 1 INJECTION INTRAVENOUS at 10:02

## 2022-04-12 RX ADMIN — SODIUM CHLORIDE, PRESERVATIVE FREE 10 ML: 5 INJECTION INTRAVENOUS at 07:10

## 2022-04-12 RX ADMIN — PANTOPRAZOLE SODIUM 40 MG: 40 TABLET, DELAYED RELEASE ORAL at 07:16

## 2022-04-12 RX ADMIN — FOLIC ACID 1 MG: 1 TABLET ORAL at 10:01

## 2022-04-12 RX ADMIN — OXYCODONE HYDROCHLORIDE AND ACETAMINOPHEN 500 MG: 500 TABLET ORAL at 10:01

## 2022-04-12 RX ADMIN — POTASSIUM CHLORIDE 40 MEQ: 750 TABLET, EXTENDED RELEASE ORAL at 10:01

## 2022-04-12 RX ADMIN — Medication 1 CAPSULE: at 10:01

## 2022-04-12 RX ADMIN — POTASSIUM CHLORIDE 10 MEQ: 7.46 INJECTION, SOLUTION INTRAVENOUS at 11:55

## 2022-04-12 RX ADMIN — HEPARIN SODIUM 5000 UNITS: 5000 INJECTION INTRAVENOUS; SUBCUTANEOUS at 12:15

## 2022-04-12 RX ADMIN — Medication 400 MG: at 18:44

## 2022-04-12 RX ADMIN — POTASSIUM CHLORIDE 10 MEQ: 7.46 INJECTION, SOLUTION INTRAVENOUS at 13:21

## 2022-04-12 RX ADMIN — Medication 1000 UNITS: at 10:01

## 2022-04-12 RX ADMIN — Medication 400 MG: at 10:01

## 2022-04-12 RX ADMIN — PIPERACILLIN SODIUM AND TAZOBACTAM SODIUM 3.38 G: 3; 375 INJECTION, POWDER, FOR SOLUTION INTRAVENOUS at 12:16

## 2022-04-12 RX ADMIN — POTASSIUM CHLORIDE 40 MEQ: 750 TABLET, EXTENDED RELEASE ORAL at 18:44

## 2022-04-12 RX ADMIN — SODIUM CHLORIDE, PRESERVATIVE FREE 10 ML: 5 INJECTION INTRAVENOUS at 20:53

## 2022-04-12 RX ADMIN — PIPERACILLIN SODIUM AND TAZOBACTAM SODIUM 3.38 G: 3; 375 INJECTION, POWDER, FOR SOLUTION INTRAVENOUS at 20:48

## 2022-04-12 RX ADMIN — PIPERACILLIN SODIUM AND TAZOBACTAM SODIUM 3.38 G: 3; 375 INJECTION, POWDER, FOR SOLUTION INTRAVENOUS at 03:15

## 2022-04-12 RX ADMIN — POTASSIUM CHLORIDE 10 MEQ: 7.46 INJECTION, SOLUTION INTRAVENOUS at 11:00

## 2022-04-12 RX ADMIN — POTASSIUM CHLORIDE 10 MEQ: 7.46 INJECTION, SOLUTION INTRAVENOUS at 10:00

## 2022-04-12 NOTE — PROGRESS NOTES
0800: Bedside and Verbal shift change report given to Robbie Francois RN (oncoming nurse) by [employer], RN (offgoing nurse). Report included the following information SBAR, Kardex, MAR and Recent Results. 1225: Pt asking for IVF to be d/mustapha, pt is drinking large amount of water; okay to  D/c per Dr. Cornell Score. 1640: Pt with episode of fecal incontinence after magnesium IV, states, \"This always happens after me! I'm done, might as well take me out to pasture! \" Pt crying, calmed with nursing presence; MD informed of magnesium side effect. Bedside and Verbal shift change report given to Shannan Fox RN (oncoming nurse) by Robbie Francois RN (offgoing nurse). Report included the following information SBAR, Kardex, MAR and Recent Results.

## 2022-04-12 NOTE — CONSULTS
ID Progress Note  2022    Subjective:     Denies any discomfort during visit  Review of Systems:            Symptom Y/N Comments   Symptom Y/N Comments   Fever/Chills n      Chest Pain  n      Poor Appetite       Edema        Cough       Abdominal Pain  n      Sputum       Joint Pain        SOB/CABEZAS n     Pruritis/Rash        Nausea/vomit n      Tolerating PT/OT        Diarrhea n      Tolerating Diet        Constipation n      Other           Could NOT obtain due to:       Objective:     Vitals:   Visit Vitals  /73 (BP 1 Location: Right arm, BP Patient Position: Lying)   Pulse 95   Temp 98.3 °F (36.8 °C)   Resp 20   Ht 5' 4\" (1.626 m)   Wt 52.2 kg (115 lb)   SpO2 96%   BMI 19.74 kg/m²        Tmax:  Temp (24hrs), Av.2 °F (36.8 °C), Min:98 °F (36.7 °C), Max:98.3 °F (36.8 °C)      PHYSICAL EXAM:  General: WD, WN. Alert, cooperative, no acute distress    EENT:  EOMI. Anicteric sclerae. MMM  Resp:  CTA bilaterally, no wheezing or rales. No accessory muscle use  CV:  Regular  rhythm,  No edema  GI:  Soft, Non distended, Non tender. +Bowel sounds  Neurologic:  Alert and oriented X 3, normal speech,   Psych:   Good insight. Not anxious nor agitated  Skin:  No rashes.   No jaundice    Labs:   Lab Results   Component Value Date/Time    WBC 7.1 2022 03:30 AM    HGB 9.9 (L) 2022 03:30 AM    HCT 31.3 (L) 2022 03:30 AM    PLATELET 93 (L)  03:30 AM    MCV 86.7 2022 03:30 AM     Lab Results   Component Value Date/Time    Sodium 138 2022 03:30 AM    Potassium 2.8 (L) 2022 03:30 AM    Chloride 108 2022 03:30 AM    CO2 22 2022 03:30 AM    Anion gap 8 2022 03:30 AM    Glucose 106 (H) 2022 03:30 AM    BUN 10 2022 03:30 AM    Creatinine 0.43 (L) 2022 03:30 AM    BUN/Creatinine ratio 23 (H) 2022 03:30 AM    GFR est AA >60 2022 03:30 AM    GFR est non-AA >60 2022 03:30 AM    Calcium 7.2 (L) 2022 03:30 AM    Bilirubin, total 1.2 (H) 04/11/2022 02:56 AM    Alk. phosphatase 68 04/11/2022 02:56 AM    Protein, total 6.0 (L) 04/11/2022 02:56 AM    Albumin 2.4 (L) 04/11/2022 02:56 AM    Globulin 3.6 04/11/2022 02:56 AM    A-G Ratio 0.7 (L) 04/11/2022 02:56 AM    ALT (SGPT) 22 04/11/2022 02:56 AM         Cultures:   Results     Procedure Component Value Units Date/Time    CULTURE, MRSA [[id number]] Collected: 04/11/22 0317    Order Status: Completed Specimen: Nasal from Nares Updated: 04/11/22 0733    URINE CULTURE HOLD SAMPLE [[id number]] Collected: 04/10/22 2318    Order Status: Completed Specimen: Urine from Serum Updated: 04/10/22 2324     Urine culture hold       Urine on hold in Microbiology dept for 2 days. If unpreserved urine is submitted, it cannot be used for addtional testing after 24 hours, recollection will be required. CULTURE, URINE [[id number]] Collected: 04/10/22 2318    Order Status: Completed Specimen: Urine from Clean catch Updated: 04/11/22 1856    CULTURE, BLOOD, PAIRED [[id number]]  (Abnormal) Collected: 04/10/22 2258    Order Status: Completed Specimen: Blood Updated: 04/12/22 1000     Special Requests: NO SPECIAL REQUESTS        Culture result:       ESCHERICHIA COLI GROWING IN 3 OF 3 BOTTLES DRAWN SITE = LAC AND LFA SENSITIVITY TO FOLLOW            (NOTE) GRAM NEGATIVE RODS GROWING IN TWO OF THREE BOTTLES CALLED TO Shawn Arias RN AT 1912 ON 4/11/22. HJR          Assessment and Plan   Sepsis; resolved  Bacteremia  UTI  ? CAP  - afebrile, WBC normal    Blood cx (4/10) E-coli; sensitivity pending    U/A (4/10) large leukocyte esterase, >100 WBC with 4+ bacteria, urine cx pending    procal 1.02    CXR (4/10) Mild patchy airspace opacity in the right lung base may represent atelectasis or developing pneumonia    CT chest, abd, pel (4/11) No evidence of acute pulmonary embolus. Small bilateral pleural effusions with bilateral dependent atelectasis. No hydronephrosis.  Nodular hepatic contour, compatible with cirrhosis. Moderately large hiatal hernia. Esophageal wall thickening, suggesting esophagitis.        continue with IV zosyn     will adjust ABX prn       Fever work up if temp >= 100.4    GERD  - continue with BB      Above plan of care discussed and agreed with Dr. Mirtha Najera, NP

## 2022-04-12 NOTE — PROGRESS NOTES
[facility] Adult  Hospitalist Group                                                                                          Hospitalist Progress Note  Zen Barros MD  Answering service: [phone] from in house phone        Date of Service:  2022  NAME:  Corky Beth  :    MRN:  [id number]      Admission Summary: This is a 42-year-old woman with a past medical history significant for supraventricular tachycardia, who was in her usual state of health until about 2 weeks ago when the patient started experiencing a palpitation. The palpitation got worse on the day of presentation, associated with shortness of breath and bilateral leg weakness. Because of that, the patient decided to come to the emergency room for further evaluation. The patient has history of supraventricular tachycardia for which the patient takes metoprolol. The patient took routine metoprolol without any significant relief in terms of the palpitation. She denies any associated chest pain. When the patient arrived at the emergency room, the patient was found to have tachycardia. The initial lactic acid level was 6.0. Code sepsis was called and code sepsis protocol was initiated including administration of IV fluid per sepsis protocol and also antibiotics. The patient's chest x-ray shows mild patchy airspace opacities in the right lung base. Her urinalysis was also consistent with urinary tract infection, these were thought to be the source of the patient's sepsis. The patient was last admitted to the hospital from 2020 to 2020. The patient was admitted with GI bleed, underwent EGD that shows bleeding ulcer, which was clipped. The patient was discharged home in stable condition.     Interval history / Subjective:   Seen and examined discussed with patient's family at bedside all questions answered  Patient alert oriented x3 patient is feeling better  Awaiting culture and sensitivity ID on board for UTI bacteremia CS pending     Assessment & Plan:     1. Sepsis. With gram-negative bacteremia 2 x 3 continue Zosyn   --follow-up cultures possible source urosepsis  --Lactate trending down    2. Bacterial pneumonia ruled out. - cxr - Mild patchy airspace opacity in the right lung base may represent  atelectasis or developing pneumonia  --Patient has no cough and fever    3. Acute cystitis with hematuria. --Reviewed CT abdomen blood culture growing 2 x 3 gram-negative rods possible source urinary infection with pyelonephritis     4. Thrombocytopenia. From sepsis    5. Hyponatremia hypokalemia replete IV potassium 2.8 sodium 138 replete potassium IV and oral    6. Hyperglycemia. Check A1c continue SSI    7. Acute kidney injury. --Continue IV fluid no obstruction CT showing possible pyelonephritis --resolved    8. Sinus tachycardia. Resolved   the patient has history of SVT. TSH is within normal limit. We will check serial cardiac markers to rule out acute myocardial infarction. CT negative for PE     9. Bilateral leg weakness. --CT head no acute issue We will check ultrasound of the lower extremity for DVT. B12 normal folate is low will give folic acid       Code status: Full  DVT prophylaxis: SCD    Care Plan discussed with: Patient/Family and Nurse  Anticipated Disposition: Home w/Family  Anticipated Discharge: Greater than 48 hours     Hospital Problems  Date Reviewed: 4/11/2022          Codes Class Noted POA    * (Principal) Sepsis ([facility] [address] ICD-10-CM: A41.9  ICD-9-CM: 038.9, 995.91  4/11/2022 Yes                Review of Systems:   Review of systems not obtained due to patient factors. Vital Signs:    Last 24hrs VS reviewed since prior progress note.  Most recent are:  Visit Vitals  /73 (BP 1 Location: Right arm, BP Patient Position: Lying)   Pulse 95   Temp 98.3 °F (36.8 °C)   Resp 20   Ht 5' 4\" (1.626 m)   Wt 52.2 kg (115 lb)   SpO2 96%   BMI 19.74 kg/m²         Intake/Output Summary (Last 24 hours) at 4/12/2022 1041  Last data filed at 4/12/2022 0030  Gross per 24 hour   Intake 2137.43 ml   Output --   Net 2137.43 ml        Physical Examination:     I had a face to face encounter with this patient and independently examined them on 4/12/2022 as outlined below:          General : alert x 3, awake, no acute distress, resting in bed  HEENT: PEERL, EOMI, moist mucus membrane, TM clear  Neck: supple, no JVD, no meningeal signs  Chest: Clear to auscultation bilaterally   CVS: S1 S2 heard, Capillary refill less than 2 seconds  Abd: soft/ Non tender, non distended, BS physiological,   Ext: no clubbing, no cyanosis, no edema, brisk 2+ DP pulses  Neuro/Psych: pleasant mood and affect, CN 2-12 grossly intact    Data Review:    I personally reviewed  Image and labs      Labs:     Recent Labs     04/12/22 0330 04/11/22 0256   WBC 7.1 8.2   HGB 9.9* 10.8*   HCT 31.3* 33.5*   PLT 93* 98*     Recent Labs     04/12/22  0330 04/11/22  0256 04/10/22  2237    132* 125*   K 2.8* 3.0* 5.0    103 93*   CO2 22 21 21   BUN 10 16 18   CREA 0.43* 0.68 1.24*   * 214* 169*   CA 7.2* 7.1* 8.4*   MG  --  1.2*  --    PHOS  --  1.4*  --      Recent Labs     04/11/22  0256 04/10/22  2237   ALT 22 30   AP 68 93   TBILI 1.2* 1.7*   TP 6.0* 7.6   ALB 2.4* 3.0*   GLOB 3.6 4.6*   LPSE 88  --      No results for input(s): INR, PTP, APTT, INREXT, INREXT in the last 72 hours. No results for input(s): FE, TIBC, PSAT, FERR in the last 72 hours. Lab Results   Component Value Date/Time    Folate 4.9 (L) 04/11/2022 04:05 AM      No results for input(s): PH, PCO2, PO2 in the last 72 hours. No results for input(s): CPK, CKNDX, TROIQ in the last 72 hours.     No lab exists for component: CPKMB  Lab Results   Component Value Date/Time    Cholesterol, total 87 03/14/2018 05:07 AM    HDL Cholesterol 35 03/14/2018 05:07 AM    LDL, calculated 37 03/14/2018 05:07 AM    Triglyceride 75 03/14/2018 05:07 AM    CHOL/HDL Ratio 2.5 03/14/2018 05:07 AM     No results found for: GLUCPOC  Lab Results   Component Value Date/Time    Color DARK YELLOW 04/10/2022 11:18 PM    Appearance TURBID (A) 04/10/2022 11:18 PM    Specific gravity 1.014 04/10/2022 11:18 PM    pH (UA) 6.0 04/10/2022 11:18 PM    Protein 300 (A) 04/10/2022 11:18 PM    Glucose Negative 04/10/2022 11:18 PM    Ketone 15 (A) 04/10/2022 11:18 PM    Bilirubin NEGATIVE  03/13/2018 11:16 PM    Urobilinogen 2.0 (H) 04/10/2022 11:18 PM    Nitrites Negative 04/10/2022 11:18 PM    Leukocyte Esterase LARGE (A) 04/10/2022 11:18 PM    Epithelial cells FEW 04/10/2022 11:18 PM    Bacteria 4+ (A) 04/10/2022 11:18 PM    WBC >100 (H) 04/10/2022 11:18 PM    RBC 10-20 04/10/2022 11:18 PM         Medications Reviewed:     Current Facility-Administered Medications   Medication Dose Route Frequency    potassium chloride 10 mEq in 100 ml IVPB  10 mEq IntraVENous Q1H    potassium chloride SR (KLOR-CON 10) tablet 40 mEq  40 mEq Oral BID    magnesium sulfate 1 g/100 ml IVPB (premix or compounded)  1 g IntraVENous ONCE    sodium chloride (NS) flush 5-10 mL  5-10 mL IntraVENous PRN    0.9% sodium chloride infusion  100 mL/hr IntraVENous CONTINUOUS    ascorbic acid (vitamin C) (VITAMIN C) tablet 500 mg  500 mg Oral DAILY    cholecalciferol (VITAMIN D3) (1000 Units /25 mcg) tablet 1,000 Units  1,000 Units Oral DAILY    magnesium oxide (MAG-OX) tablet 400 mg  400 mg Oral BID    pantoprazole (PROTONIX) tablet 40 mg  40 mg Oral ACB    sodium chloride (NS) flush 5-40 mL  5-40 mL IntraVENous Q8H    sodium chloride (NS) flush 5-40 mL  5-40 mL IntraVENous PRN    acetaminophen (TYLENOL) tablet 650 mg  650 mg Oral Q6H PRN    Or    acetaminophen (TYLENOL) suppository 650 mg  650 mg Rectal Q6H PRN    polyethylene glycol (MIRALAX) packet 17 g  17 g Oral DAILY PRN    ondansetron (ZOFRAN ODT) tablet 4 mg  4 mg Oral Q8H PRN    Or    ondansetron (ZOFRAN) injection 4 mg 4 mg IntraVENous Q6H PRN    heparin (porcine) injection 5,000 Units  5,000 Units SubCUTAneous Q8H    L.acidophilus-paracasei-S.thermophil-bifidobacter (RISAQUAD) 8 billion cell capsule  1 Capsule Oral DAILY    piperacillin-tazobactam (ZOSYN) 3.375 g in 0.9% sodium chloride (MBP/ADV) 100 mL MBP  3.375 g IntraVENous Q8H    prochlorperazine (COMPAZINE) with saline injection 5 mg  5 mg IntraVENous Q2V PRN    folic acid (FOLVITE) tablet 1 mg  1 mg Oral DAILY     ______________________________________________________________________  EXPECTED LENGTH OF STAY: - - -  ACTUAL LENGTH OF STAY:          1      Please note that this dictation was completed with [email or web address], the computer voice recognition software. Quite often unanticipated grammatical, syntax, homophones, and other interpretive errors are inadvertently transcribed by the computer software. Please disregard these errors. Please excuse any errors that have escaped final proofreading.                 Jin Zee MD

## 2022-04-12 NOTE — PROGRESS NOTES
Verbal shift change report given to Candy Costa RN (oncoming nurse) by Mumtaz Aguirre (offgoing nurse). Report included the following information SBAR, Kardex, ED Summary and MAR.     0000 TRANSFER - OUT REPORT:    Verbal report given to Agnieszka RN (name) on Naveen Marquis  being transferred to [facility] (unit) for routine progression of care       Report consisted of patients Situation, Background, Assessment and   Recommendations(SBAR). Information from the following report(s) SBAR, Kardex, ED Summary and Recent Results was reviewed with the receiving nurse. Lines:   Peripheral IV 04/10/22 Right Forearm (Active)   Site Assessment Clean, dry, & intact 04/11/22 2141   Phlebitis Assessment 0 04/11/22 2141   Infiltration Assessment 0 04/11/22 2141   Dressing Status Clean, dry, & intact 04/11/22 2141   Dressing Type Transparent 04/11/22 2141   Hub Color/Line Status Pink;Flushed;Capped 04/11/22 2141   Action Taken Open ports on tubing capped 04/11/22 2141   Alcohol Cap Used Yes 04/11/22 2141       Peripheral IV 04/11/22 Anterior; Left Forearm (Active)   Site Assessment Clean, dry, & intact 04/11/22 2141   Phlebitis Assessment 0 04/11/22 2141   Infiltration Assessment 0 04/11/22 2141   Dressing Status Clean, dry, & intact 04/11/22 2141   Dressing Type Transparent 04/11/22 2141   Hub Color/Line Status Pink;Flushed; Infusing 04/11/22 2141   Action Taken Open ports on tubing capped 04/11/22 2141   Alcohol Cap Used Yes 04/11/22 2141        Opportunity for questions and clarification was provided.       Patient transported with:   Registered Nurse

## 2022-04-12 NOTE — PROGRESS NOTES
Bedside shift change report given RAEANN Arana (oncoming nurse) by David Zhou RN (offgoing nurse). Report included the following information SBAR, Kardex, ED Summary, Intake/Output, MAR, Recent Results,ardiac Rhythm and Alarm Parameters .

## 2022-04-12 NOTE — PROGRESS NOTES
Problem: Self Care Deficits Care Plan (Adult)  Goal: *Acute Goals and Plan of Care (Insert Text)  Description: FUNCTIONAL STATUS PRIOR TO ADMISSION: Patient was independent and active without use of DME. Patient reports performing daily fina chi/ yoga exercise routine with her , also using personal recumbent bike. She reports having someone with her for tub transfers and for going up/down the outside stairs (no railing). HOME SUPPORT: The patient lived with her  but did not require assist. Her  works M-F (approx 8am-1130am, 1pm-5pm). She has children and grandchildren living locally. Occupational Therapy Goals  Initiated 4/12/2022  1. Patient will perform standing grooming with modified independence within 7 day(s). 2.  Patient will perform upper body dressing and lower body dressing with modified independence within 7 day(s). 3.  Patient will perform functional reach to multiple planes with supervision/set-up within 7 day(s). 4.  Patient will perform toilet transfers with modified independence within 7 day(s). 5.  Patient will perform all aspects of toileting with modified independence within 7 day(s). 6.  Patient will participate in upper extremity therapeutic exercise/activities with modified independence for 10 minutes within 7 day(s). 7.  Patient will utilize fall prevention techniques during functional activities without cues within 7 day(s). Outcome: Progressing Towards Goal   OCCUPATIONAL THERAPY EVALUATION  Patient: Harish Lujan (85 y.o. female)  Date: 4/12/2022  Primary Diagnosis: Sepsis ([facility] [address] [A41.9]       Precautions:        ASSESSMENT  Based on the objective data described below, the patient presents with mild weakness, decreased activity tolerance, and impaired higher level balance s/p admission for sepsis. Patient received returning to bed with wound care RN who reports patient needing assist with ambulation and toileting in the bathroom.  This date, patient demonstrates bed mobility at independent level, performing sit<>stand with CGA. Patient initially with rushing and max furniture-reaching for short distance ambulation. Educated on increased fall risk and benefits of RW with patient receptive and agreeable to trial. Patient ambulates with RW and up to min assist with occasional assist needed for RW management. Patient returns to chair, participating in exercises and requesting printed handout for reference. Returned to provide printed HEP and red theraband, receiving patient upset in bed and perseverative on episode of incontinence after receiving magnesium. When tearful, patient HR up to 120 while in semi-fowlers position. Provided reassurance and education regarding calming strategies to activate parasympathetic nervous system. At this time, anticipate patient would be able to discharge home with [facility] and initial 24/7 for safety. Current Level of Function Impacting Discharge (ADLs/self-care): min A    Functional Outcome Measure: The patient scored 60/100 on the Barthel Index. Other factors to consider for discharge:      Patient will benefit from skilled therapy intervention to address the above noted impairments. PLAN :  Recommendations and Planned Interventions: self care training, functional mobility training, therapeutic exercise, balance training, therapeutic activities, endurance activities, patient education, and home safety training    Frequency/Duration: Patient will be followed by occupational therapy 5 times a week to address goals.     Recommendation for discharge: (in order for the patient to meet his/her long term goals)  Occupational therapy at least 2 days/week in the home AND ensure assist and/or supervision for safety with functional mobility and dynamic ADLs    This discharge recommendation:  Has not yet been discussed the attending provider and/or case management    IF patient discharges home will need the following DME: NIGHAT SUBJECTIVE:   Patient stated i'm going to have to be purposeful in my recovery.     OBJECTIVE DATA SUMMARY:   HISTORY:   No past medical history on file. Past Surgical History:   Procedure Laterality Date    HX ORTHOPAEDIC      HX TONSILLECTOMY      HX WISDOM TEETH EXTRACTION         Expanded or extensive additional review of patient history:     Home Situation  Home Environment: Private residence  # Steps to Enter: 3  Rails to Enter: No  One/Two Story Residence: Two story (MBR current on 2; available on 1)  Support Systems: Spouse/Significant Other  Patient Expects to be Discharged to[de-identified] Home  Tub or Shower Type: Tub/Shower combination (walk-in available downstairs)    Hand dominance: Right    EXAMINATION OF PERFORMANCE DEFICITS:  Cognitive/Behavioral Status:  Neurologic State: Alert; Appropriate for age  Orientation Level: Oriented X4  Cognition: Follows commands  Perception: Appears intact  Perseveration: Perseverates during conversation  Safety/Judgement: Awareness of environment; Fall prevention;Home safety    Skin:     Edema:     Hearing:       Vision/Perceptual:    Acuity: Within Defined Limits    Corrective Lenses: Glasses    Range of Motion:  AROM: Within functional limits    Strength:  Strength: Generally decreased, functional    Coordination:  Coordination: Within functional limits  Fine Motor Skills-Upper: Left Intact; Right Intact    Gross Motor Skills-Upper: Left Intact; Right Intact    Tone & Sensation:      Balance:  Sitting: Intact  Standing: Impaired  Standing - Static: Good  Standing - Dynamic : Fair    Functional Mobility and Transfers for ADLs:  Bed Mobility:  Supine to Sit: Independent  Scooting: Independent    Transfers:  Sit to Stand: Contact guard assistance  Stand to Sit: Contact guard assistance  Bed to Chair: Minimum assistance  Bathroom Mobility: Minimum assistance  Toilet Transfer : Contact guard assistance    ADL Assessment:  Feeding: Setup    Oral Facial Hygiene/Grooming: Setup (CGA in standing)    Bathing: Contact guard assistance    Upper Body Dressing: Setup    Lower Body Dressing: Contact guard assistance;Minimum assistance    Toileting: Contact guard assistance;Minimum assistance    ADL Intervention and task modifications:  Lower Body Dressing Assistance  Socks: Stand-by assistance; Compensatory technique training  Leg Crossed Method Used: Yes  Position Performed: Seated in chair  Cues: Verbal cues provided;Visual cues provided    Cognitive Retraining  Safety/Judgement: Awareness of environment; Fall prevention;Home safety    Upon arrival, wound care RN present and reporting she just assisted patient to the bathroom - per report, patient requiring assistance. Therapeutic Exercise:  LAQs w/ 3 second hold, 10 reps alternating BLEs    Seated marches, 10 reps alternating BLEs    Standing marches w/ RW support, 10 reps alternating BLEs w/ CGA - min A    Standing on single LE w/ RW support, 10 seconds each LE w/ min A    Functional Measure:    Barthel Index:  Bathin  Bladder: 5  Bowels: 10  Groomin  Dressin  Feeding: 10  Mobility: 5  Stairs: 5  Toilet Use: 5  Transfer (Bed to Chair and Back): 10  Total: 60/100      The Barthel ADL Index: Guidelines  1. The index should be used as a record of what a patient does, not as a record of what a patient could do. 2. The main aim is to establish degree of independence from any help, physical or verbal, however minor and for whatever reason. 3. The need for supervision renders the patient not independent. 4. A patient's performance should be established using the best available evidence. Asking the patient, friends/relatives and nurses are the usual sources, but direct observation and common sense are also important. However direct testing is not needed. 5. Usually the patient's performance over the preceding 24-48 hours is important, but occasionally longer periods will be relevant.   6. Middle categories imply that the patient supplies over 50 per cent of the effort. 7. Use of aids to be independent is allowed. Score Interpretation (from [address] [facility])    Independent   60-79 Minimally independent   40-59 Partially dependent   20-39 Very dependent   <20 Totally dependent     -Elissa Manriquez., Barthel, D.W. (1965). Functional evaluation: the Barthel Index. [address] ([address] (1997). The Barthel activities of daily living index: self-reporting versus actual performance in the old (> or = 75 years). Journal of [address] [place] [address] 45(7), [facility], J.J.M.F, Jeanna Frankel., Kevin Alvarez. (1999). Measuring the change in disability after inpatient rehabilitation; comparison of the responsiveness of the Barthel Index and Functional [place] Measure. Journal of Neurology, Neurosurgery, and Psychiatry, 66(4), 763-947. Tj Anand, N.J.A, LUPIS Mercedes, & Tc Jimenez MMabelA. (2004) Assessment of post-stroke quality of life in cost-effectiveness studies: The usefulness of the Barthel Index and the EuroQoL-5D. Quality of Life Research, 15, 531-51         Occupational Therapy Evaluation Charge Determination   History Examination Decision-Making   LOW Complexity : Brief history review  LOW Complexity : 1-3 performance deficits relating to physical, cognitive , or psychosocial skils that result in activity limitations and / or participation restrictions  LOW Complexity : No comorbidities that affect functional and no verbal or physical assistance needed to complete eval tasks       Based on the above components, the patient evaluation is determined to be of the following complexity level: LOW   Pain Rating:  No reports.     Activity Tolerance:   Fair    After treatment patient left in no apparent distress:    Sitting in chair, Call bell within reach, and verbalizing need to use call bell and have staff present for any OOB mobility/activity    COMMUNICATION/EDUCATION:   The patients plan of care was discussed with: Physical therapist and Registered nurse. Home safety education was provided and the patient/caregiver indicated understanding., Patient/family have participated as able in goal setting and plan of care. , and Patient/family agree to work toward stated goals and plan of care.     Thank you for this referral.  Naa Montenegro OT  Time Calculation: 53 mins  (43+20)

## 2022-04-12 NOTE — WOUND CARE
WOCN Note:     New consult placed for assessment of sacrum. Chart reviewed. Assessed in room 402. Admitted DX:  Sepsis    Assessment:   Patient is A&O x 4, communicative, wear depends brief and independent in bed. Ambulates with assistance. Bed: foam mattress   Patient reports no pain. Heels intact with red blanching erythema. 1. POA Sacrum and buttocks, red moist rash consistent with Candidiasis. Wound, Pressure Prevention & Skin Care Recommendations:    1. Minimize layers of linen/pads under patient to optimize support surface. 2.  Turn/reposition approximately every 2 hours and offload heels. 3.  Manage moisture/ Keep skin folds clean and dry/minimize brief usage. 4. Sacrum and buttock:  Miconazole ointment BID  5. Heels:  Venelex BID. Discussed above plan with patient and RN.     Transition of Care:   Plan to follow as needed while admitted to hospital.    MATT LozadaN RN [facility] Inpatient Wound Care  Available on [employer]  Office [phone]

## 2022-04-12 NOTE — PROGRESS NOTES
A+O4. Pt transferred safely to unit via W/C. Report received prior to transfer, from [facility]. Safety measures in place.

## 2022-04-13 VITALS
HEART RATE: 101 BPM | WEIGHT: 115 LBS | OXYGEN SATURATION: 98 % | RESPIRATION RATE: 22 BRPM | HEIGHT: 64 IN | TEMPERATURE: 98.1 F | BODY MASS INDEX: 19.63 KG/M2 | DIASTOLIC BLOOD PRESSURE: 71 MMHG | SYSTOLIC BLOOD PRESSURE: 114 MMHG

## 2022-04-13 LAB
ANION GAP SERPL CALC-SCNC: 8 MMOL/L (ref 5–15)
BACTERIA SPEC CULT: ABNORMAL
BASOPHILS # BLD: 0.1 K/UL (ref 0–0.1)
BASOPHILS NFR BLD: 1 % (ref 0–1)
BUN SERPL-MCNC: 7 MG/DL (ref 6–20)
BUN/CREAT SERPL: 16 (ref 12–20)
CALCIUM SERPL-MCNC: 7.5 MG/DL (ref 8.5–10.1)
CC UR VC: ABNORMAL
CHLORIDE SERPL-SCNC: 109 MMOL/L (ref 97–108)
CO2 SERPL-SCNC: 21 MMOL/L (ref 21–32)
CREAT SERPL-MCNC: 0.43 MG/DL (ref 0.55–1.02)
DIFFERENTIAL METHOD BLD: ABNORMAL
EOSINOPHIL # BLD: 0.1 K/UL (ref 0–0.4)
EOSINOPHIL NFR BLD: 1 % (ref 0–7)
ERYTHROCYTE [DISTWIDTH] IN BLOOD BY AUTOMATED COUNT: 20.2 % (ref 11.5–14.5)
GLUCOSE SERPL-MCNC: 106 MG/DL (ref 65–100)
HCT VFR BLD AUTO: 30.3 % (ref 35–47)
HGB BLD-MCNC: 9.9 G/DL (ref 11.5–16)
IMM GRANULOCYTES # BLD AUTO: 0.1 K/UL (ref 0–0.04)
IMM GRANULOCYTES NFR BLD AUTO: 1 % (ref 0–0.5)
LYMPHOCYTES # BLD: 1 K/UL (ref 0.8–3.5)
LYMPHOCYTES NFR BLD: 15 % (ref 12–49)
MCH RBC QN AUTO: 27.7 PG (ref 26–34)
MCHC RBC AUTO-ENTMCNC: 32.7 G/DL (ref 30–36.5)
MCV RBC AUTO: 84.6 FL (ref 80–99)
MONOCYTES # BLD: 0.9 K/UL (ref 0–1)
MONOCYTES NFR BLD: 14 % (ref 5–13)
NEUTS SEG # BLD: 4.4 K/UL (ref 1.8–8)
NEUTS SEG NFR BLD: 68 % (ref 32–75)
NRBC # BLD: 0 K/UL (ref 0–0.01)
NRBC BLD-RTO: 0 PER 100 WBC
PLATELET # BLD AUTO: 109 K/UL (ref 150–400)
PMV BLD AUTO: 10.7 FL (ref 8.9–12.9)
POTASSIUM SERPL-SCNC: 3.5 MMOL/L (ref 3.5–5.1)
RBC # BLD AUTO: 3.58 M/UL (ref 3.8–5.2)
RBC MORPH BLD: ABNORMAL
SERVICE CMNT-IMP: ABNORMAL
SERVICE CMNT-IMP: ABNORMAL
SODIUM SERPL-SCNC: 138 MMOL/L (ref 136–145)
WBC # BLD AUTO: 6.6 K/UL (ref 3.6–11)

## 2022-04-13 PROCEDURE — 36573 INSJ PICC RS&I 5 YR+: CPT | Performed by: NURSE PRACTITIONER

## 2022-04-13 PROCEDURE — 76937 US GUIDE VASCULAR ACCESS: CPT

## 2022-04-13 PROCEDURE — C1751 CATH, INF, PER/CENT/MIDLINE: HCPCS

## 2022-04-13 PROCEDURE — 74011250636 HC RX REV CODE- 250/636: Performed by: NURSE PRACTITIONER

## 2022-04-13 PROCEDURE — 80048 BASIC METABOLIC PNL TOTAL CA: CPT

## 2022-04-13 PROCEDURE — 02HV33Z INSERTION OF INFUSION DEVICE INTO SUPERIOR VENA CAVA, PERCUTANEOUS APPROACH: ICD-10-PCS | Performed by: NURSE PRACTITIONER

## 2022-04-13 PROCEDURE — 74011000250 HC RX REV CODE- 250: Performed by: INTERNAL MEDICINE

## 2022-04-13 PROCEDURE — 74011000250 HC RX REV CODE- 250: Performed by: NURSE PRACTITIONER

## 2022-04-13 PROCEDURE — 74011000258 HC RX REV CODE- 258: Performed by: INTERNAL MEDICINE

## 2022-04-13 PROCEDURE — 85025 COMPLETE CBC W/AUTO DIFF WBC: CPT

## 2022-04-13 PROCEDURE — 74011250636 HC RX REV CODE- 250/636: Performed by: INTERNAL MEDICINE

## 2022-04-13 PROCEDURE — 77030020365 HC SOL INJ SOD CL 0.9% 50ML

## 2022-04-13 PROCEDURE — 74011250637 HC RX REV CODE- 250/637: Performed by: INTERNAL MEDICINE

## 2022-04-13 PROCEDURE — 93005 ELECTROCARDIOGRAM TRACING: CPT

## 2022-04-13 PROCEDURE — 36415 COLL VENOUS BLD VENIPUNCTURE: CPT

## 2022-04-13 PROCEDURE — 74011250637 HC RX REV CODE- 250/637: Performed by: HOSPITALIST

## 2022-04-13 RX ORDER — METOPROLOL SUCCINATE 25 MG/1
25 TABLET, EXTENDED RELEASE ORAL
Status: DISCONTINUED | OUTPATIENT
Start: 2022-04-13 | End: 2022-04-13

## 2022-04-13 RX ORDER — METOPROLOL SUCCINATE 25 MG/1
25 TABLET, EXTENDED RELEASE ORAL
Status: DISCONTINUED | OUTPATIENT
Start: 2022-04-13 | End: 2022-04-13 | Stop reason: HOSPADM

## 2022-04-13 RX ADMIN — Medication 1000 UNITS: at 08:50

## 2022-04-13 RX ADMIN — FOLIC ACID 1 MG: 1 TABLET ORAL at 08:50

## 2022-04-13 RX ADMIN — Medication 400 MG: at 08:51

## 2022-04-13 RX ADMIN — PIPERACILLIN SODIUM AND TAZOBACTAM SODIUM 3.38 G: 3; 375 INJECTION, POWDER, FOR SOLUTION INTRAVENOUS at 04:40

## 2022-04-13 RX ADMIN — SODIUM CHLORIDE, PRESERVATIVE FREE 2 G: 5 INJECTION INTRAVENOUS at 12:24

## 2022-04-13 RX ADMIN — Medication: at 08:52

## 2022-04-13 RX ADMIN — OXYCODONE HYDROCHLORIDE AND ACETAMINOPHEN 500 MG: 500 TABLET ORAL at 08:50

## 2022-04-13 RX ADMIN — HEPARIN SODIUM 5000 UNITS: 5000 INJECTION INTRAVENOUS; SUBCUTANEOUS at 04:39

## 2022-04-13 RX ADMIN — SODIUM CHLORIDE, PRESERVATIVE FREE 10 ML: 5 INJECTION INTRAVENOUS at 07:17

## 2022-04-13 RX ADMIN — Medication: at 09:00

## 2022-04-13 RX ADMIN — METOPROLOL SUCCINATE 25 MG: 25 TABLET, FILM COATED, EXTENDED RELEASE ORAL at 11:09

## 2022-04-13 RX ADMIN — HEPARIN SODIUM 5000 UNITS: 5000 INJECTION INTRAVENOUS; SUBCUTANEOUS at 12:24

## 2022-04-13 RX ADMIN — PANTOPRAZOLE SODIUM 40 MG: 40 TABLET, DELAYED RELEASE ORAL at 07:19

## 2022-04-13 RX ADMIN — POTASSIUM CHLORIDE 40 MEQ: 750 TABLET, EXTENDED RELEASE ORAL at 08:51

## 2022-04-13 RX ADMIN — Medication 1 CAPSULE: at 08:50

## 2022-04-13 NOTE — PROGRESS NOTES
Home IV Antibiotic Orders     1. Diagnosis:  E-coli bacteremia and UTI    2. Pt would like to complete IV ABX therapy at [facility]        Routine mid line care per protocol     3. Antibiotic:   IV rocephin 2 gm every 24 hours    4. Lab each Monday & Thursdays             CBC/diff/platelets             BMP             CRP (every Mondays)     5. Fax lab to Dr. Sandy See @ [phone]. 6.  Call Dr. Sandy See @ [phone] for WBC <4, PLT <100, and/or Creat > 1.5    7. Duration of therapy: 4/24/2022         Please call Dr. Sandy See @ [phone] before stopping therapy. 8.  Antibiotic Allergies: none    9.   Case management has been instructed to Call [phone] with name of [address], NP

## 2022-04-13 NOTE — PROGRESS NOTES
ID Progress Note  2022    Subjective:     She requested to be treated it at [facility] if she needs IV ABX therapy. Review of Systems:            Symptom Y/N Comments   Symptom Y/N Comments   Fever/Chills n      Chest Pain  n      Poor Appetite       Edema        Cough       Abdominal Pain        Sputum       Joint Pain  n      SOB/CABEZAS  n     Pruritis/Rash        Nausea/vomit  n     Tolerating PT/OT        Diarrhea  n     Tolerating Diet        Constipation  n     Other           Could NOT obtain due to:       Objective:     Vitals:   Visit Vitals  /80 (BP 1 Location: Right upper arm, BP Patient Position: At rest)   Pulse 83   Temp 98.3 °F (36.8 °C)   Resp 14   Ht 5' 4\" (1.626 m)   Wt 52.2 kg (115 lb)   SpO2 93%   BMI 19.74 kg/m²        Tmax:  Temp (24hrs), Av.3 °F (36.8 °C), Min:98 °F (36.7 °C), Max:98.8 °F (37.1 °C)      PHYSICAL EXAM:  General: Chronically ill appearing, WD, WN. Alert, cooperative, no acute distress    EENT:  EOMI. Anicteric sclerae. MMM  Resp:  CTA bilaterally, no wheezing or rales. No accessory muscle use  CV:  Tachycardia,  No edema  GI:  Soft, Non distended, Non tender. +Bowel sounds  Neurologic:  Alert and oriented X 3, normal speech,   Psych:   fair insight. Not anxious nor agitated  Skin:  No rashes.   No jaundice    Labs:   Lab Results   Component Value Date/Time    WBC 6.6 2022 05:10 AM    HGB 9.9 (L) 2022 05:10 AM    HCT 30.3 (L) 2022 05:10 AM    PLATELET 083 (L)  05:10 AM    MCV 84.6 2022 05:10 AM     Lab Results   Component Value Date/Time    Sodium 138 2022 05:10 AM    Potassium 3.5 2022 05:10 AM    Chloride 109 (H) 2022 05:10 AM    CO2 21 2022 05:10 AM    Anion gap 8 2022 05:10 AM    Glucose 106 (H) 2022 05:10 AM    BUN 7 2022 05:10 AM    Creatinine 0.43 (L) 2022 05:10 AM    BUN/Creatinine ratio 16 2022 05:10 AM    GFR est AA >60 2022 05:10 AM    GFR est non-AA >60 2022 05:10 AM    Calcium 7.5 (L) 04/13/2022 05:10 AM    Bilirubin, total 1.2 (H) 04/11/2022 02:56 AM    Alk. phosphatase 68 04/11/2022 02:56 AM    Protein, total 6.0 (L) 04/11/2022 02:56 AM    Albumin 2.4 (L) 04/11/2022 02:56 AM    Globulin 3.6 04/11/2022 02:56 AM    A-G Ratio 0.7 (L) 04/11/2022 02:56 AM    ALT (SGPT) 22 04/11/2022 02:56 AM         Cultures:   Results     Procedure Component Value Units Date/Time    CULTURE, MRSA [[id number]] Collected: 04/11/22 0317    Order Status: Completed Specimen: Nasal from Nares Updated: 04/12/22 1430     Special Requests: NO SPECIAL REQUESTS        Culture result: MRSA NOT PRESENT               Screening of patient nares for MRSA is for surveillance purposes and, if positive, to facilitate isolation considerations in high risk settings. It is not intended for automatic decolonization interventions per se as regimens are not sufficiently effective to warrant routine use. URINE CULTURE HOLD SAMPLE [[id number]] Collected: 04/10/22 2318    Order Status: Completed Specimen: Urine from Serum Updated: 04/10/22 2324     Urine culture hold       Urine on hold in Microbiology dept for 2 days. If unpreserved urine is submitted, it cannot be used for addtional testing after 24 hours, recollection will be required.           CULTURE, URINE [[id number]]  (Abnormal) Collected: 04/10/22 2318    Order Status: Completed Specimen: Urine from Clean catch Updated: 04/12/22 1316     Special Requests: NO SPECIAL REQUESTS        [place] Count --        >100,000  COLONIES/mL       Culture result: GRAM NEGATIVE RODS       CULTURE, BLOOD, PAIRED [[id number]]  (Abnormal) Collected: 04/10/22 2258    Order Status: Completed Specimen: Blood Updated: 04/12/22 1000     Special Requests: NO SPECIAL REQUESTS        Culture result:       ESCHERICHIA COLI GROWING IN 3 OF 3 BOTTLES DRAWN SITE = LAC AND LFA SENSITIVITY TO FOLLOW            (NOTE) GRAM NEGATIVE RODS GROWING IN TWO OF THREE BOTTLES CALLED TO Junious Gloss [facility] RN AT 4927 ON 4/11/22. HJR             Assessment and Plan   Sepsis; resolved  Bacteremia  UTI  ? CAP; no respiratory distress upon admission  - afebrile, WBC normal    Blood cx (4/10) E-coli; sensitivity pending    U/A (4/10) large leukocyte esterase, >100 WBC with 4+ bacteria, urine cx (4/10) GNR    procal 1.02    CXR (4/10) Mild patchy airspace opacity in the right lung base may represent atelectasis or developing pneumonia    CT chest, abd, pel (4/11) No evidence of acute pulmonary embolus. Small bilateral pleural effusions with bilateral dependent atelectasis. No hydronephrosis. Nodular hepatic contour, compatible with cirrhosis. Moderately large hiatal hernia. Esophageal wall thickening, suggesting esophagitis.        continue with IV rocephin (changed from IV zosyn), recommend to complete total 2 weeks, last dose 4/24/2022     Discharge IV ABX order in placed and PICC line placement requested 4/13 (may get midline if it is ok with [facility])     Pt will be completing IV ABX therapy at [facility]       No levofloxacin due to QTC prolongation; 497ms     GERD  - continue with BB       Above plan of care discussed and agreed with Dr. Boyd Vicente   ID team signing off. Please contact us with any questions.         Eran Spear NP

## 2022-04-13 NOTE — PROGRESS NOTES
UPDATED 1337    Physical Therapy Note    10:31: Chart reviewed and patient discussed with RN. Arrived for evaluation to patient in bathroom with RN and observed patient ambulating with rolling walker back to chair. Patient with increased distance between walker and self. When reattached to telemetry, patient was at  on monitor following using restroom. With rest, patient's HR decreased to ~127 and remained 124-130 at rest. Further therapy was aborted as a result. To note, recent HR for patient has been in 90s at rest. RN informed. Did educate patient on proper use of walker. Patient reports understanding but reports she is \"unsure\" if she will use the walker at home but does report she owns one. Will follow and evaluate as appropriate. 1337: Discharge orders noted in chart. Attempted again to work with patient. Patient with -110 bpm. Educated on role of Acute Physical Therapy and plan to trial rolling walker. Patient reports she is too tired to walk. Reports needs to use restroom. Therapist offered to assist and patients reports it is too late I already went. Attempted to assist patient with clean up but patient deferring to RN. Nursing arrived bedside for midline placement for discharge. Educated patient on possible benefits of HHPT and use of rolling walker - patient reports not sure if will use walker. Will follow as appropriate if discharge delayed.     Christoph Gomes, PT, DPT

## 2022-04-13 NOTE — PROGRESS NOTES
Transition Plan of care  RUR 14%-Med  Disposition-plan to discharge home today and will follow up with [facility] for daily antibiotics. Having mid-line placed now. CM faxed orders to [facility] and confirmed tomorrow's start time. All information and contact added to AVS.  Medicare pt has received, reviewed, and signed 2nd IM letter informing them of their right to appeal the discharge. Signed copy has been placed on pt bedside chart.

## 2022-04-13 NOTE — DISCHARGE INSTRUCTIONS
Discharge Instructions       PATIENT ID: Neptali Forbes  MRN: [id number]   YOB: 1950    DATE OF ADMISSION: 4/10/2022 10:34 PM    DATE OF DISCHARGE: 4/13/2022    PRIMARY CARE PROVIDER: None     ATTENDING PHYSICIAN: Elroy Armstrong MD  DISCHARGING PROVIDER: Jin Zee MD    To contact this individual call [phone] and ask the  to page. If unavailable ask to be transferred the Adult Hospitalist Department. DISCHARGE DIAGNOSES abscess with bacteremia from UTI    CONSULTATIONS: IP CONSULT TO INTENSIVIST  IP CONSULT TO INFECTIOUS DISEASES    PROCEDURES/SURGERIES: * No surgery found *    PENDING TEST RESULTS:   At the time of discharge the following test results are still pending:     FOLLOW UP APPOINTMENTS:   Follow-up Information     Follow up With Specialties Details Why Contact Info       pcp f/u in 1 week            ADDITIONAL CARE RECOMMENDATIONS:     Home IV Antibiotic Orders     1.  Diagnosis:  E-coli bacteremia and UTI     2.  Pt would like to complete IV ABX therapy at [facility]     3.  Antibiotic:   IV rocephin 2 gm every 24 hours     4.  Lab each Monday & Thursdays             CBC/diff/platelets             BMP             CRP (every Mondays)     5.  Fax lab to Dr. Landon Jeong @ [phone].  Shayna Hazard Dr. Landon Jeong @ [phone] for WBC <4, PLT <100, and/or Creat > 1.5     7.  Duration of therapy: 4/24/2022         Please call Dr. Landon Jeong @ [phone] before stopping therapy.     8.  Antibiotic Allergies: none     9.  Case management has been instructed to Call [phone] with name of [facility]    DIET: Cardiac Diet    ACTIVITY: Activity as tolerated    WOUND CARE:     EQUIPMENT needed:       Radiology      DISCHARGE MEDICATIONS:   See Medication Reconciliation Form    · It is important that you take the medication exactly as they are prescribed.    · Keep your medication in the bottles provided by the pharmacist and keep a list of the medication names, dosages, and times to be taken in your wallet. · Do not take other medications without consulting your doctor. NOTIFY YOUR PHYSICIAN FOR ANY OF THE FOLLOWING:   Fever over 101 degrees for 24 hours. Chest pain, shortness of breath, fever, chills, nausea, vomiting, diarrhea, change in mentation, falling, weakness, bleeding. Severe pain or pain not relieved by medications. Or, any other signs or symptoms that you may have questions about.       DISPOSITION:  x  Home With:   OT x PT x HH  RN       SNF/Inpatient Rehab/LTAC    Independent/assisted living    Hospice    Other: IV abx      CDMP Checked:   Yes x     PROBLEM LIST Updated:  Yes x       Signed:   Aria Cervantes MD  4/13/2022  11:22 AM

## 2022-04-13 NOTE — PROGRESS NOTES
Problem: Falls - Risk of  Goal: *Absence of Falls  Description: Document Elpidio Garza Fall Risk and appropriate interventions in the flowsheet. Outcome: Progressing Towards Goal  Note: Fall Risk Interventions:  Mobility Interventions: Patient to call before getting OOB         Medication Interventions: Patient to call before getting OOB    Elimination Interventions: Call light in reach,Bed/chair exit alarm              Problem: Patient Education: Go to Patient Education Activity  Goal: Patient/Family Education  Outcome: Progressing Towards Goal     Problem: Urinary Tract Infection - Adult  Goal: *Absence of infection signs and symptoms  Outcome: Progressing Towards Goal     Problem: Patient Education: Go to Patient Education Activity  Goal: Patient/Family Education  Outcome: Progressing Towards Goal     Problem: Pressure Injury - Risk of  Goal: *Prevention of pressure injury  Description: Document Maximo Scale and appropriate interventions in the flowsheet.   Outcome: Progressing Towards Goal  Note: Pressure Injury Interventions:       Moisture Interventions: Absorbent underpads,Apply protective barrier, creams and emollients,Assess need for specialty bed,Check for incontinence Q2 hours and as needed,Contain wound drainage    Activity Interventions: Increase time out of bed    Mobility Interventions: PT/OT evaluation    Nutrition Interventions: Document food/fluid/supplement intake    Friction and Shear Interventions: Apply protective barrier, creams and emollients,Foam dressings/transparent film/skin sealants                Problem: Patient Education: Go to Patient Education Activity  Goal: Patient/Family Education  Outcome: Progressing Towards Goal     Problem: Patient Education: Go to Patient Education Activity  Goal: Patient/Family Education  Outcome: Progressing Towards Goal

## 2022-04-13 NOTE — DISCHARGE SUMMARY
Discharge Summary       PATIENT ID: Neptali Forbes  MRN: [id number]   YOB: 1950    DATE OF ADMISSION: 4/10/2022 10:34 PM    DATE OF DISCHARGE: 4/13/22   PRIMARY CARE PROVIDER: None     ATTENDING PHYSICIAN: Bernabe Person  DISCHARGING PROVIDER: Jin Zee MD    To contact this individual call [phone] and ask the  to page. If unavailable ask to be transferred the Adult Hospitalist Department.     CONSULTATIONS: IP CONSULT TO INTENSIVIST  IP CONSULT TO INFECTIOUS DISEASES    PROCEDURES/SURGERIES: * No surgery found *    DISCHARGE DIAGNOSES: Abscess with bacteremia from UTI    This is a 64-year-old woman with a past medical history significant for supraventricular tachycardia, who was in her usual state of health until about 2 weeks ago when the patient started experiencing a palpitation.  The palpitation got worse on the day of presentation, associated with shortness of breath and bilateral leg weakness.  Because of that, the patient decided to come to the emergency room for further evaluation.  The patient has history of supraventricular tachycardia for which the patient takes metoprolol.  The patient took routine metoprolol without any significant relief in terms of the palpitation.  She denies any associated chest pain.  When the patient arrived at the emergency room, the patient was found to have tachycardia.  The initial lactic acid level was 6.0.  Code sepsis was called and code sepsis protocol was initiated including administration of IV fluid per sepsis protocol and also antibiotics.  The patient's chest x-ray shows mild patchy airspace opacities in the right lung base.  Her urinalysis was also consistent with urinary tract infection, these were thought to be the source of the patient's sepsis.  The patient was last admitted to the hospital from 12/03/2020 to 12/06/2020.  The patient was admitted with GI bleed, underwent EGD that shows bleeding ulcer, which was clipped. Jewell Lieu patient was discharged home in stable condition. [address] [facility] COURSE:   It was treated as per sepsis protocol seen by ID for bacteremia patient has QTC prolongation so it was decided patient will go home with IV antibiotics to complete the course patient agreed to the plan plan for discharge today home with IV    DISCHARGE DIAGNOSES / PLAN:      D/c home with IV abx    BMI: Body mass index is 19.74 kg/m². . This patient: Has a BMI within normal limits. PENDING TEST RESULTS:   At the time of discharge the following test results are still pending:      ADDITIONAL CARE RECOMMENDATIONS:        NOTIFY YOUR PHYSICIAN FOR ANY OF THE FOLLOWING:   Fever over 101 degrees for 24 hours. Chest pain, shortness of breath, fever, chills, nausea, vomiting, diarrhea, change in mentation, falling, weakness, bleeding. Severe pain or pain not relieved by medications, as well as any other signs or symptoms that you may have questions about. FOLLOW UP APPOINTMENTS:    Follow-up Information     Follow up With Specialties Details Why Contact Info       pcp f/u in 1 week              DIET: Cardiac Diet    ACTIVITY: Activity as tolerated    EQUIPMENT needed:     DISCHARGE MEDICATIONS:  Current Discharge Medication List      CONTINUE these medications which have NOT CHANGED    Details   metoprolol succinate (TOPROL-XL) 25 mg XL tablet TAKE 1 TABLET BY MOUTH EVERY DAY AT NIGHT  Qty: 30 Tablet, Refills: 2      Omeprazole delayed release (PRILOSEC D/R) 20 mg tablet Take 20 mg by mouth as needed. bumetanide (BUMEX) 0.5 mg tablet Take 1 Tab by mouth daily. Qty: 30 Tab, Refills: 2      magnesium oxide (MAG-OX) 400 mg tablet To take 2 tablets (800 mg) in am and 1 tablet (400 mg ) in pm.  Qty: 270 Tab, Refills: 1    Comments: Dose increased to 800 mg in am and 400 mg in pm on 2-5-21. cholecalciferol (VITAMIN D3) 1,000 unit tablet Take 1 Tab by mouth daily.   Qty: 90 Tab, Refills: 3      potassium chloride SR (K-TAB) 20 mEq tablet Take 1 Tab by mouth daily. Qty: 30 Tab, Refills: 2      cranberry fruit extract (CRANBERRY CONCENTRATE PO) Take 1 Tab by mouth daily. ascorbic acid, vitamin C, (VITAMIN C) 500 mg tablet Take 500 mg by mouth daily. DISPOSITION:  x  Home With:   OT  PT  HH  RN       Long term SNF/Inpatient Rehab    Independent/assisted living    Hospice    Other:       PATIENT CONDITION AT DISCHARGE:     Functional status    Poor     Deconditioned    x Independent      Cognition    x Lucid     Forgetful     Dementia      Catheters/lines (plus indication)    [place]     PICC     PEG    x None      Code status   x  Full code     DNR      PHYSICAL EXAMINATION AT DISCHARGE:  General:          Alert, cooperative, no distress, appears stated age. HEENT:           Atraumatic, anicteric sclerae, pink conjunctivae                          No oral ulcers, mucosa moist, throat clear, dentition fair  Neck:               Supple, symmetrical  Lungs:             Clear to auscultation bilaterally. No Wheezing or Rhonchi. No rales. Chest wall:      No tenderness  No Accessory muscle use. Heart:              Regular  rhythm,  No  murmur   No edema  Abdomen:        Soft, non-tender. Not distended. Bowel sounds normal  Extremities:     No cyanosis. No clubbing,                            Skin turgor normal, Capillary refill normal  Skin:                Not pale. Not Jaundiced  No rashes   Psych:             Not anxious or agitated.   Neurologic:      Alert, moves all extremities, answers questions appropriately and responds to commands       CHRONIC MEDICAL DIAGNOSES:  Problem List as of 4/13/2022 Date Reviewed: 4/11/2022          Codes Class Noted - Resolved    * (Principal) Sepsis ([facility] ICD-10-CM: A41.9  ICD-9-CM: 038.9, 995.91  4/11/2022 - Present        GIB (gastrointestinal bleeding) ICD-10-CM: K92.2  ICD-9-CM: 578.9  12/3/2020 - Present        Cachexia ([facility] ICD-10-CM: R64  ICD-9-CM: 799.4  11/21/2018 - Present        SVT (supraventricular tachycardia) ([facility]) ICD-10-CM: I47.1  ICD-9-CM: 427.89  7/3/2018 - Present        RESOLVED: SVT (supraventricular tachycardia) ([address] ICD-10-CM: I47.1  ICD-9-CM: 427.89  3/13/2018 - 3/21/2018              Greater than 30  minutes were spent with the patient on counseling and coordination of care    Signed:   Alice Juarez MD  4/13/2022  11:23 AM

## 2022-04-14 ENCOUNTER — HOSPITAL ENCOUNTER (OUTPATIENT)
Dept: INFUSION THERAPY | Age: 72
Discharge: HOME | End: 2022-04-14
Payer: MEDICARE

## 2022-04-14 VITALS
RESPIRATION RATE: 16 BRPM | HEART RATE: 105 BPM | TEMPERATURE: 98.5 F | SYSTOLIC BLOOD PRESSURE: 87 MMHG | DIASTOLIC BLOOD PRESSURE: 54 MMHG

## 2022-04-14 LAB
ANION GAP SERPL CALC-SCNC: 10 MMOL/L (ref 5–15)
ATRIAL RATE: 124 BPM
BASOPHILS # BLD: 0.1 K/UL (ref 0–0.1)
BASOPHILS NFR BLD: 1 % (ref 0–1)
BUN SERPL-MCNC: 8 MG/DL (ref 6–20)
BUN/CREAT SERPL: 15 (ref 12–20)
CALCIUM SERPL-MCNC: 8.6 MG/DL (ref 8.5–10.1)
CALCULATED P AXIS, ECG09: 3 DEGREES
CALCULATED R AXIS, ECG10: 86 DEGREES
CALCULATED T AXIS, ECG11: 9 DEGREES
CHLORIDE SERPL-SCNC: 108 MMOL/L (ref 97–108)
CO2 SERPL-SCNC: 21 MMOL/L (ref 21–32)
CREAT SERPL-MCNC: 0.52 MG/DL (ref 0.55–1.02)
CRP SERPL-MCNC: 2.65 MG/DL (ref 0–0.6)
DIAGNOSIS, 93000: NORMAL
DIFFERENTIAL METHOD BLD: ABNORMAL
EOSINOPHIL # BLD: 0.1 K/UL (ref 0–0.4)
EOSINOPHIL NFR BLD: 2 % (ref 0–7)
ERYTHROCYTE [DISTWIDTH] IN BLOOD BY AUTOMATED COUNT: 20.4 % (ref 11.5–14.5)
GLUCOSE SERPL-MCNC: 141 MG/DL (ref 65–100)
HCT VFR BLD AUTO: 31.2 % (ref 35–47)
HGB BLD-MCNC: 10.3 G/DL (ref 11.5–16)
IMM GRANULOCYTES # BLD AUTO: 0.1 K/UL (ref 0–0.04)
IMM GRANULOCYTES NFR BLD AUTO: 1 % (ref 0–0.5)
LYMPHOCYTES # BLD: 1.1 K/UL (ref 0.8–3.5)
LYMPHOCYTES NFR BLD: 20 % (ref 12–49)
MCH RBC QN AUTO: 27.6 PG (ref 26–34)
MCHC RBC AUTO-ENTMCNC: 33 G/DL (ref 30–36.5)
MCV RBC AUTO: 83.6 FL (ref 80–99)
MONOCYTES # BLD: 0.8 K/UL (ref 0–1)
MONOCYTES NFR BLD: 15 % (ref 5–13)
NEUTS SEG # BLD: 3.3 K/UL (ref 1.8–8)
NEUTS SEG NFR BLD: 61 % (ref 32–75)
NRBC # BLD: 0 K/UL (ref 0–0.01)
NRBC BLD-RTO: 0 PER 100 WBC
P-R INTERVAL, ECG05: 130 MS
PLATELET # BLD AUTO: 160 K/UL (ref 150–400)
PMV BLD AUTO: 10 FL (ref 8.9–12.9)
POTASSIUM SERPL-SCNC: 3.4 MMOL/L (ref 3.5–5.1)
Q-T INTERVAL, ECG07: 346 MS
QRS DURATION, ECG06: 114 MS
QTC CALCULATION (BEZET), ECG08: 497 MS
RBC # BLD AUTO: 3.73 M/UL (ref 3.8–5.2)
RBC MORPH BLD: ABNORMAL
SODIUM SERPL-SCNC: 139 MMOL/L (ref 136–145)
VENTRICULAR RATE, ECG03: 124 BPM
WBC # BLD AUTO: 5.5 K/UL (ref 3.6–11)

## 2022-04-14 PROCEDURE — 36415 COLL VENOUS BLD VENIPUNCTURE: CPT

## 2022-04-14 PROCEDURE — 85025 COMPLETE CBC W/AUTO DIFF WBC: CPT

## 2022-04-14 PROCEDURE — 80048 BASIC METABOLIC PNL TOTAL CA: CPT

## 2022-04-14 PROCEDURE — 96365 THER/PROPH/DIAG IV INF INIT: CPT

## 2022-04-14 PROCEDURE — 74011000258 HC RX REV CODE- 258: Performed by: NURSE PRACTITIONER

## 2022-04-14 PROCEDURE — 74011250636 HC RX REV CODE- 250/636: Performed by: NURSE PRACTITIONER

## 2022-04-14 PROCEDURE — 86140 C-REACTIVE PROTEIN: CPT

## 2022-04-14 RX ORDER — SODIUM CHLORIDE 9 MG/ML
10 INJECTION, SOLUTION INTRAVENOUS CONTINUOUS
Status: CANCELLED | OUTPATIENT
Start: 2022-04-14 | End: 2022-04-15

## 2022-04-14 RX ORDER — SODIUM CHLORIDE 0.9 % (FLUSH) 0.9 %
10 SYRINGE (ML) INJECTION AS NEEDED
Status: DISCONTINUED | OUTPATIENT
Start: 2022-04-14 | End: 2022-04-16 | Stop reason: HOSPADM

## 2022-04-14 RX ORDER — SODIUM CHLORIDE 0.9 % (FLUSH) 0.9 %
10 SYRINGE (ML) INJECTION AS NEEDED
Status: CANCELLED | OUTPATIENT
Start: 2022-04-14

## 2022-04-14 RX ORDER — HEPARIN SODIUM (PORCINE) LOCK FLUSH IV SOLN 100 UNIT/ML 100 UNIT/ML
500 SOLUTION INTRAVENOUS AS NEEDED
Status: CANCELLED | OUTPATIENT
Start: 2022-04-14 | End: 2022-04-15

## 2022-04-14 RX ORDER — HEPARIN 100 UNIT/ML
500 SYRINGE INTRAVENOUS AS NEEDED
Status: DISCONTINUED | OUTPATIENT
Start: 2022-04-14 | End: 2022-04-15 | Stop reason: HOSPADM

## 2022-04-14 RX ORDER — SODIUM CHLORIDE 9 MG/ML
10 INJECTION, SOLUTION INTRAVENOUS CONTINUOUS
Status: DISCONTINUED | OUTPATIENT
Start: 2022-04-14 | End: 2022-04-15 | Stop reason: HOSPADM

## 2022-04-14 RX ADMIN — SODIUM CHLORIDE 10 ML/HR: 900 INJECTION, SOLUTION INTRAVENOUS at 14:03

## 2022-04-14 RX ADMIN — Medication 10 ML: at 14:39

## 2022-04-14 RX ADMIN — CEFTRIAXONE SODIUM 2 G: 2 INJECTION, POWDER, FOR SOLUTION INTRAMUSCULAR; INTRAVENOUS at 14:05

## 2022-04-14 RX ADMIN — Medication 500 UNITS: at 14:39

## 2022-04-14 NOTE — PROGRESS NOTES
[facility] Peds/Adult Note                       Date: 2022    Name: Fanny Ji    MRN: [id number]         : 1950    1330 Patient arrives for Daily antibiotics without acute problems. Please see [facility] for complete assessment and education provided. Prior to treatment, patient was screened for COVID 19. Patient denies any signs or symptoms of COVID. Denies any known physical contact with anyone diagnosed with, or with pending or positive COVID test. Denies a pending or positive COVID test himself. Vital signs stable throughout and prior to discharge. Patient tolerated procedure well and was discharged without incident. Patient is aware of next [facility] appointment on 04/15/2022 Appointment card give to the Patient      Ms. Rosen's vitals were reviewed prior to and after treatment. Patient Vitals for the past 12 hrs:   Temp Pulse Resp BP   22 1439 -- (!) 105 -- (!) 87/54   22 1332 98.5 °F (36.9 °C) (!) 116 16 106/69         Lab results were obtained and reviewed. Recent Results (from the past 12 hour(s))   CBC WITH AUTOMATED DIFF    Collection Time: 22  1:49 PM   Result Value Ref Range    WBC 5.5 3.6 - 11.0 K/uL    RBC 3.73 (L) 3.80 - 5.20 M/uL    HGB 10.3 (L) 11.5 - 16.0 g/dL    HCT 31.2 (L) 35.0 - 47.0 %    MCV 83.6 80.0 - 99.0 FL    MCH 27.6 26.0 - 34.0 PG    MCHC 33.0 30.0 - 36.5 g/dL    RDW 20.4 (H) 11.5 - 14.5 %    PLATELET 273 274 - 301 K/uL    MPV 10.0 8.9 - 12.9 FL    NRBC 0.0 0  WBC    ABSOLUTE NRBC 0.00 0.00 - 0.01 K/uL    NEUTROPHILS 61 32 - 75 %    LYMPHOCYTES 20 12 - 49 %    MONOCYTES 15 (H) 5 - 13 %    EOSINOPHILS 2 0 - 7 %    BASOPHILS 1 0 - 1 %    IMMATURE GRANULOCYTES 1 (H) 0.0 - 0.5 %    ABS. NEUTROPHILS 3.3 1.8 - 8.0 K/UL    ABS. LYMPHOCYTES 1.1 0.8 - 3.5 K/UL    ABS. MONOCYTES 0.8 0.0 - 1.0 K/UL    ABS. EOSINOPHILS 0.1 0.0 - 0.4 K/UL    ABS. BASOPHILS 0.1 0.0 - 0.1 K/UL    ABS. IMM.  GRANS. 0.1 (H) 0.00 - 0.04 K/UL    DF SMEAR SCANNED RBC COMMENTS ANISOCYTOSIS  2+       METABOLIC PANEL, BASIC    Collection Time: 04/14/22  1:49 PM   Result Value Ref Range    Sodium 139 136 - 145 mmol/L    Potassium 3.4 (L) 3.5 - 5.1 mmol/L    Chloride 108 97 - 108 mmol/L    CO2 21 21 - 32 mmol/L    Anion gap 10 5 - 15 mmol/L    Glucose 141 (H) 65 - 100 mg/dL    BUN 8 6 - 20 MG/DL    Creatinine 0.52 (L) 0.55 - 1.02 MG/DL    BUN/Creatinine ratio 15 12 - 20      GFR est AA >60 >60 ml/min/1.73m2    GFR est non-AA >60 >60 ml/min/1.73m2    Calcium 8.6 8.5 - 10.1 MG/DL   C REACTIVE PROTEIN, QT    Collection Time: 04/14/22  1:49 PM   Result Value Ref Range    C-Reactive protein 2.65 (H) 0.00 - 0.60 mg/dL       Medications given:   Medications Administered     0.9% sodium chloride infusion     Admin Date  04/14/2022 Action  New Bag Dose  10 mL/hr Rate  10 mL/hr Route  IntraVENous Administered By  Otto Rubio RN          cefTRIAXone (ROCEPHIN) 2 g in 0.9% sodium chloride (MBP/ADV) 50 mL MBP     Admin Date  04/14/2022 Action  New Bag Dose  2 g Rate  100 mL/hr Route  IntraVENous Administered By  Otto Rubio RN          heparin (porcine) pf 500 Units     Admin Date  04/14/2022 Action  Given Dose  500 Units Route  IntraVENous Administered By  Otto Rubio RN          saline peripheral flush soln 10 mL     Admin Date  04/14/2022 Action  Given Dose  10 mL Route  InterCATHeter Administered By  Otto Rubio RN                  Ms. Koby Pires tolerated the infusion, and had no complaints. Line was flushed and capped per protocol. Ms. Koby Pires was discharged from [place] [address] in stable condition. Discharge Instructions provided to patient, patient verbalized understanding but denied the request for a copy of d/c instructions.      Future Appointments   Date Time Provider Jamie Allen   4/15/2022  1:00 PM B4 PEDS [facility]   4/16/2022  9:00 AM H2 LIANA [facility]   4/17/2022  9:00 AM G2 LIANA [facility]   4/18/2022  8:00 AM B4 [facility]   4/19/2022  9:00 AM B4 [facility]   4/20/2022  9:00 AM B4 [facility]   4/21/2022  9:00 AM B4 [facility]   4/22/2022  9:00 AM B4 [facility]   4/23/2022  9:00 AM G1 LIANA [facility]   4/24/2022  9:00 AM G1 LIANA [facility]   4/25/2022  8:00 AM B4 PEDS [facility]       Anamaria Prescott RN  April 14, 2022  4:32 PM

## 2022-04-15 ENCOUNTER — HOSPITAL ENCOUNTER (OUTPATIENT)
Dept: INFUSION THERAPY | Age: 72
Discharge: HOME | End: 2022-04-15
Payer: MEDICARE

## 2022-04-15 VITALS
TEMPERATURE: 98.1 F | RESPIRATION RATE: 16 BRPM | DIASTOLIC BLOOD PRESSURE: 67 MMHG | SYSTOLIC BLOOD PRESSURE: 97 MMHG | HEART RATE: 96 BPM

## 2022-04-15 PROCEDURE — 96365 THER/PROPH/DIAG IV INF INIT: CPT

## 2022-04-15 PROCEDURE — 74011000258 HC RX REV CODE- 258: Performed by: NURSE PRACTITIONER

## 2022-04-15 PROCEDURE — 74011250636 HC RX REV CODE- 250/636: Performed by: NURSE PRACTITIONER

## 2022-04-15 RX ORDER — SODIUM CHLORIDE 9 MG/ML
10 INJECTION, SOLUTION INTRAVENOUS CONTINUOUS
Status: DISCONTINUED | OUTPATIENT
Start: 2022-04-15 | End: 2022-04-16 | Stop reason: HOSPADM

## 2022-04-15 RX ORDER — SODIUM CHLORIDE 0.9 % (FLUSH) 0.9 %
10 SYRINGE (ML) INJECTION AS NEEDED
Status: DISPENSED | OUTPATIENT
Start: 2022-04-16 | End: 2022-04-16

## 2022-04-15 RX ORDER — SODIUM CHLORIDE 0.9 % (FLUSH) 0.9 %
10 SYRINGE (ML) INJECTION AS NEEDED
Status: DISCONTINUED | OUTPATIENT
Start: 2022-04-15 | End: 2022-04-16 | Stop reason: HOSPADM

## 2022-04-15 RX ORDER — HEPARIN 100 UNIT/ML
500 SYRINGE INTRAVENOUS AS NEEDED
Status: DISCONTINUED | OUTPATIENT
Start: 2022-04-15 | End: 2022-04-16 | Stop reason: HOSPADM

## 2022-04-15 RX ORDER — HEPARIN 100 UNIT/ML
500 SYRINGE INTRAVENOUS AS NEEDED
Status: ACTIVE | OUTPATIENT
Start: 2022-04-16 | End: 2022-04-16

## 2022-04-15 RX ADMIN — Medication 10 ML: at 14:20

## 2022-04-15 RX ADMIN — Medication 10 ML: at 13:15

## 2022-04-15 RX ADMIN — SODIUM CHLORIDE 10 ML/HR: 900 INJECTION, SOLUTION INTRAVENOUS at 13:20

## 2022-04-15 RX ADMIN — Medication 500 UNITS: at 14:20

## 2022-04-15 RX ADMIN — CEFTRIAXONE SODIUM 2 G: 2 INJECTION, POWDER, FOR SOLUTION INTRAMUSCULAR; INTRAVENOUS at 13:25

## 2022-04-15 NOTE — PROGRESS NOTES
[address] [facility] @ [facility] VISIT NOTE    0215 Patient arrives for Daily Rocephin Day 2  without acute problems. Please see connect [facility] for complete assessment and education provided. Vital signs stable throughout and prior to discharge, Pt. Tolerated treatment well and discharged without incident. Patient is aware of next [facility] appointment on 4/16/2022. Appointment card given to patient. Medications Verified by a Kasi Acevedo RN:  1. Rocephin 2gm IV   2. IV NS flush & KVO  3.   Heparin 500 units IV flush    VITAL SIGNS Patient Vitals for the past 12 hrs:   Temp Pulse Resp BP   04/15/22 1412 -- 96 16 97/67   04/15/22 1313 98.1 °F (36.7 °C) 97 16 107/69

## 2022-04-16 ENCOUNTER — HOSPITAL ENCOUNTER (OUTPATIENT)
Dept: INFUSION THERAPY | Age: 72
Discharge: HOME | End: 2022-04-16
Payer: MEDICARE

## 2022-04-16 VITALS
HEART RATE: 98 BPM | SYSTOLIC BLOOD PRESSURE: 115 MMHG | TEMPERATURE: 97.9 F | DIASTOLIC BLOOD PRESSURE: 74 MMHG | RESPIRATION RATE: 18 BRPM

## 2022-04-16 PROCEDURE — 74011000250 HC RX REV CODE- 250: Performed by: NURSE PRACTITIONER

## 2022-04-16 PROCEDURE — 96365 THER/PROPH/DIAG IV INF INIT: CPT

## 2022-04-16 PROCEDURE — 74011000258 HC RX REV CODE- 258: Performed by: NURSE PRACTITIONER

## 2022-04-16 PROCEDURE — 74011250636 HC RX REV CODE- 250/636: Performed by: NURSE PRACTITIONER

## 2022-04-16 RX ADMIN — SODIUM CHLORIDE, PRESERVATIVE FREE 10 ML: 5 INJECTION INTRAVENOUS at 09:37

## 2022-04-16 RX ADMIN — SODIUM CHLORIDE, PRESERVATIVE FREE 10 ML: 5 INJECTION INTRAVENOUS at 09:36

## 2022-04-16 RX ADMIN — SODIUM CHLORIDE, PRESERVATIVE FREE 10 ML: 5 INJECTION INTRAVENOUS at 09:04

## 2022-04-16 RX ADMIN — HEPARIN 500 UNITS: 100 SYRINGE at 09:39

## 2022-04-16 RX ADMIN — CEFTRIAXONE SODIUM 2 G: 2 INJECTION, POWDER, FOR SOLUTION INTRAMUSCULAR; INTRAVENOUS at 09:05

## 2022-04-16 NOTE — PROGRESS NOTES
[facility] VISIT NOTE    0855  Pt arrived at [facility] ambulatory and in no distress for Rocephin infusion. Assessment completed, no new complaints at this time. Midline flushed with positive blood return. Medications received:  Rocephin IV    Patient Vitals for the past 12 hrs:   Temp Pulse Resp BP   04/16/22 0857 97.9 °F (36.6 °C) 98 18 115/74     Tolerated treatment well, no adverse reaction noted. 0940  D/C'd from [facility] ambulatory and in no distress accompanied by family. Next appointment is tomorrow.

## 2022-04-17 ENCOUNTER — HOSPITAL ENCOUNTER (OUTPATIENT)
Dept: INFUSION THERAPY | Age: 72
Discharge: HOME | End: 2022-04-17
Payer: MEDICARE

## 2022-04-17 VITALS
HEART RATE: 89 BPM | DIASTOLIC BLOOD PRESSURE: 68 MMHG | RESPIRATION RATE: 18 BRPM | SYSTOLIC BLOOD PRESSURE: 104 MMHG | TEMPERATURE: 97.3 F

## 2022-04-17 PROCEDURE — 74011000258 HC RX REV CODE- 258: Performed by: NURSE PRACTITIONER

## 2022-04-17 PROCEDURE — 74011250636 HC RX REV CODE- 250/636: Performed by: NURSE PRACTITIONER

## 2022-04-17 PROCEDURE — 74011000250 HC RX REV CODE- 250: Performed by: NURSE PRACTITIONER

## 2022-04-17 PROCEDURE — 96365 THER/PROPH/DIAG IV INF INIT: CPT

## 2022-04-17 RX ORDER — SODIUM CHLORIDE 0.9 % (FLUSH) 0.9 %
5-10 SYRINGE (ML) INJECTION AS NEEDED
Status: DISCONTINUED | OUTPATIENT
Start: 2022-04-17 | End: 2022-04-18 | Stop reason: HOSPADM

## 2022-04-17 RX ORDER — HEPARIN 100 UNIT/ML
500 SYRINGE INTRAVENOUS AS NEEDED
Status: DISCONTINUED | OUTPATIENT
Start: 2022-04-17 | End: 2022-04-18 | Stop reason: HOSPADM

## 2022-04-17 RX ORDER — SODIUM CHLORIDE 9 MG/ML
25 INJECTION, SOLUTION INTRAVENOUS AS NEEDED
Status: DISCONTINUED | OUTPATIENT
Start: 2022-04-17 | End: 2022-04-18 | Stop reason: HOSPADM

## 2022-04-17 RX ADMIN — SODIUM CHLORIDE, PRESERVATIVE FREE 10 ML: 5 INJECTION INTRAVENOUS at 09:07

## 2022-04-17 RX ADMIN — CEFTRIAXONE SODIUM 2 G: 2 INJECTION, POWDER, FOR SOLUTION INTRAMUSCULAR; INTRAVENOUS at 09:10

## 2022-04-17 RX ADMIN — SODIUM CHLORIDE 25 ML/HR: 9 INJECTION, SOLUTION INTRAVENOUS at 09:09

## 2022-04-17 RX ADMIN — Medication 500 UNITS: at 09:58

## 2022-04-17 RX ADMIN — SODIUM CHLORIDE, PRESERVATIVE FREE 10 ML: 5 INJECTION INTRAVENOUS at 09:58

## 2022-04-17 NOTE — PROGRESS NOTES
[facility] VISIT NOTE    0900  Pt arrived at [facility] ambulatory and in no distress for Rocephin infusion. Assessment completed, no new complaints at this time. Midline flushed with positive blood return. Medications received:  Rocephin IV    Patient Vitals for the past 12 hrs:   Temp Pulse Resp BP   04/17/22 0906 97.3 °F (36.3 °C) 89 18 104/68     Tolerated treatment well, no adverse reaction noted. 1000  D/C'd from [facility] ambulatory and in no distress accompanied by family. Next appointment is tomorrow.

## 2022-04-18 ENCOUNTER — HOSPITAL ENCOUNTER (OUTPATIENT)
Dept: INFUSION THERAPY | Age: 72
Discharge: HOME | End: 2022-04-18
Payer: MEDICARE

## 2022-04-18 VITALS
TEMPERATURE: 97.9 F | DIASTOLIC BLOOD PRESSURE: 60 MMHG | SYSTOLIC BLOOD PRESSURE: 106 MMHG | RESPIRATION RATE: 18 BRPM | HEART RATE: 87 BPM

## 2022-04-18 LAB
ANION GAP SERPL CALC-SCNC: 5 MMOL/L (ref 5–15)
BASOPHILS # BLD: 0.1 K/UL (ref 0–0.1)
BASOPHILS NFR BLD: 1 % (ref 0–1)
BUN SERPL-MCNC: 5 MG/DL (ref 6–20)
BUN/CREAT SERPL: 11 (ref 12–20)
CALCIUM SERPL-MCNC: 8.6 MG/DL (ref 8.5–10.1)
CHLORIDE SERPL-SCNC: 108 MMOL/L (ref 97–108)
CO2 SERPL-SCNC: 27 MMOL/L (ref 21–32)
CREAT SERPL-MCNC: 0.45 MG/DL (ref 0.55–1.02)
CRP SERPL-MCNC: 1 MG/DL (ref 0–0.6)
DIFFERENTIAL METHOD BLD: ABNORMAL
EOSINOPHIL # BLD: 0.2 K/UL (ref 0–0.4)
EOSINOPHIL NFR BLD: 4 % (ref 0–7)
ERYTHROCYTE [DISTWIDTH] IN BLOOD BY AUTOMATED COUNT: 20.8 % (ref 11.5–14.5)
GLUCOSE SERPL-MCNC: 106 MG/DL (ref 65–100)
HCT VFR BLD AUTO: 31.5 % (ref 35–47)
HGB BLD-MCNC: 10.1 G/DL (ref 11.5–16)
IMM GRANULOCYTES # BLD AUTO: 0.1 K/UL (ref 0–0.04)
IMM GRANULOCYTES NFR BLD AUTO: 1 % (ref 0–0.5)
LYMPHOCYTES # BLD: 1.3 K/UL (ref 0.8–3.5)
LYMPHOCYTES NFR BLD: 25 % (ref 12–49)
MCH RBC QN AUTO: 27.3 PG (ref 26–34)
MCHC RBC AUTO-ENTMCNC: 32.1 G/DL (ref 30–36.5)
MCV RBC AUTO: 85.1 FL (ref 80–99)
MONOCYTES # BLD: 0.5 K/UL (ref 0–1)
MONOCYTES NFR BLD: 10 % (ref 5–13)
NEUTS SEG # BLD: 3.1 K/UL (ref 1.8–8)
NEUTS SEG NFR BLD: 59 % (ref 32–75)
NRBC # BLD: 0 K/UL (ref 0–0.01)
NRBC BLD-RTO: 0 PER 100 WBC
PLATELET # BLD AUTO: 274 K/UL (ref 150–400)
PMV BLD AUTO: 10.1 FL (ref 8.9–12.9)
POTASSIUM SERPL-SCNC: 2.9 MMOL/L (ref 3.5–5.1)
RBC # BLD AUTO: 3.7 M/UL (ref 3.8–5.2)
RBC MORPH BLD: ABNORMAL
SODIUM SERPL-SCNC: 140 MMOL/L (ref 136–145)
WBC # BLD AUTO: 5.3 K/UL (ref 3.6–11)

## 2022-04-18 PROCEDURE — 36415 COLL VENOUS BLD VENIPUNCTURE: CPT

## 2022-04-18 PROCEDURE — 80048 BASIC METABOLIC PNL TOTAL CA: CPT

## 2022-04-18 PROCEDURE — 96365 THER/PROPH/DIAG IV INF INIT: CPT

## 2022-04-18 PROCEDURE — 74011000258 HC RX REV CODE- 258: Performed by: NURSE PRACTITIONER

## 2022-04-18 PROCEDURE — 86140 C-REACTIVE PROTEIN: CPT

## 2022-04-18 PROCEDURE — 85025 COMPLETE CBC W/AUTO DIFF WBC: CPT

## 2022-04-18 PROCEDURE — 74011250636 HC RX REV CODE- 250/636: Performed by: NURSE PRACTITIONER

## 2022-04-18 RX ORDER — SODIUM CHLORIDE 9 MG/ML
10 INJECTION, SOLUTION INTRAVENOUS CONTINUOUS
Status: CANCELLED | OUTPATIENT
Start: 2022-04-18 | End: 2022-04-19

## 2022-04-18 RX ORDER — SODIUM CHLORIDE 0.9 % (FLUSH) 0.9 %
10 SYRINGE (ML) INJECTION AS NEEDED
Status: DISCONTINUED | OUTPATIENT
Start: 2022-04-18 | End: 2022-04-20 | Stop reason: HOSPADM

## 2022-04-18 RX ORDER — HEPARIN 100 UNIT/ML
500 SYRINGE INTRAVENOUS AS NEEDED
Status: DISCONTINUED | OUTPATIENT
Start: 2022-04-18 | End: 2022-04-19 | Stop reason: HOSPADM

## 2022-04-18 RX ORDER — HEPARIN SODIUM (PORCINE) LOCK FLUSH IV SOLN 100 UNIT/ML 100 UNIT/ML
500 SOLUTION INTRAVENOUS AS NEEDED
Status: CANCELLED | OUTPATIENT
Start: 2022-04-18 | End: 2022-04-19

## 2022-04-18 RX ORDER — SODIUM CHLORIDE 9 MG/ML
10 INJECTION, SOLUTION INTRAVENOUS CONTINUOUS
Status: DISCONTINUED | OUTPATIENT
Start: 2022-04-18 | End: 2022-04-19 | Stop reason: HOSPADM

## 2022-04-18 RX ORDER — SODIUM CHLORIDE 0.9 % (FLUSH) 0.9 %
10 SYRINGE (ML) INJECTION AS NEEDED
Status: CANCELLED | OUTPATIENT
Start: 2022-04-18

## 2022-04-18 RX ADMIN — CEFTRIAXONE SODIUM 2 G: 2 INJECTION, POWDER, FOR SOLUTION INTRAMUSCULAR; INTRAVENOUS at 08:05

## 2022-04-18 RX ADMIN — Medication 10 ML: at 08:05

## 2022-04-18 RX ADMIN — Medication 500 UNITS: at 08:53

## 2022-04-18 RX ADMIN — Medication 10 ML: at 08:53

## 2022-04-18 RX ADMIN — SODIUM CHLORIDE 10 ML/HR: 900 INJECTION, SOLUTION INTRAVENOUS at 08:05

## 2022-04-18 NOTE — PROGRESS NOTES
[address] [facility] @ [facility] VISIT NOTE    5929 Patient arrives for Daily IV Rocephin Day 5 without acute problems. Please see connect care for complete assessment and education provided. Vital signs stable throughout and prior to discharge, Pt. Tolerated treatment well and discharged without incident. Patient is aware of next [facility] appointment on 4/19/2022. Appointment card given to patient. No blood return noted to Midline but flushes well. Labs obtained peripherally in Rt arm by Nicho Rolle. Medications Verified by Tori Olmos RN:  1. Rocephin 2gm IV   2. IV NS flush & KVO  3. Heparin 500 units IV flush    VITAL SIGNS Patient Vitals for the past 12 hrs:   Temp Pulse Resp BP   04/18/22 0844 -- 87 18 106/60   04/18/22 0758 97.9 °F (36.6 °C) 92 18 110/66       LAB WORK Lab results pending, please see [facility] for results. No results found for this or any previous visit (from the past 12 hour(s)).

## 2022-04-19 ENCOUNTER — HOSPITAL ENCOUNTER (OUTPATIENT)
Dept: INFUSION THERAPY | Age: 72
Discharge: HOME | End: 2022-04-19
Payer: MEDICARE

## 2022-04-19 VITALS
HEART RATE: 78 BPM | SYSTOLIC BLOOD PRESSURE: 104 MMHG | DIASTOLIC BLOOD PRESSURE: 59 MMHG | TEMPERATURE: 97.7 F | RESPIRATION RATE: 18 BRPM

## 2022-04-19 PROCEDURE — 74011000258 HC RX REV CODE- 258: Performed by: NURSE PRACTITIONER

## 2022-04-19 PROCEDURE — 96365 THER/PROPH/DIAG IV INF INIT: CPT

## 2022-04-19 PROCEDURE — 74011250636 HC RX REV CODE- 250/636: Performed by: NURSE PRACTITIONER

## 2022-04-19 RX ORDER — SODIUM CHLORIDE 9 MG/ML
10 INJECTION, SOLUTION INTRAVENOUS CONTINUOUS
Status: DISCONTINUED | OUTPATIENT
Start: 2022-04-19 | End: 2022-04-20 | Stop reason: HOSPADM

## 2022-04-19 RX ORDER — SODIUM CHLORIDE 0.9 % (FLUSH) 0.9 %
10 SYRINGE (ML) INJECTION AS NEEDED
Status: DISCONTINUED | OUTPATIENT
Start: 2022-04-19 | End: 2022-04-21 | Stop reason: HOSPADM

## 2022-04-19 RX ORDER — HEPARIN 100 UNIT/ML
500 SYRINGE INTRAVENOUS AS NEEDED
Status: DISCONTINUED | OUTPATIENT
Start: 2022-04-19 | End: 2022-04-20 | Stop reason: HOSPADM

## 2022-04-19 RX ADMIN — Medication 10 ML: at 08:57

## 2022-04-19 RX ADMIN — Medication 500 UNITS: at 09:55

## 2022-04-19 RX ADMIN — SODIUM CHLORIDE 10 ML/HR: 9 INJECTION, SOLUTION INTRAVENOUS at 09:00

## 2022-04-19 RX ADMIN — CEFTRIAXONE SODIUM 2 G: 2 INJECTION, POWDER, FOR SOLUTION INTRAMUSCULAR; INTRAVENOUS at 09:03

## 2022-04-19 RX ADMIN — Medication 10 ML: at 09:55

## 2022-04-19 NOTE — PROGRESS NOTES
[address] [facility] @ [facility] VISIT NOTE     2186 Patient arrives for Daily IV Rocephin (through 04/24/2022) without acute problems. Please see connect care for complete assessment and education provided. Vital signs stable throughout and prior to discharge, Pt. Tolerated treatment well and discharged without incident. Patient/spouse is aware of next [facility] appointment on 04/20/2022. Appointment card given to patient/spouse. The patient/parent denies any symptoms of COVID (SOB, coughing, fever, or generally not feeling well), denies any known exposure to COVID-19 recently. Medications Verified by Myles Linares :  1. Rocephin 2 gm IV  2. IV NS Flush & KVO via LUE SL PICC  3. Heparin 500 units IV Flush via LUE SL PICC     VITAL SIGNS  Patient Vitals for the past 12 hrs:   Temp Pulse Resp BP   04/19/22 0945 -- 78 18 (!) 104/59   04/19/22 0855 97.7 °F (36.5 °C) 79 18 111/74     LAB WORK  Labs drawn q Monday & Thursday.

## 2022-04-20 ENCOUNTER — HOSPITAL ENCOUNTER (OUTPATIENT)
Dept: INFUSION THERAPY | Age: 72
Discharge: HOME | End: 2022-04-20
Payer: MEDICARE

## 2022-04-20 VITALS
OXYGEN SATURATION: 98 % | RESPIRATION RATE: 18 BRPM | SYSTOLIC BLOOD PRESSURE: 100 MMHG | HEART RATE: 80 BPM | TEMPERATURE: 97.7 F | DIASTOLIC BLOOD PRESSURE: 65 MMHG

## 2022-04-20 PROCEDURE — 77030020847 HC STATLOK BARD -A

## 2022-04-20 PROCEDURE — 96365 THER/PROPH/DIAG IV INF INIT: CPT

## 2022-04-20 PROCEDURE — 74011250636 HC RX REV CODE- 250/636: Performed by: NURSE PRACTITIONER

## 2022-04-20 PROCEDURE — 74011000258 HC RX REV CODE- 258: Performed by: NURSE PRACTITIONER

## 2022-04-20 RX ORDER — SODIUM CHLORIDE 9 MG/ML
10 INJECTION, SOLUTION INTRAVENOUS CONTINUOUS
Status: DISCONTINUED | OUTPATIENT
Start: 2022-04-20 | End: 2022-04-21 | Stop reason: HOSPADM

## 2022-04-20 RX ORDER — SODIUM CHLORIDE 0.9 % (FLUSH) 0.9 %
10 SYRINGE (ML) INJECTION AS NEEDED
Status: DISCONTINUED | OUTPATIENT
Start: 2022-04-20 | End: 2022-04-21 | Stop reason: HOSPADM

## 2022-04-20 RX ORDER — HEPARIN 100 UNIT/ML
500 SYRINGE INTRAVENOUS AS NEEDED
Status: DISCONTINUED | OUTPATIENT
Start: 2022-04-20 | End: 2022-04-21 | Stop reason: HOSPADM

## 2022-04-20 RX ADMIN — Medication 500 UNITS: at 09:47

## 2022-04-20 RX ADMIN — Medication 10 ML: at 09:46

## 2022-04-20 RX ADMIN — Medication 10 ML: at 08:52

## 2022-04-20 RX ADMIN — CEFTRIAXONE SODIUM 2 G: 2 INJECTION, POWDER, FOR SOLUTION INTRAMUSCULAR; INTRAVENOUS at 08:55

## 2022-04-20 RX ADMIN — SODIUM CHLORIDE 10 ML/HR: 9 INJECTION, SOLUTION INTRAVENOUS at 08:53

## 2022-04-20 NOTE — PROGRESS NOTES
[address] [facility] @ [facility] VISIT NOTE     0352 Patient arrives for Daily IV Rocephin (through 04/24/2022) without acute problems. Please see [facility] care for complete assessment and education provided. Vital signs stable throughout and prior to discharge, Pt. Tolerated treatment well and discharged without incident. Patient/spouse is aware of next [facility] appointment on 04/21/2022. Appointment card given to patient/spouse. The patient/parent denies any symptoms of COVID (SOB, coughing, fever, or generally not feeling well), denies any known exposure to COVID-19 recently. Medications Verified by Suraj Kunz RN :  1. Rocephin 2gm IV  2. NS IV Flush & KVO via LUE SL PICC  3.  Heparin 500 units IV Flush via LUE SL PICC     VITAL SIGNS  Patient Vitals for the past 12 hrs:   Temp Pulse Resp BP SpO2   04/20/22 0938 -- 80 18 100/65 --   04/20/22 0847 97.7 °F (36.5 °C) 82 18 112/67 98 %

## 2022-04-21 ENCOUNTER — HOSPITAL ENCOUNTER (OUTPATIENT)
Dept: INFUSION THERAPY | Age: 72
Discharge: HOME | End: 2022-04-21
Payer: MEDICARE

## 2022-04-21 VITALS
TEMPERATURE: 97.9 F | HEART RATE: 74 BPM | DIASTOLIC BLOOD PRESSURE: 56 MMHG | SYSTOLIC BLOOD PRESSURE: 97 MMHG | RESPIRATION RATE: 18 BRPM

## 2022-04-21 LAB
ANION GAP SERPL CALC-SCNC: 6 MMOL/L (ref 5–15)
BASOPHILS # BLD: 0.1 K/UL (ref 0–0.1)
BASOPHILS NFR BLD: 1 % (ref 0–1)
BUN SERPL-MCNC: 7 MG/DL (ref 6–20)
BUN/CREAT SERPL: 18 (ref 12–20)
CALCIUM SERPL-MCNC: 8.5 MG/DL (ref 8.5–10.1)
CHLORIDE SERPL-SCNC: 106 MMOL/L (ref 97–108)
CO2 SERPL-SCNC: 26 MMOL/L (ref 21–32)
CREAT SERPL-MCNC: 0.38 MG/DL (ref 0.55–1.02)
CRP SERPL-MCNC: 0.56 MG/DL (ref 0–0.6)
DIFFERENTIAL METHOD BLD: ABNORMAL
EOSINOPHIL # BLD: 0.1 K/UL (ref 0–0.4)
EOSINOPHIL NFR BLD: 2 % (ref 0–7)
ERYTHROCYTE [DISTWIDTH] IN BLOOD BY AUTOMATED COUNT: 21.1 % (ref 11.5–14.5)
GLUCOSE SERPL-MCNC: 105 MG/DL (ref 65–100)
HCT VFR BLD AUTO: 32.1 % (ref 35–47)
HGB BLD-MCNC: 10.4 G/DL (ref 11.5–16)
IMM GRANULOCYTES # BLD AUTO: 0 K/UL (ref 0–0.04)
IMM GRANULOCYTES NFR BLD AUTO: 0 % (ref 0–0.5)
LYMPHOCYTES # BLD: 1.2 K/UL (ref 0.8–3.5)
LYMPHOCYTES NFR BLD: 20 % (ref 12–49)
MCH RBC QN AUTO: 28 PG (ref 26–34)
MCHC RBC AUTO-ENTMCNC: 32.4 G/DL (ref 30–36.5)
MCV RBC AUTO: 86.3 FL (ref 80–99)
MONOCYTES # BLD: 0.4 K/UL (ref 0–1)
MONOCYTES NFR BLD: 7 % (ref 5–13)
NEUTS SEG # BLD: 4.2 K/UL (ref 1.8–8)
NEUTS SEG NFR BLD: 70 % (ref 32–75)
NRBC # BLD: 0 K/UL (ref 0–0.01)
NRBC BLD-RTO: 0 PER 100 WBC
PLATELET # BLD AUTO: 278 K/UL (ref 150–400)
PMV BLD AUTO: 10.1 FL (ref 8.9–12.9)
POTASSIUM SERPL-SCNC: 2.9 MMOL/L (ref 3.5–5.1)
RBC # BLD AUTO: 3.72 M/UL (ref 3.8–5.2)
RBC MORPH BLD: ABNORMAL
SODIUM SERPL-SCNC: 138 MMOL/L (ref 136–145)
WBC # BLD AUTO: 6 K/UL (ref 3.6–11)

## 2022-04-21 PROCEDURE — 80048 BASIC METABOLIC PNL TOTAL CA: CPT

## 2022-04-21 PROCEDURE — 74011250636 HC RX REV CODE- 250/636: Performed by: NURSE PRACTITIONER

## 2022-04-21 PROCEDURE — 85025 COMPLETE CBC W/AUTO DIFF WBC: CPT

## 2022-04-21 PROCEDURE — 36415 COLL VENOUS BLD VENIPUNCTURE: CPT

## 2022-04-21 PROCEDURE — 86140 C-REACTIVE PROTEIN: CPT

## 2022-04-21 PROCEDURE — 96365 THER/PROPH/DIAG IV INF INIT: CPT

## 2022-04-21 PROCEDURE — 74011000258 HC RX REV CODE- 258: Performed by: NURSE PRACTITIONER

## 2022-04-21 RX ORDER — HEPARIN 100 UNIT/ML
500 SYRINGE INTRAVENOUS AS NEEDED
Status: DISCONTINUED | OUTPATIENT
Start: 2022-04-21 | End: 2022-04-22 | Stop reason: HOSPADM

## 2022-04-21 RX ORDER — SODIUM CHLORIDE 0.9 % (FLUSH) 0.9 %
10 SYRINGE (ML) INJECTION AS NEEDED
Status: DISCONTINUED | OUTPATIENT
Start: 2022-04-21 | End: 2022-04-22 | Stop reason: HOSPADM

## 2022-04-21 RX ORDER — SODIUM CHLORIDE 9 MG/ML
10 INJECTION, SOLUTION INTRAVENOUS CONTINUOUS
Status: DISCONTINUED | OUTPATIENT
Start: 2022-04-21 | End: 2022-04-22 | Stop reason: HOSPADM

## 2022-04-21 RX ADMIN — Medication 10 ML: at 08:45

## 2022-04-21 RX ADMIN — SODIUM CHLORIDE 10 ML/HR: 9 INJECTION, SOLUTION INTRAVENOUS at 08:45

## 2022-04-21 RX ADMIN — Medication 500 UNITS: at 09:36

## 2022-04-21 RX ADMIN — Medication 10 ML: at 09:36

## 2022-04-21 RX ADMIN — CEFTRIAXONE SODIUM 2 G: 2 INJECTION, POWDER, FOR SOLUTION INTRAMUSCULAR; INTRAVENOUS at 08:48

## 2022-04-21 NOTE — PROGRESS NOTES
[address] [facility] @ [facility] VISIT NOTE    3910 Patient arrives for Rocephin IV Day 8 without acute problems. Please see connect care for complete assessment and education provided. Vital signs stable throughout and prior to discharge, Pt. Tolerated treatment well and discharged without incident. Patient is aware of next [facility] appointment on 4/22/2022. Appointment card given to patient. Medications Verified by Salima Nieves RN:  1. Rocephin 2gm IV   2. NS IV flush & KVO  3. Heparin 500units IV flush    VITAL SIGNS Patient Vitals for the past 12 hrs:   Temp Pulse Resp BP   04/21/22 0924 -- 74 18 (!) 97/56   04/21/22 0844 97.9 °F (36.6 °C) 84 18 110/74       LAB WORK Lab results pending, please see [facility] for results. Recent Results (from the past 12 hour(s))   C REACTIVE PROTEIN, QT    Collection Time: 04/21/22  8:49 AM   Result Value Ref Range    C-Reactive protein 0.56 0.00 - 0.60 mg/dL   CBC WITH AUTOMATED DIFF    Collection Time: 04/21/22  8:49 AM   Result Value Ref Range    WBC 6.0 3.6 - 11.0 K/uL    RBC 3.72 (L) 3.80 - 5.20 M/uL    HGB 10.4 (L) 11.5 - 16.0 g/dL    HCT 32.1 (L) 35.0 - 47.0 %    MCV 86.3 80.0 - 99.0 FL    MCH 28.0 26.0 - 34.0 PG    MCHC 32.4 30.0 - 36.5 g/dL    RDW 21.1 (H) 11.5 - 14.5 %    PLATELET 621 744 - 106 K/uL    MPV 10.1 8.9 - 12.9 FL    NRBC 0.0 0  WBC    ABSOLUTE NRBC 0.00 0.00 - 0.01 K/uL    NEUTROPHILS 70 32 - 75 %    LYMPHOCYTES 20 12 - 49 %    MONOCYTES 7 5 - 13 %    EOSINOPHILS 2 0 - 7 %    BASOPHILS 1 0 - 1 %    IMMATURE GRANULOCYTES 0 0.0 - 0.5 %    ABS. NEUTROPHILS 4.2 1.8 - 8.0 K/UL    ABS. LYMPHOCYTES 1.2 0.8 - 3.5 K/UL    ABS. MONOCYTES 0.4 0.0 - 1.0 K/UL    ABS. EOSINOPHILS 0.1 0.0 - 0.4 K/UL    ABS. BASOPHILS 0.1 0.0 - 0.1 K/UL    ABS. IMM.  GRANS. 0.0 0.00 - 0.04 K/UL    DF SMEAR SCANNED      RBC COMMENTS ANISOCYTOSIS  2+       METABOLIC PANEL, BASIC    Collection Time: 04/21/22  8:49 AM   Result Value Ref Range    Sodium 138 136 - 145 mmol/L    Potassium 2.9 (L) 3.5 - 5.1 mmol/L    Chloride 106 97 - 108 mmol/L    CO2 26 21 - 32 mmol/L    Anion gap 6 5 - 15 mmol/L    Glucose 105 (H) 65 - 100 mg/dL    BUN 7 6 - 20 MG/DL    Creatinine 0.38 (L) 0.55 - 1.02 MG/DL    BUN/Creatinine ratio 18 12 - 20      GFR est AA >60 >60 ml/min/1.73m2    GFR est non-AA >60 >60 ml/min/1.73m2    Calcium 8.5 8.5 - 10.1 MG/DL

## 2022-04-22 ENCOUNTER — HOSPITAL ENCOUNTER (OUTPATIENT)
Dept: INFUSION THERAPY | Age: 72
Discharge: HOME | End: 2022-04-22
Payer: MEDICARE

## 2022-04-22 VITALS
RESPIRATION RATE: 16 BRPM | TEMPERATURE: 97.6 F | DIASTOLIC BLOOD PRESSURE: 54 MMHG | HEART RATE: 95 BPM | SYSTOLIC BLOOD PRESSURE: 89 MMHG

## 2022-04-22 PROCEDURE — 74011000258 HC RX REV CODE- 258: Performed by: NURSE PRACTITIONER

## 2022-04-22 PROCEDURE — 74011250636 HC RX REV CODE- 250/636: Performed by: NURSE PRACTITIONER

## 2022-04-22 PROCEDURE — 96365 THER/PROPH/DIAG IV INF INIT: CPT

## 2022-04-22 RX ORDER — SODIUM CHLORIDE 9 MG/ML
10 INJECTION, SOLUTION INTRAVENOUS CONTINUOUS
Status: DISCONTINUED | OUTPATIENT
Start: 2022-04-22 | End: 2022-04-23 | Stop reason: HOSPADM

## 2022-04-22 RX ORDER — SODIUM CHLORIDE 0.9 % (FLUSH) 0.9 %
10 SYRINGE (ML) INJECTION AS NEEDED
Status: DISCONTINUED | OUTPATIENT
Start: 2022-04-22 | End: 2022-04-23 | Stop reason: HOSPADM

## 2022-04-22 RX ORDER — HEPARIN 100 UNIT/ML
500 SYRINGE INTRAVENOUS AS NEEDED
Status: DISCONTINUED | OUTPATIENT
Start: 2022-04-22 | End: 2022-04-23 | Stop reason: HOSPADM

## 2022-04-22 RX ADMIN — Medication 10 ML: at 09:25

## 2022-04-22 RX ADMIN — SODIUM CHLORIDE 10 ML/HR: 9 INJECTION, SOLUTION INTRAVENOUS at 09:25

## 2022-04-22 RX ADMIN — Medication 10 ML: at 10:26

## 2022-04-22 RX ADMIN — CEFTRIAXONE SODIUM 2 G: 2 INJECTION, POWDER, FOR SOLUTION INTRAMUSCULAR; INTRAVENOUS at 09:31

## 2022-04-22 RX ADMIN — Medication 500 UNITS: at 10:27

## 2022-04-22 NOTE — PROGRESS NOTES
[address] [facility] @ [facility] VISIT NOTE    3905 Patient arrives for Rocephin IV Day 9 without acute problems. Please see connect care for complete assessment and education provided. Vital signs stable throughout and prior to discharge, Pt. Tolerated treatment well and discharged without incident. Patient is aware of next [facility] appointment on 4/23/2022. Appointment card given to patient. Updated labs faxed to [facility]. Order received & written to D/C pt's midline after Rocephin dose on Sunday 4/24/22. Medications Verified by Regino Tinsley RN:  1. Rocephin 2gm IV   2. NS IV flush & KVO  3.   Heparin 500 units IV flush    VITAL SIGNS Patient Vitals for the past 12 hrs:   Temp Pulse Resp BP   04/22/22 1024 -- 95 16 (!) 89/54   04/22/22 0907 97.6 °F (36.4 °C) 85 18 (!) 103/55

## 2022-04-23 ENCOUNTER — HOSPITAL ENCOUNTER (OUTPATIENT)
Dept: INFUSION THERAPY | Age: 72
Discharge: HOME | End: 2022-04-23
Payer: MEDICARE

## 2022-04-23 VITALS
DIASTOLIC BLOOD PRESSURE: 52 MMHG | HEART RATE: 88 BPM | TEMPERATURE: 97 F | SYSTOLIC BLOOD PRESSURE: 84 MMHG | RESPIRATION RATE: 16 BRPM

## 2022-04-23 PROCEDURE — 96365 THER/PROPH/DIAG IV INF INIT: CPT

## 2022-04-23 PROCEDURE — 74011250636 HC RX REV CODE- 250/636: Performed by: NURSE PRACTITIONER

## 2022-04-23 PROCEDURE — 74011000258 HC RX REV CODE- 258: Performed by: NURSE PRACTITIONER

## 2022-04-23 PROCEDURE — 74011000250 HC RX REV CODE- 250: Performed by: NURSE PRACTITIONER

## 2022-04-23 RX ORDER — HEPARIN 100 UNIT/ML
500 SYRINGE INTRAVENOUS AS NEEDED
Status: DISCONTINUED | OUTPATIENT
Start: 2022-04-23 | End: 2022-04-24 | Stop reason: HOSPADM

## 2022-04-23 RX ORDER — SODIUM CHLORIDE 0.9 % (FLUSH) 0.9 %
10 SYRINGE (ML) INJECTION AS NEEDED
Status: DISCONTINUED | OUTPATIENT
Start: 2022-04-23 | End: 2022-04-24 | Stop reason: HOSPADM

## 2022-04-23 RX ORDER — SODIUM CHLORIDE 9 MG/ML
25 INJECTION, SOLUTION INTRAVENOUS ONCE
Status: COMPLETED | OUTPATIENT
Start: 2022-04-23 | End: 2022-04-23

## 2022-04-23 RX ADMIN — SODIUM CHLORIDE 25 ML/HR: 9 INJECTION, SOLUTION INTRAVENOUS at 08:55

## 2022-04-23 RX ADMIN — CEFTRIAXONE SODIUM 2 G: 2 INJECTION, POWDER, FOR SOLUTION INTRAMUSCULAR; INTRAVENOUS at 08:59

## 2022-04-23 RX ADMIN — SODIUM CHLORIDE, PRESERVATIVE FREE 10 ML: 5 INJECTION INTRAVENOUS at 09:43

## 2022-04-23 RX ADMIN — Medication 500 UNITS: at 09:44

## 2022-04-23 RX ADMIN — SODIUM CHLORIDE, PRESERVATIVE FREE 10 ML: 5 INJECTION INTRAVENOUS at 08:53

## 2022-04-23 NOTE — PROGRESS NOTES
[address] [facility] @ [facility] VISIT NOTE     8190 Patient arrives for Daily IV Rocephin (through 04/24/2022) without acute problems. Please see connect care for complete assessment and education provided. Vital signs stable throughout and prior to discharge, Pt. Tolerated treatment well and discharged without incident. Patient/spouse is aware of next [facility] appointment on 04/24/2022. Appointment card given to patient/spouse. Order received & written to D/C pt's midline after Rocephin dose on Sunday 4/24/22. The patient/parent denies any symptoms of COVID (SOB, coughing, fever, or generally not feeling well), denies any known exposure to COVID-19 recently. Medications Verified by Gilson Posey RN :   1. Rocephin 2gm IV   2. NS IV Flush & KVO via LUE SL PICC  3.  Heparin 500 units IV Flush via LUE SL PICC     VITAL SIGNS  Patient Vitals for the past 12 hrs:   Temp Pulse Resp BP   04/23/22 0936 -- 88 16 (!) 84/52   04/23/22 0849 97 °F (36.1 °C) 82 18 109/65

## 2022-04-24 ENCOUNTER — HOSPITAL ENCOUNTER (OUTPATIENT)
Dept: INFUSION THERAPY | Age: 72
Discharge: HOME | End: 2022-04-24
Payer: MEDICARE

## 2022-04-24 VITALS
HEART RATE: 85 BPM | SYSTOLIC BLOOD PRESSURE: 97 MMHG | DIASTOLIC BLOOD PRESSURE: 53 MMHG | TEMPERATURE: 96.8 F | RESPIRATION RATE: 16 BRPM

## 2022-04-24 PROCEDURE — 36589 REMOVAL TUNNELED CV CATH: CPT

## 2022-04-24 PROCEDURE — 74011250636 HC RX REV CODE- 250/636: Performed by: NURSE PRACTITIONER

## 2022-04-24 PROCEDURE — 96365 THER/PROPH/DIAG IV INF INIT: CPT

## 2022-04-24 PROCEDURE — 74011000258 HC RX REV CODE- 258: Performed by: NURSE PRACTITIONER

## 2022-04-24 RX ADMIN — CEFTRIAXONE SODIUM 2 G: 2 INJECTION, POWDER, FOR SOLUTION INTRAMUSCULAR; INTRAVENOUS at 09:10

## 2022-04-24 NOTE — PROGRESS NOTES
[facility] Adult Note                       Date: 2022    Name: Rosa Bernstein    MRN: [id number]         : 1950    0900 Patient arrives for Rocephin/PICC removal without acute problems. Please see [facility] for complete assessment and education provided. Prior to treatment, patient was screened for COVID 19. Patient denies any signs or symptoms of COVID. Denies any known physical contact with anyone diagnosed with, or with pending or positive COVID test. Denies a pending or positive COVID test himself. Vital signs stable throughout and prior to discharge. Patient tolerated procedure well and was discharged without incident. Patient and Family member is aware of no further [facility] appointments and to follow up with referring provider for any questions or concerns. Ms. Oswald Santamaria vitals were reviewed prior to and after treatment. Patient Vitals for the past 12 hrs:   Temp Pulse Resp BP   22 1018 -- 85 16 (!) 97/53   22 0901 96.8 °F (36 °C) 93 16 118/77         Lab results were obtained and reviewed. No results found for this or any previous visit (from the past 12 hour(s)). Medications given:   Medications Administered     cefTRIAXone (ROCEPHIN) 2 g in 0.9% sodium chloride (MBP/ADV) 50 mL MBP     Admin Date  2022 Action  New Bag Dose  2 g Rate  100 mL/hr Route  IntraVENous Administered By  Kaylan Padilla RN                Ms. Kenneth Ventura tolerated the infusion, and had no complaints. Post infusion, pt had PICC line removed per protocol without any difficulty. Ms. Kenneth Ventura was discharged from [place] [address] in stable condition. Discharge Instructions provided to patient, patient verbalized understanding and was given a copy of d/c instructions. No future appointments.     Aaron Richmond RN  2022  10:16 AM

## 2022-04-25 ENCOUNTER — HOSPITAL ENCOUNTER (OUTPATIENT)
Dept: INFUSION THERAPY | Age: 72
Discharge: HOME | End: 2022-04-25

## 2022-05-02 NOTE — ADDENDUM NOTE
Encounter addended by: Heather Lundy RN on: 5/2/2022 11:39 AM   Actions taken: Charge Capture section accepted

## 2022-10-09 ENCOUNTER — HOSPITAL ENCOUNTER (INPATIENT)
Age: 72
LOS: 1 days | Discharge: HOME | DRG: 377 | End: 2022-10-10
Attending: EMERGENCY MEDICINE | Admitting: FAMILY MEDICINE
Payer: MEDICARE

## 2022-10-09 DIAGNOSIS — E87.6 HYPOKALEMIA: ICD-10-CM

## 2022-10-09 DIAGNOSIS — E83.42 HYPOMAGNESEMIA: ICD-10-CM

## 2022-10-09 DIAGNOSIS — R00.0 TACHYCARDIA: ICD-10-CM

## 2022-10-09 DIAGNOSIS — K29.71 GASTROINTESTINAL HEMORRHAGE ASSOCIATED WITH GASTRITIS, UNSPECIFIED GASTRITIS TYPE: Primary | ICD-10-CM

## 2022-10-09 PROBLEM — K92.2 GI BLEED: Status: ACTIVE | Noted: 2022-10-09

## 2022-10-09 LAB
ALBUMIN SERPL-MCNC: 3 G/DL (ref 3.5–5)
ALBUMIN/GLOB SERPL: 0.7 {RATIO} (ref 1.1–2.2)
ALP SERPL-CCNC: 77 U/L (ref 45–117)
ALT SERPL-CCNC: 26 U/L (ref 12–78)
ANION GAP SERPL CALC-SCNC: 9 MMOL/L (ref 5–15)
AST SERPL-CCNC: 43 U/L (ref 15–37)
BASOPHILS # BLD: 0.1 K/UL (ref 0–0.1)
BASOPHILS NFR BLD: 1 % (ref 0–1)
BILIRUB SERPL-MCNC: 1.5 MG/DL (ref 0.2–1)
BUN SERPL-MCNC: 23 MG/DL (ref 6–20)
BUN/CREAT SERPL: 36 (ref 12–20)
CALCIUM SERPL-MCNC: 8 MG/DL (ref 8.5–10.1)
CHLORIDE SERPL-SCNC: 97 MMOL/L (ref 97–108)
CO2 SERPL-SCNC: 31 MMOL/L (ref 21–32)
COMMENT, HOLDF: NORMAL
CREAT SERPL-MCNC: 0.64 MG/DL (ref 0.55–1.02)
DIFFERENTIAL METHOD BLD: ABNORMAL
EOSINOPHIL # BLD: 0 K/UL (ref 0–0.4)
EOSINOPHIL NFR BLD: 0 % (ref 0–7)
ERYTHROCYTE [DISTWIDTH] IN BLOOD BY AUTOMATED COUNT: 13.5 % (ref 11.5–14.5)
GLOBULIN SER CALC-MCNC: 4.2 G/DL (ref 2–4)
GLUCOSE SERPL-MCNC: 147 MG/DL (ref 65–100)
HCT VFR BLD AUTO: 31.7 % (ref 35–47)
HGB BLD-MCNC: 10.8 G/DL (ref 11.5–16)
IMM GRANULOCYTES # BLD AUTO: 0 K/UL (ref 0–0.04)
IMM GRANULOCYTES NFR BLD AUTO: 0 % (ref 0–0.5)
INR PPP: 1.6 (ref 0.9–1.1)
LIPASE SERPL-CCNC: 41 U/L (ref 73–393)
LYMPHOCYTES # BLD: 1.4 K/UL (ref 0.8–3.5)
LYMPHOCYTES NFR BLD: 15 % (ref 12–49)
MAGNESIUM SERPL-MCNC: 1 MG/DL (ref 1.6–2.4)
MCH RBC QN AUTO: 33.8 PG (ref 26–34)
MCHC RBC AUTO-ENTMCNC: 34.1 G/DL (ref 30–36.5)
MCV RBC AUTO: 99.1 FL (ref 80–99)
MONOCYTES # BLD: 0.9 K/UL (ref 0–1)
MONOCYTES NFR BLD: 10 % (ref 5–13)
NEUTS SEG # BLD: 6.9 K/UL (ref 1.8–8)
NEUTS SEG NFR BLD: 74 % (ref 32–75)
NRBC # BLD: 0 K/UL (ref 0–0.01)
NRBC BLD-RTO: 0 PER 100 WBC
PLATELET # BLD AUTO: 140 K/UL (ref 150–400)
PMV BLD AUTO: 9.8 FL (ref 8.9–12.9)
POTASSIUM SERPL-SCNC: 2.7 MMOL/L (ref 3.5–5.1)
PROT SERPL-MCNC: 7.2 G/DL (ref 6.4–8.2)
PROTHROMBIN TIME: 16.1 SEC (ref 9–11.1)
RBC # BLD AUTO: 3.2 M/UL (ref 3.8–5.2)
SAMPLES BEING HELD,HOLD: NORMAL
SODIUM SERPL-SCNC: 137 MMOL/L (ref 136–145)
TROPONIN-HIGH SENSITIVITY: 5 NG/L (ref 0–51)
WBC # BLD AUTO: 9.3 K/UL (ref 3.6–11)

## 2022-10-09 PROCEDURE — 96375 TX/PRO/DX INJ NEW DRUG ADDON: CPT

## 2022-10-09 PROCEDURE — 74011000250 HC RX REV CODE- 250: Performed by: EMERGENCY MEDICINE

## 2022-10-09 PROCEDURE — 74011250636 HC RX REV CODE- 250/636: Performed by: FAMILY MEDICINE

## 2022-10-09 PROCEDURE — 65660000001 HC RM ICU INTERMED STEPDOWN

## 2022-10-09 PROCEDURE — 36415 COLL VENOUS BLD VENIPUNCTURE: CPT

## 2022-10-09 PROCEDURE — 85610 PROTHROMBIN TIME: CPT

## 2022-10-09 PROCEDURE — 99285 EMERGENCY DEPT VISIT HI MDM: CPT

## 2022-10-09 PROCEDURE — C9113 INJ PANTOPRAZOLE SODIUM, VIA: HCPCS | Performed by: EMERGENCY MEDICINE

## 2022-10-09 PROCEDURE — 82272 OCCULT BLD FECES 1-3 TESTS: CPT

## 2022-10-09 PROCEDURE — 65270000029 HC RM PRIVATE

## 2022-10-09 PROCEDURE — 85025 COMPLETE CBC W/AUTO DIFF WBC: CPT

## 2022-10-09 PROCEDURE — 83690 ASSAY OF LIPASE: CPT

## 2022-10-09 PROCEDURE — 96365 THER/PROPH/DIAG IV INF INIT: CPT

## 2022-10-09 PROCEDURE — 74011250636 HC RX REV CODE- 250/636: Performed by: EMERGENCY MEDICINE

## 2022-10-09 PROCEDURE — 84484 ASSAY OF TROPONIN QUANT: CPT

## 2022-10-09 PROCEDURE — 80053 COMPREHEN METABOLIC PANEL: CPT

## 2022-10-09 PROCEDURE — 83735 ASSAY OF MAGNESIUM: CPT

## 2022-10-09 RX ORDER — POTASSIUM CHLORIDE 7.45 MG/ML
10 INJECTION INTRAVENOUS
Status: COMPLETED | OUTPATIENT
Start: 2022-10-09 | End: 2022-10-09

## 2022-10-09 RX ORDER — SODIUM CHLORIDE 9 MG/ML
100 INJECTION, SOLUTION INTRAVENOUS ONCE
Status: DISCONTINUED | OUTPATIENT
Start: 2022-10-09 | End: 2022-10-09

## 2022-10-09 RX ORDER — SODIUM CHLORIDE 9 MG/ML
75 INJECTION, SOLUTION INTRAVENOUS CONTINUOUS
Status: DISCONTINUED | OUTPATIENT
Start: 2022-10-09 | End: 2022-10-10 | Stop reason: HOSPADM

## 2022-10-09 RX ORDER — METOPROLOL TARTRATE 5 MG/5ML
2.5 INJECTION INTRAVENOUS ONCE
Status: COMPLETED | OUTPATIENT
Start: 2022-10-09 | End: 2022-10-09

## 2022-10-09 RX ORDER — ONDANSETRON 2 MG/ML
4 INJECTION INTRAMUSCULAR; INTRAVENOUS
Status: COMPLETED | OUTPATIENT
Start: 2022-10-09 | End: 2022-10-09

## 2022-10-09 RX ORDER — POTASSIUM CHLORIDE 7.45 MG/ML
10 INJECTION INTRAVENOUS
Status: COMPLETED | OUTPATIENT
Start: 2022-10-09 | End: 2022-10-10

## 2022-10-09 RX ADMIN — POTASSIUM CHLORIDE 10 MEQ: 7.46 INJECTION, SOLUTION INTRAVENOUS at 21:37

## 2022-10-09 RX ADMIN — POTASSIUM CHLORIDE 10 MEQ: 7.46 INJECTION, SOLUTION INTRAVENOUS at 23:53

## 2022-10-09 RX ADMIN — SODIUM CHLORIDE 75 ML/HR: 9 INJECTION, SOLUTION INTRAVENOUS at 22:54

## 2022-10-09 RX ADMIN — POTASSIUM CHLORIDE 10 MEQ: 7.46 INJECTION, SOLUTION INTRAVENOUS at 22:53

## 2022-10-09 RX ADMIN — METOPROLOL TARTRATE 2.5 MG: 5 INJECTION, SOLUTION INTRAVENOUS at 19:02

## 2022-10-09 RX ADMIN — ONDANSETRON 4 MG: 2 INJECTION INTRAMUSCULAR; INTRAVENOUS at 18:57

## 2022-10-09 RX ADMIN — POTASSIUM CHLORIDE 10 MEQ: 7.46 INJECTION, SOLUTION INTRAVENOUS at 19:02

## 2022-10-09 RX ADMIN — SODIUM CHLORIDE 500 ML: 9 INJECTION, SOLUTION INTRAVENOUS at 21:39

## 2022-10-09 RX ADMIN — PANTOPRAZOLE SODIUM 80 MG: 40 INJECTION, POWDER, FOR SOLUTION INTRAVENOUS at 18:57

## 2022-10-09 NOTE — ED PROVIDER NOTES
74year old female with history of SVT and GI bleed, presents with gradual onset of coffee ground emesis and dark stools for last two days. She has a history of GI bleed requiring blood transfusion in April, not currently taking PPI. She also has a history of SVT and is not sure if she was able to keep her metoprolol down today. She denies pain     The history is provided by the patient. Vomiting Blood   This is a recurrent problem. The current episode started more than 2 days ago. The problem has been gradually worsening. Associated symptoms include diarrhea. Pertinent negatives include no fever and no abdominal pain. Her pertinent negatives include no recent abdominal surgery. Past Medical History:   Diagnosis Date    Heart abnormality     SVT (supraventricular tachycardia) (HCC)     UTI (urinary tract infection)        Past Surgical History:   Procedure Laterality Date    HX ORTHOPAEDIC      HX TONSILLECTOMY      HX WISDOM TEETH EXTRACTION           No family history on file.     Social History     Socioeconomic History    Marital status:      Spouse name: Not on file    Number of children: Not on file    Years of education: Not on file    Highest education level: Not on file   Occupational History    Not on file   Tobacco Use    Smoking status: Never    Smokeless tobacco: Never   Substance and Sexual Activity    Alcohol use: Yes     Comment: occasional wine    Drug use: No    Sexual activity: Not on file   Other Topics Concern     Service Not Asked    Blood Transfusions Not Asked    Caffeine Concern Not Asked    Occupational Exposure Not Asked    Hobby Hazards Not Asked    Sleep Concern Not Asked    Stress Concern Not Asked    Weight Concern Not Asked    Special Diet Not Asked    Back Care Not Asked    Exercise Not Asked    Bike Helmet Not Asked   [address] Not Asked    Self-Exams Not Asked   Social History Narrative    Not on file     Social Determinants of Health     Financial Resource Strain: Not on file   Food Insecurity: Not on file   Transportation Needs: Not on file   Physical Activity: Not on file   Stress: Not on file   Social Connections: Not on file   Intimate Partner Violence: Not on file   Housing Stability: Not on file         ALLERGIES: Patient has no known allergies. Review of Systems   Constitutional:  Positive for appetite change and fatigue. Negative for fever. HENT:  Negative for nosebleeds. Respiratory:  Negative for chest tightness and shortness of breath. Cardiovascular:  Negative for chest pain, palpitations and leg swelling. Gastrointestinal:  Positive for blood in stool, diarrhea, nausea and vomiting. Negative for abdominal pain. Genitourinary:  Negative for dysuria. Musculoskeletal:  Negative for back pain. Neurological:  Negative for dizziness. Hematological:  Does not bruise/bleed easily. Vitals:    10/09/22 1752   BP: (!) 94/56   Pulse: (!) 139   Resp: 18   Temp: 97.5 °F (36.4 °C)   SpO2: 97%            Physical Exam  Vitals and nursing note reviewed. Constitutional:       Appearance: Normal appearance. Comments: Frail, answers questions easily,    HENT:      Head: Normocephalic and atraumatic. Mouth/Throat:      Mouth: Mucous membranes are moist.   Eyes:      Pupils: Pupils are equal, round, and reactive to light. Cardiovascular:      Rate and Rhythm: Regular rhythm. Tachycardia present. Pulmonary:      Effort: Pulmonary effort is normal.      Breath sounds: Normal breath sounds. Abdominal:      General: Abdomen is flat. Bowel sounds are normal.      Comments: Dark tarry diarrhea   Musculoskeletal:      Cervical back: Normal range of motion and neck supple. Skin:     Comments: pale   Neurological:      General: No focal deficit present. Mental Status: She is alert and oriented to person, place, and time.    Psychiatric:         Mood and Affect: Mood normal.        MDM     Amount and/or Complexity of Data Reviewed  Decide to obtain previous medical records or to obtain history from someone other than the patient: yes    6:48 PM  Patient resting comfortably, will give small dose of metoprolol due to sinus tachycardia,   7:15 PM  Noted baseline hemoglobin, BUN 23:1 ratio, EKG x 2 with showed demand changes:     EKG: sinus tachycardia, RBBB, lateral t wave changes most likely related to demand ischemia. 74year old female with history of GI bleed and SVT presents to ED with complaint of nausea,vomtiing tarry stools for 2-3 days. Patient is not currently taking PPI and is not sure if she kept her metoprolol down today. She denies any abdominal or chest pain. Hemoglobin is 10.8/HCT 31.7, platelets lower at 158. BUN and Creatinine not consistent with major GI bleed at ratio of 23/0.64, Magnesium is low at 1.0 and potasium is 2.7. Patient was given gently bolus and small dose of metoprolol, initiated potasium replacement in ED. HR has improved, no active hemorrhage in ED. Also started protonix. Patient is resting comfortably at this time      9:40 PM  Noted low blood pressure reading, patient is alert and oriented, abd soft and non tender, no vomiting    Perfect Serve Consult for Admission  9:00 PM    ED Room Number: ER13/13  Patient Name and age: Mamta Vilchis 74 y.o.  female  Working Diagnosis:   1. Gastrointestinal hemorrhage associated with gastritis, unspecified gastritis type    2. Hypokalemia    3. Hypomagnesemia    4.  Tachycardia        COVID-19 Suspicion:  no  Sepsis present:  no  Reassessment needed: no  Code Status:  Full Code  Readmission: no  Isolation Requirements:  no  Recommended Level of Care:  step down  Department:[facility] ED - 21   Other:          Procedures

## 2022-10-09 NOTE — ED TRIAGE NOTES
Pt arrives for coffee ground emesis since 1230pm yesterday, reports pmh of gastric ulcers seen here previously. Not on PPI's at home. Denies etoh abuse. No pain reported.

## 2022-10-10 ENCOUNTER — APPOINTMENT (OUTPATIENT)
Dept: ULTRASOUND IMAGING | Age: 72
DRG: 377 | End: 2022-10-10
Attending: NURSE PRACTITIONER
Payer: MEDICARE

## 2022-10-10 VITALS
TEMPERATURE: 99 F | WEIGHT: 100.5 LBS | HEART RATE: 97 BPM | DIASTOLIC BLOOD PRESSURE: 60 MMHG | HEIGHT: 64 IN | RESPIRATION RATE: 15 BRPM | BODY MASS INDEX: 17.16 KG/M2 | OXYGEN SATURATION: 98 % | SYSTOLIC BLOOD PRESSURE: 97 MMHG

## 2022-10-10 LAB
ALBUMIN SERPL-MCNC: 2.6 G/DL (ref 3.5–5)
ALBUMIN/GLOB SERPL: 0.8 {RATIO} (ref 1.1–2.2)
ALP SERPL-CCNC: 69 U/L (ref 45–117)
ALT SERPL-CCNC: 20 U/L (ref 12–78)
ANION GAP SERPL CALC-SCNC: 12 MMOL/L (ref 5–15)
AST SERPL-CCNC: 38 U/L (ref 15–37)
BASOPHILS # BLD: 0.1 K/UL (ref 0–0.1)
BASOPHILS NFR BLD: 1 % (ref 0–1)
BILIRUB DIRECT SERPL-MCNC: 0.6 MG/DL (ref 0–0.2)
BILIRUB SERPL-MCNC: 1.4 MG/DL (ref 0.2–1)
BUN SERPL-MCNC: 18 MG/DL (ref 6–20)
BUN/CREAT SERPL: 44 (ref 12–20)
CALCIUM SERPL-MCNC: 7.1 MG/DL (ref 8.5–10.1)
CHLORIDE SERPL-SCNC: 104 MMOL/L (ref 97–108)
CO2 SERPL-SCNC: 22 MMOL/L (ref 21–32)
CREAT SERPL-MCNC: 0.41 MG/DL (ref 0.55–1.02)
DIFFERENTIAL METHOD BLD: ABNORMAL
EOSINOPHIL # BLD: 0.1 K/UL (ref 0–0.4)
EOSINOPHIL NFR BLD: 2 % (ref 0–7)
ERYTHROCYTE [DISTWIDTH] IN BLOOD BY AUTOMATED COUNT: 13.7 % (ref 11.5–14.5)
GLOBULIN SER CALC-MCNC: 3.3 G/DL (ref 2–4)
GLUCOSE SERPL-MCNC: 85 MG/DL (ref 65–100)
HCT VFR BLD AUTO: 27.8 % (ref 35–47)
HEMOCCULT STL QL: POSITIVE
HGB BLD-MCNC: 8.9 G/DL (ref 11.5–16)
IMM GRANULOCYTES # BLD AUTO: 0 K/UL (ref 0–0.04)
IMM GRANULOCYTES NFR BLD AUTO: 0 % (ref 0–0.5)
INR PPP: 1.5 (ref 0.9–1.1)
LYMPHOCYTES # BLD: 1.4 K/UL (ref 0.8–3.5)
LYMPHOCYTES NFR BLD: 19 % (ref 12–49)
MCH RBC QN AUTO: 33.7 PG (ref 26–34)
MCHC RBC AUTO-ENTMCNC: 32 G/DL (ref 30–36.5)
MCV RBC AUTO: 105.3 FL (ref 80–99)
MONOCYTES # BLD: 0.5 K/UL (ref 0–1)
MONOCYTES NFR BLD: 8 % (ref 5–13)
NEUTS SEG # BLD: 5 K/UL (ref 1.8–8)
NEUTS SEG NFR BLD: 71 % (ref 32–75)
NRBC # BLD: 0 K/UL (ref 0–0.01)
NRBC BLD-RTO: 0 PER 100 WBC
PLATELET # BLD AUTO: 108 K/UL (ref 150–400)
PMV BLD AUTO: 10.6 FL (ref 8.9–12.9)
POTASSIUM SERPL-SCNC: 3.5 MMOL/L (ref 3.5–5.1)
PROT SERPL-MCNC: 5.9 G/DL (ref 6.4–8.2)
PROTHROMBIN TIME: 14.9 SEC (ref 9–11.1)
RBC # BLD AUTO: 2.64 M/UL (ref 3.8–5.2)
SODIUM SERPL-SCNC: 138 MMOL/L (ref 136–145)
WBC # BLD AUTO: 7.1 K/UL (ref 3.6–11)

## 2022-10-10 PROCEDURE — 74011250636 HC RX REV CODE- 250/636: Performed by: FAMILY MEDICINE

## 2022-10-10 PROCEDURE — 82248 BILIRUBIN DIRECT: CPT

## 2022-10-10 PROCEDURE — 74011000250 HC RX REV CODE- 250: Performed by: FAMILY MEDICINE

## 2022-10-10 PROCEDURE — 85025 COMPLETE CBC W/AUTO DIFF WBC: CPT

## 2022-10-10 PROCEDURE — 85610 PROTHROMBIN TIME: CPT

## 2022-10-10 PROCEDURE — 80053 COMPREHEN METABOLIC PANEL: CPT

## 2022-10-10 PROCEDURE — 36415 COLL VENOUS BLD VENIPUNCTURE: CPT

## 2022-10-10 PROCEDURE — C9113 INJ PANTOPRAZOLE SODIUM, VIA: HCPCS | Performed by: FAMILY MEDICINE

## 2022-10-10 RX ORDER — CALCIUM GLUCONATE 20 MG/ML
1 INJECTION, SOLUTION INTRAVENOUS ONCE
Status: COMPLETED | OUTPATIENT
Start: 2022-10-10 | End: 2022-10-10

## 2022-10-10 RX ORDER — PHENOL/SODIUM PHENOLATE
20 AEROSOL, SPRAY (ML) MUCOUS MEMBRANE 2 TIMES DAILY
Qty: 60 TABLET | Refills: 0 | Status: SHIPPED | OUTPATIENT
Start: 2022-10-10 | End: 2022-10-17

## 2022-10-10 RX ORDER — MAGNESIUM SULFATE HEPTAHYDRATE 40 MG/ML
2 INJECTION, SOLUTION INTRAVENOUS ONCE
Status: COMPLETED | OUTPATIENT
Start: 2022-10-10 | End: 2022-10-10

## 2022-10-10 RX ORDER — SODIUM CHLORIDE 0.9 % (FLUSH) 0.9 %
5-40 SYRINGE (ML) INJECTION EVERY 8 HOURS
Status: DISCONTINUED | OUTPATIENT
Start: 2022-10-10 | End: 2022-10-10 | Stop reason: HOSPADM

## 2022-10-10 RX ORDER — SODIUM CHLORIDE 0.9 % (FLUSH) 0.9 %
5-40 SYRINGE (ML) INJECTION AS NEEDED
Status: DISCONTINUED | OUTPATIENT
Start: 2022-10-10 | End: 2022-10-10 | Stop reason: HOSPADM

## 2022-10-10 RX ADMIN — PANTOPRAZOLE SODIUM 40 MG: 40 INJECTION, POWDER, FOR SOLUTION INTRAVENOUS at 09:55

## 2022-10-10 RX ADMIN — CALCIUM GLUCONATE 1000 MG: 20 INJECTION, SOLUTION INTRAVENOUS at 09:53

## 2022-10-10 RX ADMIN — MAGNESIUM SULFATE HEPTAHYDRATE 2 G: 40 INJECTION, SOLUTION INTRAVENOUS at 01:29

## 2022-10-10 NOTE — PROGRESS NOTES
[address] [facility] Adult  Hospitalist Group                                                                                          Hospitalist Progress Note  Abe Huggins MD  Answering service: [phone] from in house phone        Date of Service:  10/10/2022  NAME:  Declan Alcantara  :  1950  MRN:  [id number]      Admission Summary:      Declan Alcantara is a 74 y.o. female with past medical history SVT, orthostatic hypotension, GI bleed, esophagitis, esophageal and gastric ulcers, anemia requiring blood transfusion, bacteremia secondary to UTI, sepsis presented to the emergency department with chief complaint of nausea and vomiting. Patient reportedly had episodes of emesis starting \"noon \"yesterday, approximately 2-3 episodes of dark, possibly bloody emesis, unknown quantity, severe, without specific exacerbating leaving factors. .  Patient states \"looked like blood to me\". Patient denied abdominal pain. She did not have any episodes of emesis today. She notes that she was urged to come to the ED today. She has history of prior hospitalization from 12/3/2020-2020 with diagnosis of GI bleed, anemia, and tachycardia; required transfusion with 3 units packed red blood cells. On 12/3/2020, she underwent EGD showing ulcerated esophagitis, ulcerative GE junction (with unsuccessful clip attempt) and gastric ulcer (with sessile gastric clip). She was last hospitalized 4/10/2022-2022 with sepsis, abscess, with bacteremia secondary to UTI. On arrival emergency department today, initial ported vital signs temperature 97.5 °F, BP 94/56, heart rate 139, respiratory 18, O2 saturation 97% room air.   Abnormal labs include hemoglobin 10.8, platelets 292, INR 1.6, K2.7, magnesium 1.0, BUN 23, blood glucose 147, total bilirubin 1.5, albumin 3.0, AST 43.  ED ordered metoprolol 2.5 mg IV x1 dose with noted tachycardia, Zofran 4 mg IV x1 dose, Protonix 80 mg IV x1, KCl 10 mEq IV x1 dose, and 0.9% normal saline 500 mL IV fluid bolus x1. Patient is now seen for admission to the hospital service for continued valuation treatments. On arrival to bedside, patient was complaining of her blood pressure cuff on her right arm which was then promptly moved to her left arm for her comfort. She notes that her blood pressure is chronically low and has known history of SVT with intermittent tachycardia. Interval history / Subjective:   Overnight, patient has not had any abdominal pain, nausea, vomiting, loose stools or dark stools. Hemoglobin dropped down overnight from 10.8 on arrival to 8.9 this morning. She has been started on IV Protonix on admission. She has a remote history of gastric ulcers, but it has been many years. She is not currently on any PPI's. Assessment & Plan:      Coffee-ground/dark emesis x3 prior to admission:  - concern for upper GI bleed;  - history of esophageal and gastric ulcers, many years ago, not on Pepcid or PPI's prior to admission;   - monitor H&H: no immediate indication for transfusion;   - continue IV Protonix for now;   - GI consult and recommendations appreciated: outpatient EGD;   - advance diet to GI soft as tolerated;   - NPO past midnight for liver US;   - check stool occult blood; Hypokalemia:   - replete prn;   - due to likely to GI losses (vomiting); Hypomagnesemia:   - replete prn;     Sinus Tachycardia:  - on Metoprolol for hx of SVT;   - likely due to a combination of factors: anxiety, anemia, dehydration;   -12 lead EKG shows sinus tachycardia, right bundle branch block, T wave changes inferior lateral leads at 147 bpm at 18 0200 hrs. ;  -per ED, patient given Metoprolol 2.5 mg IV x 1 dose with improvement  -continue telemetry monitoring    Hypotension:  -patient notes she is chronically hypotensive with current SBP 98 as about baseline  -continue BP monitoring    Coagulopathy:  -with elevated INR 1.6  - liver US ordered;  - trend; - could be Vitamin K deficient, appears malnourished;    Hyperbilirubinemia  -total bilirubin 1.5.  repeat total/ direct bilirubin level in a.m.  - liver US to assess;     Transaminitis:  - trend; Code status: Full   Prophylaxis: B SCD's  Care Plan discussed with: patient  Anticipated Disposition: home in 2 days, pending GI work-up and treatment of UTI; Hospital Problems  Date Reviewed: 4/11/2022            Codes Class Noted POA    Acute hypokalemia ICD-10-CM: E87.6  ICD-9-CM: 276.8  10/9/2022 Unknown        Hypomagnesemia ICD-10-CM: E83.42  ICD-9-CM: 275.2  10/9/2022 Unknown        Tachycardia ICD-10-CM: R00.0  ICD-9-CM: 785.0  10/9/2022 Unknown        GI bleed ICD-10-CM: K92.2  ICD-9-CM: 578.9  10/9/2022 Unknown             Review of Systems:   A comprehensive review of systems was negative except for that written in the HPI. Vital Signs:    Last 24hrs VS reviewed since prior progress note. Most recent are:  Visit Vitals  BP (!) 88/49 (BP 1 Location: Left upper arm, BP Patient Position: At rest)   Pulse 98   Temp 99 °F (37.2 °C)   Resp 15   Ht 5' 4\" (1.626 m)   Wt 45.6 kg (100 lb 8 oz)   SpO2 98%   BMI 17.25 kg/m²         Intake/Output Summary (Last 24 hours) at 10/10/2022 1532  Last data filed at 10/9/2022 2010  Gross per 24 hour   Intake 100 ml   Output --   Net 100 ml        Physical Examination:     I had a face to face encounter with this patient and independently examined them on 10/10/2022 as outlined below:          Constitutional:  No acute distress, cooperative, pleasant    ENT:  Oral mucosa moist, oropharynx benign. Resp:  CTA bilaterally. No wheezing/rhonchi/rales. No accessory muscle use. CV:  Regular rhythm, normal rate, no murmurs, gallops, rubs    GI:  Soft, non distended, non tender. normoactive bowel sounds, no hepatosplenomegaly     Musculoskeletal:  No edema, warm, 2+ pulses throughout    Neurologic:  Moves all extremities.   AAOx3, CN II-XII reviewed            Data Review:    Review and/or order of clinical lab test  Review and/or order of tests in the radiology section of CPT  Review and/or order of tests in the medicine section of CPT      Labs:     Recent Labs     10/10/22  0452 10/09/22  1814   WBC 7.1 9.3   HGB 8.9* 10.8*   HCT 27.8* 31.7*   * 140*     Recent Labs     10/10/22  0452 10/09/22  1814    137   K 3.5 2.7*    97   CO2 22 31   BUN 18 23*   CREA 0.41* 0.64   GLU 85 147*   CA 7.1* 8.0*   MG  --  1.0*     Recent Labs     10/10/22  0452 10/09/22  1814   ALT 20 26   AP 69 77   TBILI 1.4* 1.5*   TP 5.9* 7.2   ALB 2.6* 3.0*   GLOB 3.3 4.2*   LPSE  --  41*     Recent Labs     10/10/22  0452 10/09/22  1814   INR 1.5* 1.6*   PTP 14.9* 16.1*      No results for input(s): FE, TIBC, PSAT, FERR in the last 72 hours. Lab Results   Component Value Date/Time    Folate 4.9 (L) 04/11/2022 04:05 AM      No results for input(s): PH, PCO2, PO2 in the last 72 hours. No results for input(s): CPK, CKNDX, TROIQ in the last 72 hours. No lab exists for component: CPKMB  Medications Reviewed:     Current Facility-Administered Medications   Medication Dose Route Frequency    sodium chloride (NS) flush 5-40 mL  5-40 mL IntraVENous Q8H    sodium chloride (NS) flush 5-40 mL  5-40 mL IntraVENous PRN    pantoprazole (PROTONIX) 40 mg in 0.9% sodium chloride 10 mL injection  40 mg IntraVENous Q12H    0.9% sodium chloride infusion  75 mL/hr IntraVENous CONTINUOUS     ______________________________________________________________________  EXPECTED LENGTH OF STAY: 3  ACTUAL LENGTH OF STAY:          1                 Armida Garza MD      Addendum:  Patient does not have a UTI.

## 2022-10-10 NOTE — PROGRESS NOTES
Comprehensive Nutrition Assessment    Type and Reason for Visit: Initial (low BMI)    Nutrition Recommendations/Plan:   Continue regular, GI lite diet as tolerated  Pt declines all ONS  Consider checking folate, B12, Vit D levels (hx of deficiency with all 3)       Malnutrition Assessment:  Malnutrition Status:  Severe malnutrition (10/10/22 1359)    Context:  Acute illness     Findings of the 6 clinical characteristics of malnutrition:   Energy Intake:  Mild decrease in energy intake (specify)  Weight Loss:  Greater than 2% over 1 week     Body Fat Loss: Moderate body fat loss, Buccal region, Orbital   Muscle Mass Loss: Moderate muscle mass loss, Temples (temporalis)  Fluid Accumulation:  No significant fluid accumulation,     Strength:  Not performed        Nutrition Assessment:    PMHx includes SVT, orthostatic hypotension, GI bleed, esophagitis, esophageal and gastric ulcers, anemia requiring blood transfusion, bacteremia secondary to UTI, and sepsis. Presented to the emergency department with chief complaint of nausea and vomiting. Patient reportedly had episodes of emesis starting \"noon \"yesterday, approximately 2-3 episodes of dark, possibly bloody emesis, unknown quantity, severe, without specific exacerbating leaving factors. .    Pt admitted with GI bleed [K92.2]  Acute hypokalemia [E87.6]  Hypomagnesemia [E83.42]  Tachycardia [R00.0]  GI and cardiology consulted pending. Noted plans for abdominal US today d/t elevated Tbili and LFTs. Pt screened for low BMI, met with pt in room. Reports a little bit of wt loss PTA d/t N/V, but UBW ~107-110 lb. Explained current wt listed as 100 lb, which she didn't seem too phased by. She is happy that diet has been advanced (GI lite) and denies any current N/V. Declines all ONS or additional concerns to this service. Dentition intact. Pt with very prominent cheek bones and thin appearing overall.   Pt meets acute, severe PCM, but suspect chronic malnutrition as well. Folic acid mildly low in April 2022, but specimen hemolyzed. Hgb trending down this admission, but suspected GI bleed on admission - GI eval pending. No further anemia work-up at this time. Pt with with low folic acid, Q26, and Vit D in 2018, but unclear circumstances at that time - has hx of GI bleed. Will follow along with GI eval.    Nutritionally Significant Medications:  NS @ 75 mL/hr, protonix, calcium gluconate, mag sulfate      Estimated Daily Nutrient Needs:  Energy Requirements Based On: Kcal/kg  Weight Used for Energy Requirements: Usual (48.5 kg)  Energy (kcal/day): 3816-7174 (30-35 kcal/kg)  Weight Used for Protein Requirements: Usual  Protein (g/day): 70-90 (1.5-1.8 gm/kg or at least 20%)     Fluid (ml/day): 1 mL/kcal    Nutrition Related Findings:   Edema: No data recorded    Last BM:  ,      Wounds: None      Current Nutrition Therapies:  Diet: regular, GI Lite  Supplements: none at this time  Meal intake: No data found. Supplement intake: No data found. Anthropometric Measures:  Height: 5' 4\" (162.6 cm)  Ideal Body Weight (IBW): 120 lbs (55 kg)     Current Body Wt:  45.6 kg (100 lb 8 oz), 83.8 % IBW.  Bed scale  Current BMI (kg/m2): 17.2  Usual Body Weight: 48.5 kg (107 lb) (107-110 lb)  % Weight Change (Calculated): -6.1  Weight Adjustment: No adjustment                 BMI Category: Underweight (BMI less than 18.5)    Wt Readings from Last 15 Encounters:   10/10/22 45.6 kg (100 lb 8 oz)   04/10/22 52.2 kg (115 lb)   05/14/21 51.7 kg (114 lb)   03/03/21 51.7 kg (114 lb)   03/03/21 51.7 kg (114 lb)   02/04/21 49 kg (108 lb)   12/28/20 55.3 kg (122 lb)   12/03/20 48.5 kg (107 lb)   05/21/19 46.1 kg (101 lb 9.6 oz)   11/20/18 46.4 kg (102 lb 6.4 oz)   07/16/18 45.8 kg (101 lb)   07/04/18 46.9 kg (103 lb 6.3 oz)   05/08/18 46.4 kg (102 lb 3.2 oz)   03/16/18 53.9 kg (118 lb 13.3 oz)       Nutrition Diagnosis:   Inadequate oral intake related to altered GI function, inadequate protein-energy intake as evidenced by BMI, weight loss      Nutrition Interventions:   Food and/or Nutrient Delivery: Continue current diet, Mineral supplement, Vitamin supplement  Nutrition Education/Counseling: No recommendations at this time  Coordination of Nutrition Care: Continue to monitor while inpatient       Goals:     Goals: PO intake 50% or greater, within 7 days, by next RD assessment       Nutrition Monitoring and Evaluation:   Behavioral-Environmental Outcomes: None identified  Food/Nutrient Intake Outcomes: Food and nutrient intake  Physical Signs/Symptoms Outcomes: Biochemical data, GI status, Hemodynamic status, Nutrition focused physical findings, Weight    Discharge Planning:    Continue current diet    Recent Labs     10/10/22  0452 10/09/22  1814   GLU 85 147*   BUN 18 23*   CREA 0.41* 0.64    137   K 3.5 2.7*    97   CO2 22 31   CA 7.1* 8.0*   MG  --  1.0*       Recent Labs     10/10/22  0452 10/09/22  1814   ALT 20 26   AST 38* 43*   AP 69 77   TBILI 1.4* 1.5*   TP 5.9* 7.2   ALB 2.6* 3.0*   GLOB 3.3 4.2*   LPSE  --  41*       No results for input(s): LAC in the last 72 hours. Recent Labs     10/10/22  0452 10/09/22  1814   WBC 7.1 9.3   HGB 8.9* 10.8*   HCT 27.8* 31.7*   * 140*       No results for input(s): PREALB in the last 72 hours. Prealbumin   Date Value Ref Range Status   03/14/2018 6.4 (L) 20.0 - 40.0 mg/dL Final       No results for input(s): TRIGL in the last 72 hours. Triglyceride   Date Value Ref Range Status   03/14/2018 75 <150 MG/DL Final     Comment:     Based on NCEP-ATP III:  Triglycerides <150 mg/dL  is considered normal, 150-199 mg/dL  borderline high,  200-499 mg/dL high and  greater than or equal to 500 mg/dL very high. No results for input(s): GLUCPOC in the last 72 hours.     Lab Results   Component Value Date/Time    Hemoglobin A1c 4.7 03/13/2018 11:16 PM       Iron Profile    No results for input(s): IRON, FOL, B12LT, VB6LT in the last 72 hours. No results for input(s): NH4 in the last 72 hours. Iron   Date Value Ref Range Status   03/14/2018 131 35 - 150 ug/dL Final   03/14/2018 131 35 - 150 ug/dL Final     TIBC   Date Value Ref Range Status   03/14/2018 181 (L) 250 - 450 ug/dL Final     Iron % saturation   Date Value Ref Range Status   03/14/2018 72 (H) 20 - 50 % Final       Ferritin   Date Value Ref Range Status   03/14/2018 172 8 - 252 NG/ML Final       B Vitamins  Folate   Date Value Ref Range Status   04/11/2022 4.9 (L) 5.0 - 21.0 ng/mL Final     Comment:     SPECIMEN HEMOLYZED, RESULTS MAY BE AFFECTED   03/14/2018 3.7 (L) 5.0 - 21.0 ng/mL Final     Vitamin B12   Date Value Ref Range Status   04/11/2022 320 193 - 986 pg/mL Final   03/14/2018 162 (L) 211 - 911 pg/mL Final     Comment:     Method used is Siemens Advia Centaur       Vitamin D 25-Hydroxy   Date Value Ref Range Status   03/17/2018 13.6 (L) 30 - 100 ng/mL Final     Comment:     (NOTE)  Deficiency               <20 ng/mL  Insufficiency          20-30 ng/mL  Sufficient             ng/mL  Possible toxicity       >100 ng/mL    The Method used is Siemens Advia Centaur currently standardized to a   Center of Disease Control and Prevention (CDC) certified reference   [address]. Samples containing fluorescein dye can produce falsely   elevated values when tested with the ADVIA Centaur Vitamin D Assay. It is recommended that results in the toxic range, >100 ng/mL, be   retested 72 hours post fluorescein exposure.            Arianne Kurtz RD  Available via [employer]

## 2022-10-10 NOTE — H&P
[address] [facility]  Hospitalist Group  History and Physical    Date of Service:  10/9/2022  Primary Care Provider: Tierra Mendieta DO  Source of information: The patient and Chart review    Chief Complaint: Vomiting Blood      History of Presenting Illness:   Ju Lang is a 74 y.o. female with past medical history SVT, orthostatic hypotension, GI bleed, esophagitis, esophageal and gastric ulcers, anemia requiring blood transfusion, bacteremia secondary to UTI, sepsis presented to the emergency department with chief complaint of nausea and vomiting. Patient reportedly had episodes of emesis starting \"noon \"yesterday, approximately 2-3 episodes of dark, possibly bloody emesis, unknown quantity, severe, without specific exacerbating leaving factors. .  Patient states \"looked like blood to me\". Patient denied abdominal pain. She did not have any episodes of emesis today. She notes that she was urged to come to the ED today. She has history of prior hospitalization from 12/3/2020-12/6/2020 with diagnosis of GI bleed, anemia, and tachycardia; required transfusion with 3 units packed red blood cells. On 12/3/2020, she underwent EGD showing ulcerated esophagitis, ulcerative GE junction (with unsuccessful clip attempt) and gastric ulcer (with sessile gastric clip). She was last hospitalized 4/10/2022-4/13/2022 with sepsis, abscess, with bacteremia secondary to UTI. On arrival emergency department today, initial ported vital signs temperature 97.5 °F, BP 94/56, heart rate 139, respiratory 18, O2 saturation 97% room air. Abnormal labs include hemoglobin 10.8, platelets 864, INR 1.6, K2.7, magnesium 1.0, BUN 23, blood glucose 147, total bilirubin 1.5, albumin 3.0, AST 43.  ED ordered metoprolol 2.5 mg IV x1 dose with noted tachycardia, Zofran 4 mg IV x1 dose, Protonix 80 mg IV x1, KCl 10 mEq IV x1 dose, and 0.9% normal saline 500 mL IV fluid bolus x1.   Patient is now seen for admission to the hospital service for continued valuation treatments. On arrival to bedside, patient was complaining of her blood pressure cuff on her right arm which was then promptly moved to her left arm for her comfort. She notes that her blood pressure is chronically low and has known history of SVT with intermittent tachycardia. The patient had no complaints of current headache, blurry vision, sore throat, trouble swallowing, trouble with speech, chest pain, SOB, cough, fever, chills, diarrhea, rectal bleeding, abd pain, urinary symptoms, constipation, recent travels, sick contacts, focal or generalized neurological symptoms, falls, injuries, rashes, contact with COVID-19 diagnosed patients, melena, hemoptysis, hematuria, rashes, denies starting any new medications and denies any other concerns or problems besides as mentioned above. REVIEW OF SYSTEMS:  A comprehensive review of systems was negative except for that written in the History of Present Illness. Past Medical History:   Diagnosis Date    Heart abnormality     SVT (supraventricular tachycardia) (HCC)     UTI (urinary tract infection)       Past Surgical History:   Procedure Laterality Date    HX ORTHOPAEDIC      HX TONSILLECTOMY      HX WISDOM TEETH EXTRACTION       MEDICATIONS:  Prior to Admission medications    Medication Sig Start Date End Date Taking? Authorizing Provider   metoprolol succinate (TOPROL-XL) 25 mg XL tablet TAKE 1 TABLET BY MOUTH EVERY DAY AT NIGHT 8/1/21   Estefani Pak MD   Omeprazole delayed release (PRILOSEC D/R) 20 mg tablet Take 20 mg by mouth as needed. Patient not taking: Reported on 4/14/2022    Shanta Contreras   bumetanide (BUMEX) 0.5 mg tablet Take 1 Tab by mouth daily.   Patient not taking: Reported on 4/14/2022 3/3/21   Esperanza Ramsay NP   magnesium oxide (MAG-OX) 400 mg tablet To take 2 tablets (800 mg) in am and 1 tablet (400 mg ) in pm.  Patient taking differently: Take 400 mg by mouth two (2) times a day. To take 2 tablets (800 mg) in am and 1 tablet (400 mg ) in pm. 2/5/21   Alejandro Gupta NP   cholecalciferol (VITAMIN D3) 1,000 unit tablet Take 1 Tab by mouth daily. 11/20/18   Jenn Muro MD   potassium chloride SR (K-TAB) 20 mEq tablet Take 1 Tab by mouth daily. 11/20/18   Jenn Muro MD   cranberry fruit extract (CRANBERRY CONCENTRATE PO) Take 1 Tab by mouth daily. Provider, Historical   ascorbic acid, vitamin C, (VITAMIN C) 500 mg tablet Take 500 mg by mouth daily. Provider, Historical          Social History:   Smoking: Denied  Alcohol: Occasional  Walks unassisted  ; lives at home    FAMILY HISTORY:  Stroke                                   mother      Objective:   Visit Vitals  BP (!) 98.57   Pulse 88   Temp 98.2 °F (36.8 °C)   Resp 14   SpO2 97%      O2 Device: None (Room air)    PHYSICAL EXAM:   General:  Patient  in no acute respiratory distress. Head:  Normocephalic, without obvious abnormality, atraumatic   Eyes:  Conjunctivae/corneas clear. Pupils 2 mm reactive bilateral.   E/N/M/T: Nares normal. Septum midline.  No nasal drainage or sinus tenderness  Tongue midline/ non-edematous  Clear oropharynx   Neck: Normal appearance and movements, symmetrical, trachea midline  No palpable adenopathy  No thyroid enlargement, tenderness or nodules  No carotid bruit   No JVD  Trachea midline   Lungs:   Symmetrical chest expansion and respiratory effort  Clear to auscultation bilaterally   Chest wall:  No tenderness or deformity   Heart:  Regular rate and rhythm   Normal S1 and S2; no murmur, click, rub or gallop   Abdomen:   Soft, no tenderness  No rebound, guarding, or rigidity  Non-distended   Bowel sounds normal  No masses or hepatosplenomegaly  No hernias present   Back: No costovertebral angle tenderness  No step-off deformity   Extremities: Extremities normal, atraumatic  No cyanosis or edema     Vascular/  Pulses: 2+ radial/ 1+ DP bilateral pulses   Integument/  Skin: No rashes or ulcers  Warm and dry   Musculo-      skeletal: Gait not tested  No calf tenderness   Neuro: GCS 15. Moves all extremities x 4. No slurred speech. No facial droop. Sensation grossly intact.      Psych: Alert, oriented x 3              Abnormal Labs Reviewed   CBC WITH AUTOMATED DIFF - Abnormal; Notable for the following components:       Result Value    RBC 3.20 (*)     HGB 10.8 (*)     HCT 31.7 (*)     MCV 99.1 (*)     PLATELET 633 (*)     All other components within normal limits   METABOLIC PANEL, COMPREHENSIVE - Abnormal; Notable for the following components:    Potassium 2.7 (*)     Glucose 147 (*)     BUN 23 (*)     BUN/Creatinine ratio 36 (*)     Calcium 8.0 (*)     Bilirubin, total 1.5 (*)     AST (SGOT) 43 (*)     Albumin 3.0 (*)     Globulin 4.2 (*)     A-G Ratio 0.7 (*)     All other components within normal limits   PROTHROMBIN TIME + INR - Abnormal; Notable for the following components:    INR 1.6 (*)     Prothrombin time 16.1 (*)     All other components within normal limits   LIPASE - Abnormal; Notable for the following components:    Lipase 41 (*)     All other components within normal limits   MAGNESIUM - Abnormal; Notable for the following components:    Magnesium 1.0 (*)     All other components within normal limits       All Micro Results       None            IMAGING:   No orders to display        ECG/ECHO:    Results for orders placed or performed during the hospital encounter of 04/10/22   EKG, 12 LEAD, INITIAL   Result Value Ref Range    Ventricular Rate 124 BPM    Atrial Rate 124 BPM    P-R Interval 130 ms    QRS Duration 114 ms    Q-T Interval 346 ms    QTC Calculation (Bezet) 497 ms    Calculated P Axis 3 degrees    Calculated R Axis 86 degrees    Calculated T Axis 9 degrees    Diagnosis       Sinus tachycardia  Right bundle branch block  When compared with ECG of 11-APR-2022 02:00,  T wave inversion now evident in Anterior leads  Confirmed by Suhail Viveros MD, Guillaume Bermudez ([id number]) on 4/14/2022 2:36:35 PM          Assessment/ Plan:   Given the patient's current clinical presentation, there is a high level of concern for decompensation if discharged from the emergency department. Complex decision making was performed, which includes reviewing the patient's available past medical records, laboratory results, and imaging studies. Active Problems:  GI bleed  -with prior history of esophageal and gastric ulcers  -admit to telemetry floor  -consult GI  -keep NPO  -order repeat H&H  -order Protonix 40 mg IV q 12 hours  -plan for possible EGD/ colonoscopy   -patient notes having hemorrhoid and reluctant to have rectal exam (for FOBT) at this time  -ordered stool occult blood test with patient's next bowel movement and gastric occult test if she has another episode of emesis    2. Acute hypokalemia   -Order additional KCl 10 mEq IV over 1 hour x 3 minus  -Repeat K level post KCl replacement  -Continue telemetry monitoring    3. Hypomagnesemia   -order magnesium sulfate 2 grams IV x 1 dose now and repeat Mg level post replacement    4. Tachycardia   -prior h/o SVT  -12 lead EKG shows sinus tachycardia, right bundle branch block, T wave changes inferior lateral leads at 147 bpm at 18 0200 hrs.  -per ED, patient given Metoprolol 2.5 mg IV x 1 dose with improvement  -continue telemetry monitoring  -consider for cardiology consultation    5. Hypotension  -patient notes she is chronically hypotensive with current SBP 98 as about baseline  -continue BP monitoring    6. Coagulopathy  -with elevated INR 1.6  -repeat INR in a.m.    7. Hyperbilirubinemia  -total bilirubin 1.5.  repeat total/ direct bilirubin level in a.m.    8. LFT elevation  -mildly elevated AST 43. Repeat CMP.     9. History of UTI  -order UA to rule out UTI                     DIET: NPO  ISOLATION PRECAUTIONS: There are currently no Active Isolations    DVT PROPHYLAXIS: SCDs  FUNCTIONAL STATUS PRIOR TO HOSPITALIZATION: Fully active and ambulatory; able to carry on all self-care without restriction. Ambulatory status/function: By self   EARLY MOBILITY ASSESSMENT: Recommend routine ambulation while hospitalized with the assistance of nursing staff  ANTICIPATED DISCHARGE: Greater than 48 hours. ANTICIPATED DISPOSITION: Home with Home Healthcare  EMERGENCY CONTACT/SURROGATE DECISION MAKER: Edson Siemens [phone]    CRITICAL CARE WAS PERFORMED FOR THIS ENCOUNTER: NO.    ADVANCED DIRECTIVE/ CODE STATUS:  FULL CODE as per discussion with patient. Signed By: Inga Oglesby MD     October 9, 2022         Please note that this dictation may have been completed with Marni Dunn, the computer voice recognition software. Quite often unanticipated grammatical, syntax, homophones, and other interpretive errors are inadvertently transcribed by the computer software. Please disregard these errors. Please excuse any errors that have escaped final proofreading.

## 2022-10-10 NOTE — ED NOTES
Bedside and Verbal shift change report given to Synetta Schirmer, RN (oncoming nurse) by Vianney Pedraza RN (offgoing nurse). Report included the following information SBAR, Kardex, ED Summary, [facility] and Recent Results.

## 2022-10-10 NOTE — CONSULTS
118 [facility].  217 [facility] 140 [facility], [address]  [phone]                     GI CONSULTATION NOTE      NAME:  Mamta Vilchis   :      MRN:   [id number]     Consult Date: 10/10/2022     Chief Complaint: GI Bleed    History of Present Illness:  Patient is a 74 y. o. who is seen in consultation at the request of Dr. Casa Caal for the above mentioned problem. Ms. Tirso Gonzalez has a past medical history significant for GI bleed, esophagitis, esophageal and gastric ulcers, anemia requiring blood transfusion, SVT and orthostatic hypotension. She presented to [facility] ED with nausea and vomiting with reported 2- 3 episodes of abnormal emesis followed bu extreme gagging and dry heaving on Saturday evening. States it was not blood \"but it didn't look right. \"  States she went out to dinner at [facility] and got sick immediately afterward. She presented to ED due to her history of having esophagitis and previous esophageal and gastric ulcers in 2020. She had an EGD on 12/3/20 that revealed ulcerative esophagitis, GEJ deep ulceration with active bleeding unsuccessful attempt at clip due to friability, 5mm clean based ulcer closed with clips x2, and mild duodenitis. Today, she denies abdominal pain, nausea, odynophagia, pyrosis, or dyspepsia. She has not vomited since Saturday evening. Denies black or bloody stools. Labs in ED significant for: Hgb 10.8, plt 140, INR 1.6, K+ 2.7, Mag 1, TB 1.5, FOBT- positive       PMH:  Past Medical History:   Diagnosis Date    Heart abnormality     SVT (supraventricular tachycardia) (HCC)     UTI (urinary tract infection)        PSH:  Past Surgical History:   Procedure Laterality Date    HX ORTHOPAEDIC      HX TONSILLECTOMY      HX WISDOM TEETH EXTRACTION         Allergies:  No Known Allergies    Home Medications:  Prior to Admission Medications   Prescriptions Last Dose Informant Patient Reported? Taking?    Omeprazole delayed release (PRILOSEC D/R) 20 mg tablet   Yes No   Sig: Take 20 mg by mouth as needed. Patient not taking: Reported on 4/14/2022   ascorbic acid, vitamin C, (VITAMIN C) 500 mg tablet   Yes No   Sig: Take 500 mg by mouth daily. bumetanide (BUMEX) 0.5 mg tablet   No No   Sig: Take 1 Tab by mouth daily. Patient not taking: Reported on 4/14/2022   cholecalciferol (VITAMIN D3) 1,000 unit tablet   No No   Sig: Take 1 Tab by mouth daily. cranberry fruit extract (CRANBERRY CONCENTRATE PO)   Yes No   Sig: Take 1 Tab by mouth daily. magnesium oxide (MAG-OX) 400 mg tablet   No No   Sig: To take 2 tablets (800 mg) in am and 1 tablet (400 mg ) in pm.   Patient taking differently: Take 400 mg by mouth two (2) times a day. To take 2 tablets (800 mg) in am and 1 tablet (400 mg ) in pm.   metoprolol succinate (TOPROL-XL) 25 mg XL tablet   No No   Sig: TAKE 1 TABLET BY MOUTH EVERY DAY AT NIGHT   potassium chloride SR (K-TAB) 20 mEq tablet   No No   Sig: Take 1 Tab by mouth daily. Facility-Administered Medications: None       Hospital Medications:  Current Facility-Administered Medications   Medication Dose Route Frequency    sodium chloride (NS) flush 5-40 mL  5-40 mL IntraVENous Q8H    sodium chloride (NS) flush 5-40 mL  5-40 mL IntraVENous PRN    pantoprazole (PROTONIX) 40 mg in 0.9% sodium chloride 10 mL injection  40 mg IntraVENous Q12H    0.9% sodium chloride infusion  75 mL/hr IntraVENous CONTINUOUS       Social History:  Social History     Tobacco Use    Smoking status: Never    Smokeless tobacco: Never   Substance Use Topics    Alcohol use: Yes     Comment: occasional wine       Family History:  No family history on file.     Review of Systems:    Constitutional: negative fever, negative chills, negative weight loss  Eyes:   negative visual changes  ENT:   negative sore throat, tongue or lip swelling  Respiratory:  negative cough, negative dyspnea  Cards:  negative for chest pain, palpitations, lower extremity edema  GI:   See HPI  :  negative for frequency, dysuria  Integument:  negative for rash and pruritus  Heme:  negative for easy bruising and gum/nose bleeding  Musculoskel: negative for myalgias, back pain and muscle weakness  Neuro: negative for headaches, dizziness, vertigo  Psych:  negative for feelings of anxiety, depression      Objective:   Patient Vitals for the past 8 hrs:   BP Temp Pulse Resp SpO2 Height   10/10/22 1400 -- -- 95 -- -- --   10/10/22 1357 -- -- -- -- -- 5' 4\" (1.626 m)   10/10/22 1200 -- -- (!) 105 -- -- --   10/10/22 1059 (!) 92/56 98.1 °F (36.7 °C) (!) 101 19 97 % --   10/10/22 1000 -- -- 91 -- -- --   10/10/22 0945 100/62 98.1 °F (36.7 °C) 92 14 98 % --     No intake/output data recorded. 10/08 1901 - 10/10 0700  In: 100 [I.V.:100]  Out: -       PHYSICAL EXAM:  General appearance: thin elderly,  female, NAD  Skin: Extremities and face reveal no rashes. HEENT: Sclerae anicteric. Cardiovascular: Regular rate and rhythm. No murmurs, gallops, or rubs. Respiratory: Comfortable breathing with no accessory muscle use. Clear breath sounds with no wheezes, rales, or rhonchi. GI: Abdomen nondistended, flat, soft, and nontender. Normal active bowel sounds. Rectal: Deferred   Musculoskeletal: No pitting edema of the lower legs. Neurological: Gross memory appears intact. Patient is alert and oriented. Psychiatric:   No anxiety or agitation.       Data Review     Recent Labs     10/10/22  0452 10/09/22  1814   WBC 7.1 9.3   HGB 8.9* 10.8*   HCT 27.8* 31.7*   * 140*     Recent Labs     10/10/22  0452 10/09/22  1814    137   K 3.5 2.7*    97   CO2 22 31   BUN 18 23*   CREA 0.41* 0.64   GLU 85 147*   CA 7.1* 8.0*     Recent Labs     10/10/22  0452 10/09/22  1814   AP 69 77   TP 5.9* 7.2   ALB 2.6* 3.0*   GLOB 3.3 4.2*   LPSE  --  41*     Recent Labs     10/10/22  0452 10/09/22  1814   INR 1.5* 1.6*   PTP 14.9* 16.1*        Imaging studies reviewed    Assessment / Plan :     74 yo female with h/o esophagitis, gastric and esophageal ulcers, GIB, anemia, SVT and orthostatic hypotension presents with nausea vomiting tachycardia, and electrolyte derangements (hypomagnesemia and hypokalemia). Since arrival to hospital patient has not had any vomiting. Hgb has dropped from 10.8 to 8.9 since admission, with +FOBT with INR 1.6 and plt count of 108. - Will obtain US ABD for further evaluation of her liver. - Given her current presentation she warrants an EGD, patient prefers to complete on outpatient basis. She has not had further emesis or melena.     - Continue to trend hgb and transfuse prn.    - Trend LFT's, INR  - diet as tolerated  - electrolyte replacement per hospitalist      Patient C/Andres Cuevas 1106 Problem List:   Active Problems:    Acute hypokalemia (10/9/2022)      Hypomagnesemia (10/9/2022)      Tachycardia (10/9/2022)      GI bleed (10/9/2022)       Renny Julian NP

## 2022-10-10 NOTE — ROUTINE PROCESS
TRANSFER - OUT REPORT:    Verbal report given to Ivette Headley RN(name) on Raffaele Pella  being transferred to [facility]) for routine progression of care       Report consisted of patients Situation, Background, Assessment and   Recommendations(SBAR). Information from the following report(s) SBAR, Kardex, ED Summary, MAR, and Recent Results was reviewed with the receiving nurse. Lines:   Peripheral IV 10/09/22 Right Antecubital (Active)   Site Assessment Clean, dry, & intact 10/09/22 1825   Phlebitis Assessment 0 10/09/22 1825   Infiltration Assessment 0 10/09/22 1825   Dressing Status Clean, dry, & intact 10/09/22 1825   Dressing Type 4 X 4;Transparent 10/09/22 1825   Hub Color/Line Status Pink;Flushed;Patent 10/09/22 1825   Action Taken Blood drawn 10/09/22 1825   Alcohol Cap Used No 10/09/22 1825        Opportunity for questions and clarification was provided.       Patient transported with:   Monitor  Registered Nurse

## 2022-10-10 NOTE — DISCHARGE SUMMARY
Physician Discharge Summary     Patient ID:    Randell Cherry  [id number]  :  1950    Admit date: 10/9/2022    Discharge date and time: 10/10/2022    Hospital Diagnoses and Treatment Rendered:   Patient is a 74 y.o. female with past medical history SVT, orthostatic hypotension, GI bleed, esophagitis, esophageal and gastric ulcers, anemia requiring blood transfusion, bacteremia secondary to UTI, sepsis, who presented to the emergency department with chief complaint of nausea, followed by 2-3 episodes of dark, possibly bloody emesis on 10/08. Patient denied abdominal pain. She has history of prior hospitalization from 12/3/2020-2020 with diagnosis of GI bleed, anemia, and tachycardia; required transfusion with 3 units packed red blood cells. On 12/3/2020, she underwent EGD showing ulcerated esophagitis, ulcerative GE junction (with unsuccessful clip attempt) and gastric ulcer (with sessile gastric clip). She notes that her blood pressure is chronically low and has known history of SVT with intermittent tachycardia. HOSPITAL COURSE:  Dark emesis x3 prior to admission:  - concern for upper GI bleed;  - history of esophageal and gastric ulcers, many years ago, not on Pepcid or PPI's prior to admission;   - monitor H&H: no immediate indication for transfusion;   - started IV Protonix on admission;   - GI consult and recommendations appreciated: outpatient EGD;   - advance diet to GI soft as tolerated;   - outpatient liver US;   - outpatient follow-up with GI: Dr. Janel Gabriel in 2 weeks to discuss repeat EGD and liver US;   - in the meantime, patient will be discharged on Omeprazole 20 mg BID (scheduled); Hypokalemia:   - replete prn;   - due to likely to GI losses (vomiting);     Hypomagnesemia:   - replete prn;     Sinus Tachycardia:  - on Metoprolol for hx of SVT;   - likely due to a combination of factors: anxiety, anemia, dehydration;   -12 lead EKG shows sinus tachycardia, right bundle branch block, T wave changes inferior lateral leads at 147 bpm at 18 0200 hrs.;    Hypotension, chronic:  -patient notes she is chronically hypotensive with current SBP 98 as about baseline  - outpatient follow-up with PCP;     Coagulopathy:  -with elevated INR 1.6  - liver US ordered: patient prefers to complete this as outpatient;   - could be Vitamin K deficient, appears malnourished;    Hyperbilirubinemia  -total bilirubin 1.5.  repeat total/ direct bilirubin level in a.m.  - liver US to assess: patient does not want to stay in the hospital to have the test done, prefers to complete it as outpatient;      Transaminitis:  - trend;  - outpatient follow-up with GI;          Chronic Diagnoses:    Problem List as of 10/10/2022 Date Reviewed: 4/11/2022            Codes Class Noted - Resolved    Acute hypokalemia ICD-10-CM: E87.6  ICD-9-CM: 276.8  10/9/2022 - Present        Hypomagnesemia ICD-10-CM: E83.42  ICD-9-CM: 275.2  10/9/2022 - Present        Tachycardia ICD-10-CM: R00.0  ICD-9-CM: 785.0  10/9/2022 - Present        GI bleed ICD-10-CM: K92.2  ICD-9-CM: 578.9  10/9/2022 - Present        Sepsis ([facility] ICD-10-CM: A41.9  ICD-9-CM: 038.9, 995.91  4/11/2022 - Present        GIB (gastrointestinal bleeding) ICD-10-CM: K92.2  ICD-9-CM: 578.9  12/3/2020 - Present        Cachexia ([facility] ICD-10-CM: R64  ICD-9-CM: 799.4  11/21/2018 - Present        SVT (supraventricular tachycardia) ([facility] ICD-10-CM: I47.1  ICD-9-CM: 427.89  7/3/2018 - Present        RESOLVED: SVT (supraventricular tachycardia) ([facility] ICD-10-CM: I47.1  ICD-9-CM: 427.89  3/13/2018 - 3/21/2018           Discharge Medications:   Current Discharge Medication List        Cholecalciferol (Vitamin D3) 1000 Units /25 mcg daily    CRANBERRY CONCENTRATE PO  Take 1 Tab by mouth daily.       Metoprolol succinate 25 mg XL tablet  TAKE 1 TABLET BY MOUTH EVERY DAY AT NIGHT       Omeprazole delayed release 20 mg tablet  Take 1 Tablet by mouth two (2) times a day for 30 days. Indications: Gastritis        Follow up Care:    1. Malia Bender DO in 1-2 weeks. Please call to set up an appointment shortly after discharge. Diet:  Regular Diet    Disposition:  Home. Advanced Directive:   FULL x   DNR      Discharge Exam:  See today's note. CONSULTATIONS: GI    Significant Diagnostic Studies:   10/9/2022: BUN 23 MG/DL (H; Ref range: 6 - 20 MG/DL); Calcium 8.0 MG/DL (L; Ref range: 8.5 - 10.1 MG/DL); CO2 31 mmol/L (Ref range: 21 - 32 mmol/L); Creatinine 0.64 MG/DL (Ref range: 0.55 - 1.02 MG/DL); Glucose 147 mg/dL (H; Ref range: 65 - 100 mg/dL); HCT 31.7 % (L; Ref range: 35.0 - 47.0 %); HGB 10.8 g/dL (L; Ref range: 11.5 - 16.0 g/dL); Potassium 2.7 mmol/L (LL; Ref range: 3.5 - 5.1 mmol/L); Sodium 137 mmol/L (Ref range: 136 - 145 mmol/L)  10/10/2022: BUN 18 MG/DL (Ref range: 6 - 20 MG/DL); Calcium 7.1 MG/DL (L; Ref range: 8.5 - 10.1 MG/DL); CO2 22 mmol/L (Ref range: 21 - 32 mmol/L); Creatinine 0.41 MG/DL (L; Ref range: 0.55 - 1.02 MG/DL); Glucose 85 mg/dL (Ref range: 65 - 100 mg/dL); HCT 27.8 % (L; Ref range: 35.0 - 47.0 %); HGB 8.9 g/dL (L; Ref range: 11.5 - 16.0 g/dL); Potassium 3.5 mmol/L (Ref range: 3.5 - 5.1 mmol/L); Sodium 138 mmol/L (Ref range: 136 - 145 mmol/L)  Recent Labs     10/10/22  0452 10/09/22  1814   WBC 7.1 9.3   HGB 8.9* 10.8*   HCT 27.8* 31.7*   * 140*     Recent Labs     10/10/22  0452 10/09/22  1814    137   K 3.5 2.7*    97   CO2 22 31   BUN 18 23*   CREA 0.41* 0.64   GLU 85 147*   CA 7.1* 8.0*   MG  --  1.0*     Recent Labs     10/10/22  0452 10/09/22  1814   AP 69 77   TP 5.9* 7.2   ALB 2.6* 3.0*   GLOB 3.3 4.2*   LPSE  --  41*     Recent Labs     10/10/22  0452 10/09/22  1814   INR 1.5* 1.6*   PTP 14.9* 16.1*      No results for input(s): FE, TIBC, PSAT, FERR in the last 72 hours. No results for input(s): PH, PCO2, PO2 in the last 72 hours. No results for input(s): CPK, CKMB in the last 72 hours.     No lab exists for component: TROPONINI  No components found for: [place]          Signed:  Roshan Chu MD  10/10/2022  5:54 PM

## 2022-10-10 NOTE — ED NOTES
Bedside and Verbal shift change report given to RAEANN VENEGAS (oncoming nurse) by Maryellen Vences RN (offgoing nurse). Report included the following information SBAR, Kardex, ED Summary, [facility] and Recent Results.

## 2022-10-13 NOTE — PROGRESS NOTES
Physician Progress Note      PATIENT:               Josemanuel Chen  CSN #:                  [id number]  :                       1950  ADMIT DATE:       10/9/2022 6:13 PM  [address] DATE:        10/10/2022 7:00 PM  RESPONDING  PROVIDER #:        Deedee Avila TEXT:    Dear attending,    H&P noted GI bleed-with prior history of esophageal and gastric ulcers and dc summary noted Dark emesis x3 prior to admission-concern for upper GI bleed. If possible, please document in progress notes and discharge summary if you are treating/evaluating any of the following: The medical record reflects the following:  Risk Factors: Hx. of esophageal and gastric ulcers 12/3/20, esophagitis  Clinical Indicators:  Per H&P- presented to the emergency department with chief complaint of nausea and vomiting. Patient reportedly had episodes of emesis starting \"noon \"yesterday, approximately 2-3 episodes of dark, possibly bloody emesis, unknown quantity, severe, without specific exacerbating leaving factors. 10/10-Per GI- She presented to ED due to her history of having esophagitis and previous esophageal and gastric ulcers in 2020. She had an EGD on 12/3/20 that revealed ulcerative esophagitis, GEJ deep ulceration with active bleeding unsuccessful attempt at clip due to friability, 5mm clean based ulcer closed with clips x2, and mild duodenitis. Hgb has dropped from 10.8 to 8.9 since admission, with +FOBT. Given her current presentation she warrants an EGD, patient prefers to complete on outpatient basis. She has not had further emesis or melena.   patient notes having hemorrhoid and reluctant to have rectal exam (for FOBT) at this time  Treatment: Monitor labwork, vital signs, imaging, neuro checks, GI consult, IV Protonix 40 mg IV q12 hours      Thank you,    Alecia Valenzuela RN, BSN, CRCR, CCDS  Clinical Documentation Improvement  [phone] or via Perfect Serve  Options provided:  -- GIB due to esophagitis  -- GIB due to gastric ulcers  -- GIB due to hemorrhoids  -- Other - I will add my own diagnosis  -- Disagree - Not applicable / Not valid  -- Disagree - Clinically unable to determine / Unknown  -- Refer to Clinical Documentation Reviewer    PROVIDER RESPONSE TEXT:    GI bleed due to gastritis, cannot confirm ulcers as EGD has not been done inpatient;    Query created by: Dora Sparks on 10/12/2022 6:57 AM      QUERY TEXT:    Dear attending,    Per the dietary assessment on10/10/22, the patient meets criteria for severe malnutrition. If possible, please document in the progress notes and discharge summary if you are evaluating and/or treating any of the following: The medical record reflects the following:  Risk Factors: N/V PTA  Clinical Indicators: 10/10-Per dietary- . Reports a little bit of wt loss PTA d/t N/V, but UBW   107-110 lb. Explained current wt listed as 100 lb, which she didn't seem too phased by. She is happy that diet has been advanced (GI lite) and denies any current N/V. Pt with very prominent cheek bones and thin appearing overall. Pt meets acute, severe PCM, but suspect chronic malnutrition as well. Malnutrition Status:  Severe malnutrition (10/10/22 1359)  Context:  Acute illness  Findings of the 6 clinical characteristics of malnutrition:  Energy Intake:  Mild decrease in energy intake (specify)  Weight Loss:  Greater than 2% over 1 week  Body Fat Loss: Moderate body fat loss, Buccal region, Orbital  Muscle Mass Loss:   Moderate muscle mass loss, Temples (temporalis)  Fluid Accumulation:  No significant fluid accumulation,   Strength:  Not performed  Nutrition Diagnosis:  Inadequate oral intake related to altered GI function, inadequate protein-energy intake as evidenced by BMI, weight loss  Treatment: monitor intake, weight, dietary consult        Thank you,  Alecia Valenzuela RN, BSN, [place], [employer]  Clinical Documentation Improvement  [phone] or via Perfect Serve  Options provided:  -- Severe Malnutrition  -- Malnutrition, please specify degree of malnutrition. -- Other - I will add my own diagnosis  -- Disagree - Not applicable / Not valid  -- Disagree - Clinically unable to determine / Unknown  -- Refer to Clinical Documentation Reviewer    PROVIDER RESPONSE TEXT:    This patient has severe Malnutrition.     Query created by: Fara Haley on 10/12/2022 7:00 AM      Electronically signed by:  Jeannie Cruz 10/13/2022 10:33 AM

## 2022-10-15 ENCOUNTER — ANESTHESIA (OUTPATIENT)
Dept: SURGERY | Age: 72
DRG: 380 | End: 2022-10-15
Payer: MEDICARE

## 2022-10-15 ENCOUNTER — HOSPITAL ENCOUNTER (INPATIENT)
Age: 72
LOS: 2 days | Discharge: HOME | DRG: 380 | End: 2022-10-17
Attending: EMERGENCY MEDICINE | Admitting: INTERNAL MEDICINE
Payer: MEDICARE

## 2022-10-15 ENCOUNTER — ANESTHESIA EVENT (OUTPATIENT)
Dept: SURGERY | Age: 72
DRG: 380 | End: 2022-10-15
Payer: MEDICARE

## 2022-10-15 DIAGNOSIS — D64.9 ANEMIA REQUIRING TRANSFUSIONS: ICD-10-CM

## 2022-10-15 DIAGNOSIS — K92.2 UPPER GI BLEEDING: Primary | ICD-10-CM

## 2022-10-15 LAB
ALBUMIN SERPL-MCNC: 2.4 G/DL (ref 3.5–5)
ALBUMIN/GLOB SERPL: 0.7 {RATIO} (ref 1.1–2.2)
ALP SERPL-CCNC: 74 U/L (ref 45–117)
ALT SERPL-CCNC: 23 U/L (ref 12–78)
ANION GAP SERPL CALC-SCNC: 9 MMOL/L (ref 5–15)
AST SERPL-CCNC: 38 U/L (ref 15–37)
BASOPHILS # BLD: 0 K/UL (ref 0–0.1)
BASOPHILS NFR BLD: 0 % (ref 0–1)
BILIRUB SERPL-MCNC: 0.7 MG/DL (ref 0.2–1)
BUN SERPL-MCNC: 25 MG/DL (ref 6–20)
BUN/CREAT SERPL: 45 (ref 12–20)
CALCIUM SERPL-MCNC: 8.4 MG/DL (ref 8.5–10.1)
CHLORIDE SERPL-SCNC: 100 MMOL/L (ref 97–108)
CO2 SERPL-SCNC: 30 MMOL/L (ref 21–32)
COMMENT, HOLDF: NORMAL
CREAT SERPL-MCNC: 0.55 MG/DL (ref 0.55–1.02)
DIFFERENTIAL METHOD BLD: ABNORMAL
EOSINOPHIL # BLD: 0.1 K/UL (ref 0–0.4)
EOSINOPHIL NFR BLD: 1 % (ref 0–7)
ERYTHROCYTE [DISTWIDTH] IN BLOOD BY AUTOMATED COUNT: 14.1 % (ref 11.5–14.5)
GLOBULIN SER CALC-MCNC: 3.3 G/DL (ref 2–4)
GLUCOSE SERPL-MCNC: 174 MG/DL (ref 65–100)
HCT VFR BLD AUTO: 18.1 % (ref 35–47)
HCT VFR BLD AUTO: 22.9 % (ref 35–47)
HGB BLD-MCNC: 5.9 G/DL (ref 11.5–16)
HGB BLD-MCNC: 7.6 G/DL (ref 11.5–16)
HISTORY CHECKED?,CKHIST: NORMAL
HISTORY CHECKED?,CKHIST: NORMAL
IMM GRANULOCYTES # BLD AUTO: 0.1 K/UL (ref 0–0.04)
IMM GRANULOCYTES NFR BLD AUTO: 1 % (ref 0–0.5)
LYMPHOCYTES # BLD: 1.5 K/UL (ref 0.8–3.5)
LYMPHOCYTES NFR BLD: 21 % (ref 12–49)
MCH RBC QN AUTO: 33.9 PG (ref 26–34)
MCHC RBC AUTO-ENTMCNC: 32.6 G/DL (ref 30–36.5)
MCV RBC AUTO: 104 FL (ref 80–99)
MONOCYTES # BLD: 1 K/UL (ref 0–1)
MONOCYTES NFR BLD: 14 % (ref 5–13)
NEUTS SEG # BLD: 4.3 K/UL (ref 1.8–8)
NEUTS SEG NFR BLD: 63 % (ref 32–75)
NRBC # BLD: 0 K/UL (ref 0–0.01)
NRBC BLD-RTO: 0 PER 100 WBC
PLATELET # BLD AUTO: 239 K/UL (ref 150–400)
PMV BLD AUTO: 9.9 FL (ref 8.9–12.9)
POTASSIUM SERPL-SCNC: 3.4 MMOL/L (ref 3.5–5.1)
PROT SERPL-MCNC: 5.7 G/DL (ref 6.4–8.2)
RBC # BLD AUTO: 1.74 M/UL (ref 3.8–5.2)
RBC MORPH BLD: ABNORMAL
SAMPLES BEING HELD,HOLD: NORMAL
SODIUM SERPL-SCNC: 139 MMOL/L (ref 136–145)
WBC # BLD AUTO: 7 K/UL (ref 3.6–11)

## 2022-10-15 PROCEDURE — 36415 COLL VENOUS BLD VENIPUNCTURE: CPT

## 2022-10-15 PROCEDURE — 74011250636 HC RX REV CODE- 250/636: Performed by: NURSE ANESTHETIST, CERTIFIED REGISTERED

## 2022-10-15 PROCEDURE — 74011250636 HC RX REV CODE- 250/636: Performed by: ANESTHESIOLOGY

## 2022-10-15 PROCEDURE — 86900 BLOOD TYPING SEROLOGIC ABO: CPT

## 2022-10-15 PROCEDURE — 80053 COMPREHEN METABOLIC PANEL: CPT

## 2022-10-15 PROCEDURE — 74011000250 HC RX REV CODE- 250: Performed by: NURSE ANESTHETIST, CERTIFIED REGISTERED

## 2022-10-15 PROCEDURE — 74011636637 HC RX REV CODE- 636/637: Performed by: EMERGENCY MEDICINE

## 2022-10-15 PROCEDURE — 76060000032 HC ANESTHESIA 0.5 TO 1 HR: Performed by: INTERNAL MEDICINE

## 2022-10-15 PROCEDURE — 96375 TX/PRO/DX INJ NEW DRUG ADDON: CPT

## 2022-10-15 PROCEDURE — C9113 INJ PANTOPRAZOLE SODIUM, VIA: HCPCS | Performed by: EMERGENCY MEDICINE

## 2022-10-15 PROCEDURE — 76210000016 HC OR PH I REC 1 TO 1.5 HR: Performed by: INTERNAL MEDICINE

## 2022-10-15 PROCEDURE — 65660000001 HC RM ICU INTERMED STEPDOWN

## 2022-10-15 PROCEDURE — 76010000154 HC OR TIME FIRST 0.5 HR: Performed by: INTERNAL MEDICINE

## 2022-10-15 PROCEDURE — 74011000250 HC RX REV CODE- 250: Performed by: INTERNAL MEDICINE

## 2022-10-15 PROCEDURE — 85018 HEMOGLOBIN: CPT

## 2022-10-15 PROCEDURE — 74011250636 HC RX REV CODE- 250/636: Performed by: EMERGENCY MEDICINE

## 2022-10-15 PROCEDURE — 86923 COMPATIBILITY TEST ELECTRIC: CPT

## 2022-10-15 PROCEDURE — 96374 THER/PROPH/DIAG INJ IV PUSH: CPT

## 2022-10-15 PROCEDURE — 0DJ08ZZ INSPECTION OF UPPER INTESTINAL TRACT, VIA NATURAL OR ARTIFICIAL OPENING ENDOSCOPIC: ICD-10-PCS | Performed by: INTERNAL MEDICINE

## 2022-10-15 PROCEDURE — 74011000250 HC RX REV CODE- 250: Performed by: EMERGENCY MEDICINE

## 2022-10-15 PROCEDURE — 30233N1 TRANSFUSION OF NONAUTOLOGOUS RED BLOOD CELLS INTO PERIPHERAL VEIN, PERCUTANEOUS APPROACH: ICD-10-PCS | Performed by: INTERNAL MEDICINE

## 2022-10-15 PROCEDURE — 74011250636 HC RX REV CODE- 250/636: Performed by: INTERNAL MEDICINE

## 2022-10-15 PROCEDURE — 85025 COMPLETE CBC W/AUTO DIFF WBC: CPT

## 2022-10-15 PROCEDURE — 99285 EMERGENCY DEPT VISIT HI MDM: CPT

## 2022-10-15 PROCEDURE — P9016 RBC LEUKOCYTES REDUCED: HCPCS

## 2022-10-15 RX ORDER — ACETAMINOPHEN 325 MG/1
650 TABLET ORAL
Status: DISCONTINUED | OUTPATIENT
Start: 2022-10-15 | End: 2022-10-17 | Stop reason: HOSPADM

## 2022-10-15 RX ORDER — SODIUM CHLORIDE 9 MG/ML
250 INJECTION, SOLUTION INTRAVENOUS AS NEEDED
Status: DISCONTINUED | OUTPATIENT
Start: 2022-10-15 | End: 2022-10-15 | Stop reason: SDUPTHER

## 2022-10-15 RX ORDER — ACETAMINOPHEN 650 MG/1
650 SUPPOSITORY RECTAL
Status: DISCONTINUED | OUTPATIENT
Start: 2022-10-15 | End: 2022-10-17 | Stop reason: HOSPADM

## 2022-10-15 RX ORDER — MIDAZOLAM HYDROCHLORIDE 1 MG/ML
1 INJECTION, SOLUTION INTRAMUSCULAR; INTRAVENOUS
Status: DISCONTINUED | OUTPATIENT
Start: 2022-10-15 | End: 2022-10-15 | Stop reason: HOSPADM

## 2022-10-15 RX ORDER — DIPHENHYDRAMINE HYDROCHLORIDE 50 MG/ML
12.5 INJECTION, SOLUTION INTRAMUSCULAR; INTRAVENOUS AS NEEDED
Status: DISCONTINUED | OUTPATIENT
Start: 2022-10-15 | End: 2022-10-15 | Stop reason: HOSPADM

## 2022-10-15 RX ORDER — SODIUM CHLORIDE 0.9 % (FLUSH) 0.9 %
5-40 SYRINGE (ML) INJECTION AS NEEDED
Status: DISCONTINUED | OUTPATIENT
Start: 2022-10-15 | End: 2022-10-15 | Stop reason: HOSPADM

## 2022-10-15 RX ORDER — MORPHINE SULFATE 2 MG/ML
2 INJECTION, SOLUTION INTRAMUSCULAR; INTRAVENOUS
Status: DISCONTINUED | OUTPATIENT
Start: 2022-10-15 | End: 2022-10-15 | Stop reason: HOSPADM

## 2022-10-15 RX ORDER — FENTANYL CITRATE 50 UG/ML
50 INJECTION, SOLUTION INTRAMUSCULAR; INTRAVENOUS AS NEEDED
Status: DISCONTINUED | OUTPATIENT
Start: 2022-10-15 | End: 2022-10-15 | Stop reason: HOSPADM

## 2022-10-15 RX ORDER — SODIUM CHLORIDE, SODIUM LACTATE, POTASSIUM CHLORIDE, CALCIUM CHLORIDE 600; 310; 30; 20 MG/100ML; MG/100ML; MG/100ML; MG/100ML
125 INJECTION, SOLUTION INTRAVENOUS CONTINUOUS
Status: DISCONTINUED | OUTPATIENT
Start: 2022-10-15 | End: 2022-10-15 | Stop reason: HOSPADM

## 2022-10-15 RX ORDER — ONDANSETRON 2 MG/ML
4 INJECTION INTRAMUSCULAR; INTRAVENOUS
Status: DISCONTINUED | OUTPATIENT
Start: 2022-10-15 | End: 2022-10-17 | Stop reason: HOSPADM

## 2022-10-15 RX ORDER — SODIUM CHLORIDE, SODIUM LACTATE, POTASSIUM CHLORIDE, CALCIUM CHLORIDE 600; 310; 30; 20 MG/100ML; MG/100ML; MG/100ML; MG/100ML
100 INJECTION, SOLUTION INTRAVENOUS CONTINUOUS
Status: DISCONTINUED | OUTPATIENT
Start: 2022-10-15 | End: 2022-10-16

## 2022-10-15 RX ORDER — LIDOCAINE HYDROCHLORIDE 20 MG/ML
INJECTION, SOLUTION EPIDURAL; INFILTRATION; INTRACAUDAL; PERINEURAL AS NEEDED
Status: DISCONTINUED | OUTPATIENT
Start: 2022-10-15 | End: 2022-10-15 | Stop reason: HOSPADM

## 2022-10-15 RX ORDER — ONDANSETRON 2 MG/ML
4 INJECTION INTRAMUSCULAR; INTRAVENOUS
Status: COMPLETED | OUTPATIENT
Start: 2022-10-15 | End: 2022-10-15

## 2022-10-15 RX ORDER — DEXTROSE, SODIUM CHLORIDE, SODIUM LACTATE, POTASSIUM CHLORIDE, AND CALCIUM CHLORIDE 5; .6; .31; .03; .02 G/100ML; G/100ML; G/100ML; G/100ML; G/100ML
125 INJECTION, SOLUTION INTRAVENOUS CONTINUOUS
Status: DISCONTINUED | OUTPATIENT
Start: 2022-10-15 | End: 2022-10-15 | Stop reason: HOSPADM

## 2022-10-15 RX ORDER — SODIUM CHLORIDE 9 MG/ML
INJECTION, SOLUTION INTRAVENOUS
Status: DISCONTINUED | OUTPATIENT
Start: 2022-10-15 | End: 2022-10-15 | Stop reason: HOSPADM

## 2022-10-15 RX ORDER — SODIUM CHLORIDE 0.9 % (FLUSH) 0.9 %
5-40 SYRINGE (ML) INJECTION EVERY 8 HOURS
Status: DISCONTINUED | OUTPATIENT
Start: 2022-10-15 | End: 2022-10-17 | Stop reason: HOSPADM

## 2022-10-15 RX ORDER — SODIUM CHLORIDE 0.9 % (FLUSH) 0.9 %
5-40 SYRINGE (ML) INJECTION EVERY 8 HOURS
Status: DISCONTINUED | OUTPATIENT
Start: 2022-10-15 | End: 2022-10-15 | Stop reason: HOSPADM

## 2022-10-15 RX ORDER — OXYCODONE HYDROCHLORIDE 5 MG/1
5 TABLET ORAL AS NEEDED
Status: DISCONTINUED | OUTPATIENT
Start: 2022-10-15 | End: 2022-10-15 | Stop reason: HOSPADM

## 2022-10-15 RX ORDER — POLYETHYLENE GLYCOL 3350 17 G/17G
17 POWDER, FOR SOLUTION ORAL DAILY PRN
Status: DISCONTINUED | OUTPATIENT
Start: 2022-10-15 | End: 2022-10-17 | Stop reason: HOSPADM

## 2022-10-15 RX ORDER — ONDANSETRON 4 MG/1
4 TABLET, ORALLY DISINTEGRATING ORAL
Status: DISCONTINUED | OUTPATIENT
Start: 2022-10-15 | End: 2022-10-17 | Stop reason: HOSPADM

## 2022-10-15 RX ORDER — ONDANSETRON 2 MG/ML
4 INJECTION INTRAMUSCULAR; INTRAVENOUS AS NEEDED
Status: DISCONTINUED | OUTPATIENT
Start: 2022-10-15 | End: 2022-10-15 | Stop reason: HOSPADM

## 2022-10-15 RX ORDER — METOCLOPRAMIDE HYDROCHLORIDE 5 MG/ML
10 INJECTION INTRAMUSCULAR; INTRAVENOUS
Status: COMPLETED | OUTPATIENT
Start: 2022-10-15 | End: 2022-10-15

## 2022-10-15 RX ORDER — ACETAMINOPHEN 325 MG/1
650 TABLET ORAL ONCE
Status: DISCONTINUED | OUTPATIENT
Start: 2022-10-15 | End: 2022-10-15 | Stop reason: HOSPADM

## 2022-10-15 RX ORDER — PROPOFOL 10 MG/ML
INJECTION, EMULSION INTRAVENOUS AS NEEDED
Status: DISCONTINUED | OUTPATIENT
Start: 2022-10-15 | End: 2022-10-15 | Stop reason: HOSPADM

## 2022-10-15 RX ORDER — PHENYLEPHRINE HCL IN 0.9% NACL 0.4MG/10ML
SYRINGE (ML) INTRAVENOUS AS NEEDED
Status: DISCONTINUED | OUTPATIENT
Start: 2022-10-15 | End: 2022-10-15 | Stop reason: HOSPADM

## 2022-10-15 RX ORDER — LIDOCAINE HYDROCHLORIDE 10 MG/ML
0.5 INJECTION, SOLUTION EPIDURAL; INFILTRATION; INTRACAUDAL; PERINEURAL AS NEEDED
Status: DISCONTINUED | OUTPATIENT
Start: 2022-10-15 | End: 2022-10-15 | Stop reason: HOSPADM

## 2022-10-15 RX ORDER — SODIUM CHLORIDE 9 MG/ML
250 INJECTION, SOLUTION INTRAVENOUS AS NEEDED
Status: DISCONTINUED | OUTPATIENT
Start: 2022-10-15 | End: 2022-10-17 | Stop reason: HOSPADM

## 2022-10-15 RX ORDER — FENTANYL CITRATE 50 UG/ML
25 INJECTION, SOLUTION INTRAMUSCULAR; INTRAVENOUS
Status: DISCONTINUED | OUTPATIENT
Start: 2022-10-15 | End: 2022-10-15 | Stop reason: HOSPADM

## 2022-10-15 RX ORDER — OCTREOTIDE ACETATE 100 UG/ML
100 INJECTION, SOLUTION INTRAVENOUS; SUBCUTANEOUS ONCE
Status: COMPLETED | OUTPATIENT
Start: 2022-10-15 | End: 2022-10-15

## 2022-10-15 RX ORDER — SODIUM CHLORIDE 0.9 % (FLUSH) 0.9 %
5-40 SYRINGE (ML) INJECTION AS NEEDED
Status: DISCONTINUED | OUTPATIENT
Start: 2022-10-15 | End: 2022-10-17 | Stop reason: HOSPADM

## 2022-10-15 RX ORDER — MIDAZOLAM HYDROCHLORIDE 1 MG/ML
1 INJECTION, SOLUTION INTRAMUSCULAR; INTRAVENOUS AS NEEDED
Status: DISCONTINUED | OUTPATIENT
Start: 2022-10-15 | End: 2022-10-15 | Stop reason: HOSPADM

## 2022-10-15 RX ORDER — ROPIVACAINE HYDROCHLORIDE 5 MG/ML
30 INJECTION, SOLUTION EPIDURAL; INFILTRATION; PERINEURAL AS NEEDED
Status: DISCONTINUED | OUTPATIENT
Start: 2022-10-15 | End: 2022-10-15 | Stop reason: HOSPADM

## 2022-10-15 RX ORDER — DIPHENHYDRAMINE HYDROCHLORIDE 50 MG/ML
12.5 INJECTION, SOLUTION INTRAMUSCULAR; INTRAVENOUS ONCE
Status: DISPENSED | OUTPATIENT
Start: 2022-10-15 | End: 2022-10-16

## 2022-10-15 RX ADMIN — CEFTRIAXONE SODIUM 1 G: 1 INJECTION, POWDER, FOR SOLUTION INTRAMUSCULAR; INTRAVENOUS at 16:17

## 2022-10-15 RX ADMIN — PANTOPRAZOLE SODIUM 80 MG: 40 INJECTION, POWDER, FOR SOLUTION INTRAVENOUS at 13:12

## 2022-10-15 RX ADMIN — PROPOFOL 25 MG: 10 INJECTION, EMULSION INTRAVENOUS at 15:12

## 2022-10-15 RX ADMIN — LIDOCAINE HYDROCHLORIDE 60 MG: 20 INJECTION, SOLUTION EPIDURAL; INFILTRATION; INTRACAUDAL; PERINEURAL at 15:10

## 2022-10-15 RX ADMIN — Medication 120 MCG: at 15:19

## 2022-10-15 RX ADMIN — PROPOFOL 75 MG: 10 INJECTION, EMULSION INTRAVENOUS at 15:10

## 2022-10-15 RX ADMIN — Medication 120 MCG: at 15:28

## 2022-10-15 RX ADMIN — SODIUM CHLORIDE, POTASSIUM CHLORIDE, SODIUM LACTATE AND CALCIUM CHLORIDE 100 ML/HR: 600; 310; 30; 20 INJECTION, SOLUTION INTRAVENOUS at 16:18

## 2022-10-15 RX ADMIN — Medication 120 MCG: at 15:27

## 2022-10-15 RX ADMIN — SODIUM CHLORIDE: 900 INJECTION, SOLUTION INTRAVENOUS at 14:10

## 2022-10-15 RX ADMIN — METOCLOPRAMIDE HYDROCHLORIDE 10 MG: 5 INJECTION INTRAMUSCULAR; INTRAVENOUS at 13:12

## 2022-10-15 RX ADMIN — OCTREOTIDE ACETATE 25 MCG/HR: 500 INJECTION, SOLUTION INTRAVENOUS; SUBCUTANEOUS at 13:35

## 2022-10-15 RX ADMIN — DIPHENHYDRAMINE HYDROCHLORIDE 12.5 MG: 50 INJECTION, SOLUTION INTRAMUSCULAR; INTRAVENOUS at 16:07

## 2022-10-15 RX ADMIN — PROPOFOL 25 MG: 10 INJECTION, EMULSION INTRAVENOUS at 15:15

## 2022-10-15 RX ADMIN — Medication 80 MCG: at 15:10

## 2022-10-15 RX ADMIN — SODIUM CHLORIDE, PRESERVATIVE FREE 10 ML: 5 INJECTION INTRAVENOUS at 17:14

## 2022-10-15 RX ADMIN — OCTREOTIDE ACETATE 100 MCG: 100 INJECTION, SOLUTION INTRAVENOUS; SUBCUTANEOUS at 13:35

## 2022-10-15 RX ADMIN — SODIUM CHLORIDE, POTASSIUM CHLORIDE, SODIUM LACTATE AND CALCIUM CHLORIDE 100 ML/HR: 600; 310; 30; 20 INJECTION, SOLUTION INTRAVENOUS at 17:11

## 2022-10-15 RX ADMIN — Medication 120 MCG: at 15:21

## 2022-10-15 RX ADMIN — SODIUM CHLORIDE, PRESERVATIVE FREE 10 ML: 5 INJECTION INTRAVENOUS at 22:00

## 2022-10-15 RX ADMIN — PROPOFOL 25 MG: 10 INJECTION, EMULSION INTRAVENOUS at 15:20

## 2022-10-15 RX ADMIN — PROPOFOL 25 MG: 10 INJECTION, EMULSION INTRAVENOUS at 15:17

## 2022-10-15 RX ADMIN — PROPOFOL 25 MG: 10 INJECTION, EMULSION INTRAVENOUS at 15:23

## 2022-10-15 RX ADMIN — ONDANSETRON 4 MG: 2 INJECTION INTRAMUSCULAR; INTRAVENOUS at 13:12

## 2022-10-15 NOTE — PERIOP NOTES
TRANSFER - IN REPORT:    Verbal report received from Lena Madden RN(name) on Maria Isabel Da Silva  being received from ED(unit) for ordered procedure      Report consisted of patients Situation, Background, Assessment and   Recommendations(SBAR). Information from the following report(s) SBAR, Kardex, ED Summary, Procedure Summary, Intake/Output, MAR, and Recent Results was reviewed with the receiving nurse. Opportunity for questions and clarification was provided. Assessment completed upon patients arrival to unit and care assumed.

## 2022-10-15 NOTE — ANESTHESIA PREPROCEDURE EVALUATION
Relevant Problems   CARDIOVASCULAR   (+) SVT (supraventricular tachycardia) (HCC)       Anesthetic History   No history of anesthetic complications            Review of Systems / Medical History  Patient summary reviewed, nursing notes reviewed and pertinent labs reviewed    Pulmonary  Within defined limits                 Neuro/Psych   Within defined limits           Cardiovascular            Dysrhythmias : SVT           GI/Hepatic/Renal               Comments: GI bleed Endo/Other        Anemia     Other Findings              Physical Exam    Airway  Mallampati: II  TM Distance: > 6 cm  Neck ROM: normal range of motion   Mouth opening: Normal     Cardiovascular  Regular rate and rhythm,  S1 and S2 normal,  no murmur, click, rub, or gallop             Dental  No notable dental hx       Pulmonary  Breath sounds clear to auscultation               Abdominal  GI exam deferred       Other Findings            Anesthetic Plan    ASA: 3, emergent  Anesthesia type: general            Anesthetic plan and risks discussed with: Patient

## 2022-10-15 NOTE — H&P
[address] Will Padron \[facility]\"" 113  [place], [address]      History and Physical       NAME:  Brandyn Mack   :   1950   MRN:   [id number]     Chief Complaint: UGI bleeding    History of Present Illness:  Patient is a 74 y. o. who is seen for UGI bleeding. PMH:  Past Medical History:   Diagnosis Date    Heart abnormality     SVT (supraventricular tachycardia) (HCC)     UTI (urinary tract infection)        PSH:  Past Surgical History:   Procedure Laterality Date    HX ORTHOPAEDIC      HX TONSILLECTOMY      HX WISDOM TEETH EXTRACTION         Allergies:  No Known Allergies    Home Medications:  Prior to Admission Medications   Prescriptions Last Dose Informant Patient Reported? Taking? Omeprazole delayed release (PRILOSEC D/R) 20 mg tablet   No No   Sig: Take 1 Tablet by mouth two (2) times a day for 30 days. Indications: Gastritis   ascorbic acid, vitamin C, (VITAMIN C) 500 mg tablet   Yes No   Sig: Take 500 mg by mouth daily. bumetanide (BUMEX) 0.5 mg tablet   No No   Sig: Take 1 Tab by mouth daily. Patient not taking: Reported on 2022   cholecalciferol (VITAMIN D3) 1,000 unit tablet   No No   Sig: Take 1 Tab by mouth daily. cranberry fruit extract (CRANBERRY CONCENTRATE PO)   Yes No   Sig: Take 1 Tab by mouth daily. magnesium oxide (MAG-OX) 400 mg tablet   No No   Sig: To take 2 tablets (800 mg) in am and 1 tablet (400 mg ) in pm.   Patient taking differently: Take 400 mg by mouth two (2) times a day. To take 2 tablets (800 mg) in am and 1 tablet (400 mg ) in pm.   metoprolol succinate (TOPROL-XL) 25 mg XL tablet   No No   Sig: TAKE 1 TABLET BY MOUTH EVERY DAY AT NIGHT   potassium chloride SR (K-TAB) 20 mEq tablet   No No   Sig: Take 1 Tab by mouth daily.       Facility-Administered Medications: None       Hospital Medications:  Current Facility-Administered Medications   Medication Dose Route Frequency    octreotide (SANDOSTATIN) 500 mcg in 0.9% sodium chloride 500 mL infusion  25 mcg/hr IntraVENous CONTINUOUS    lactated Ringers infusion  125 mL/hr IntraVENous CONTINUOUS    sodium chloride (NS) flush 5-40 mL  5-40 mL IntraVENous Q8H    sodium chloride (NS) flush 5-40 mL  5-40 mL IntraVENous PRN    oxyCODONE IR (ROXICODONE) tablet 5 mg  5 mg Oral PRN    fentaNYL citrate (PF) injection 25 mcg  25 mcg IntraVENous Multiple    morphine injection 2 mg  2 mg IntraVENous Multiple    ondansetron (ZOFRAN) injection 4 mg  4 mg IntraVENous PRN    midazolam (VERSED) injection 1 mg  1 mg IntraVENous Q5MIN PRN    diphenhydrAMINE (BENADRYL) injection 12.5 mg  12.5 mg IntraVENous PRN    meperidine (DEMEROL) injection 12.5 mg  12.5 mg IntraVENous ONCE    sodium chloride (NS) flush 5-40 mL  5-40 mL IntraVENous Q8H    sodium chloride (NS) flush 5-40 mL  5-40 mL IntraVENous PRN    acetaminophen (TYLENOL) tablet 650 mg  650 mg Oral Q6H PRN    Or    acetaminophen (TYLENOL) suppository 650 mg  650 mg Rectal Q6H PRN    polyethylene glycol (MIRALAX) packet 17 g  17 g Oral DAILY PRN    ondansetron (ZOFRAN ODT) tablet 4 mg  4 mg Oral Q8H PRN    Or    ondansetron (ZOFRAN) injection 4 mg  4 mg IntraVENous Q6H PRN    [START ON 10/16/2022] pantoprazole (PROTONIX) 40 mg in 0.9% sodium chloride 10 mL injection  40 mg IntraVENous Q12H    lactated Ringers infusion  100 mL/hr IntraVENous CONTINUOUS    cefTRIAXone (ROCEPHIN) 1 g in 0.9% sodium chloride 10 mL IV syringe  1 g IntraVENous Q24H    0.9% sodium chloride infusion 250 mL  250 mL IntraVENous PRN       Social History:  Social History     Tobacco Use    Smoking status: Never    Smokeless tobacco: Never   Substance Use Topics    Alcohol use: Yes     Comment: occasional wine       Family History:  History reviewed. No pertinent family history.                 Review of Systems:      Constitutional: - fever, - chills, - weight loss  Eyes:   - visual changes  ENT:   - sore throat, - tongue or lip swelling  Respiratory:  - cough, - dyspnea  Cards:  - chest pain, - diaphoresis, - palpitations, - lower extremity edema  GI:   See HPI  :  - frequency, - dysuria  Integument:  - rash, - pruritus  Heme:  - easy bruising, - gum/nose bleeding  Musculoskel: - for myalgias,  - back pain, - muscle weakness  Neuro:  - headaches, - dizziness  Psych: - feelings of anxiety, - feelings of depression      Objective:   Patient Vitals for the past 8 hrs:   BP Temp Pulse Resp SpO2 Height Weight   10/15/22 1538 (!) 97/51 -- 88 14 100 % -- --   10/15/22 1535 (!) 87/42 -- 97 15 100 % -- --   10/15/22 1533 (!) 90/50 97.5 °F (36.4 °C) 96 15 100 % -- --   10/15/22 1455 99/63 -- (!) 107 14 96 % -- --   10/15/22 1440 -- -- (!) 110 16 96 % -- --   10/15/22 1436 (!) 96/55 98.5 °F (36.9 °C) (!) 108 22 96 % -- --   10/15/22 1431 (!) 92/54 -- (!) 110 16 95 % -- --   10/15/22 1426 (!) 99/56 -- (!) 113 14 97 % -- --   10/15/22 1415 (!) 97/57 98.5 °F (36.9 °C) (!) 116 15 96 % -- --   10/15/22 1301 107/62 -- (!) 112 18 -- -- --   10/15/22 1246 (!) 106/50 -- (!) 105 14 -- -- --   10/15/22 1231 (!) 95/58 -- (!) 104 14 -- -- --   10/15/22 1228 (!) 117/56 -- (!) 115 21 -- -- --   10/15/22 1131 94/64 97.2 °F (36.2 °C) (!) 111 15 98 % 5' 4\" (1.626 m) 49.9 kg (110 lb)     10/15 0701 - 10/15 1900  In: 600 [I.V.:600]  Out: -   No intake/output data recorded. General: Frail appearing, cooperative, in no acute distress    HEENT: Atraumatic, Anicteric sclerae. Chest/ Lungs: Adequate air movement bilaterally, no overt wheezing, rhonchi or rales. Heart:  Regular rate & rhythm, no murmurs  Abdomen: Soft, Non distended, Non tender.   +Bowel sounds  Extremities: No clubbing or cyanosis  Neurologic:  Grossly alert & oriented  Skin:   No jaundice, no rashes  Psych:   Appropriate mood and affect, appropriate insight     Data Review     Recent Labs     10/15/22  1138   WBC 7.0   HGB 5.9*   HCT 18.1*        Recent Labs     10/15/22  1138      K 3.4*      CO2 30   BUN 25*   CREA 0.55   * CA 8.4*     Recent Labs     10/15/22  1138   AP 74   TP 5.7*   ALB 2.4*   GLOB 3.3     No results for input(s): INR, PTP, APTT, INREXT in the last 72 hours.        Assessment:     EGD for UGI bleeding       Patient Active Problem List   Diagnosis Code    SVT (supraventricular tachycardia) (HCC) I47.1    Cachexia ([facility] [address] R64    GIB (gastrointestinal bleeding) K92.2    Sepsis ([facility] [address] A41.9    Acute hypokalemia E87.6    Hypomagnesemia E83.42    Tachycardia R00.0    GI bleed K92.2    GI bleeding K92.2       Plan:     Endoscopic procedure with MAC     Signed By: Yessy Khan MD     10/15/2022  3:43 PM

## 2022-10-15 NOTE — CONSULTS
[address] [address]  [facility], [address] [place] [address] NOTE          NAME:  Casa Montez   :   1950   MRN:   [id number]         Consult Date: 10/15/2022 1:42 PM    Referring provider: Calixto Joseph    Chief Complaint: vomiting blood    History of Present Illness: 73 yo F w/ hx SVT, erosive esophagitis c/b ulceration & bleeding in Dec '20, presenting for further eval of coffee ground emesis for the second time in 1 week- initially she wanted to try to handle this on an outpt basis, but recurrent bleeding promtped her coming back. - 1 unit ordered   - she vomited coffee ground and clot 2 hours ago       PMH:  Past Medical History:   Diagnosis Date    Heart abnormality     SVT (supraventricular tachycardia) (HCC)     UTI (urinary tract infection)        PSH:  Past Surgical History:   Procedure Laterality Date    HX ORTHOPAEDIC      HX TONSILLECTOMY      HX WISDOM TEETH EXTRACTION         Allergies:  No Known Allergies    Home Medications:  Prior to Admission Medications   Prescriptions Last Dose Informant Patient Reported? Taking? Omeprazole delayed release (PRILOSEC D/R) 20 mg tablet   No No   Sig: Take 1 Tablet by mouth two (2) times a day for 30 days. Indications: Gastritis   ascorbic acid, vitamin C, (VITAMIN C) 500 mg tablet   Yes No   Sig: Take 500 mg by mouth daily. bumetanide (BUMEX) 0.5 mg tablet   No No   Sig: Take 1 Tab by mouth daily. Patient not taking: Reported on 2022   cholecalciferol (VITAMIN D3) 1,000 unit tablet   No No   Sig: Take 1 Tab by mouth daily. cranberry fruit extract (CRANBERRY CONCENTRATE PO)   Yes No   Sig: Take 1 Tab by mouth daily. magnesium oxide (MAG-OX) 400 mg tablet   No No   Sig: To take 2 tablets (800 mg) in am and 1 tablet (400 mg ) in pm.   Patient taking differently: Take 400 mg by mouth two (2) times a day.  To take 2 tablets (800 mg) in am and 1 tablet (400 mg ) in pm.   metoprolol succinate (TOPROL-XL) 25 mg XL tablet   No No   Sig: TAKE 1 TABLET BY MOUTH EVERY DAY AT NIGHT   potassium chloride SR (K-TAB) 20 mEq tablet   No No   Sig: Take 1 Tab by mouth daily. Facility-Administered Medications: None       Hospital Medications:  Current Facility-Administered Medications   Medication Dose Route Frequency    0.9% sodium chloride infusion 250 mL  250 mL IntraVENous PRN    octreotide (SANDOSTATIN) 500 mcg in 0.9% sodium chloride 500 mL infusion  25 mcg/hr IntraVENous CONTINUOUS     Current Outpatient Medications   Medication Sig    Omeprazole delayed release (PRILOSEC D/R) 20 mg tablet Take 1 Tablet by mouth two (2) times a day for 30 days. Indications: Gastritis    metoprolol succinate (TOPROL-XL) 25 mg XL tablet TAKE 1 TABLET BY MOUTH EVERY DAY AT NIGHT    bumetanide (BUMEX) 0.5 mg tablet Take 1 Tab by mouth daily. (Patient not taking: Reported on 4/14/2022)    magnesium oxide (MAG-OX) 400 mg tablet To take 2 tablets (800 mg) in am and 1 tablet (400 mg ) in pm. (Patient taking differently: Take 400 mg by mouth two (2) times a day. To take 2 tablets (800 mg) in am and 1 tablet (400 mg ) in pm.)    cholecalciferol (VITAMIN D3) 1,000 unit tablet Take 1 Tab by mouth daily. potassium chloride SR (K-TAB) 20 mEq tablet Take 1 Tab by mouth daily. cranberry fruit extract (CRANBERRY CONCENTRATE PO) Take 1 Tab by mouth daily. ascorbic acid, vitamin C, (VITAMIN C) 500 mg tablet Take 500 mg by mouth daily. Social History:  Social History     Tobacco Use    Smoking status: Never    Smokeless tobacco: Never   Substance Use Topics    Alcohol use: Yes     Comment: occasional wine       Family History:  History reviewed. No pertinent family history.     Review of Systems:  Constitutional: - fever, - chills, - weight loss  Eyes:   - visual changes  ENT:   - sore throat, - tongue or lip swelling  Respiratory:  - cough, - dyspnea  Cards:  - chest pain, - diaphoresis, - palpitations, - lower extremity edema  GI:   See HPI  :  - frequency, - dysuria  Integument:  - rash, - pruritus  Heme:  - easy bruising, - gum/nose bleeding  Musculoskel: - for myalgias,  - back pain, - muscle weakness  Neuro:  - headaches, - dizziness  Psych: - feelings of anxiety, - feelings of depression     Objective:   Patient Vitals for the past 8 hrs:   BP Temp Pulse Resp SpO2 Height Weight   10/15/22 1301 107/62 -- (!) 112 18 -- -- --   10/15/22 1246 (!) 106/50 -- (!) 105 14 -- -- --   10/15/22 1231 (!) 95/58 -- (!) 104 14 -- -- --   10/15/22 1228 (!) 117/56 -- (!) 115 21 -- -- --   10/15/22 1131 94/64 97.2 °F (36.2 °C) (!) 111 15 98 % 5' 4\" (1.626 m) 49.9 kg (110 lb)     No intake/output data recorded. No intake/output data recorded. PHYSICAL EXAM:  General: Frail appearing, cooperative, in no acute distress    HEENT: Atraumatic, Anicteric sclerae. Chest/ Lungs: Adequate air movement bilaterally, no overt wheezing, rhonchi or rales. Heart:  Tachycardic & rhythm, no murmurs  Abdomen: Soft, Non distended, Non tender. +Bowel sounds  Extremities: No clubbing or cyanosis  Neurologic:  Grossly alert & oriented  Skin:   No jaundice, no rashes  Psych:   Appropriate mood and affect, appropriate insight     Data Review     Recent Labs     10/15/22  1138   WBC 7.0   HGB 5.9*   HCT 18.1*        Recent Labs     10/15/22  1138      K 3.4*      CO2 30   BUN 25*   CREA 0.55   *   CA 8.4*     Recent Labs     10/15/22  1138   AP 74   TP 5.7*   ALB 2.4*   GLOB 3.3     No results for input(s): INR, PTP, APTT, INREXT in the last 72 hours.     Radiology Review    Nothing pertinent to review       Assessment:     73 yo F w/ hx SVT, erosive esophagitis c/b ulceration & bleeding in Dec '20, presenting for further eval of coffee ground emesis for the second time in 1 week- initially she wanted to try to handle this on an outpt basis, but recurrent bleeding promtped her coming back     Plan:     NPO  Needs PPI ggt  Needs 1 unit of blood  Plan for EGD later today in OR, general anesthesia, once blood is hanging     Signed By: Angi Quan MD     10/15/2022  1:42 PM

## 2022-10-15 NOTE — PERIOP NOTES
TRANSFER - OUT REPORT:    Verbal report given to RAEANN Bryan(name) on [facility]  being transferred to (unit) for routine post - op       Report consisted of patients Situation, Background, Assessment and   Recommendations(SBAR). Time Pre op antibiotic given:na  Anesthesia Stop time: 1690    Information from the following report(s) SBAR, Kardex, ED Summary, OR Summary, Procedure Summary, Intake/Output, MAR, and Recent Results was reviewed with the receiving nurse. Opportunity for questions and clarification was provided. Is the patient on 02? NO    Is the patient on a monitor? NO    Is the nurse transporting with the patient? NO    Surgical Waiting Area notified of patient's transfer from PACU?  YES- updated  on patient status and room assignment       The following personal items collected during your admission accompanied patient upon transfer:   Dental Appliance: Dental Appliances: None  Vision:    Hearing Aid:    Jewelry:    Clothing:    Other Valuables:    Valuables sent to safe:

## 2022-10-15 NOTE — ED TRIAGE NOTES
Patient admitted to hospital for vomiting blood last week. Sent home with follow up to GI for endoscopy. Reports back today with frequent hematemesis. This RN notes patient actively vomiting dark black vomit.

## 2022-10-15 NOTE — ED PROVIDER NOTES
73-year-old female with a history of upper GI bleeding presents to the ER for onset this morning of coffee-ground emesis. Patient has been feeling a lot of nausea, started vomiting this morning and feels very similar to prior episodes of upper GI bleeding. The patient was actually admitted here 1 week ago for the same symptoms, she was treated medically did not receive any scope or procedures because the bleeding resolved and she was planning to do everything outpatient. Patient 3 years ago did have an endoscopy procedure to surgically repair gastric bleeding, has been diagnosed with peptic ulcer disease. ROS:  Constitutional: Negative for chills or fever. Eyes: Negative for vision change or loss. ENT and mouth: Negative for ear drainage, epistaxis, or mouth sores. Cardiovascular: Negative for chest pain. Respiratory: No wheezing or shortness of breath. Gastrointestinal: Positive for coffee-ground emesis. Musculoskeletal: No loss of range of motion. Neurologic: No unilateral weakness. Integumentary: No rash. Psychiatric: Negative for SI.    10 level review of systems is otherwise negative except as noted above in the ROS and in the history of present illness. Reviewed and agree with available nursing notes. Available prior ED visit history reviewed. Vital signs were reviewed and oxygenation is adequate. Physical exam:  Constitutional: Awake, alert, not in severe distress. Head and neck: Normocephalic, atraumatic. No JVD, no nuchal rigidity. ENT and mouth: No active epistaxis, external ears normal, trachea is midline. Eyes: Extraocular movements intact, no periorbital edema. Cardiovascular: Regular rate, regular rhythm. Respiratory: No increased work of breathing, no signs of pending respiratory failure. Gastrointestinal: Nondistended. Musculoskeletal: Free range of motion, no edema. Integumentary: Warm and dry, no rash, skin is intact.   Neurologic: Normal speech, no lateralizing neurologic deficit. Psychiatric: Appropriate affect, not responding to internal stimuli. Differential diagnosis includes but is not limited to: Appendicitis, bowel obstruction, constipation, ileus, bowel perforation, diverticulitis, enteritis, viral syndrome, UTI, pyelonephritis, ureterolithiasis, renal colic, biliary colic, incarcerated or strangulated hernia, PUD, GERD, GI bleeding, pancreatitis, cholecystitis, sepsis. Medical decision making and ED course: Patient was given octreotide, Protonix, typed and crossmatched for 1 unit of blood. I consulted gastroenterologist on the hospitalist for admission, their plan is to take the patient to the endoscopy suite today or tomorrow first thing in the morning. I spoke with the hospitalist for admission. Vomiting Blood        Past Medical History:   Diagnosis Date    Heart abnormality     SVT (supraventricular tachycardia) (HCC)     UTI (urinary tract infection)        Past Surgical History:   Procedure Laterality Date    HX ORTHOPAEDIC      HX TONSILLECTOMY      HX WISDOM TEETH EXTRACTION           History reviewed. No pertinent family history.     Social History     Socioeconomic History    Marital status:      Spouse name: Not on file    Number of children: Not on file    Years of education: Not on file    Highest education level: Not on file   Occupational History    Not on file   Tobacco Use    Smoking status: Never    Smokeless tobacco: Never   Substance and Sexual Activity    Alcohol use: Yes     Comment: occasional wine    Drug use: No    Sexual activity: Not on file   Other Topics Concern     Service Not Asked    Blood Transfusions Not Asked    Caffeine Concern Not Asked    Occupational Exposure Not Asked    Hobby Hazards Not Asked    Sleep Concern Not Asked    Stress Concern Not Asked    Weight Concern Not Asked    Special Diet Not Asked    Back Care Not Asked    Exercise Not Asked    Bike Helmet Not Asked    Seat Belt Not Asked Self-Exams Not Asked   Social History Narrative    Not on file     Social Determinants of Health     Financial Resource Strain: Not on file   Food Insecurity: Not on file   Transportation Needs: Not on file   Physical Activity: Not on file   Stress: Not on file   Social Connections: Not on file   Intimate Partner Violence: Not on file   Housing Stability: Not on file         ALLERGIES: Patient has no known allergies. Review of Systems    Vitals:    10/15/22 1131 10/15/22 1228 10/15/22 1231 10/15/22 1246   BP: 94/64 (!) 117/56 (!) 95/58 (!) 106/50   Pulse: (!) 111 (!) 115 (!) 104 (!) 105   Resp: 15 21 14 14   Temp: 97.2 °F (36.2 °C)      SpO2: 98%      Weight: 49.9 kg (110 lb)      Height: 5' 4\" (1.626 m)               Physical Exam     MDM     Perfect Serve Consult for Admission  1:55 PM    ED Room Number: ER13/13  Patient Name and age: Sal Vickers 74 y.o.  female  Working Diagnosis: No diagnosis found. GI BLEED    COVID-19 Suspicion:  no  Sepsis present:  no  Reassessment needed: no  Code Status:  Full Code  Readmission: yes  Isolation Requirements:  no  Recommended Level of Care:  telemetry  Department:[facility] Adult ED - 21   Other: 68-year-old woman with upper GI bleeding symptoms, just discharged for the same few days ago. Spoke with GI, Dr. Yola Simmons is good however to the GI suite either today or first thing in the morning. She is getting 1 unit of blood transfused, received octreotide and Protonix.       Procedures

## 2022-10-15 NOTE — PROCEDURES
[address] [facility] [address]        Esophagogastroduodenoscopy (EGD) Procedure Note    Declan Alcantara  1950  [id number]      Procedure: Esophagogastroduodenoscopy    Indication: GI bleeding    Pre-operative Diagnosis: see indication above    Post-operative Diagnosis: see findings below    : Gabriel Pop MD    Referring Provider:  Pauleen Cheadle, DO    Anesthesia/Sedation: MAC anesthesia    Procedure Details     After informed consent was obtained for the procedure, with all risks and benefits of procedure explained the patient was taken to the endoscopy suite and placed in the left lateral decubitus position. Following sequential administration of sedation as per above, the endoscope was inserted into the mouth and advanced under direct vision to second portion of the duodenum. A careful inspection was made as the gastroscope was withdrawn, including a retroflexed view of the proximal stomach; findings and interventions are described below. Findings:   Esophagus: 8 mm distal esophageal ulcer w/ clean base. The tissue surrounding the ulcer was not concerning for malignancy. Large hiatal hernia with clot that was evacuated to confirm absence of ulceration within the herniated gastric fundus. Appearance of her distal esophagus was consistent with an 11 cm segment of Curran's esophagus, [place] [id number]. Given the situation, I elected to not biopsy this tissue. Hill grade IV valve with at least 6 cm defect in the transverse plane. The distal esophagus was somewhat tortuous. The esophagus was not dilated and there was no increase in resistance to the gastroscope across the GEJ. Stomach: 6 cm cardiofundal herniation. Stomach otherwise normal    Duodenum/jejunum: normal      Therapies:      Specimens: none         EBL: None      Complications:   None; patient tolerated the procedure well.            Impression:      Esophageal ulcer was the most likely source of her recent upper GI bleeding. Likely Bogart [id number] Curran's esophagus. Large hiatal defect in the transverse plane. Large hiatal hernia  Normal duodenum    Recommendations:    Sucralfate 1 gram solution or tablets dissolved in 10 mL water at least bid for the next 1-2 weeks. 40 mg Omeprazole bid for 1 month, then qdaily indefinitely  It sounds like this is the same problem she suffered from 2 years ago  Will discuss the symptoms she has tomorrow. Given the degree of acid exposure she has, I suspect her esophagus is not very sensitive. OK to start clear liquids  I do recommend she have a follow up endoscopy in 3-4 months to confirm that this ulcer heals as she is at increased risk for esophageal malignancy.         Signed By: Angi Quan MD     10/15/2022  3:44 PM

## 2022-10-15 NOTE — ANESTHESIA POSTPROCEDURE EVALUATION
Procedure(s):  ESOPHAGOGASTRODUODENOSCOPY (EGD). general    Anesthesia Post Evaluation        Patient location during evaluation: PACU  Patient participation: complete - patient participated  Level of consciousness: awake and alert  Pain management: adequate  Airway patency: patent  Anesthetic complications: no  Cardiovascular status: acceptable  Respiratory status: acceptable  Hydration status: acceptable  Comments: I have seen and evaluated the patient and is ready for discharge. Rachel Garcia MD    Post anesthesia nausea and vomiting:  none      INITIAL Post-op Vital signs:   Vitals Value Taken Time   BP 83/67 10/15/22 1546   Temp 36.4 °C (97.5 °F) 10/15/22 1533   Pulse 90 10/15/22 1547   Resp 16 10/15/22 1547   SpO2 100 % 10/15/22 1547   Vitals shown include unvalidated device data.

## 2022-10-16 LAB
ALBUMIN SERPL-MCNC: 2 G/DL (ref 3.5–5)
ALBUMIN/GLOB SERPL: 0.7 {RATIO} (ref 1.1–2.2)
ALP SERPL-CCNC: 49 U/L (ref 45–117)
ALT SERPL-CCNC: 15 U/L (ref 12–78)
ANION GAP SERPL CALC-SCNC: 7 MMOL/L (ref 5–15)
AST SERPL-CCNC: 27 U/L (ref 15–37)
BASOPHILS # BLD: 0.1 K/UL (ref 0–0.1)
BASOPHILS NFR BLD: 1 % (ref 0–1)
BILIRUB SERPL-MCNC: 1.6 MG/DL (ref 0.2–1)
BUN SERPL-MCNC: 19 MG/DL (ref 6–20)
BUN/CREAT SERPL: 33 (ref 12–20)
CALCIUM SERPL-MCNC: 7.4 MG/DL (ref 8.5–10.1)
CHLORIDE SERPL-SCNC: 103 MMOL/L (ref 97–108)
CO2 SERPL-SCNC: 30 MMOL/L (ref 21–32)
CREAT SERPL-MCNC: 0.57 MG/DL (ref 0.55–1.02)
DIFFERENTIAL METHOD BLD: ABNORMAL
EOSINOPHIL # BLD: 0.1 K/UL (ref 0–0.4)
EOSINOPHIL NFR BLD: 1 % (ref 0–7)
GLOBULIN SER CALC-MCNC: 2.7 G/DL (ref 2–4)
GLUCOSE SERPL-MCNC: 147 MG/DL (ref 65–100)
HCT VFR BLD AUTO: 18.7 % (ref 35–47)
HCT VFR BLD AUTO: 19.3 % (ref 35–47)
HCT VFR BLD AUTO: 24.4 % (ref 35–47)
HGB BLD-MCNC: 6.3 G/DL (ref 11.5–16)
HGB BLD-MCNC: 6.4 G/DL (ref 11.5–16)
HGB BLD-MCNC: 8.3 G/DL (ref 11.5–16)
HISTORY CHECKED?,CKHIST: NORMAL
IMM GRANULOCYTES # BLD AUTO: 0.1 K/UL (ref 0–0.04)
IMM GRANULOCYTES NFR BLD AUTO: 1 % (ref 0–0.5)
INR PPP: 1.6 (ref 0.9–1.1)
LACTATE SERPL-SCNC: 1.4 MMOL/L (ref 0.4–2)
LACTATE SERPL-SCNC: 2.6 MMOL/L (ref 0.4–2)
LYMPHOCYTES # BLD: 1.4 K/UL (ref 0.8–3.5)
LYMPHOCYTES NFR BLD: 23 % (ref 12–49)
MAGNESIUM SERPL-MCNC: 0.9 MG/DL (ref 1.6–2.4)
MCH RBC QN AUTO: 30.5 PG (ref 26–34)
MCHC RBC AUTO-ENTMCNC: 33.2 G/DL (ref 30–36.5)
MCV RBC AUTO: 91.9 FL (ref 80–99)
MONOCYTES # BLD: 0.7 K/UL (ref 0–1)
MONOCYTES NFR BLD: 11 % (ref 5–13)
NEUTS SEG # BLD: 3.9 K/UL (ref 1.8–8)
NEUTS SEG NFR BLD: 63 % (ref 32–75)
NRBC # BLD: 0 K/UL (ref 0–0.01)
NRBC BLD-RTO: 0 PER 100 WBC
PLATELET # BLD AUTO: 156 K/UL (ref 150–400)
PMV BLD AUTO: 10 FL (ref 8.9–12.9)
POTASSIUM SERPL-SCNC: 3.1 MMOL/L (ref 3.5–5.1)
PROT SERPL-MCNC: 4.7 G/DL (ref 6.4–8.2)
PROTHROMBIN TIME: 16.3 SEC (ref 9–11.1)
RBC # BLD AUTO: 2.1 M/UL (ref 3.8–5.2)
RBC MORPH BLD: ABNORMAL
SODIUM SERPL-SCNC: 140 MMOL/L (ref 136–145)
WBC # BLD AUTO: 6.3 K/UL (ref 3.6–11)

## 2022-10-16 PROCEDURE — 74011250636 HC RX REV CODE- 250/636: Performed by: INTERNAL MEDICINE

## 2022-10-16 PROCEDURE — 85025 COMPLETE CBC W/AUTO DIFF WBC: CPT

## 2022-10-16 PROCEDURE — 83605 ASSAY OF LACTIC ACID: CPT

## 2022-10-16 PROCEDURE — 74011250637 HC RX REV CODE- 250/637: Performed by: INTERNAL MEDICINE

## 2022-10-16 PROCEDURE — C9113 INJ PANTOPRAZOLE SODIUM, VIA: HCPCS | Performed by: INTERNAL MEDICINE

## 2022-10-16 PROCEDURE — 80053 COMPREHEN METABOLIC PANEL: CPT

## 2022-10-16 PROCEDURE — 65660000001 HC RM ICU INTERMED STEPDOWN

## 2022-10-16 PROCEDURE — 74011000250 HC RX REV CODE- 250: Performed by: INTERNAL MEDICINE

## 2022-10-16 PROCEDURE — 83735 ASSAY OF MAGNESIUM: CPT

## 2022-10-16 PROCEDURE — 74011250636 HC RX REV CODE- 250/636: Performed by: EMERGENCY MEDICINE

## 2022-10-16 PROCEDURE — P9016 RBC LEUKOCYTES REDUCED: HCPCS

## 2022-10-16 PROCEDURE — 74011636637 HC RX REV CODE- 636/637: Performed by: EMERGENCY MEDICINE

## 2022-10-16 PROCEDURE — 85610 PROTHROMBIN TIME: CPT

## 2022-10-16 PROCEDURE — 36430 TRANSFUSION BLD/BLD COMPNT: CPT

## 2022-10-16 RX ORDER — POTASSIUM CHLORIDE 14.9 MG/ML
10 INJECTION INTRAVENOUS
Status: COMPLETED | OUTPATIENT
Start: 2022-10-16 | End: 2022-10-16

## 2022-10-16 RX ORDER — METOPROLOL TARTRATE 25 MG/1
12.5 TABLET, FILM COATED ORAL ONCE
Status: COMPLETED | OUTPATIENT
Start: 2022-10-16 | End: 2022-10-16

## 2022-10-16 RX ORDER — SODIUM CHLORIDE 9 MG/ML
250 INJECTION, SOLUTION INTRAVENOUS AS NEEDED
Status: DISCONTINUED | OUTPATIENT
Start: 2022-10-16 | End: 2022-10-17 | Stop reason: HOSPADM

## 2022-10-16 RX ORDER — MAGNESIUM SULFATE HEPTAHYDRATE 40 MG/ML
2 INJECTION, SOLUTION INTRAVENOUS
Status: COMPLETED | OUTPATIENT
Start: 2022-10-16 | End: 2022-10-16

## 2022-10-16 RX ORDER — SUCRALFATE 1 G/1
1 TABLET ORAL
Status: DISCONTINUED | OUTPATIENT
Start: 2022-10-16 | End: 2022-10-17 | Stop reason: HOSPADM

## 2022-10-16 RX ORDER — SODIUM CHLORIDE 9 MG/ML
75 INJECTION, SOLUTION INTRAVENOUS CONTINUOUS
Status: DISCONTINUED | OUTPATIENT
Start: 2022-10-16 | End: 2022-10-16

## 2022-10-16 RX ADMIN — SODIUM CHLORIDE, PRESERVATIVE FREE 10 ML: 5 INJECTION INTRAVENOUS at 06:00

## 2022-10-16 RX ADMIN — POTASSIUM CHLORIDE 10 MEQ: 14.9 INJECTION, SOLUTION INTRAVENOUS at 03:51

## 2022-10-16 RX ADMIN — POTASSIUM CHLORIDE 10 MEQ: 14.9 INJECTION, SOLUTION INTRAVENOUS at 02:26

## 2022-10-16 RX ADMIN — METOPROLOL TARTRATE 12.5 MG: 25 TABLET, FILM COATED ORAL at 22:36

## 2022-10-16 RX ADMIN — SUCRALFATE 1 G: 1 TABLET ORAL at 21:14

## 2022-10-16 RX ADMIN — POTASSIUM CHLORIDE 10 MEQ: 14.9 INJECTION, SOLUTION INTRAVENOUS at 04:56

## 2022-10-16 RX ADMIN — SODIUM CHLORIDE 75 ML/HR: 9 INJECTION, SOLUTION INTRAVENOUS at 03:51

## 2022-10-16 RX ADMIN — MAGNESIUM SULFATE HEPTAHYDRATE 2 G: 40 INJECTION, SOLUTION INTRAVENOUS at 02:26

## 2022-10-16 RX ADMIN — SODIUM CHLORIDE, PRESERVATIVE FREE 10 ML: 5 INJECTION INTRAVENOUS at 21:14

## 2022-10-16 RX ADMIN — SUCRALFATE 1 G: 1 TABLET ORAL at 15:30

## 2022-10-16 RX ADMIN — CEFTRIAXONE SODIUM 1 G: 1 INJECTION, POWDER, FOR SOLUTION INTRAMUSCULAR; INTRAVENOUS at 15:03

## 2022-10-16 RX ADMIN — SODIUM CHLORIDE, PRESERVATIVE FREE 10 ML: 5 INJECTION INTRAVENOUS at 15:06

## 2022-10-16 RX ADMIN — POTASSIUM CHLORIDE 10 MEQ: 14.9 INJECTION, SOLUTION INTRAVENOUS at 05:53

## 2022-10-16 RX ADMIN — SODIUM CHLORIDE, POTASSIUM CHLORIDE, SODIUM LACTATE AND CALCIUM CHLORIDE 100 ML/HR: 600; 310; 30; 20 INJECTION, SOLUTION INTRAVENOUS at 08:21

## 2022-10-16 RX ADMIN — OCTREOTIDE ACETATE 25 MCG/HR: 500 INJECTION, SOLUTION INTRAVENOUS; SUBCUTANEOUS at 10:44

## 2022-10-16 RX ADMIN — MAGNESIUM SULFATE HEPTAHYDRATE 2 G: 40 INJECTION, SOLUTION INTRAVENOUS at 03:51

## 2022-10-16 RX ADMIN — PANTOPRAZOLE SODIUM 40 MG: 40 INJECTION, POWDER, FOR SOLUTION INTRAVENOUS at 15:03

## 2022-10-16 RX ADMIN — PANTOPRAZOLE SODIUM 40 MG: 40 INJECTION, POWDER, FOR SOLUTION INTRAVENOUS at 01:59

## 2022-10-16 NOTE — PROGRESS NOTES
[address]  [place], [address] [facility] [address] JAVIER PROGRESS NOTE    NAME: Eber Hartmann   :  1950   MRN:  [id number]       Subjective:     Feeling good  I just missed her , Dulce Preciado  No further bleeding    Objective:     VITALS:   Last 24hrs VS reviewed since prior progress note. Most recent are:    Visit Vitals  BP (!) 89/47 (BP 1 Location: Right upper arm, BP Patient Position: At rest;Lying)   Pulse (!) 102   Temp 97.8 °F (36.6 °C)   Resp 18   Ht 5' 4\" (1.626 m)   Wt 49.9 kg (110 lb)   SpO2 95%   BMI 18.88 kg/m²       Intake/Output Summary (Last 24 hours) at 10/16/2022 1102  Last data filed at 10/16/2022 0550  Gross per 24 hour   Intake 1462.5 ml   Output --   Net 1462.5 ml       PHYSICAL EXAM:    General: Frail appearing, cooperative, in no acute distress    HEENT: Atraumatic, Anicteric sclerae. Chest/ Lungs: Adequate air movement bilaterally, no overt wheezing, rhonchi or rales. Heart:  Tachycardic, reg rhythm, no murmurs  Abdomen: Soft, Non distended, Non tender. +Bowel sounds  Extremities: No clubbing or cyanosis  Neurologic:  Grossly alert & oriented  Skin:   No jaundice, no rashes  Psych:   Appropriate mood and affect, appropriate insight     Data Review     Recent Labs     10/16/22  0004 10/15/22  2345 10/15/22  1834 10/15/22  1138   WBC 6.3  --   --  7.0   HGB 6.4* 6.3*   < > 5.9*   HCT 19.3* 18.7*   < > 18.1*     --   --  239    < > = values in this interval not displayed.      Recent Labs     10/16/22  0004 10/15/22  1138    139   K 3.1* 3.4*    100   CO2 30 30   BUN 19 25*   CREA 0.57 0.55   * 174*   CA 7.4* 8.4*     Recent Labs     10/16/22  0004 10/15/22  1138   AP 49 74   TP 4.7* 5.7*   ALB 2.0* 2.4*   GLOB 2.7 3.3     Recent Labs     10/16/22  0004   INR 1.6*   PTP 16.3*     ___________________________________________________________________       Assessment:     75 yo F w/ hx SVT, erosive esophagitis c/b ulceration & bleeding in Dec '20, presenting for further eval of coffee ground emesis for the second time in 1 week, EGD remarkable for similar pathology in the form of an already healing distal esophageal ulcer, long segment Curran's esophagus, large hiatal hernia, etc.   's cell is [phone], Rohith  Her cell is [phone]     Plan:     Continue 1 g Sucralfate bid for 1 week  Second unit PRBC  Consider 300 mg Iron sucrose to tank her iron stores up completely  Bid PPI for 1 month, then decrease to qdaily indefinitely *discussed this with her- not sure if she wants to take this indefinitely or consider surgery  Transfuse to Hg > 7 g/dL  Repeat EGD in 3 months to confirm ulcer healing and get Bx throughout her Curran's segment- she is someone who is at substantial risk for dysplasia somewhere along this long segment of disease- for continuity of care sake, I will do this EGD in January and discuss with her whether or not to pursue surgery  I advanced her diet to GI soft/ sow fiber today  Anticipate she will be ok for discharge tomorrow- my office will reach out to her to schedule the follow-up endoscopy     Signed By: Kaylynn Chester MD     10/16/2022  11:02 AM

## 2022-10-16 NOTE — H&P
[address] [facility] [address] [facility] Adult  Hospitalist Group  History and Physical    Date of Service:  10/15/2022  Primary Care Provider: Tierra Mendieta DO  Source of information: The patient    Chief Complaint: Vomiting Blood      History of Presenting Illness:   Ju Lang is a 74 y.o. female is known past medical history of SVT, orthostatic hypotension, recurrent GI bleeding, esophagitis, esophageal and gastric ulcers, anemia related to acute blood loss requiring transfusion PRBC, urinary tract infection and bacteremia who presents to ED with complaint of bloody vomiting for 1 day. The patient was evaluated in the emergency department laboratory data was obtained was significant for anemia with hemoglobin as low as 5.9, transfusion of PRBC was requested, Protonix IV was provided, octreotide drip was initiated, gastroenterologist was consulted for EGD, and medicine was consulted for admission and further evaluation. Initially during presentation patient was hypotensive, most likely related to hemorrhagic shock, and tachycardic. The patient was taken immediately to endoscopy suit by gastroenterologist, the patient underwent EGD: Esophageal ulcer was the most likely source of her recent upper GI bleeding. Likely [place] [id number] Curran's esophagus. Large hiatal defect in the transverse plane. Large hiatal hernia  The patient was seen after she underwent EGD, and GI bleeding was controlled. The patient denies any headache, blurry vision, sore throat, trouble swallowing, trouble with speech, chest pain, SOB, cough, fever, chills, N/V/D, abd pain, urinary symptoms, constipation, recent travels, sick contacts, focal or generalized neurological symptoms, falls, injuries, rashes, contact with COVID-19 diagnosed patients, hematemesis, melena, hemoptysis, hematuria, rashes, denies starting any new medications and denies any other concerns or problems besides as mentioned above.          REVIEW OF SYSTEMS:  A comprehensive review of systems was negative except for that written in the History of Present Illness. Past Medical History:   Diagnosis Date    Heart abnormality     SVT (supraventricular tachycardia) (HCC)     UTI (urinary tract infection)       Past Surgical History:   Procedure Laterality Date    HX ORTHOPAEDIC      HX TONSILLECTOMY      HX WISDOM TEETH EXTRACTION       Prior to Admission medications    Medication Sig Start Date End Date Taking? Authorizing Provider   Omeprazole delayed release (PRILOSEC D/R) 20 mg tablet Take 1 Tablet by mouth two (2) times a day for 30 days. Indications: Gastritis 10/10/22 11/9/22  Sofy Serna MD   metoprolol succinate (TOPROL-XL) 25 mg XL tablet TAKE 1 TABLET BY MOUTH EVERY DAY AT NIGHT 8/1/21   Madeleine Sarmiento MD   bumetanide (BUMEX) 0.5 mg tablet Take 1 Tab by mouth daily. Patient not taking: Reported on 4/14/2022 3/3/21   Mariana Guadarrama NP   magnesium oxide (MAG-OX) 400 mg tablet To take 2 tablets (800 mg) in am and 1 tablet (400 mg ) in pm.  Patient taking differently: Take 400 mg by mouth two (2) times a day. To take 2 tablets (800 mg) in am and 1 tablet (400 mg ) in pm. 2/5/21   Vini Gupta NP   cholecalciferol (VITAMIN D3) 1,000 unit tablet Take 1 Tab by mouth daily. 11/20/18   Madeleine Sarmiento MD   potassium chloride SR (K-TAB) 20 mEq tablet Take 1 Tab by mouth daily. 11/20/18   Madeleine Sarmiento MD   cranberry fruit extract (CRANBERRY CONCENTRATE PO) Take 1 Tab by mouth daily. Provider, Historical   ascorbic acid, vitamin C, (VITAMIN C) 500 mg tablet Take 500 mg by mouth daily. Provider, Historical     No Known Allergies   History reviewed. No pertinent family history. Social History:  reports that she has never smoked. She has never used smokeless tobacco. She reports current alcohol use. She reports that she does not use drugs.      Family and social history were personally reviewed, all pertinent and relevant details are outlined as above. Objective:   Visit Vitals  BP (!) 92/49   Pulse (!) 104   Temp 98.8 °F (37.1 °C)   Resp 21   Ht 5' 4\" (1.626 m)   Wt 49.9 kg (110 lb)   SpO2 94%   BMI 18.88 kg/m²      O2 Device: None (Room air)    PHYSICAL EXAM:   General: Alert x oriented x 3, awake, no acute distress, resting in bed, pleasant female, appears to be stated age, pale  HEENT: PEERL, EOMI, moist mucus membranes  Neck: Supple, no JVD, no meningeal signs  Chest: Clear to auscultation bilaterally   CVS: RRR, S1 S2 heard, no murmurs/rubs/gallops  Abd: Soft, non-tender, non-distended, +bowel sounds   Ext: No clubbing, no cyanosis, no edema  Neuro/Psych: Pleasant mood and affect, CN 2-12 grossly intact, sensory grossly within normal limit, Strength 5/5 in all extremities, DTR 1+ x 4  Cap refill: Brisk, less than 3 seconds  Pulses: 2+, symmetric in all extremities  Skin: Warm, dry, without rashes or lesions    Data Review: All diagnostic labs and studies have been reviewed. Abnormal Labs Reviewed   CBC WITH AUTOMATED DIFF - Abnormal; Notable for the following components:       Result Value    RBC 1.74 (*)     HGB 5.9 (*)     HCT 18.1 (*)     .0 (*)     MONOCYTES 14 (*)     IMMATURE GRANULOCYTES 1 (*)     ABS. IMM.  GRANS. 0.1 (*)     All other components within normal limits   METABOLIC PANEL, COMPREHENSIVE - Abnormal; Notable for the following components:    Potassium 3.4 (*)     Glucose 174 (*)     BUN 25 (*)     BUN/Creatinine ratio 45 (*)     Calcium 8.4 (*)     AST (SGOT) 38 (*)     Protein, total 5.7 (*)     Albumin 2.4 (*)     A-G Ratio 0.7 (*)     All other components within normal limits   HGB & HCT - Abnormal; Notable for the following components:    HGB 7.6 (*)     HCT 22.9 (*)     All other components within normal limits       All Micro Results       None            IMAGING:   No orders to display        ECG/ECHO:    Results for orders placed or performed during the hospital encounter of 04/10/22   EKG, 12 LEAD, INITIAL   Result Value Ref Range    Ventricular Rate 124 BPM    Atrial Rate 124 BPM    P-R Interval 130 ms    QRS Duration 114 ms    Q-T Interval 346 ms    QTC Calculation (Bezet) 497 ms    Calculated P Axis 3 degrees    Calculated R Axis 86 degrees    Calculated T Axis 9 degrees    Diagnosis       Sinus tachycardia  Right bundle branch block  When compared with ECG of 11-APR-2022 02:00,  T wave inversion now evident in Anterior leads  Confirmed by Author MD Anjel, Kiah Felton ([id number]) on 4/14/2022 2:36:35 PM          Assessment:   Given the patient's current clinical presentation, there is a high level of concern for decompensation if discharged from the emergency department. Complex decision making was performed, which includes reviewing the patient's available past medical records, laboratory results, and imaging studies. Active Problems:    GI bleeding (10/15/2022)    Esophageal ulcer    Hemorrhagic shock    Anemia acute blood loss requiring transfusion of PRBC    Curran's esophagus    Large hiatal hernia. Plan:     The patient will be admitted to medicine, telemetry unit, and will require at least 2 midnight for inpatient treatment. IV fluids for hydration will be provided  Octreotide IV drip and Protonix IV were initiated  Gastroenterologist was consulted for EGD, which was done today emergently. Per gastroenterologist was recommended to follow-up endoscopy in 3 to 4 months to confirm healing ulcer  Continue monitor H&H and transfuse PRBC as needed  Clear liquid diet for now after procedure, advance per GI recommendation. DIET: ADULT DIET Clear Liquid   ISOLATION PRECAUTIONS: There are currently no Active Isolations  CODE STATUS: Full Code   DVT PROPHYLAXIS: SCDs  FUNCTIONAL STATUS PRIOR TO HOSPITALIZATION: Fully active and ambulatory; able to carry on all self-care without restriction.   Ambulatory status/function: By self   EARLY MOBILITY ASSESSMENT: Recommend an assessment from physical therapy and/or occupational therapy  ANTICIPATED DISCHARGE: 24-48 hours. ANTICIPATED DISPOSITION: Home with Home Healthcare  EMERGENCY CONTACT/SURROGATE DECISION MAKER:     CRITICAL CARE WAS PERFORMED FOR THIS ENCOUNTER: NO.      Signed By: Lazara Tyler MD     October 15, 2022         Please note that this dictation may have been completed with Dragon, the computer voice recognition software. Quite often unanticipated grammatical, syntax, homophones, and other interpretive errors are inadvertently transcribed by the computer software. Please disregard these errors. Please excuse any errors that have escaped final proofreading.

## 2022-10-16 NOTE — PROGRESS NOTES
Bedside shift change report given to Wesley Herring RN/Nano RN (oncoming nurse) by Vamsi Myers RN (offgoing nurse). Report included the following information SBAR, Kardex, Intake/Output, MAR, and Cardiac Rhythm NSR/ST . Problem: Falls - Risk of  Goal: *Absence of Falls  Description: Document [place] Fail Fall Risk and appropriate interventions in the flowsheet.   Outcome: Progressing Towards Goal  Note: Fall Risk Interventions:            Medication Interventions: Evaluate medications/consider consulting pharmacy, Patient to call before getting OOB, Teach patient to arise slowly    Elimination Interventions: Call light in reach, Patient to call for help with toileting needs    History of Falls Interventions: Room close to nurse's station, Investigate reason for fall         Problem: Patient Education: Go to Patient Education Activity  Goal: Patient/Family Education  Outcome: Progressing Towards Goal

## 2022-10-16 NOTE — PROGRESS NOTES
Physical Therapy 10/16/2022    Orders received and chart reviewed up to date. Pt with Hgb of 6.4 an active bedrest orders. Will follow-up once Hgb >7 and bedrest orders removed. Thank you.   Neetu Fisher, PT, DPT

## 2022-10-16 NOTE — PROGRESS NOTES
Occupational Therapy    Order's acknowledged and chart reviewed; however, pt's Hgb outside of therapeutic range, with active bedrest orders. Will defer and follow up later as able and appropriate.     Thank you,  Maya Pérez, OT

## 2022-10-16 NOTE — PROGRESS NOTES
Bedside shift change report given to DanisMatamoras (oncoming nurse) by Amanuel Cueto (offgoing nurse). Report included the following information SBAR, Kardex, Intake/Output, MAR, and Cardiac Rhythm NSR . Problem: Falls - Risk of  Goal: *Absence of Falls  Description: Document Moraima Fall Risk and appropriate interventions in the flowsheet.   Outcome: Progressing Towards Goal  Note: Fall Risk Interventions:  Mobility Interventions: Assess mobility with egress test, Patient to call before getting OOB         Medication Interventions: Patient to call before getting OOB, Teach patient to arise slowly    Elimination Interventions: Call light in reach, Patient to call for help with toileting needs    History of Falls Interventions: Bed/chair exit alarm         Problem: Patient Education: Go to Patient Education Activity  Goal: Patient/Family Education  Outcome: Progressing Towards Goal

## 2022-10-16 NOTE — PROGRESS NOTES
[address] [facility] Adult  Hospitalist Group                                                                                          Hospitalist Progress Note  Grzegorz Carrera MD  Answering service: [phone] from in house phone        Date of Service:  10/16/2022  NAME:  Alejandro Locke  :  1950  MRN:  [id number]      Admission Summary:   Alejandro Locke is a 74 y.o. female is known past medical history of SVT, orthostatic hypotension, recurrent GI bleeding, esophagitis, esophageal and gastric ulcers, anemia related to acute blood loss requiring transfusion PRBC, urinary tract infection and bacteremia who presents to ED with complaint of bloody vomiting for 1 day. The patient was evaluated in the emergency department laboratory data was obtained was significant for anemia with hemoglobin as low as 5.9, transfusion of PRBC was requested, Protonix IV was provided, octreotide drip was initiated, gastroenterologist was consulted for EGD, and medicine was consulted for admission and further evaluation. Initially during presentation patient was hypotensive, most likely related to hemorrhagic shock, and tachycardic. The patient was taken immediately to endoscopy suit by gastroenterologist, the patient underwent EGD: Esophageal ulcer was the most likely source of her recent upper GI bleeding. Likely [place] [id number] Curran's esophagus. Large hiatal defect in the transverse plane. Large hiatal hernia  The patient was seen after she underwent EGD, and GI bleeding was controlled.     The patient denies any headache, blurry vision, sore throat, trouble swallowing, trouble with speech, chest pain, SOB, cough, fever, chills, N/V/D, abd pain, urinary symptoms, constipation, recent travels, sick contacts, focal or generalized neurological symptoms, falls, injuries, rashes, contact with COVID-19 diagnosed patients, hematemesis, melena, hemoptysis, hematuria, rashes, denies starting any new medications and denies any other concerns or problems besides as mentioned above. Interval history / Subjective:   Patient well known to me from previous admission. Overnight events noted. Hgb last night was 6.4, patient was transfused 1 Unit of PRBC's. She is asking if she can go home today, she refused AM labs.  at the bedside. I discussed with patient at length the importance of checking a repeat CBC and monitoring it to make sure she has no ongoing bleeding. I strongly advised her against leaving the hospital.        Assessment & Plan:         Given the patient's current clinical presentation, there is a high level of concern for decompensation if discharged from the emergency department. Complex decision making was performed, which includes reviewing the patient's available past medical records, laboratory results, and imaging studies. Active Problems:    GI bleeding (10/15/2022)    Esophageal ulcer    Hemorrhagic shock    Anemia acute blood loss requiring transfusion of PRBC    Curran's esophagus    Large hiatal hernia. HOSPITAL COURSE:   Patient admitted for further management;   Started on IV Protonix and Octreotide drip;   GI consulted;     10/16:   EGD urgently on admission:   Esophageal ulcer was the most likely source of her recent upper GI bleeding. Likely [place] [id number] Curran's esophagus. Large hiatal defect in the transverse plane. Large hiatal hernia  Normal duodenum    - Recommendations:   Sucralfate 1 gram solution or tablets dissolved in 10 mL water at least bid for the next 1-2 weeks. Omeprazole 40 mg bid for 1 month, then q daily indefinitely; Follow up endoscopy in 3-4 months to confirm that this ulcer heals as she is at increased risk for esophageal malignancy.     - diet advanced to clear liquids;   - advance to GI soft as tolerated; stop if there is any recurrence of bleeding;   - monitor H&H: patient refused labs this morning; I spoke to her at length, she verbalized understanding and agrees to have labwork so that the h&h can be monitored;   - I discussed the need for further blood transfusions: If Hgb drops below 7.0;   - patient was transfused 1 Unit of PRBC's last night for a Hgb of 6.4;     - check Abdo US (elevated LFTs and INR last admission), patient preferred to do it as outpatient, but since she was readmitted, will plan to complete it inpatient;     DIET: ADULT DIET Clear Liquid   CODE STATUS: Full Code   DVT PROPHYLAXIS: SCDs  - no anticoagulation in the setting of GI bleed and anemia;   FUNCTIONAL STATUS PRIOR TO HOSPITALIZATION: Fully active and ambulatory; able to carry on all self-care without restriction. Ambulatory status/function: By self   EARLY MOBILITY ASSESSMENT: Recommend an assessment from physical therapy and/or occupational therapy  ANTICIPATED DISCHARGE: 24-48 hours. ANTICIPATED DISPOSITION: Home with [address] [place], [place] [address]:   - plan of care was discussed with patient and her  at the bedside, all questions answered;   - plan of care was discussed with RN;           Hospital Problems  Date Reviewed: 4/11/2022            Codes Class Noted POA    GI bleeding ICD-10-CM: K92.2  ICD-9-CM: 578.9  10/15/2022 Unknown             Review of Systems:   A comprehensive review of systems was negative except for that written in the HPI. Vital Signs:    Last 24hrs VS reviewed since prior progress note.  Most recent are:  Visit Vitals  BP (!) 89/47 (BP 1 Location: Right upper arm, BP Patient Position: At rest;Lying)   Pulse 94   Temp 97.8 °F (36.6 °C)   Resp 18   Ht 5' 4\" (1.626 m)   Wt 49.9 kg (110 lb)   SpO2 95%   BMI 18.88 kg/m²         Intake/Output Summary (Last 24 hours) at 10/16/2022 1006  Last data filed at 10/16/2022 0550  Gross per 24 hour   Intake 1462.5 ml   Output --   Net 1462.5 ml        Physical Examination:     I had a face to face encounter with this patient and independently examined them on 10/16/2022 as outlined below:          Constitutional:  No acute distress, cooperative, pleasant    ENT:  Oral mucosa moist, oropharynx benign. Resp:  CTA bilaterally. No wheezing/rhonchi/rales. No accessory muscle use. CV:  Regular rhythm, normal rate, no murmurs, gallops, rubs    GI:  Soft, non distended, non tender. normoactive bowel sounds, no hepatosplenomegaly     Musculoskeletal:  No edema, warm, 2+ pulses throughout    Neurologic:  Moves all extremities. AAOx3, CN II-XII reviewed            Data Review:    Review and/or order of clinical lab test  Review and/or order of tests in the radiology section of CPT  Review and/or order of tests in the medicine section of CPT      Labs:     Recent Labs     10/16/22  0004 10/15/22  2345 10/15/22  1834 10/15/22  1138   WBC 6.3  --   --  7.0   HGB 6.4* 6.3*   < > 5.9*   HCT 19.3* 18.7*   < > 18.1*     --   --  239    < > = values in this interval not displayed. Recent Labs     10/16/22  0004 10/15/22  1138    139   K 3.1* 3.4*    100   CO2 30 30   BUN 19 25*   CREA 0.57 0.55   * 174*   CA 7.4* 8.4*   MG 0.9*  --      Recent Labs     10/16/22  0004 10/15/22  1138   ALT 15 23   AP 49 74   TBILI 1.6* 0.7   TP 4.7* 5.7*   ALB 2.0* 2.4*   GLOB 2.7 3.3     Recent Labs     10/16/22  0004   INR 1.6*   PTP 16.3*      No results for input(s): FE, TIBC, PSAT, FERR in the last 72 hours. Lab Results   Component Value Date/Time    Folate 4.9 (L) 04/11/2022 04:05 AM      No results for input(s): PH, PCO2, PO2 in the last 72 hours. No results for input(s): CPK, CKNDX, TROIQ in the last 72 hours.     No lab exists for component: CPKMB  Lab Results   Component Value Date/Time    Cholesterol, total 87 03/14/2018 05:07 AM    HDL Cholesterol 35 03/14/2018 05:07 AM    LDL, calculated 37 03/14/2018 05:07 AM    Triglyceride 75 03/14/2018 05:07 AM    CHOL/HDL Ratio 2.5 03/14/2018 05:07 AM     No results found for: [facility]  Lab Results   Component Value Date/Time    Color DARK YELLOW 04/10/2022 11:18 PM    Appearance TURBID (A) 04/10/2022 11:18 PM    Specific gravity 1.014 04/10/2022 11:18 PM    pH (UA) 6.0 04/10/2022 11:18 PM    Protein 300 (A) 04/10/2022 11:18 PM    Glucose Negative 04/10/2022 11:18 PM    Ketone 15 (A) 04/10/2022 11:18 PM    Bilirubin NEGATIVE 03/13/2018 11:16 PM    Urobilinogen 2.0 (H) 04/10/2022 11:18 PM    Nitrites Negative 04/10/2022 11:18 PM    Leukocyte Esterase LARGE (A) 04/10/2022 11:18 PM    Epithelial cells FEW 04/10/2022 11:18 PM    Bacteria 4+ (A) 04/10/2022 11:18 PM    WBC >100 (H) 04/10/2022 11:18 PM    RBC 10-20 04/10/2022 11:18 PM         Medications Reviewed:     Current Facility-Administered Medications   Medication Dose Route Frequency    0.9% sodium chloride infusion  75 mL/hr IntraVENous CONTINUOUS    0.9% sodium chloride infusion 250 mL  250 mL IntraVENous PRN    octreotide (SANDOSTATIN) 500 mcg in 0.9% sodium chloride 500 mL infusion  25 mcg/hr IntraVENous CONTINUOUS    sodium chloride (NS) flush 5-40 mL  5-40 mL IntraVENous Q8H    sodium chloride (NS) flush 5-40 mL  5-40 mL IntraVENous PRN    acetaminophen (TYLENOL) tablet 650 mg  650 mg Oral Q6H PRN    Or    acetaminophen (TYLENOL) suppository 650 mg  650 mg Rectal Q6H PRN    polyethylene glycol (MIRALAX) packet 17 g  17 g Oral DAILY PRN    ondansetron (ZOFRAN ODT) tablet 4 mg  4 mg Oral Q8H PRN    Or    ondansetron (ZOFRAN) injection 4 mg  4 mg IntraVENous Q6H PRN    pantoprazole (PROTONIX) 40 mg in 0.9% sodium chloride 10 mL injection  40 mg IntraVENous Q12H    lactated Ringers infusion  100 mL/hr IntraVENous CONTINUOUS    cefTRIAXone (ROCEPHIN) 1 g in 0.9% sodium chloride 10 mL IV syringe  1 g IntraVENous Q24H    0.9% sodium chloride infusion 250 mL  250 mL IntraVENous PRN     ______________________________________________________________________  EXPECTED LENGTH OF STAY: - - -  ACTUAL LENGTH OF STAY:          1                 Abigail March MD

## 2022-10-17 VITALS
RESPIRATION RATE: 19 BRPM | HEART RATE: 104 BPM | DIASTOLIC BLOOD PRESSURE: 55 MMHG | SYSTOLIC BLOOD PRESSURE: 92 MMHG | TEMPERATURE: 98 F | WEIGHT: 116.18 LBS | OXYGEN SATURATION: 96 % | BODY MASS INDEX: 19.84 KG/M2 | HEIGHT: 64 IN

## 2022-10-17 LAB
ABO + RH BLD: NORMAL
ANION GAP SERPL CALC-SCNC: 3 MMOL/L (ref 5–15)
BLD PROD TYP BPU: NORMAL
BLD PROD TYP BPU: NORMAL
BLOOD GROUP ANTIBODIES SERPL: NORMAL
BPU ID: NORMAL
BPU ID: NORMAL
BUN SERPL-MCNC: 8 MG/DL (ref 6–20)
BUN/CREAT SERPL: 15 (ref 12–20)
CALCIUM SERPL-MCNC: 7.4 MG/DL (ref 8.5–10.1)
CHLORIDE SERPL-SCNC: 104 MMOL/L (ref 97–108)
CO2 SERPL-SCNC: 26 MMOL/L (ref 21–32)
CREAT SERPL-MCNC: 0.54 MG/DL (ref 0.55–1.02)
CROSSMATCH RESULT,%XM: NORMAL
CROSSMATCH RESULT,%XM: NORMAL
GLUCOSE SERPL-MCNC: 135 MG/DL (ref 65–100)
HCT VFR BLD AUTO: 33.5 % (ref 35–47)
HGB BLD-MCNC: 10.1 G/DL (ref 11.5–16)
MAGNESIUM SERPL-MCNC: 2.6 MG/DL (ref 1.6–2.4)
POTASSIUM SERPL-SCNC: 5 MMOL/L (ref 3.5–5.1)
SODIUM SERPL-SCNC: 133 MMOL/L (ref 136–145)
SPECIMEN EXP DATE BLD: NORMAL
STATUS OF UNIT,%ST: NORMAL
STATUS OF UNIT,%ST: NORMAL
UNIT DIVISION, %UDIV: 0
UNIT DIVISION, %UDIV: 0

## 2022-10-17 PROCEDURE — 97165 OT EVAL LOW COMPLEX 30 MIN: CPT

## 2022-10-17 PROCEDURE — 83735 ASSAY OF MAGNESIUM: CPT

## 2022-10-17 PROCEDURE — 74011000250 HC RX REV CODE- 250: Performed by: INTERNAL MEDICINE

## 2022-10-17 PROCEDURE — 74011250636 HC RX REV CODE- 250/636: Performed by: HOSPITALIST

## 2022-10-17 PROCEDURE — 36415 COLL VENOUS BLD VENIPUNCTURE: CPT

## 2022-10-17 PROCEDURE — 74011250637 HC RX REV CODE- 250/637: Performed by: INTERNAL MEDICINE

## 2022-10-17 PROCEDURE — 85018 HEMOGLOBIN: CPT

## 2022-10-17 PROCEDURE — 80048 BASIC METABOLIC PNL TOTAL CA: CPT

## 2022-10-17 RX ORDER — PANTOPRAZOLE SODIUM 40 MG/1
40 TABLET, DELAYED RELEASE ORAL 2 TIMES DAILY
Qty: 60 TABLET | Refills: 0 | Status: SHIPPED | OUTPATIENT
Start: 2022-10-17 | End: 2022-11-16

## 2022-10-17 RX ORDER — SUCRALFATE 1 G/1
1 TABLET ORAL 4 TIMES DAILY
Qty: 120 TABLET | Refills: 0 | Status: SHIPPED | OUTPATIENT
Start: 2022-10-17 | End: 2022-11-16

## 2022-10-17 RX ORDER — POTASSIUM CHLORIDE 7.45 MG/ML
10 INJECTION INTRAVENOUS
Status: COMPLETED | OUTPATIENT
Start: 2022-10-17 | End: 2022-10-17

## 2022-10-17 RX ORDER — MAGNESIUM SULFATE HEPTAHYDRATE 40 MG/ML
4 INJECTION, SOLUTION INTRAVENOUS ONCE
Status: COMPLETED | OUTPATIENT
Start: 2022-10-17 | End: 2022-10-17

## 2022-10-17 RX ADMIN — POTASSIUM CHLORIDE 10 MEQ: 7.46 INJECTION, SOLUTION INTRAVENOUS at 10:26

## 2022-10-17 RX ADMIN — MAGNESIUM SULFATE HEPTAHYDRATE 4 G: 40 INJECTION, SOLUTION INTRAVENOUS at 09:31

## 2022-10-17 RX ADMIN — SODIUM CHLORIDE, PRESERVATIVE FREE 10 ML: 5 INJECTION INTRAVENOUS at 13:04

## 2022-10-17 RX ADMIN — SUCRALFATE 1 G: 1 TABLET ORAL at 07:09

## 2022-10-17 RX ADMIN — POTASSIUM CHLORIDE 10 MEQ: 7.46 INJECTION, SOLUTION INTRAVENOUS at 11:30

## 2022-10-17 RX ADMIN — POTASSIUM CHLORIDE 10 MEQ: 7.46 INJECTION, SOLUTION INTRAVENOUS at 13:03

## 2022-10-17 RX ADMIN — SODIUM CHLORIDE, PRESERVATIVE FREE 10 ML: 5 INJECTION INTRAVENOUS at 05:24

## 2022-10-17 RX ADMIN — POTASSIUM CHLORIDE 10 MEQ: 7.46 INJECTION, SOLUTION INTRAVENOUS at 09:36

## 2022-10-17 NOTE — DISCHARGE INSTRUCTIONS
Discharge Instructions       PATIENT ID: Fifi Pinto  MRN: [id number]   YOB: 1950    DATE OF ADMISSION: [unfilled]    DATE OF DISCHARGE: 10/17/2022    PRIMARY CARE PROVIDER: @PCP@     ATTENDING PHYSICIAN: [unfilled]  DISCHARGING PROVIDER: Lennox Nevins, MD    To contact this individual call [phone] and ask the  to page. If unavailable ask to be transferred the Adult Hospitalist Department. DISCHARGE DIAGNOSES GIB    CONSULTATIONS: [unfilled]    PROCEDURES/SURGERIES: Procedure(s):  ESOPHAGOGASTRODUODENOSCOPY (EGD)    PENDING TEST RESULTS:   At the time of discharge the following test results are still pending:     FOLLOW UP APPOINTMENTS:   [unfilled]     ADDITIONAL CARE RECOMMENDATIONS:     DIET: Regular Diet    ACTIVITY: Activity as tolerated    WOUND CARE:     EQUIPMENT needed:       Radiology      DISCHARGE MEDICATIONS:   See Medication Reconciliation Form    It is important that you take the medication exactly as they are prescribed. Keep your medication in the bottles provided by the pharmacist and keep a list of the medication names, dosages, and times to be taken in your wallet. Do not take other medications without consulting your doctor. NOTIFY YOUR PHYSICIAN FOR ANY OF THE FOLLOWING:   Fever over 101 degrees for 24 hours. Chest pain, shortness of breath, fever, chills, nausea, vomiting, diarrhea, change in mentation, falling, weakness, bleeding. Severe pain or pain not relieved by medications. Or, any other signs or symptoms that you may have questions about.       DISPOSITION:   x Home With:   OT  PT  HH  RN       SNF/Inpatient Rehab/LTAC    Independent/assisted living    Hospice    Other:     CDMP Checked:   Yes x     PROBLEM LIST Updated:  Yes x       Signed:   Lennox Nevins, MD  10/17/2022  3:44 PM

## 2022-10-17 NOTE — PROGRESS NOTES
Patient given discharged instructions, medications reviewed, and follow-up with PCP discussed.   Patient cleared for discharged H&H 10.1 and K 5.0  Oseas Peterson

## 2022-10-17 NOTE — PROGRESS NOTES
OCCUPATIONAL THERAPY EVALUATION/DISCHARGE  Patient: Casa Maldonado (8 y.o. female)  Date: 10/17/2022  Primary Diagnosis: GI bleeding [K92.2]  Procedure(s) (LRB):  ESOPHAGOGASTRODUODENOSCOPY (EGD) (N/A) 2 Days Post-Op   Precautions:        ASSESSMENT  Based on the objective data described below, the patient presents near baseline level of ADL/IADL performance with mild decreases in mobility, reporting she had used the bathroom for toileting and grooming needs by herself, and got herself dressed. Pt alert and agreeable to therapy. Pt completed all bed and bathroom mobility independently. In bathroom, pt simulated sink ADLs and toileting. At EOB, pt demonstrated tailor sitting for LB dressing. Pt appears to be at reported baseline level of ADL/IADL performance as pt is functioning independently. Skilled OT is not recommended at this time and following discharge, OT will sign off. Current Level of Function (ADLs/self-care): independent bathroom and functional mobility, independent ADLs    Functional Outcome Measure: The patient scored 100/100 on the Barthel Index outcome measure which is indicative of independence with basic self care. Other factors to consider for discharge: PLOF, family support in home and nearby     PLAN :  Recommend with staff: bathroom for toileting, supervision to help manage lines/leads    Recommendation for discharge: (in order for the patient to meet his/her long term goals)  No skilled occupational therapy/ follow up rehabilitation needs identified at this time. This discharge recommendation:  Has been made in collaboration with the attending provider and/or case management    IF patient discharges home will need the following DME: none       SUBJECTIVE:   Patient stated I was already in the bathroom and everything no problem.     OBJECTIVE DATA SUMMARY:   HISTORY:   Past Medical History:   Diagnosis Date    Heart abnormality     SVT [facility]supraventricular tachycardia) ([facility] [address]     UTI (urinary tract infection)      Past Surgical History:   Procedure Laterality Date    HX ORTHOPAEDIC      HX TONSILLECTOMY      HX WISDOM TEETH EXTRACTION         Prior Level of Function/Environment/Context: Pt was independent, no DME for mobility. Reports that if she feels like she needs a break, she will hold on to furniture for standing rest break. She was independent with all ADLs/IADLs, using a shower chair. She lives at home with her  and has adult children nearby. Lives on the second floor of her home with ~12 steps. She does not drive due to decreased vision at night. No falls reported in past 6 months. Expanded or extensive additional review of patient history:   Home Situation  Home Environment: Private residence  # Steps to Enter: 3  Rails to [employer]: No  One/Two Story Residence: Two story  # of Interior Steps: 12  Interior Rails: Both  Living Alone: No  Support Systems: Spouse/Significant Other, Child(aram)  Patient Expects to be Discharged to[de-identified] Home  Current DME Used/Available at Home: Shower chair  Tub or Shower Type: Tub/Shower combination    Hand dominance: Right    EXAMINATION OF PERFORMANCE DEFICITS:  Cognitive/Behavioral Status:  Neurologic State: Alert  Orientation Level: Oriented X4  Cognition: Appropriate decision making; Appropriate for age attention/concentration; Appropriate safety awareness  Perception: Appears intact  Perseveration: No perseveration noted  Safety/Judgement: Awareness of environment; Fall prevention; Insight into deficits;Home safety    Skin: intact    Edema: none noted    Hearing:       Vision/Perceptual:                                     Range of Motion:  AROM: Generally decreased, functional                         Strength:  Strength: Generally decreased, functional                Coordination:  Coordination: Generally decreased, functional  Fine Motor Skills-Upper: Left Intact; Right Intact    Gross Motor Skills-Upper: Left Intact; Right Intact    Tone & Sensation:  Tone: Normal  Sensation: Intact                      Balance:  Sitting: Intact  Standing: Impaired; Without support  Standing - Static: Good  Standing - Dynamic : Fair    Functional Mobility and Transfers for ADLs:  Bed Mobility:  Supine to Sit: Independent  Sit to Supine: Independent  Scooting: Independent    Transfers:  Sit to Stand: Independent  Stand to Sit: Independent  Bathroom Mobility: Independent  Toilet Transfer : Independent    ADL Assessment:  Feeding: Independent    Oral Facial Hygiene/Grooming: Independent    Bathing: Modified independent; Adaptive equipment    Type of Bath: Patient refused    Upper Body Dressing: Independent    Lower Body Dressing: Independent    Toileting: Independent                ADL Intervention and task modifications:   Simulated LB dressing,toileting, and standing grooming ADLs at sink                   Lower Body Dressing Assistance  Dressing Assistance: Independent  Leg Crossed Method Used: Yes  Position Performed: Seated edge of bed         Cognitive Retraining  Safety/Judgement: Awareness of environment; Fall prevention; Insight into deficits;Home safety      Functional Measure:    Barthel Index:  Bathin  Bladder: 10  Bowels: 10  Groomin  Dressing: 10  Feeding: 10  Mobility: 15  Stairs: 10  Toilet Use: 10  Transfer (Bed to Chair and Back): 15  Total: 100/100      The Barthel ADL Index: Guidelines  1. The index should be used as a record of what a patient does, not as a record of what a patient could do. 2. The main aim is to establish degree of independence from any help, physical or verbal, however minor and for whatever reason. 3. The need for supervision renders the patient not independent. 4. A patient's performance should be established using the best available evidence. Asking the patient, friends/relatives and nurses are the usual sources, but direct observation and common sense are also important. However direct testing is not needed.   5. Usually the patient's performance over the preceding 24-48 hours is important, but occasionally longer periods will be relevant. 6. Middle categories imply that the patient supplies over 50 per cent of the effort. 7. Use of aids to be independent is allowed. Score Interpretation (from [facility])    Independent   60-79 Minimally independent   40-59 Partially dependent   20-39 Very dependent   <20 Totally dependent     -Elissa Manriquez., BarthelKARINA. (1965). Functional evaluation: the Barthel Index. [address] ([address] (1997). The Barthel activities of daily living index: self-reporting versus actual performance in the old (> or = 75 years). Journal of [address] [place] [address] 45(7), [facility], J[facility], Na Chavez., [facility]. (1999). Measuring the change in disability after inpatient rehabilitation; comparison of the responsiveness of the Barthel Index and Functional [place] Measure. Journal of Neurology, Neurosurgery, and Psychiatry, 66(4), 737-515. Meño Huff, N.J.A, LUPIS Mercedse, & Osmar Floyd, MMabelA. (2004) Assessment of post-stroke quality of life in cost-effectiveness studies: The usefulness of the Barthel Index and the EuroQoL-5D.  Quality of Life Research, 15, 88-91        Occupational Therapy Evaluation Charge Determination   History Examination Decision-Making   LOW Complexity : Brief history review  LOW Complexity : 1-3 performance deficits relating to physical, cognitive , or psychosocial skils that result in activity limitations and / or participation restrictions  LOW Complexity : No comorbidities that affect functional and no verbal or physical assistance needed to complete eval tasks       Based on the above components, the patient evaluation is determined to be of the following complexity level: LOW   Pain Rating:  No pain reported    Activity Tolerance:   Good    After treatment patient left in no apparent distress: Supine in bed, Call bell within reach, Caregiver / family present, and Side rails x 3    COMMUNICATION/EDUCATION:   The patients plan of care was discussed with: Registered nurse. Thank you for this referral.  LOUISA Dietrich  Time Calculation: 13 mins   Regarding student involvement in patient care:  A student participated in this treatment session. Per CMS Medicare statements and AOTA guidelines I certify that the following was true:  1. I was present and directly observed the entire session. 2. I made all skilled judgments and clinical decisions regarding care. 3. I am the practitioner responsible for assessment, treatment, and documentation.

## 2022-10-17 NOTE — PROGRESS NOTES
Orders received, chart reviewed and patient evaluated by occupational therapy. Pending progression with skilled acute occupational therapy, recommend:  No skilled occupational therapy/ follow up rehabilitation needs identified at this time. Recommend with nursing ADLs with supervision/setup, OOB to chair 3x/day and toileting via functional mobility to and from bathroom. Thank you for completing as able in order to maintain patient strength, endurance and independence. Full evaluation to follow.

## 2022-10-17 NOTE — PROGRESS NOTES
Chart reviewed in preparation for PT eval. Pt now with hgb >7 however still on complete bed rest orders. Will evaluate once activity orders updated and as able and appropriate.     Garfield Juárez PT, DPT

## 2022-10-17 NOTE — DISCHARGE SUMMARY
Discharge Summary       PATIENT ID: Sonja Ospina  MRN: [id number]   YOB: 1950    DATE OF ADMISSION: 10/15/2022 12:22 PM    DATE OF DISCHARGE: 10/17/22   PRIMARY CARE PROVIDER: Melquiades Dougherty DO     ATTENDING PHYSICIAN: Candido Goltz  DISCHARGING PROVIDER: Mega Alvarado MD    To contact this individual call [phone] and ask the  to page. If unavailable ask to be transferred the Adult Hospitalist Department. CONSULTATIONS: IP CONSULT TO HOSPITALIST  IP CONSULT TO GASTROENTEROLOGY    PROCEDURES/SURGERIES: Procedure(s):  ESOPHAGOGASTRODUODENOSCOPY (EGD)    DISCHARGE DIAGNOSES: RAJINDER Ospina is a 74 y.o. female is known past medical history of SVT, orthostatic hypotension, recurrent GI bleeding, esophagitis, esophageal and gastric ulcers, anemia related to acute blood loss requiring transfusion PRBC, urinary tract infection and bacteremia who presents to ED with complaint of bloody vomiting for 1 day. The patient was evaluated in the emergency department laboratory data was obtained was significant for anemia with hemoglobin as low as 5.9, transfusion of PRBC was requested, Protonix IV was provided, octreotide drip was initiated, gastroenterologist was consulted for EGD, and medicine was consulted for admission and further evaluation. Initially during presentation patient was hypotensive, most likely related to hemorrhagic shock, and tachycardic. The patient was taken immediately to endoscopy suit by gastroenterologist, the patient underwent EGD: Esophageal ulcer was the most likely source of her recent upper GI bleeding. Likely [place] [id number] Curran's esophagus. Large hiatal defect in the transverse plane. Large hiatal hernia  The patient was seen after she underwent EGD, and GI bleeding was controlled.      [address] [facility],[address] COURSE:   Esophageal ulcer--post EV EGD given PPI twice daily on discharge  Hemorrhagic shock--blood transfusion PRBC give  Anemia acute blood loss requiring transfusion of PRBC    Curran's esophagus  Large hiatal hernia. HOSPITAL COURSE:   Patient admitted for further management;   Started on IV Protonix and Octreotide drip;   GI consulted    [address] / PLAN:      Discharged home follow-up with PCP    BMI: Body mass index is 19.94 kg/m². . This patient: Has a BMI within normal limits. PENDING TEST RESULTS:   At the time of discharge the following test results are still pending:      ADDITIONAL CARE RECOMMENDATIONS:        NOTIFY YOUR PHYSICIAN FOR ANY OF THE FOLLOWING:   Fever over 101 degrees for 24 hours. Chest pain, shortness of breath, fever, chills, nausea, vomiting, diarrhea, change in mentation, falling, weakness, bleeding. Severe pain or pain not relieved by medications, as well as any other signs or symptoms that you may have questions about. FOLLOW UP APPOINTMENTS:    Follow-up Information       Follow up With Specialties Details Why Huang [address], [address] [place] [address], DO Family Medicine Follow up in 1 week(s)  [facility]  [address] [address] [phone]                 DIET: Regular Diet    ACTIVITY: Activity as tolerated    EQUIPMENT needed:     DISCHARGE MEDICATIONS:  Current Discharge Medication List        START taking these medications    Details   pantoprazole (PROTONIX) 40 mg tablet Take 1 Tablet by mouth two (2) times a day for 30 days. Qty: 60 Tablet, Refills: 0  Start date: 10/17/2022, End date: 11/16/2022      sucralfate (Carafate) 1 gram tablet Take 1 Tablet by mouth four (4) times daily for 30 days.   Qty: 120 Tablet, Refills: 0  Start date: 10/17/2022, End date: 11/16/2022           CONTINUE these medications which have NOT CHANGED    Details   metoprolol succinate (TOPROL-XL) 25 mg XL tablet TAKE 1 TABLET BY MOUTH EVERY DAY AT NIGHT  Qty: 30 Tablet, Refills: 2      magnesium oxide (MAG-OX) 400 mg tablet To take 2 tablets (800 mg) in am and 1 tablet (400 mg ) in pm.  Qty: 270 Tab, Refills: 1    Comments: Dose increased to 800 mg in am and 400 mg in pm on 2-5-21. cholecalciferol (VITAMIN D3) 1,000 unit tablet Take 1 Tab by mouth daily. Qty: 90 Tab, Refills: 3      potassium chloride SR (K-TAB) 20 mEq tablet Take 1 Tab by mouth daily. Qty: 30 Tab, Refills: 2      cranberry fruit extract (CRANBERRY CONCENTRATE PO) Take 1 Tab by mouth daily. ascorbic acid, vitamin C, (VITAMIN C) 500 mg tablet Take 500 mg by mouth daily. STOP taking these medications       Omeprazole delayed release (PRILOSEC D/R) 20 mg tablet Comments:   Reason for Stopping:         bumetanide (BUMEX) 0.5 mg tablet Comments:   Reason for Stopping:               DISPOSITION:  x  Home With:   OT  PT  HH  RN       Long term SNF/Inpatient Rehab    Independent/assisted living    Hospice    Other:       PATIENT CONDITION AT DISCHARGE:     Functional status    Poor     Deconditioned    x Independent      Cognition    x Lucid     Forgetful     Dementia      Catheters/lines (plus indication)    [place]     PICC     PEG    x None      Code status    x Full code     DNR      PHYSICAL EXAMINATION AT DISCHARGE:  General:          Alert, cooperative, no distress, appears stated age. HEENT:           Atraumatic, anicteric sclerae, pink conjunctivae                          No oral ulcers, mucosa moist, throat clear, dentition fair  Neck:               Supple, symmetrical  Lungs:             Clear to auscultation bilaterally. No Wheezing or Rhonchi. No rales. Chest wall:      No tenderness  No Accessory muscle use. Heart:              Regular  rhythm,  No  murmur   No edema  Abdomen:        Soft, non-tender. Not distended. Bowel sounds normal  Extremities:     No cyanosis. No clubbing,                            Skin turgor normal, Capillary refill normal  Skin:                Not pale. Not Jaundiced  No rashes   Psych:             Not anxious or agitated.   Neurologic:      Alert, moves all extremities, answers questions appropriately and responds to commands       [address] [place] [address] Street:  Problem List as of 10/17/2022 Date Reviewed: 4/11/2022            Codes Class Noted - Resolved    GI bleeding ICD-10-CM: K92.2  ICD-9-CM: 578.9  10/15/2022 - Present        Acute hypokalemia ICD-10-CM: E87.6  ICD-9-CM: 276.8  10/9/2022 - Present        Hypomagnesemia ICD-10-CM: E83.42  ICD-9-CM: 275.2  10/9/2022 - Present        Tachycardia ICD-10-CM: R00.0  ICD-9-CM: 785.0  10/9/2022 - Present        GI bleed ICD-10-CM: K92.2  ICD-9-CM: 578.9  10/9/2022 - Present        Sepsis ([facility] [address] ICD-10-CM: A41.9  ICD-9-CM: 038.9, 995.91  4/11/2022 - Present        GIB (gastrointestinal bleeding) ICD-10-CM: K92.2  ICD-9-CM: 578.9  12/3/2020 - Present        Cachexia ([facility] [address] ICD-10-CM: R64  ICD-9-CM: 799.4  11/21/2018 - Present        SVT (supraventricular tachycardia) (HCC) ICD-10-CM: I47.1  ICD-9-CM: 427.89  7/3/2018 - Present        RESOLVED: SVT (supraventricular tachycardia) ([facility] [address] ICD-10-CM: I47.1  ICD-9-CM: 427.89  3/13/2018 - 3/21/2018           Greater than 30  minutes were spent with the patient on counseling and coordination of care    Signed:   Hanh Schwartz MD  10/17/2022  3:50 PM

## 2022-10-17 NOTE — PROGRESS NOTES
Chart reviewed, cleared by RN for PT eval with bed rest orders now removed. Upon entering room, pt observed semi uriostegui on RA, RN in room. Pt politely and answers all questions appropriately however refusing any OOB activity with PT at this time. Pt screened for mobility deficits and denies any fall hx. Pt endorses use of recumbent bike for exercise at home and regularly completes a fina chi and palliates work out a home. Pt extensively educated in fall prevention, activity modification, use of SPC to reduce fall risk, and proper activity pacing. At this time per conversation with OT, pt likely at or near baseline and safe to d/c home from a PT perspective. Pt offered option of HHPT or OPPT interventions and politely declined. Will complete order but please re consult if further mobility concerns arise.     Yesenia Brown PT, DPT      Time consideration: 15 minutes

## 2022-10-17 NOTE — PROGRESS NOTES
Pt refused labs for this RN at 0830. Pt was made aware of significance for redraw of H&H as well as lactic acid given her recent lab results and blood transfusion. She then stated that she would do the H&H after seeing MD.   After seeing and speaking to hospitalist, pt agreed only to H&H, not lactic acid. This RN educated the pt on need for these labs. The patient did agree to labs being drawn. 1600 While rounding on pt, pt stated that she wanted her 18g IV to be removed. After making sure that the IV was not hurting her and was patent and intact this RN educated the patient on the importance of having that IV in place in the event that she were to need another blood transfusion. Pt stated that she was told that she will not need another transfusion even if hgb <7. Pt stated that she was told by MD that she does not need tp get anymore labs drawn from that point moving forward.  at bedside at time stated that he was unsure when wife asked for his input. Pt agreed to leave IV in at this time. 1945  During shift change this RN and night RN walked in to see patient was upset and crying reporting that Dk Quinones was not trying to be difficult\" and \"has had great care\" however she stated was told \"specifically that she should refuse any more labs being drawn and \"would not need any more blood and will be good to go home in the morning. \" She stated she \"felt as though she was being made out as though she were not telling the truth or had not heard what she reported. \" This RN spoke to patient and educated her on orders and that no one was trying to imply that she was wrong about anything that may have been discussed with her prior, that we nurses just have to go by orders and protocol. RN for the overnight shift let patient know she would be checking back in with her about the labs once more when time comes for labs to be drawn. Pt calm and reports understanding we are \"just the middle men. \"

## 2022-10-28 ENCOUNTER — TRANSCRIBE ORDER (OUTPATIENT)
Dept: SCHEDULING | Age: 72
End: 2022-10-28

## 2022-10-28 DIAGNOSIS — Z78.0 POSTMENOPAUSAL: Primary | ICD-10-CM

## 2022-11-17 ENCOUNTER — APPOINTMENT (OUTPATIENT)
Dept: CT IMAGING | Age: 72
DRG: 381 | End: 2022-11-17
Attending: EMERGENCY MEDICINE
Payer: MEDICARE

## 2022-11-17 ENCOUNTER — HOSPITAL ENCOUNTER (INPATIENT)
Age: 72
LOS: 5 days | Discharge: HOME | DRG: 381 | End: 2022-11-22
Attending: EMERGENCY MEDICINE | Admitting: INTERNAL MEDICINE
Payer: MEDICARE

## 2022-11-17 DIAGNOSIS — D64.9 ANEMIA, UNSPECIFIED TYPE: ICD-10-CM

## 2022-11-17 DIAGNOSIS — K92.0 HEMATEMESIS, UNSPECIFIED WHETHER NAUSEA PRESENT: ICD-10-CM

## 2022-11-17 DIAGNOSIS — I85.01 BLEEDING ESOPHAGEAL VARICES, UNSPECIFIED ESOPHAGEAL VARICES TYPE (HCC): ICD-10-CM

## 2022-11-17 DIAGNOSIS — K92.2 UPPER GI BLEED: Primary | ICD-10-CM

## 2022-11-17 LAB
ALBUMIN SERPL-MCNC: 2.5 G/DL (ref 3.5–5)
ALBUMIN/GLOB SERPL: 0.7 {RATIO} (ref 1.1–2.2)
ALP SERPL-CCNC: 61 U/L (ref 45–117)
ALT SERPL-CCNC: 17 U/L (ref 12–78)
ANION GAP SERPL CALC-SCNC: 8 MMOL/L (ref 5–15)
APTT PPP: 25.2 SEC (ref 22.1–31)
AST SERPL-CCNC: 27 U/L (ref 15–37)
BASOPHILS # BLD: 0.1 K/UL (ref 0–0.1)
BASOPHILS NFR BLD: 1 % (ref 0–1)
BILIRUB SERPL-MCNC: 1.2 MG/DL (ref 0.2–1)
BUN SERPL-MCNC: 20 MG/DL (ref 6–20)
BUN/CREAT SERPL: 45 (ref 12–20)
CALCIUM SERPL-MCNC: 8.1 MG/DL (ref 8.5–10.1)
CHLORIDE SERPL-SCNC: 104 MMOL/L (ref 97–108)
CO2 SERPL-SCNC: 29 MMOL/L (ref 21–32)
COMMENT, HOLDF: NORMAL
CREAT SERPL-MCNC: 0.44 MG/DL (ref 0.55–1.02)
DIFFERENTIAL METHOD BLD: ABNORMAL
EOSINOPHIL # BLD: 0 K/UL (ref 0–0.4)
EOSINOPHIL NFR BLD: 0 % (ref 0–7)
ERYTHROCYTE [DISTWIDTH] IN BLOOD BY AUTOMATED COUNT: 18.8 % (ref 11.5–14.5)
GLOBULIN SER CALC-MCNC: 3.6 G/DL (ref 2–4)
GLUCOSE SERPL-MCNC: 145 MG/DL (ref 65–100)
HCT VFR BLD AUTO: 25.2 % (ref 35–47)
HGB BLD-MCNC: 7.8 G/DL (ref 11.5–16)
IMM GRANULOCYTES # BLD AUTO: 0 K/UL (ref 0–0.04)
IMM GRANULOCYTES NFR BLD AUTO: 0 % (ref 0–0.5)
INR PPP: 1.6 (ref 0.9–1.1)
LYMPHOCYTES # BLD: 1.1 K/UL (ref 0.8–3.5)
LYMPHOCYTES NFR BLD: 16 % (ref 12–49)
MAGNESIUM SERPL-MCNC: 1.1 MG/DL (ref 1.6–2.4)
MCH RBC QN AUTO: 26.8 PG (ref 26–34)
MCHC RBC AUTO-ENTMCNC: 31 G/DL (ref 30–36.5)
MCV RBC AUTO: 86.6 FL (ref 80–99)
MONOCYTES # BLD: 0.6 K/UL (ref 0–1)
MONOCYTES NFR BLD: 8 % (ref 5–13)
NEUTS SEG # BLD: 5.4 K/UL (ref 1.8–8)
NEUTS SEG NFR BLD: 75 % (ref 32–75)
NRBC # BLD: 0 K/UL (ref 0–0.01)
NRBC BLD-RTO: 0 PER 100 WBC
PLATELET # BLD AUTO: 238 K/UL (ref 150–400)
PMV BLD AUTO: 10.1 FL (ref 8.9–12.9)
POTASSIUM SERPL-SCNC: 3 MMOL/L (ref 3.5–5.1)
PROT SERPL-MCNC: 6.1 G/DL (ref 6.4–8.2)
PROTHROMBIN TIME: 16.7 SEC (ref 9–11.1)
RBC # BLD AUTO: 2.91 M/UL (ref 3.8–5.2)
SAMPLES BEING HELD,HOLD: NORMAL
SODIUM SERPL-SCNC: 141 MMOL/L (ref 136–145)
THERAPEUTIC RANGE,PTTT: NORMAL SECS (ref 58–77)
WBC # BLD AUTO: 7.2 K/UL (ref 3.6–11)

## 2022-11-17 PROCEDURE — 86900 BLOOD TYPING SEROLOGIC ABO: CPT

## 2022-11-17 PROCEDURE — 85730 THROMBOPLASTIN TIME PARTIAL: CPT

## 2022-11-17 PROCEDURE — 85610 PROTHROMBIN TIME: CPT

## 2022-11-17 PROCEDURE — 85025 COMPLETE CBC W/AUTO DIFF WBC: CPT

## 2022-11-17 PROCEDURE — C9113 INJ PANTOPRAZOLE SODIUM, VIA: HCPCS | Performed by: EMERGENCY MEDICINE

## 2022-11-17 PROCEDURE — 36415 COLL VENOUS BLD VENIPUNCTURE: CPT

## 2022-11-17 PROCEDURE — 74011000250 HC RX REV CODE- 250: Performed by: EMERGENCY MEDICINE

## 2022-11-17 PROCEDURE — 93005 ELECTROCARDIOGRAM TRACING: CPT

## 2022-11-17 PROCEDURE — 74011000636 HC RX REV CODE- 636: Performed by: RADIOLOGY

## 2022-11-17 PROCEDURE — 99285 EMERGENCY DEPT VISIT HI MDM: CPT

## 2022-11-17 PROCEDURE — 74178 CT ABD&PLV WO CNTR FLWD CNTR: CPT

## 2022-11-17 PROCEDURE — 83735 ASSAY OF MAGNESIUM: CPT

## 2022-11-17 PROCEDURE — 74011250636 HC RX REV CODE- 250/636: Performed by: EMERGENCY MEDICINE

## 2022-11-17 PROCEDURE — 65270000046 HC RM TELEMETRY

## 2022-11-17 PROCEDURE — 86923 COMPATIBILITY TEST ELECTRIC: CPT

## 2022-11-17 PROCEDURE — 80053 COMPREHEN METABOLIC PANEL: CPT

## 2022-11-17 RX ORDER — POTASSIUM CHLORIDE 7.45 MG/ML
10 INJECTION INTRAVENOUS
Status: COMPLETED | OUTPATIENT
Start: 2022-11-17 | End: 2022-11-18

## 2022-11-17 RX ORDER — ONDANSETRON 2 MG/ML
4 INJECTION INTRAMUSCULAR; INTRAVENOUS
Status: COMPLETED | OUTPATIENT
Start: 2022-11-17 | End: 2022-11-17

## 2022-11-17 RX ADMIN — ONDANSETRON 4 MG: 2 INJECTION INTRAMUSCULAR; INTRAVENOUS at 23:15

## 2022-11-17 RX ADMIN — POTASSIUM CHLORIDE 10 MEQ: 7.46 INJECTION, SOLUTION INTRAVENOUS at 21:54

## 2022-11-17 RX ADMIN — POTASSIUM CHLORIDE 10 MEQ: 7.46 INJECTION, SOLUTION INTRAVENOUS at 23:16

## 2022-11-17 RX ADMIN — SODIUM CHLORIDE, PRESERVATIVE FREE 80 MG: 5 INJECTION INTRAVENOUS at 21:54

## 2022-11-17 RX ADMIN — SODIUM CHLORIDE 1000 ML: 9 INJECTION, SOLUTION INTRAVENOUS at 21:55

## 2022-11-17 RX ADMIN — IOPAMIDOL 100 ML: 755 INJECTION, SOLUTION INTRAVENOUS at 21:29

## 2022-11-17 NOTE — ED TRIAGE NOTES
Pt comes to ED from home with reports of vomiting blood x last night. Pt states she has a known lower esophageal ulcer. Pt states she missed her Prilosec dose yesterday.      GI doc - Joanie Tinajero.

## 2022-11-18 ENCOUNTER — APPOINTMENT (OUTPATIENT)
Dept: GENERAL RADIOLOGY | Age: 72
DRG: 381 | End: 2022-11-18
Attending: INTERNAL MEDICINE
Payer: MEDICARE

## 2022-11-18 LAB
BNP SERPL-MCNC: 288 PG/ML
CALCULATED R AXIS, ECG10: 63 DEGREES
CALCULATED T AXIS, ECG11: -113 DEGREES
COMMENT, HOLDF: NORMAL
DIAGNOSIS, 93000: NORMAL
EST. AVERAGE GLUCOSE BLD GHB EST-MCNC: 91 MG/DL
FERRITIN SERPL-MCNC: 14 NG/ML (ref 26–388)
FOLATE SERPL-MCNC: 4.5 NG/ML (ref 5–21)
HBA1C MFR BLD: 4.8 % (ref 4–5.6)
HCT VFR BLD AUTO: 27.2 % (ref 35–47)
HGB BLD-MCNC: 8.2 G/DL (ref 11.5–16)
IRON SATN MFR SERPL: 4 % (ref 20–50)
IRON SERPL-MCNC: 12 UG/DL (ref 35–150)
LIPASE SERPL-CCNC: 77 U/L (ref 73–393)
PHOSPHATE SERPL-MCNC: 2.7 MG/DL (ref 2.6–4.7)
Q-T INTERVAL, ECG07: 306 MS
QRS DURATION, ECG06: 116 MS
QTC CALCULATION (BEZET), ECG08: 402 MS
SAMPLES BEING HELD,HOLD: NORMAL
TIBC SERPL-MCNC: 314 UG/DL (ref 250–450)
TSH SERPL DL<=0.05 MIU/L-ACNC: 3.5 UIU/ML (ref 0.36–3.74)
VENTRICULAR RATE, ECG03: 104 BPM
VIT B12 SERPL-MCNC: 406 PG/ML (ref 193–986)

## 2022-11-18 PROCEDURE — 74011250637 HC RX REV CODE- 250/637: Performed by: HOSPITALIST

## 2022-11-18 PROCEDURE — 82607 VITAMIN B-12: CPT

## 2022-11-18 PROCEDURE — 74011250637 HC RX REV CODE- 250/637: Performed by: INTERNAL MEDICINE

## 2022-11-18 PROCEDURE — 83540 ASSAY OF IRON: CPT

## 2022-11-18 PROCEDURE — 83690 ASSAY OF LIPASE: CPT

## 2022-11-18 PROCEDURE — 84443 ASSAY THYROID STIM HORMONE: CPT

## 2022-11-18 PROCEDURE — 74011000250 HC RX REV CODE- 250: Performed by: INTERNAL MEDICINE

## 2022-11-18 PROCEDURE — 94761 N-INVAS EAR/PLS OXIMETRY MLT: CPT

## 2022-11-18 PROCEDURE — 85018 HEMOGLOBIN: CPT

## 2022-11-18 PROCEDURE — 83880 ASSAY OF NATRIURETIC PEPTIDE: CPT

## 2022-11-18 PROCEDURE — 83036 HEMOGLOBIN GLYCOSYLATED A1C: CPT

## 2022-11-18 PROCEDURE — 36415 COLL VENOUS BLD VENIPUNCTURE: CPT

## 2022-11-18 PROCEDURE — 74011250636 HC RX REV CODE- 250/636: Performed by: INTERNAL MEDICINE

## 2022-11-18 PROCEDURE — 65270000046 HC RM TELEMETRY

## 2022-11-18 PROCEDURE — 71045 X-RAY EXAM CHEST 1 VIEW: CPT

## 2022-11-18 PROCEDURE — 99222 1ST HOSP IP/OBS MODERATE 55: CPT | Performed by: SPECIALIST

## 2022-11-18 PROCEDURE — 82746 ASSAY OF FOLIC ACID SERUM: CPT

## 2022-11-18 PROCEDURE — 82728 ASSAY OF FERRITIN: CPT

## 2022-11-18 PROCEDURE — 84100 ASSAY OF PHOSPHORUS: CPT

## 2022-11-18 PROCEDURE — 74011250637 HC RX REV CODE- 250/637: Performed by: PHYSICIAN ASSISTANT

## 2022-11-18 PROCEDURE — 74011636637 HC RX REV CODE- 636/637: Performed by: INTERNAL MEDICINE

## 2022-11-18 RX ORDER — SUCRALFATE 1 G/1
1 TABLET ORAL 4 TIMES DAILY
Status: DISCONTINUED | OUTPATIENT
Start: 2022-11-18 | End: 2022-11-22 | Stop reason: HOSPADM

## 2022-11-18 RX ORDER — SODIUM CHLORIDE 0.9 % (FLUSH) 0.9 %
5-40 SYRINGE (ML) INJECTION AS NEEDED
Status: DISCONTINUED | OUTPATIENT
Start: 2022-11-18 | End: 2022-11-22 | Stop reason: HOSPADM

## 2022-11-18 RX ORDER — PANTOPRAZOLE SODIUM 40 MG/1
40 TABLET, DELAYED RELEASE ORAL
Status: DISCONTINUED | OUTPATIENT
Start: 2022-11-18 | End: 2022-11-19

## 2022-11-18 RX ORDER — OCTREOTIDE ACETATE 50 UG/ML
50 INJECTION, SOLUTION INTRAVENOUS; SUBCUTANEOUS ONCE
Status: COMPLETED | OUTPATIENT
Start: 2022-11-18 | End: 2022-11-18

## 2022-11-18 RX ORDER — ONDANSETRON 2 MG/ML
4 INJECTION INTRAMUSCULAR; INTRAVENOUS
Status: DISCONTINUED | OUTPATIENT
Start: 2022-11-18 | End: 2022-11-20

## 2022-11-18 RX ORDER — POTASSIUM CHLORIDE 750 MG/1
40 TABLET, FILM COATED, EXTENDED RELEASE ORAL 2 TIMES DAILY
Status: DISCONTINUED | OUTPATIENT
Start: 2022-11-18 | End: 2022-11-22 | Stop reason: HOSPADM

## 2022-11-18 RX ORDER — SODIUM CHLORIDE 0.9 % (FLUSH) 0.9 %
5-40 SYRINGE (ML) INJECTION EVERY 8 HOURS
Status: DISCONTINUED | OUTPATIENT
Start: 2022-11-18 | End: 2022-11-22 | Stop reason: HOSPADM

## 2022-11-18 RX ORDER — ACETAMINOPHEN 325 MG/1
650 TABLET ORAL
Status: DISCONTINUED | OUTPATIENT
Start: 2022-11-18 | End: 2022-11-22 | Stop reason: HOSPADM

## 2022-11-18 RX ORDER — ACETAMINOPHEN 650 MG/1
650 SUPPOSITORY RECTAL
Status: DISCONTINUED | OUTPATIENT
Start: 2022-11-18 | End: 2022-11-22 | Stop reason: HOSPADM

## 2022-11-18 RX ORDER — POTASSIUM CHLORIDE 750 MG/1
40 TABLET, FILM COATED, EXTENDED RELEASE ORAL
Status: COMPLETED | OUTPATIENT
Start: 2022-11-18 | End: 2022-11-18

## 2022-11-18 RX ORDER — SODIUM CHLORIDE, SODIUM LACTATE, POTASSIUM CHLORIDE, CALCIUM CHLORIDE 600; 310; 30; 20 MG/100ML; MG/100ML; MG/100ML; MG/100ML
75 INJECTION, SOLUTION INTRAVENOUS CONTINUOUS
Status: DISCONTINUED | OUTPATIENT
Start: 2022-11-18 | End: 2022-11-22 | Stop reason: HOSPADM

## 2022-11-18 RX ORDER — LANOLIN ALCOHOL/MO/W.PET/CERES
400 CREAM (GRAM) TOPICAL 2 TIMES DAILY
Status: DISCONTINUED | OUTPATIENT
Start: 2022-11-18 | End: 2022-11-22 | Stop reason: HOSPADM

## 2022-11-18 RX ORDER — METOPROLOL SUCCINATE 25 MG/1
25 TABLET, EXTENDED RELEASE ORAL
Status: DISCONTINUED | OUTPATIENT
Start: 2022-11-18 | End: 2022-11-22 | Stop reason: HOSPADM

## 2022-11-18 RX ORDER — ENOXAPARIN SODIUM 100 MG/ML
40 INJECTION SUBCUTANEOUS DAILY
Status: DISCONTINUED | OUTPATIENT
Start: 2022-11-18 | End: 2022-11-18

## 2022-11-18 RX ORDER — POTASSIUM CHLORIDE 14.9 MG/ML
10 INJECTION INTRAVENOUS
Status: DISCONTINUED | OUTPATIENT
Start: 2022-11-18 | End: 2022-11-18

## 2022-11-18 RX ORDER — ONDANSETRON 4 MG/1
4 TABLET, ORALLY DISINTEGRATING ORAL
Status: DISCONTINUED | OUTPATIENT
Start: 2022-11-18 | End: 2022-11-20

## 2022-11-18 RX ORDER — MAGNESIUM SULFATE 1 G/100ML
1 INJECTION INTRAVENOUS ONCE
Status: COMPLETED | OUTPATIENT
Start: 2022-11-18 | End: 2022-11-18

## 2022-11-18 RX ORDER — POLYETHYLENE GLYCOL 3350 17 G/17G
17 POWDER, FOR SOLUTION ORAL DAILY PRN
Status: DISCONTINUED | OUTPATIENT
Start: 2022-11-18 | End: 2022-11-22 | Stop reason: HOSPADM

## 2022-11-18 RX ORDER — PANTOPRAZOLE SODIUM 40 MG/1
40 TABLET, DELAYED RELEASE ORAL
Status: DISCONTINUED | OUTPATIENT
Start: 2022-11-18 | End: 2022-11-18

## 2022-11-18 RX ADMIN — OCTREOTIDE ACETATE 50 MCG: 50 INJECTION, SOLUTION INTRAVENOUS; SUBCUTANEOUS at 02:31

## 2022-11-18 RX ADMIN — SODIUM CHLORIDE, PRESERVATIVE FREE 10 ML: 5 INJECTION INTRAVENOUS at 21:54

## 2022-11-18 RX ADMIN — POTASSIUM CHLORIDE 40 MEQ: 750 TABLET, FILM COATED, EXTENDED RELEASE ORAL at 11:05

## 2022-11-18 RX ADMIN — SUCRALFATE 1 G: 1 TABLET ORAL at 09:22

## 2022-11-18 RX ADMIN — SUCRALFATE 1 G: 1 TABLET ORAL at 14:07

## 2022-11-18 RX ADMIN — MAGNESIUM SULFATE HEPTAHYDRATE 1 G: 1 INJECTION, SOLUTION INTRAVENOUS at 02:32

## 2022-11-18 RX ADMIN — Medication 400 MG: at 11:04

## 2022-11-18 RX ADMIN — CEFTRIAXONE SODIUM 1 G: 1 INJECTION, POWDER, FOR SOLUTION INTRAMUSCULAR; INTRAVENOUS at 02:31

## 2022-11-18 RX ADMIN — PANTOPRAZOLE SODIUM 40 MG: 40 TABLET, DELAYED RELEASE ORAL at 16:29

## 2022-11-18 RX ADMIN — SODIUM CHLORIDE, POTASSIUM CHLORIDE, SODIUM LACTATE AND CALCIUM CHLORIDE 75 ML/HR: 600; 310; 30; 20 INJECTION, SOLUTION INTRAVENOUS at 02:32

## 2022-11-18 RX ADMIN — OCTREOTIDE ACETATE 50 MCG/HR: 500 INJECTION, SOLUTION INTRAVENOUS; SUBCUTANEOUS at 02:33

## 2022-11-18 RX ADMIN — POTASSIUM CHLORIDE 40 MEQ: 750 TABLET, FILM COATED, EXTENDED RELEASE ORAL at 02:31

## 2022-11-18 RX ADMIN — SUCRALFATE 1 G: 1 TABLET ORAL at 22:04

## 2022-11-18 RX ADMIN — SODIUM CHLORIDE, PRESERVATIVE FREE 10 ML: 5 INJECTION INTRAVENOUS at 13:54

## 2022-11-18 NOTE — PROGRESS NOTES
Advance Care Planning (ACP) Physician/NP/PA Conversation      Date of Conversation: 11/17/2022  Conducted with: Patient with Decision Making Capacity    Healthcare Decision Maker:     Primary Decision Maker: Jo Ann Mak - [phone]  Click here to complete [address] including selection of the Healthcare Decision Maker Relationship (ie \"Primary\")    Today we documented Decision Maker(s) consistent with ACP documents on file. Care Preferences:    Hospitalization: \"If your health worsens and it becomes clear that your chance of recovery is unlikely, what would be your preference regarding hospitalization? \"  The patient would prefer hospitalization. Ventilation: \"If you were unable to breathe on your own and your chance of recovery was unlikely, what would be your preference about the use of a ventilator (breathing machine) if it was available to you? \"   The patient would NOT desire the use of a ventilator. Resuscitation: \"In the event your heart stopped as a result of an underlying serious health condition, would you want attempts to be made to restart your heart, or would you prefer a natural death? \"   No, do NOT attempt to resuscitate.     Additional topics discussed: treatment goals, benefit/burden of treatment options, artificial nutrition, ventilation preferences, hospitalization preferences, and resuscitation preferences    Conversation Outcomes / Follow-Up Plan:   ACP complete - no further action today  Reviewed DNR/DNI and patient confirms current DNR status - completed forms on file (place new order if needed)     Length of Voluntary ACP Conversation in minutes:  20 minutes    Wen Haynes MD

## 2022-11-18 NOTE — CONSULTS
[address] [facility]                       Cardiology Care Note     [x]Initial Consult     []Progress note    Patient Name: Tamanna Gentile - BWW:[id number] - NTW:[id number]  Primary Cardiologist: Dr Basia Gonzalez Cardiologist: [facility] Cardiology Physicians: Pranay Rosa MD     Reason for consult:af                  [address] [facility]    CONSULT/PROGRESS NOTE      Patient seen on the day of progress note and examined  and agree with Advance Practice Provider (CARRIE, NP,PA)  assessment and plans. Time spent on patient's care >60% of entire combined MD/CARRIE evaluation/treatment      Brief HPI:72 y.o. female with PMH significant for  HTN, SVT, mod MR, and esophageal ulcer with hospitalization 10/15/22 to 10/17/22 for management of GIB. She required transfusion during this admission. She presented to ED with recurrent hematemesis and acute blood loss anemia. Hgb 7.8, , K 3.0, Creatinine 0.44, , Mag 1.1. She was found on EKG to be in atrial fibrillation. She was started on Toprol. No OAC with her active blood loss. Overnight pt converted to SR. A/P: She is feeling well cardiac wise. Closely followed by Dr. Jennifer Bruner for history of SVT. I have reviewed the EKG upon admission. There is significant baseline artifact which reduces the accuracy of interpretation. Nonetheless I see no convincing evidence for atrial fibrillation. EKG seems to be with normal sinus rhythm and PACs. No additional cardiac interventions at this time. Follow-up with Dr. Jennifer Bruner as an outpatient.             BP Readings from Last 3 Encounters:   11/18/22 (!) 96/54   10/17/22 (!) 92/55   10/10/22 97/60       Pulse Readings from Last 3 Encounters:   11/18/22 (!) 109   10/17/22 (!) 104   10/10/22 97       Visit Vitals  BP (!) 96/54   Pulse (!) 109   Temp 98 °F (36.7 °C)   Resp 18   Ht 5' 4\" (1.626 m)   Wt 110 lb (49.9 kg)   SpO2 99%   BMI 18.88 kg/m²     General Appearance:  Well developed, well nourished,alert and oriented x 3, and individual in no acute distress. Ears/Nose/Mouth/Throat:   Hearing grossly normal.         Neck: Supple. Chest:   Lungs clear to auscultation bilaterally. Cardiovascular:  Regular rate and rhythm, S1, S2 normal, no murmur. Abdomen:   Soft, non-tender, bowel sounds are active. Extremities: No edema bilaterally. Skin: Warm and dry. 05/14/21    ECHO ADULT COMPLETE 05/14/2021 5/14/2021    Interpretation Summary  · LV: Calculated LVEF is 64%. Normal cavity size, wall thickness and systolic function (ejection fraction normal). Wall motion: normal. Inconclusive left ventricular diastolic function. · LA: Severely dilated left atrium. Left Atrium volume index is 55 mL/m2. · MV: Mitral valve thickening. Trivial mitral valve prolapse. At least moderate mitral valve regurgitation is present. PISA measures 0.5 cm. · TV: Mild tricuspid valve regurgitation is present. · PA: Pulmonary arterial systolic pressure is 25 mmHg. · Pericardium: There is left pleural effusion. Signed by: Damon James MD on 5/14/2021 11:26 AM        Lab Results   Component Value Date/Time    CK 89 03/14/2018 05:07 AM    CK - MB <1.0 03/14/2018 05:07 AM    CK-MB Index Cannot be calculated 03/14/2018 05:07 AM    Troponin-I, Qt. <0.05 07/03/2018 09:21 AM       Lab Results   Component Value Date/Time    Creatinine 0.44 (L) 11/17/2022 03:13 PM       Lab Results   Component Value Date/Time    HGB 8.2 (L) 11/18/2022 02:16 AM                         HPI:  Ashley Gonzalez is a 74 y.o. female with PMH significant for  HTN, SVT, mod MR, and esophageal ulcer with hospitalization 10/15/22 to 10/17/22 for management of GIB. She required transfusion during this admission. She presented to ED with recurrent hematemesis and acute blood loss anemia. Hgb 7.8, , K 3.0, Creatinine 0.44, , Mag 1.1.  She was found on EKG to be in atrial fibrillation. She was started on Toprol. No OAC with her active blood loss. Overnight pt converted to SR. SUBJECTIVE:  Currently denies CP, SOB, palpitations. Assessment and Plan     New diagnosis concerning for atrial fibrillation: pt presented with acute GIB/blood loss anemia in setting of recent esophageal ulcer. She presented to the hospital with hematemesis. EKG was read as atrial fibrillation . Upon review with Dr Mcnulty Silvana appears to be ST with frequent PACs. HR remains in 100-110 range. BP trending lower. Cont with toprol as BP allows. Treat underlying GIB. Cont to monitor on tele   2. Hypokalemia/Hypomagnesemia: replete         ____________________________________________________________    Cardiac testing  05/14/21    ECHO ADULT COMPLETE 05/14/2021 5/14/2021    Interpretation Summary  · LV: Calculated LVEF is 64%. Normal cavity size, wall thickness and systolic function (ejection fraction normal). Wall motion: normal. Inconclusive left ventricular diastolic function. · LA: Severely dilated left atrium. Left Atrium volume index is 55 mL/m2. · MV: Mitral valve thickening. Trivial mitral valve prolapse. At least moderate mitral valve regurgitation is present. PISA measures 0.5 cm. · TV: Mild tricuspid valve regurgitation is present. · PA: Pulmonary arterial systolic pressure is 25 mmHg. · Pericardium: There is left pleural effusion. Signed by: Sade Lebron MD on 5/14/2021 11:26 AM              Most recent HS troponins:  No results for input(s): TROPHS in the last 72 hours.     No lab exists for component:  CKMB      Review of Systems:    [x]All other systems reviewed and all negative except as written in HPI    [] Patient unable to provide secondary to condition    Past Medical History:   Diagnosis Date    Heart abnormality     SVT (supraventricular tachycardia) ([address]     UTI (urinary tract infection)      Past Surgical History:   Procedure Laterality Date HX ORTHOPAEDIC      HX TONSILLECTOMY      HX WISDOM TEETH EXTRACTION       Social Hx:  reports that she has never smoked. She has never used smokeless tobacco. She reports current alcohol use. She reports that she does not use drugs. Family Hx: family history is not on file. No Known Allergies       OBJECTIVE:  Wt Readings from Last 3 Encounters:   11/17/22 110 lb (49.9 kg)   10/17/22 116 lb 2.9 oz (52.7 kg)   10/10/22 100 lb 8 oz (45.6 kg)     No intake or output data in the 24 hours ending 11/18/22 1229    Physical Exam:    Vitals:   Vitals:    11/18/22 0155 11/18/22 0355 11/18/22 0455 11/18/22 0919   BP: 102/62 94/61 (!) 92/56 (!) 96/54   Pulse: (!) 112 (!) 103 (!) 108 (!) 109   Resp: 15 21 19 18   Temp:    98 °F (36.7 °C)   SpO2:    99%   Weight:       Height:         Telemetry: normal sinus rhythm    Gen: Well-developed, thin, in no acute distress  Neck: Supple, No JVD, No Carotid Bruit  Resp: No accessory muscle use, Clear breath sounds, No rales or rhonchi  Card: Regular Rate,Rythm, Normal S1, S2, + murmur, no rubs or gallop. No thrills. Abd:   Soft, non-tender, non-distended, BS+   MSK: No cyanosis  Skin: No rashes    Neuro: Moving all four extremities, follows commands appropriately  Psych: Good insight, oriented to person, place, alert, Nml Affect  LE: No edema    Data Review:     Radiology:   XR Results (most recent):  Results from Hospital Encounter encounter on 11/17/22    XR CHEST PORT    Narrative  INDICATION: GI bleed    EXAM:  AP CHEST RADIOGRAPH    COMPARISON: April 10, 2022    FINDINGS:    AP portable view of the chest demonstrates a normal cardiomediastinal  silhouette. There is no edema, effusion, consolidation, or pneumothorax. The  osseous structures are unremarkable. Impression  No acute process.       Recent Labs     11/18/22  0216 11/17/22  1513   NA  --  141   K  --  3.0*   CL  --  104   CO2  --  29   BUN  --  20   CREA  --  0.44*   GLU  --  145*   PHOS 2.7  --    CA  --  8.1* Recent Labs     11/18/22  0216 11/17/22  1513   WBC  --  7.2   HGB 8.2* 7.8*   HCT 27.2* 25.2*   PLT  --  238     Recent Labs     11/17/22  1746 11/17/22  1513   PTP 16.7*  --    INR 1.6*  --    AP  --  61     No results for input(s): CHOL, LDLC in the last 72 hours.     No lab exists for component: TGL, HDLC,  HBA1C      Current meds:    Current Facility-Administered Medications:     metoprolol succinate (TOPROL-XL) XL tablet 25 mg, 25 mg, Oral, QHS, Jim Lomax MD    sucralfate (CARAFATE) tablet 1 g, 1 g, Oral, QID, Jmi Lomax MD, 1 g at 11/18/22 0922    sodium chloride (NS) flush 5-40 mL, 5-40 mL, IntraVENous, Q8H, Jim Lomax MD    sodium chloride (NS) flush 5-40 mL, 5-40 mL, IntraVENous, PRN, Jim Lomax MD    acetaminophen (TYLENOL) tablet 650 mg, 650 mg, Oral, Q6H PRN **OR** acetaminophen (TYLENOL) suppository 650 mg, 650 mg, Rectal, Q6H PRN, Jim Lomax MD    polyethylene glycol (MIRALAX) packet 17 g, 17 g, Oral, DAILY PRN, Jim Lomax MD    ondansetron (ZOFRAN ODT) tablet 4 mg, 4 mg, Oral, Q8H PRN **OR** ondansetron (ZOFRAN) injection 4 mg, 4 mg, IntraVENous, Q6H PRN, Jim Lomax MD    octreotide (SANDOSTATIN) 500 mcg in 0.9% sodium chloride 500 mL infusion, 50 mcg/hr, IntraVENous, CONTINUOUS, Jim Lomax MD, Last Rate: 50 mL/hr at 11/18/22 0233, 50 mcg/hr at 11/18/22 0233    cefTRIAXone (ROCEPHIN) 1 g in 0.9% sodium chloride 10 mL IV syringe, 1 g, IntraVENous, Q24H, Jim Lomax MD, 1 g at 11/18/22 0231    lactated Ringers infusion, 75 mL/hr, IntraVENous, CONTINUOUS, Jim Lomax MD, Last Rate: 75 mL/hr at 11/18/22 0232, 75 mL/hr at 11/18/22 0232    magnesium oxide (MAG-OX) tablet 400 mg, 400 mg, Oral, BID, Alfonse Hamman, Mahmud, MD, 400 mg at 11/18/22 1104    potassium chloride SR (KLOR-CON 10) tablet 40 mEq, 40 mEq, Oral, BID, Alfonse Hamman, Mahmud, MD, 40 mEq at 11/18/22 1105    pantoprazole (PROTONIX) tablet 40 mg, 40 mg, Oral, ACB&D, Caroline Neal, PA    Current Outpatient Medications:     metoprolol succinate (TOPROL-XL) 25 mg XL tablet, TAKE 1 TABLET BY MOUTH EVERY DAY AT NIGHT, Disp: 30 Tablet, Rfl: 2    magnesium oxide (MAG-OX) 400 mg tablet, To take 2 tablets (800 mg) in am and 1 tablet (400 mg ) in pm. (Patient taking differently: Take 400 mg by mouth two (2) times a day. To take 2 tablets (800 mg) in am and 1 tablet (400 mg ) in pm.), Disp: 270 Tab, Rfl: 1    cholecalciferol (VITAMIN D3) 1,000 unit tablet, Take 1 Tab by mouth daily. , Disp: 90 Tab, Rfl: 3    potassium chloride SR (K-TAB) 20 mEq tablet, Take 1 Tab by mouth daily. , Disp: 30 Tab, Rfl: 2    cranberry fruit extract (CRANBERRY CONCENTRATE PO), Take 1 Tab by mouth daily. , Disp: , Rfl:     ascorbic acid, vitamin C, (VITAMIN C) 500 mg tablet, Take 500 mg by mouth daily. , Disp: , Rfl:     Андрей Nguyen MD    [facility] Cardiology  Call center: F) [phone] (s) [phone]      CC:Malia Bender DO

## 2022-11-18 NOTE — ED NOTES
Pt says she does not want anyone except her GI physician to do her scope and he does not come to [facility].

## 2022-11-18 NOTE — ED NOTES
Pt declined anything to do with her heart as she \"is not here for her heart\". Pt said no more EKGs. It was explained to her that the wires for the monitors were not EKGs and she was just being monitored.

## 2022-11-18 NOTE — PROGRESS NOTES
[address] [facility] Adult  Hospitalist Group                                                                                          Hospitalist Progress Note  Sal Schwartz MD  Answering service: [phone] from in house phone        Date of Service:  2022  NAME:  Declan Alcantara  :    MRN:  [id number]      Admission Summary: This 75-year-old woman with past medical history significant for hypertension and esophageal ulcer presented at the emergency room with vomiting. The patient stated that she started vomiting blood last night. No associated abdominal pain. The patient also stated that she missed the dose of her Prilosec yesterday. The patient has history of esophageal ulcer and was last admitted to the hospital from 10/15/2022 to 10/17/2022. She was also admitted at that time with vomiting blood. The patient received blood transfusion. EGD was performed. The patient was discharged home in stable condition. The CT scan of the abdomen and pelvis done on arrival at the emergency room. No evidence of GI bleeding, but cirrhosis and sequela of portal hypertension noted and small esophageal varices also noted as well as small volume ascites. Interval history / Subjective:   Patient seen and examined in the ED she is refusing any procedure as well as IV replacement of potassium and magnesium patient was seen by GI and cardiology     Assessment & Plan:     1. Acute upper GI bleed. Patient has history of esophageal varices  -- Continue PPI and octreotide drip  --Patient seen by GI she is refusing EGD  --He also refusing taking PPI IV so oral ordered  --Continue Rocephin    2. Acute blood loss anemia. --Check Hb every 8 hours transfuse less than 7     3. Hypokalemia hypomagnesemia patient refused IV repletion will add oral  4. Hypertension. We will resume preadmission medication monitor blood pressure  6. Hyperglycemia. We will check hemoglobin A1c level. The patient has no history of diabetes. 7.  Persistent atrial fibrillation. The patient has history of SVT. The EKG in the emergency room shows atrial fibrillation. consulted cardiology rate control replete electrolytes patient on metoprolol patient closely follows with Dr. Danae Diego patient does not want to change any medications or add on any medication     Code status: DNR  DVT prophylaxis: SCD    Care Plan discussed with: Patient/Family and Nurse  Anticipated Disposition: Home w/Family  Anticipated Discharge: Less than 24 hours     Hospital Problems  Date Reviewed: 11/18/2022            Codes Class Noted POA    * (Principal) Acute upper GI bleed ICD-10-CM: K92.2  ICD-9-CM: 578.9  11/17/2022 Yes             Review of Systems:   A comprehensive review of systems was negative. Vital Signs:    Last 24hrs VS reviewed since prior progress note.  Most recent are:  Visit Vitals  BP (!) 96/54   Pulse (!) 109   Temp 98 °F (36.7 °C)   Resp 18   Ht 5' 4\" (1.626 m)   Wt 49.9 kg (110 lb)   SpO2 99%   BMI 18.88 kg/m²       No intake or output data in the 24 hours ending 11/18/22 1303     Physical Examination:     I had a face to face encounter with this patient and independently examined them on 11/18/2022 as outlined below:          General : alert x 3, awake, no acute distress, resting in bed, pleasant   HEENT: PEERL, EOMI, moist mucus membrane, TM clear  Neck: supple, no JVD, no meningeal signs  Chest: Clear to auscultation bilaterally   CVS: S1 S2 heard, Capillary refill less than 2 seconds  Abd: soft/ Non tender, non distended, BS physiological,   Ext: no clubbing, no cyanosis, no edema, brisk 2+ DP pulses  Neuro/Psych: pleasant mood and affect, CN 2-12 grossly intact,   Skin: warm     Data Review:    I personally reviewed  Image and labs      Labs:     Recent Labs     11/18/22 0216 11/17/22 1513   WBC  --  7.2   HGB 8.2* 7.8*   HCT 27.2* 25.2*   PLT  --  238     Recent Labs     11/18/22 0216 11/17/22 1513 NA  --  141   K  --  3.0*   CL  --  104   CO2  --  29   BUN  --  20   CREA  --  0.44*   GLU  --  145*   CA  --  8.1*   MG  --  1.1*   PHOS 2.7  --      Recent Labs     11/18/22 0216 11/17/22  1513   ALT  --  17   AP  --  61   TBILI  --  1.2*   TP  --  6.1*   ALB  --  2.5*   GLOB  --  3.6   LPSE 77  --      Recent Labs     11/17/22  1746   INR 1.6*   PTP 16.7*   APTT 25.2      Recent Labs     11/18/22 0216   TIBC 314   PSAT 4*   FERR 14*      Lab Results   Component Value Date/Time    Folate 4.5 (L) 11/18/2022 02:16 AM      No results for input(s): PH, PCO2, PO2 in the last 72 hours. No results for input(s): CPK, CKNDX, TROIQ in the last 72 hours.     No lab exists for component: CPKMB  Lab Results   Component Value Date/Time    Cholesterol, total 87 03/14/2018 05:07 AM    HDL Cholesterol 35 03/14/2018 05:07 AM    LDL, calculated 37 03/14/2018 05:07 AM    Triglyceride 75 03/14/2018 05:07 AM    CHOL/HDL Ratio 2.5 03/14/2018 05:07 AM     No results found for: GLUCPOC  Lab Results   Component Value Date/Time    Color DARK YELLOW 04/10/2022 11:18 PM    Appearance TURBID (A) 04/10/2022 11:18 PM    Specific gravity 1.014 04/10/2022 11:18 PM    pH (UA) 6.0 04/10/2022 11:18 PM    Protein 300 (A) 04/10/2022 11:18 PM    Glucose Negative 04/10/2022 11:18 PM    Ketone 15 (A) 04/10/2022 11:18 PM    Bilirubin NEGATIVE 03/13/2018 11:16 PM    Urobilinogen 2.0 (H) 04/10/2022 11:18 PM    Nitrites Negative 04/10/2022 11:18 PM    Leukocyte Esterase LARGE (A) 04/10/2022 11:18 PM    Epithelial cells FEW 04/10/2022 11:18 PM    Bacteria 4+ (A) 04/10/2022 11:18 PM    WBC >100 (H) 04/10/2022 11:18 PM    RBC 10-20 04/10/2022 11:18 PM         Medications Reviewed:     Current Facility-Administered Medications   Medication Dose Route Frequency    metoprolol succinate (TOPROL-XL) XL tablet 25 mg  25 mg Oral QHS    sucralfate (CARAFATE) tablet 1 g  1 g Oral QID    sodium chloride (NS) flush 5-40 mL  5-40 mL IntraVENous Q8H    sodium chloride (NS) flush 5-40 mL  5-40 mL IntraVENous PRN    acetaminophen (TYLENOL) tablet 650 mg  650 mg Oral Q6H PRN    Or    acetaminophen (TYLENOL) suppository 650 mg  650 mg Rectal Q6H PRN    polyethylene glycol (MIRALAX) packet 17 g  17 g Oral DAILY PRN    ondansetron (ZOFRAN ODT) tablet 4 mg  4 mg Oral Q8H PRN    Or    ondansetron (ZOFRAN) injection 4 mg  4 mg IntraVENous Q6H PRN    octreotide (SANDOSTATIN) 500 mcg in 0.9% sodium chloride 500 mL infusion  50 mcg/hr IntraVENous CONTINUOUS    cefTRIAXone (ROCEPHIN) 1 g in 0.9% sodium chloride 10 mL IV syringe  1 g IntraVENous Q24H    lactated Ringers infusion  75 mL/hr IntraVENous CONTINUOUS    magnesium oxide (MAG-OX) tablet 400 mg  400 mg Oral BID    potassium chloride SR (KLOR-CON 10) tablet 40 mEq  40 mEq Oral BID    pantoprazole (PROTONIX) tablet 40 mg  40 mg Oral ACB&D     Current Outpatient Medications   Medication Sig    metoprolol succinate (TOPROL-XL) 25 mg XL tablet TAKE 1 TABLET BY MOUTH EVERY DAY AT NIGHT    magnesium oxide (MAG-OX) 400 mg tablet To take 2 tablets (800 mg) in am and 1 tablet (400 mg ) in pm. (Patient taking differently: Take 400 mg by mouth two (2) times a day. To take 2 tablets (800 mg) in am and 1 tablet (400 mg ) in pm.)    cholecalciferol (VITAMIN D3) 1,000 unit tablet Take 1 Tab by mouth daily. potassium chloride SR (K-TAB) 20 mEq tablet Take 1 Tab by mouth daily. cranberry fruit extract (CRANBERRY CONCENTRATE PO) Take 1 Tab by mouth daily. ascorbic acid, vitamin C, (VITAMIN C) 500 mg tablet Take 500 mg by mouth daily. ______________________________________________________________________  EXPECTED LENGTH OF STAY: - - -  ACTUAL LENGTH OF STAY:          1      Please note that this dictation was completed with [employer], the [employer] voice recognition software. Quite often unanticipated grammatical, syntax, homophones, and other interpretive errors are inadvertently transcribed by the computer software.   Please disregard these errors. Please excuse any errors that have escaped final proofreading.                 Wen Haynes MD

## 2022-11-18 NOTE — ED PROVIDER NOTES
58-year-old female with a history of esophageal ulcer with periodic bleeding presents to the emergency department stating that yesterday evening she began having hematemesis again similar to prior episodes when her ulcer \"acts up. \"  She states that it is pink hematemesis intermittently intermingled in stomach contents and last vomited at about 4 PM with no further nausea or vomiting since. She states that the last time she vomited it was only a small amount of blood within it. She denies any blood in stool or tarry black stool. She denies any abdominal pain, chest pain, shortness of breath, syncope, fatigue, on no blood thinners. She follows with GI, Dr. Serge Hernandez.  She states that she is not always compliant with her PPIs and potassium supplementation. Vomiting   Pertinent negatives include no chills, no fever, no abdominal pain, no diarrhea, no headaches, no arthralgias, no myalgias, no cough and no headaches. Past Medical History:   Diagnosis Date    Heart abnormality     SVT (supraventricular tachycardia) (HCC)     UTI (urinary tract infection)        Past Surgical History:   Procedure Laterality Date    HX ORTHOPAEDIC      HX TONSILLECTOMY      HX WISDOM TEETH EXTRACTION           No family history on file.     Social History     Socioeconomic History    Marital status:      Spouse name: Not on file    Number of children: Not on file    Years of education: Not on file    Highest education level: Not on file   Occupational History    Not on file   Tobacco Use    Smoking status: Never    Smokeless tobacco: Never   Substance and Sexual Activity    Alcohol use: Yes     Comment: occasional wine    Drug use: No    Sexual activity: Not on file   Other Topics Concern     Service Not Asked    Blood Transfusions Not Asked    Caffeine Concern Not Asked    Occupational Exposure Not Asked    Hobby Hazards Not Asked    Sleep Concern Not Asked    Stress Concern Not Asked    Weight Concern Not Asked    Special Diet Not Asked    Back Care Not Asked    Exercise Not Asked    Bike Helmet Not Asked   [address] Not Asked    Self-Exams Not Asked   Social History Narrative    Not on file     Social Determinants of Health     Financial Resource Strain: Not on file   Food Insecurity: Not on file   Transportation Needs: Not on file   Physical Activity: Not on file   Stress: Not on file   Social Connections: Not on file   Intimate Partner Violence: Not on file   Housing Stability: Not on file         ALLERGIES: Patient has no known allergies. Review of Systems   Constitutional:  Negative for activity change, appetite change, chills and fever. HENT:  Negative for congestion, rhinorrhea, sinus pressure, sneezing and sore throat. Eyes:  Negative for photophobia and visual disturbance. Respiratory:  Negative for cough and shortness of breath. Cardiovascular:  Negative for chest pain. Gastrointestinal:  Positive for nausea and vomiting. Negative for abdominal pain, blood in stool, constipation and diarrhea. Genitourinary:  Negative for difficulty urinating, dysuria, flank pain, frequency, hematuria, menstrual problem, urgency, vaginal bleeding and vaginal discharge. Musculoskeletal:  Negative for arthralgias, back pain, myalgias and neck pain. Skin:  Negative for rash and wound. Neurological:  Negative for syncope, weakness, numbness and headaches. Psychiatric/Behavioral:  Negative for self-injury and suicidal ideas. All other systems reviewed and are negative. Vitals:    11/17/22 1443   BP: 115/75   Pulse: (!) 118   Resp: 20   Temp: 97.9 °F (36.6 °C)   SpO2: 96%   Weight: 49.9 kg (110 lb)   Height: 5' 4\" (1.626 m)            Physical Exam  Vitals and nursing note reviewed. Constitutional:       General: She is not in acute distress. Appearance: Normal appearance. She is well-developed. She is not diaphoretic.       Comments: Pleasant, no acute distress, speaking in full sentences. HENT:      Head: Normocephalic and atraumatic. Nose: Nose normal.   Eyes:      Extraocular Movements: Extraocular movements intact. Conjunctiva/sclera: Conjunctivae normal.      Pupils: Pupils are equal, round, and reactive to light. Cardiovascular:      Rate and Rhythm: Normal rate and regular rhythm. Heart sounds: Normal heart sounds. Pulmonary:      Effort: Pulmonary effort is normal.      Breath sounds: Normal breath sounds. Abdominal:      General: There is no distension. Palpations: Abdomen is soft. Tenderness: There is no abdominal tenderness. Musculoskeletal:         General: No tenderness. Cervical back: Neck supple. Skin:     General: Skin is warm and dry. Coloration: Skin is pale. Neurological:      General: No focal deficit present. Mental Status: She is alert and oriented to person, place, and time. Cranial Nerves: No cranial nerve deficit. Sensory: No sensory deficit. Motor: No weakness. Coordination: Coordination normal.        MDM    75-year-old female with a history of esophageal ulcer with periodic upper GI bleeding presents with the same. She is tachycardic to 118 but afebrile with vital signs stable. Benign abdomen. Labs returned showing Hg 7.8, significant lower than prior measure at 10. No leukocytosis, normal PLT. INR 1.6. Normal renal function, T bili 1.2, but otherwise normal LFTs. K mildly low at 3.0. Ordered IV fluid bolus, IV Protonix, IV Zofran and IV K.  GI consulted. Ordered CT abdomen pelvis to further evaluate and returned showing no evidence of active GI bleeding but does show small esophageal varices and a large hiatal hernia. Will admit to the hospitalist service for further care and assessment.     Total critical care time (not including time spent performing separately reportable procedures): 45 minutes    Procedures    1033 EKG shows atrial fibrillation with RVR with a rate of 104 bpm with RBBB with no acute ST or T wave abnormalities suggestive of ischemia. Perfect Serve Consult for Admission  9:22 PM    ED Room Number: ER27/27  Patient Name and age: Raffaele Mendoza 74 y.o.  female  Working Diagnosis:   1. Upper GI bleed    2. Hematemesis, unspecified whether nausea present    3. Anemia, unspecified type        COVID-19 Suspicion:  no  Sepsis present:  no  Reassessment needed: no  Code Status:  Full Code  Readmission: no  Isolation Requirements:  no  Recommended Level of Care:  telemetry  Department:[facility] Adult ED - 21   Other: 68-year-old female with a history of prior esophageal ulcer and multiple prior admissions for hematemesis and upper GI bleeding presents with the same. Started vomiting yesterday and last episode of emesis was at 4 PM with what she describes as just a small volume of blood. Benign abdomen. Ordered IV fluid bolus, Protonix, Zofran. Hg down to 7.8 from prior 10.1. Stable blood pressure and appears well. Ordered CT to further evaluate, GI consulted.

## 2022-11-18 NOTE — H&P
[address]   HISTORY AND PHYSICAL    Name:  Tony Anders  MR#:  [id number]  :  1950  ACCOUNT #:  [de-identified]  ADMIT DATE:  2022      The patient was seen, evaluated, and admitted by me on 2022. PRIMARY CARE PHYSICIAN:  Malia Bender DO    SOURCE OF INFORMATION:  The patient and review of ED and old electronic medical record. CHIEF COMPLAINT:  Vomiting blood. HISTORY OF PRESENT ILLNESS:  This 39-year-old woman with past medical history significant for hypertension and esophageal ulcer presented at the emergency room with vomiting. The patient stated that she started vomiting blood last night. No associated abdominal pain. The patient also stated that she missed the dose of her Prilosec yesterday. The patient has history of esophageal ulcer and was last admitted to the hospital from 10/15/2022 to 10/17/2022. She was also admitted at that time with vomiting blood. The patient received blood transfusion. EGD was performed. The patient was discharged home in stable condition. The CT scan of the abdomen and pelvis done on arrival at the emergency room. No evidence of GI bleeding, but cirrhosis and sequela of portal hypertension noted and small esophageal varices also noted as well as small volume ascites. PAST MEDICAL HISTORY:  Hypertension and esophageal ulcer. ALLERGIES:  NO KNOWN DRUG ALLERGIES. MEDICATIONS:  1. Carafate 1 g four times daily. 2.  Potassium chloride 20 mEq daily. 3.  Protonix 40 mg twice daily. 4.  Toprol XL 25 mg daily at bedtime. 5.  Magnesium oxide 400 mg 2 tablets daily. FAMILY HISTORY:  This was reviewed. Her mother had hypertension. PAST SURGICAL HISTORY:  This is significant for tonsillectomy. SOCIAL HISTORY:  No history of alcohol or tobacco abuse. REVIEW OF SYSTEMS:  HEAD, EYES, EARS, NOSE AND THROAT:  No headache, no dizziness, no blurring of vision, no photophobia.   RESPIRATORY SYSTEM:  No cough, no shortness of breath, and no hemoptysis. CARDIOVASCULAR SYSTEM:  No chest pain, no orthopnea, and no palpitation. GASTROINTESTINAL SYSTEM:  This is positive for vomiting blood. No diarrhea, no constipation, and no abdominal pain. GENITOURINARY SYSTEM:  No dysuria, no urgency and no frequency. All other systems are reviewed and they are negative. PHYSICAL EXAMINATION:  GENERAL APPEARANCE:  The patient appeared ill and in moderate distress. VITAL SIGNS:  On arrival at the emergency room; temperature 97.9, pulse 118, respiratory rate 20, blood pressure 115/75, and oxygen saturation 96%. HEAD:  Normocephalic, atraumatic. EYES:  Normal eye movement. No redness, no drainage, and no discharge. EARS:  Normal external ears with no obvious drainage. NOSE:  No deformity and no drainage. MOUTH AND THROAT:  No visible oral lesion. NECK:  Neck is supple. No JVD and no thyromegaly. CHEST:  Clear breath sounds. No wheezing and no crackles. HEART:  Normal S1 and S2, irregularly irregular. No clinically appreciable murmur. ABDOMEN:  Soft and nontender. Normal bowel sounds. CNS:  Alert and oriented x3. No gross focal neurological deficit. EXTREMITIES:  No edema. Pulses 2+ bilaterally. MUSCULOSKELETAL SYSTEM:  No obvious joint deformity and swelling. SKIN:  No active skin lesions seen in the exposed part of the body. PSYCHIATRY:  Normal mood and affect. LYMPHATIC SYSTEM:  No cervical lymphadenopathy. DIAGNOSTIC DATA:  The EKG shows atrial fibrillation with rapid ventricular response and nonspecific ST and T-waves abnormalities. The CT scan of the abdomen and pelvis, no evidence of GI bleed, cirrhosis, and sequelae of portal hypertension. Small esophageal varices, small volume ascites, moderate rectal wall thickening, and likely small rectal varices. Trace bilateral pleural effusions. Chest x-ray, no acute process.     LABORATORY DATA:  Hematology; WBC 7.2, hemoglobin 7.8, hematocrit 25.2, and platelets 519. Chemistry; sodium 141, potassium 3.0, chloride 104, CO2 is 29, glucose 145, BUN 20, creatinine 0.44, calcium 8.1, total bilirubin 1.2, ALT 17, AST 27, alkaline phosphatase 61, total protein 6.1, albumin level 2.5, and globulin 3.6. Coagulation profile; INR 1.6, PT 16.7, and PTT 25.2. Magnesium level 1.1.    ASSESSMENT:  1. Acute upper gastrointestinal bleed. 2.  Acute blood loss anemia. 3.  Hypokalemia. 4.  Hypomagnesemia. 5.  Hypertension. 6.  Hyperglycemia. 7.  Esophageal varices. 8.  Persistent atrial fibrillation. PLAN:  1. Acute upper GI bleed. We will admit the patient for further evaluation and treatment. We will start the patient on Protonix. The patient's Gastroenterology group has been consulted. We will await further recommendation from the gastroenterologist.  The patient will be n.p.o. in anticipation of intervention by her gastroenterologist.  2.  Acute blood loss anemia. This is as a result of the patient's acute upper GI bleed. We will monitor the patient's hemoglobin and hematocrit closely. We will transfuse as indicated. We will check serial hemoglobin level. We will also carry out anemia workup. 3.  Hypokalemia. We will replete and repeat level. 4.  Hypomagnesemia. We will also replete and repeat level. 5.  Hypertension. We will resume preadmission medication and monitor the patient's blood pressure closely. 6.  Hyperglycemia. We will check hemoglobin A1c level. The patient has no history of diabetes. 7.  Esophageal varices. This is in the setting of acute upper GI bleed. There is a suspicion for esophageal variceal bleed as well. Because of that suspicion, we will start the patient on Sandostatin. The patient will also be started on Rocephin and we will await further recommendation from the gastroenterologist.  8.  Persistent atrial fibrillation. The patient has history of SVT. The EKG in the emergency room shows atrial fibrillation.   The patient's Cardiology group will be consulted to assist in further evaluation and treatment. We will check serial cardiac markers. We will also check BNP level. 9.  Other issues. Code status, the patient is a full code. Will request SCD for DVT prophylaxis. FUNCTIONAL STATUS PRIOR TO ADMISSION:  The patient came from home. The patient is ambulatory with no assistive device. COVID PRECAUTION:  The patient was wearing a face mask. I was wearing a face mask and gloves for this patient's encounter. Critical care was performed for this encounter. CRITICAL CARE ATTESTATION:   I had a face to face encounter with the patient, reviewed and interpreted patient data including clinical events, labs, images, vital signs, I/O's, and examined patient. I have discussed the case and the plan and management of the patient's care with the consulting services, the bedside nurses and necessary ancillary providers. NOTE OF PERSONAL INVOLVEMENT IN CARE   This patient has a high probability of imminent, clinically significant deterioration, which requires the highest level of preparedness to intervene urgently. I participated in the decision-making and personally managed or directed the management of the following life and organ supporting interventions that required my frequent assessment to treat or prevent imminent deterioration. I personally spent 45 minutes of critical care time. This is time spent at this critically ill patient's bedside actively involved in patient care as well as the coordination of care and discussions with the patient's family. This does not include any procedural time which has been billed separately.      MD NANY [email or web address]  D:  11/18/2022 5:04  T:  11/18/2022 5:58  JOB #:  [id number]  CC:  Malia Bender DO

## 2022-11-18 NOTE — PROGRESS NOTES
1214: Bedside shift change report given to [address] (oncoming nurse) by Bailey Alexis (offgoing nurse). Report included the following information SBAR, ED Summary, Intake/Output, MAR, Accordion, and Cardiac Rhythm Sinus Tach w/ intermittend AFib .     1300: BP 87/60 (Map 69); patient reports chronic low blood pressure due to metoprolol for control of SVT, care per primary cardiologist Dr. Dorita Habermann; patient AOx4, asymptomatic, will continue to monitor; Patient continuing to decline further lab work to check Hgb, education provided. 1400:  Patient on clear liquid diet but drinking coffee provided by family, states \"I'm not going to do that\" in reference to diet orders; Patient agreeable to taking PO sucralfate, education provided; BP 90/58, will continue to monitor     1550: Bedside shift change report given to [address] (oncoming nurse) by [address] [employer] [address] (offgoing nurse). Report included the following information SBAR, Intake/Output, MAR, Accordion, Recent Results, Med Rec Status, and Cardiac Rhythm Sinus Tach (no AFib noted during my shift) .

## 2022-11-18 NOTE — CONSULTS
[address] [facility].   [address] [facility] [address]        GASTROENTEROLOGY CONSULTATION NOTE  Will Ashish Alegria  [phone] office  [phone] NP/PA in-hospital cell phone M-F until 4:30PM  After 5PM or on weekends, please call  for physician on call        NAME:  Mamta Vilchis   :   1950   MRN:   [id number]       Referring Physician: Dr. Moreau Honor Date: 2022 10:22 AM    Chief Complaint: vomiting      History of Present Illness:  Patient is a 74 y. o. who is seen in consultation at the request of Dr. Ema Burrows for upper GI bleed, history of esophageal ulcer. Patient has a past medical history significant for hypertension. She presented to the ED for evaluation of hematemesis. Patient was admitted to the hospital on 22 for acute upper GI bleed. Patient was recently admitted to [facility] from 10/15/22 to 10/17/22. EGD (10/15/22) by Dr. Lizeth Mena for GI bleeding showed an 8 mm esophageal ulcer most likely source of recent upper GI bleeding, likely Curran's esophagus; large hiatal hernia; normal duodenum. Following the EGD, PPI BID x 1 month and Carafate x 1 week were recommended. A repeat EGD was recommended in 3 months. Patient reports that she has been taking omeprazole 20 mg twice daily, however not on a consistent basis. Patient reports an onset of hematemesis on Wednesday night. She reports multiple episodes of vomiting. Last episode was yesterday afternoon around 3PM.  No nausea, reflux, or abdominal pain. No hematochezia or melena. No fevers, chills, or unexplained weight loss.  is at the bedside. No NSAID use. No anticoagulation.  + Alcohol use. No tobacco use. No history of abdominal surgeries. I have reviewed the emergency room note, hospital admission note, notes by all other clinicians who have seen the patient during this hospitalization to date.  I have reviewed the problem list and the reason for this hospitalization. I have reviewed the allergies and the medications the patient was taking at home prior to this hospitalization. PMH:  Past Medical History:   Diagnosis Date    Heart abnormality     SVT (supraventricular tachycardia) (HCC)     UTI (urinary tract infection)        PSH:  Past Surgical History:   Procedure Laterality Date    HX ORTHOPAEDIC      HX TONSILLECTOMY      HX WISDOM TEETH EXTRACTION         Allergies:  No Known Allergies    Home Medications:  Prior to Admission Medications   Prescriptions Last Dose Informant Patient Reported? Taking?   ascorbic acid, vitamin C, (VITAMIN C) 500 mg tablet   Yes No   Sig: Take 500 mg by mouth daily. cholecalciferol (VITAMIN D3) 1,000 unit tablet   No No   Sig: Take 1 Tab by mouth daily. cranberry fruit extract (CRANBERRY CONCENTRATE PO)   Yes No   Sig: Take 1 Tab by mouth daily. magnesium oxide (MAG-OX) 400 mg tablet   No No   Sig: To take 2 tablets (800 mg) in am and 1 tablet (400 mg ) in pm.   Patient taking differently: Take 400 mg by mouth two (2) times a day. To take 2 tablets (800 mg) in am and 1 tablet (400 mg ) in pm.   metoprolol succinate (TOPROL-XL) 25 mg XL tablet   No No   Sig: TAKE 1 TABLET BY MOUTH EVERY DAY AT NIGHT   pantoprazole (PROTONIX) 40 mg tablet   No No   Sig: Take 1 Tablet by mouth two (2) times a day for 30 days. potassium chloride SR (K-TAB) 20 mEq tablet   No No   Sig: Take 1 Tab by mouth daily. sucralfate (Carafate) 1 gram tablet   No No   Sig: Take 1 Tablet by mouth four (4) times daily for 30 days.       Facility-Administered Medications: None       Hospital Medications:  Current Facility-Administered Medications   Medication Dose Route Frequency    metoprolol succinate (TOPROL-XL) XL tablet 25 mg  25 mg Oral QHS    sucralfate (CARAFATE) tablet 1 g  1 g Oral QID    sodium chloride (NS) flush 5-40 mL  5-40 mL IntraVENous Q8H    sodium chloride (NS) flush 5-40 mL  5-40 mL IntraVENous PRN    acetaminophen (TYLENOL) tablet 650 mg  650 mg Oral Q6H PRN    Or    acetaminophen (TYLENOL) suppository 650 mg  650 mg Rectal Q6H PRN    polyethylene glycol (MIRALAX) packet 17 g  17 g Oral DAILY PRN    ondansetron (ZOFRAN ODT) tablet 4 mg  4 mg Oral Q8H PRN    Or    ondansetron (ZOFRAN) injection 4 mg  4 mg IntraVENous Q6H PRN    octreotide (SANDOSTATIN) 500 mcg in 0.9% sodium chloride 500 mL infusion  50 mcg/hr IntraVENous CONTINUOUS    cefTRIAXone (ROCEPHIN) 1 g in 0.9% sodium chloride 10 mL IV syringe  1 g IntraVENous Q24H    lactated Ringers infusion  75 mL/hr IntraVENous CONTINUOUS    pantoprazole (PROTONIX) tablet 40 mg  40 mg Oral ACB    magnesium oxide (MAG-OX) tablet 400 mg  400 mg Oral BID    potassium chloride SR (KLOR-CON 10) tablet 40 mEq  40 mEq Oral BID     Current Outpatient Medications   Medication Sig    metoprolol succinate (TOPROL-XL) 25 mg XL tablet TAKE 1 TABLET BY MOUTH EVERY DAY AT NIGHT    magnesium oxide (MAG-OX) 400 mg tablet To take 2 tablets (800 mg) in am and 1 tablet (400 mg ) in pm. (Patient taking differently: Take 400 mg by mouth two (2) times a day. To take 2 tablets (800 mg) in am and 1 tablet (400 mg ) in pm.)    cholecalciferol (VITAMIN D3) 1,000 unit tablet Take 1 Tab by mouth daily. potassium chloride SR (K-TAB) 20 mEq tablet Take 1 Tab by mouth daily. cranberry fruit extract (CRANBERRY CONCENTRATE PO) Take 1 Tab by mouth daily. ascorbic acid, vitamin C, (VITAMIN C) 500 mg tablet Take 500 mg by mouth daily. Social History:  Social History     Tobacco Use    Smoking status: Never    Smokeless tobacco: Never   Substance Use Topics    Alcohol use: Yes     Comment: occasional wine       Family History:  History reviewed. No pertinent family history.     Review of Systems:  Denies any other complaints at this time  Constitutional: negative fever, negative chills, negative weight loss  Eyes:   negative visual changes  ENT:   negative sore throat, tongue or lip swelling  Respiratory:  negative cough, negative dyspnea  Cards:  negative for chest pain, palpitations, lower extremity edema  GI:   See HPI  :  negative for frequency, dysuria  Integument:  negative for rash and pruritus  Heme:  negative for easy bruising and gum/nose bleeding  Musculoskeletal:negative for myalgias, back pain and muscle weakness  Neuro:  negative for headaches, dizziness,  Psych: negative for feelings of anxiety, depression     Objective:   Patient Vitals for the past 8 hrs:   BP Temp Pulse Resp SpO2   11/18/22 0919 (!) 96/54 98 °F (36.7 °C) (!) 109 18 99 %   11/18/22 0455 (!) 92/56 -- (!) 108 19 --   11/18/22 0355 94/61 -- (!) 103 21 --     No intake/output data recorded. No intake/output data recorded. EXAM:     CONST:  Pleasant female lying in bed, no acute distress,  at the bedside   NEURO:  Alert and oriented x 3   HEENT: EOMI, no scleral icterus   LUNGS: No acute respiratory distress   CARD:  S1 S2   ABD:  Soft, mildly distended, no tenderness, no rebound, no guarding. + Bowel sounds. EXT:  Warm   PSYCH: Full, not anxious or agitated     Data Review     Recent Labs     11/18/22  0216 11/17/22  1513   WBC  --  7.2   HGB 8.2* 7.8*   HCT 27.2* 25.2*   PLT  --  238     Recent Labs     11/18/22  0216 11/17/22  1513   NA  --  141   K  --  3.0*   CL  --  104   CO2  --  29   BUN  --  20   CREA  --  0.44*   GLU  --  145*   PHOS 2.7  --    CA  --  8.1*     Recent Labs     11/18/22  0216 11/17/22  1513   AP  --  61   TP  --  6.1*   ALB  --  2.5*   GLOB  --  3.6   LPSE 77  --      Recent Labs     11/17/22  1746   INR 1.6*   PTP 16.7*   APTT 25.2     EGD (10/15/22) by Dr. Lizeth Mena for GI bleeding showed an 8 mm esophageal ulcer most likely source of recent upper GI bleeding, likely Curran's esophagus; large hiatal hernia; normal duodenum. Assessment:     GI bleed: hematemesis and anemia.  Hgb 8.2 (7.8 yesterday, 10.1 on 10/17/22, 5.9 on 10/15/22), platelets 513, INR 1.6, BUN 20. CT abdomen/pelvis with IV contrast (11/17/22): no evidence of GI bleeding; cirrhosis and sequelae of portal hypertension, small esophageal varices, small volume of ascites; moderate nonspecific circumferential rectal wall thickening likely small rectal varices; large hiatal hernia. EGD in 10/2022 as above. Differential includes esophageal ulcer as seen on EGD, esophageal varices, Adan erosions, and peptic ulcer disease. Atrial fibrillation  Hypertension     Patient Active Problem List   Diagnosis Code    SVT (supraventricular tachycardia) (HCC) I47.1    Cachexia ([facility] [address] R64    GIB (gastrointestinal bleeding) K92.2    Sepsis ([facility] [address] A41.9    Acute hypokalemia E87.6    Hypomagnesemia E83.42    Tachycardia R00.0    GI bleed K92.2    GI bleeding K92.2    Acute upper GI bleed K92.2     Plan:     Clear liquids  PPI BID  On Carafate  On Octreotide gtt  Trend CBC and transfuse as necessary  Patient declines EGD at this time. She would like to follow up outpatient with Dr. Theodore Thomas. We will see on request over the weekend. Patient was discussed with Dr. Torie Castle  Thank you for allowing me to participate in care of Mansi Swain     Signed By: Kiara Aparicioma     11/18/2022  10:22 AM        GI Attending: Agree with above plan. Patient is declining EGD at this time. See on request over the weekend. If patient is discharged, outpatient GI follow up should be scheduled with Dr. Sharon Serna.  Torie Castle MD

## 2022-11-18 NOTE — ED NOTES
Pt stated that she does not want [facility] to give her doctors any information. Asked what we cannot tell them and she gave no details about what we cannot say. Pt refused blood work and said \"her doctor has a plan for iron supplements \". Was not able to get a Hgb and Hct this morning. Will only take medications in pill form. No IV medications.

## 2022-11-19 LAB
ALBUMIN SERPL-MCNC: 2.3 G/DL (ref 3.5–5)
ALBUMIN/GLOB SERPL: 0.6 {RATIO} (ref 1.1–2.2)
ALP SERPL-CCNC: 60 U/L (ref 45–117)
ALT SERPL-CCNC: 15 U/L (ref 12–78)
ANION GAP SERPL CALC-SCNC: 3 MMOL/L (ref 5–15)
AST SERPL-CCNC: 33 U/L (ref 15–37)
BASOPHILS # BLD: 0 K/UL (ref 0–0.1)
BASOPHILS NFR BLD: 1 % (ref 0–1)
BILIRUB SERPL-MCNC: 0.7 MG/DL (ref 0.2–1)
BUN SERPL-MCNC: 16 MG/DL (ref 6–20)
BUN/CREAT SERPL: 30 (ref 12–20)
CALCIUM SERPL-MCNC: 7.4 MG/DL (ref 8.5–10.1)
CHLORIDE SERPL-SCNC: 110 MMOL/L (ref 97–108)
CO2 SERPL-SCNC: 27 MMOL/L (ref 21–32)
CREAT SERPL-MCNC: 0.53 MG/DL (ref 0.55–1.02)
DIFFERENTIAL METHOD BLD: ABNORMAL
EOSINOPHIL # BLD: 0.2 K/UL (ref 0–0.4)
EOSINOPHIL NFR BLD: 5 % (ref 0–7)
ERYTHROCYTE [DISTWIDTH] IN BLOOD BY AUTOMATED COUNT: 18.8 % (ref 11.5–14.5)
GLOBULIN SER CALC-MCNC: 3.6 G/DL (ref 2–4)
GLUCOSE SERPL-MCNC: 143 MG/DL (ref 65–100)
HCT VFR BLD AUTO: 22.7 % (ref 35–47)
HGB BLD-MCNC: 6.8 G/DL (ref 11.5–16)
HISTORY CHECKED?,CKHIST: NORMAL
IMM GRANULOCYTES # BLD AUTO: 0 K/UL (ref 0–0.04)
IMM GRANULOCYTES NFR BLD AUTO: 0 % (ref 0–0.5)
LYMPHOCYTES # BLD: 0.9 K/UL (ref 0.8–3.5)
LYMPHOCYTES NFR BLD: 23 % (ref 12–49)
MAGNESIUM SERPL-MCNC: 1.4 MG/DL (ref 1.6–2.4)
MCH RBC QN AUTO: 27.3 PG (ref 26–34)
MCHC RBC AUTO-ENTMCNC: 30 G/DL (ref 30–36.5)
MCV RBC AUTO: 91.2 FL (ref 80–99)
MONOCYTES # BLD: 0.3 K/UL (ref 0–1)
MONOCYTES NFR BLD: 8 % (ref 5–13)
NEUTS SEG # BLD: 2.4 K/UL (ref 1.8–8)
NEUTS SEG NFR BLD: 63 % (ref 32–75)
NRBC # BLD: 0 K/UL (ref 0–0.01)
NRBC BLD-RTO: 0 PER 100 WBC
PLATELET # BLD AUTO: 179 K/UL (ref 150–400)
PMV BLD AUTO: 10.7 FL (ref 8.9–12.9)
POTASSIUM SERPL-SCNC: 4.1 MMOL/L (ref 3.5–5.1)
PROT SERPL-MCNC: 5.9 G/DL (ref 6.4–8.2)
RBC # BLD AUTO: 2.49 M/UL (ref 3.8–5.2)
RBC MORPH BLD: ABNORMAL
RBC MORPH BLD: ABNORMAL
SODIUM SERPL-SCNC: 140 MMOL/L (ref 136–145)
WBC # BLD AUTO: 3.8 K/UL (ref 3.6–11)

## 2022-11-19 PROCEDURE — 74011000250 HC RX REV CODE- 250: Performed by: HOSPITALIST

## 2022-11-19 PROCEDURE — C9113 INJ PANTOPRAZOLE SODIUM, VIA: HCPCS | Performed by: HOSPITALIST

## 2022-11-19 PROCEDURE — 74011250637 HC RX REV CODE- 250/637: Performed by: INTERNAL MEDICINE

## 2022-11-19 PROCEDURE — 36430 TRANSFUSION BLD/BLD COMPNT: CPT

## 2022-11-19 PROCEDURE — 74011250636 HC RX REV CODE- 250/636: Performed by: INTERNAL MEDICINE

## 2022-11-19 PROCEDURE — 36415 COLL VENOUS BLD VENIPUNCTURE: CPT

## 2022-11-19 PROCEDURE — 74011250636 HC RX REV CODE- 250/636: Performed by: HOSPITALIST

## 2022-11-19 PROCEDURE — 85025 COMPLETE CBC W/AUTO DIFF WBC: CPT

## 2022-11-19 PROCEDURE — 74011000250 HC RX REV CODE- 250: Performed by: INTERNAL MEDICINE

## 2022-11-19 PROCEDURE — 30233N1 TRANSFUSION OF NONAUTOLOGOUS RED BLOOD CELLS INTO PERIPHERAL VEIN, PERCUTANEOUS APPROACH: ICD-10-PCS | Performed by: INTERNAL MEDICINE

## 2022-11-19 PROCEDURE — 83735 ASSAY OF MAGNESIUM: CPT

## 2022-11-19 PROCEDURE — 74011250637 HC RX REV CODE- 250/637: Performed by: HOSPITALIST

## 2022-11-19 PROCEDURE — 65270000046 HC RM TELEMETRY

## 2022-11-19 PROCEDURE — 80053 COMPREHEN METABOLIC PANEL: CPT

## 2022-11-19 PROCEDURE — P9016 RBC LEUKOCYTES REDUCED: HCPCS

## 2022-11-19 PROCEDURE — 74011636637 HC RX REV CODE- 636/637: Performed by: INTERNAL MEDICINE

## 2022-11-19 PROCEDURE — 74011250637 HC RX REV CODE- 250/637: Performed by: PHYSICIAN ASSISTANT

## 2022-11-19 RX ORDER — SODIUM CHLORIDE 9 MG/ML
250 INJECTION, SOLUTION INTRAVENOUS AS NEEDED
Status: DISCONTINUED | OUTPATIENT
Start: 2022-11-19 | End: 2022-11-22 | Stop reason: HOSPADM

## 2022-11-19 RX ORDER — MAGNESIUM SULFATE HEPTAHYDRATE 40 MG/ML
2 INJECTION, SOLUTION INTRAVENOUS ONCE
Status: COMPLETED | OUTPATIENT
Start: 2022-11-19 | End: 2022-11-19

## 2022-11-19 RX ADMIN — SODIUM CHLORIDE, PRESERVATIVE FREE 10 ML: 5 INJECTION INTRAVENOUS at 13:51

## 2022-11-19 RX ADMIN — PANTOPRAZOLE SODIUM 40 MG: 40 TABLET, DELAYED RELEASE ORAL at 08:34

## 2022-11-19 RX ADMIN — Medication 400 MG: at 08:34

## 2022-11-19 RX ADMIN — SODIUM CHLORIDE, PRESERVATIVE FREE 10 ML: 5 INJECTION INTRAVENOUS at 21:48

## 2022-11-19 RX ADMIN — OCTREOTIDE ACETATE 50 MCG/HR: 500 INJECTION, SOLUTION INTRAVENOUS; SUBCUTANEOUS at 19:01

## 2022-11-19 RX ADMIN — POTASSIUM CHLORIDE 40 MEQ: 750 TABLET, FILM COATED, EXTENDED RELEASE ORAL at 08:34

## 2022-11-19 RX ADMIN — SUCRALFATE 1 G: 1 TABLET ORAL at 21:48

## 2022-11-19 RX ADMIN — PANTOPRAZOLE SODIUM 40 MG: 40 INJECTION, POWDER, FOR SOLUTION INTRAVENOUS at 13:50

## 2022-11-19 RX ADMIN — SUCRALFATE 1 G: 1 TABLET ORAL at 17:21

## 2022-11-19 RX ADMIN — MAGNESIUM SULFATE HEPTAHYDRATE 2 G: 40 INJECTION, SOLUTION INTRAVENOUS at 13:50

## 2022-11-19 RX ADMIN — SUCRALFATE 1 G: 1 TABLET ORAL at 08:34

## 2022-11-19 RX ADMIN — SUCRALFATE 1 G: 1 TABLET ORAL at 13:50

## 2022-11-19 RX ADMIN — Medication 400 MG: at 17:21

## 2022-11-19 RX ADMIN — POTASSIUM CHLORIDE 40 MEQ: 750 TABLET, FILM COATED, EXTENDED RELEASE ORAL at 17:21

## 2022-11-19 RX ADMIN — SODIUM CHLORIDE, PRESERVATIVE FREE 10 ML: 5 INJECTION INTRAVENOUS at 07:45

## 2022-11-19 NOTE — PROGRESS NOTES
Overnight pt refused octreotide gtt, her IV antibiotic and AM labs. Pt states hospitalist in ER told her she would discharge in the morning.

## 2022-11-19 NOTE — ED NOTES
TRANSFER - OUT REPORT:    Verbal report given to [facility]) on Sonja Ospina  being transferred to [facility]) for routine progression of care       Report consisted of patients Situation, Background, Assessment and   Recommendations(SBAR). Information from the following report(s) SBAR, Kardex and ED Summary was reviewed with the receiving nurse. Lines:   Peripheral IV 11/17/22 Left Antecubital (Active)   Site Assessment Clean, dry, & intact 11/18/22 1300   Phlebitis Assessment 0 11/18/22 1300   Infiltration Assessment 0 11/18/22 1300   Dressing Status Clean, dry, & intact 11/18/22 1300   Dressing Type Transparent;Tape 11/18/22 1300   Hub Color/Line Status Capped 11/18/22 1629   Action Taken Open ports on tubing capped 11/18/22 1300   Alcohol Cap Used Yes 11/18/22 1300       Peripheral IV 11/18/22 Right Wrist (Active)   Site Assessment Clean, dry, & intact 11/18/22 1300   Phlebitis Assessment 0 11/18/22 1300   Infiltration Assessment 0 11/18/22 1300   Dressing Status Clean, dry, & intact 11/18/22 1300   Dressing Type Transparent 11/18/22 1300   Hub Color/Line Status Pink;Capped 11/18/22 1629   Action Taken Open ports on tubing capped 11/18/22 1300   Alcohol Cap Used Yes 11/18/22 1408     Patient declined to be reconnected to continuous fluid orders and also declined blood work for repeat CBC. Opportunity for questions and clarification was provided.       Patient transported with:   Registered Nurse

## 2022-11-19 NOTE — PROGRESS NOTES
[address] [facility]  Hospitalist Group                                                                                          Hospitalist Progress Note  Dhiraj Garner MD  Answering service: [phone] from in house phone        Date of Service:  2022  NAME:  Clara Madison  :    MRN:  [id number]      Admission Summary: This 64-year-old woman with past medical history significant for hypertension and esophageal ulcer presented at the emergency room with vomiting. The patient stated that she started vomiting blood last night. No associated abdominal pain. The patient also stated that she missed the dose of her Prilosec yesterday. The patient has history of esophageal ulcer and was last admitted to the hospital from 10/15/2022 to 10/17/2022. She was also admitted at that time with vomiting blood. The patient received blood transfusion. EGD was performed. The patient was discharged home in stable condition. The CT scan of the abdomen and pelvis done on arrival at the emergency room. No evidence of GI bleeding, but cirrhosis and sequela of portal hypertension noted and small esophageal varices also noted as well as small volume ascites. Interval history / Subjective:   Patient seen and examined   CBC showing hemoglobin of 6.8 BMP still pending  Will offer blood transfusion looks like patient opted to leave bleeding inside  Hemoglobin dropped from 10.1-6.8 in a month     Assessment & Plan:     1. Acute upper GI bleed. Patient has history of esophageal varices  -- Continue PPI and octreotide drip  --Patient seen by GI she is refusing EGD  --He also refusing taking PPI IV so oral ordered  --Continue Rocephin    2. Acute blood loss anemia. -- Hemoglobin came down to 6.8 this morning we will transfuse 1 unit    3. Hypokalemia hypomagnesemia patient refused IV repletion will add oral  4. Hypertension. Low blood pressure hold blood pressure medication  6. Hyperglycemia. --Monitor   7. Persistent atrial fibrillation. The patient has history of SVT. The EKG in the emergency room shows atrial fibrillation. consulted cardiology rate control replete electrolytes patient on metoprolol patient closely follows with Dr. Lazara Mojica patient does not want to change any medications or add on any medication     Code status: DNR  DVT prophylaxis: SCD    Care Plan discussed with: Patient/Family and Nurse  Anticipated Disposition: Home w/Family  Anticipated Discharge: Less than 24 hours    High risk for deterioration patient refusing treatment for correcting electrolyte Will offer blood transfusion as well as reinforced the need for EGD discussed with patient advanced directive she is DNR but if continues to bleed and hemoglobin continues to trended down will be fatal    CRITICAL CARE ATTESTATION:  I had a face to face encounter with the patient, reviewed and interpreted patient data including clinical events, labs, images, vital signs, I/O's, and examined patient. I have discussed the case and the plan and management of the patient's care with the consulting services, the bedside nurses and necessary ancillary providers. NOTE OF PERSONAL INVOLVEMENT IN CARE   This patient has a high probability of imminent, clinically significant deterioration, which requires the highest level of preparedness to intervene urgently. I participated in the decision-making and personally managed or directed the management of the following life and organ supporting interventions that required my frequent assessment to treat or prevent imminent deterioration. I personally spent 45 minutes of critical care time. This is time spent at this critically ill patient's bedside actively involved in patient care as well as the coordination of care and discussions with the patient's family. This does not include any procedural time which has been billed separately.       Hospital Problems  Date Reviewed: 11/18/2022            Codes Class Noted POA    * (Principal) Acute upper GI bleed ICD-10-CM: K92.2  ICD-9-CM: 578.9  11/17/2022 Yes           Review of Systems:   A comprehensive review of systems was negative. Vital Signs:    Last 24hrs VS reviewed since prior progress note.  Most recent are:  Visit Vitals  BP 96/67 (BP 1 Location: Right upper arm, BP Patient Position: At rest)   Pulse 94   Temp 97.9 °F (36.6 °C)   Resp 27   Ht 5' 4\" (1.626 m)   Wt 49.9 kg (110 lb)   SpO2 100%   BMI 18.88 kg/m²         Intake/Output Summary (Last 24 hours) at 11/19/2022 1136  Last data filed at 11/18/2022 1629  Gross per 24 hour   Intake 905 ml   Output --   Net 905 ml          Physical Examination:     I had a face to face encounter with this patient and independently examined them on 11/19/2022 as outlined below:          General : alert x 3, awake, no acute distress, resting in bed, pleasant   HEENT: PEERL, EOMI, moist mucus membrane, TM clear  Neck: supple, no JVD, no meningeal signs  Chest: Clear to auscultation bilaterally   CVS: S1 S2 heard, Capillary refill less than 2 seconds  Abd: soft/ Non tender, non distended, BS physiological,   Ext: no clubbing, no cyanosis, no edema, brisk 2+ DP pulses  Neuro/Psych: pleasant mood and affect, CN 2-12 grossly intact,   Skin: warm     Data Review:    I personally reviewed  Image and labs      Labs:     Recent Labs     11/19/22  1008 11/18/22  0216 11/17/22  1513   WBC 3.8  --  7.2   HGB 6.8* 8.2* 7.8*   HCT 22.7* 27.2* 25.2*     --  238       Recent Labs     11/19/22  1008 11/18/22  0216 11/17/22  1513     --  141   K 4.1  --  3.0*   *  --  104   CO2 27  --  29   BUN 16  --  20   CREA 0.53*  --  0.44*   *  --  145*   CA 7.4*  --  8.1*   MG 1.4*  --  1.1*   PHOS  --  2.7  --        Recent Labs     11/19/22  1008 11/18/22  0216 11/17/22  1513   ALT 15  --  17   AP 60  --  61   TBILI 0.7  --  1.2*   TP 5.9*  --  6.1*   ALB 2.3*  --  2.5*   GLOB 3.6 --  3.6   LPSE  --  77  --        Recent Labs     11/17/22  1746   INR 1.6*   PTP 16.7*   APTT 25.2        Recent Labs     11/18/22  0216   TIBC 314   PSAT 4*   FERR 14*        Lab Results   Component Value Date/Time    Folate 4.5 (L) 11/18/2022 02:16 AM        No results for input(s): PH, PCO2, PO2 in the last 72 hours. No results for input(s): CPK, CKNDX, TROIQ in the last 72 hours.     No lab exists for component: CPKMB  Lab Results   Component Value Date/Time    Cholesterol, total 87 03/14/2018 05:07 AM    HDL Cholesterol 35 03/14/2018 05:07 AM    LDL, calculated 37 03/14/2018 05:07 AM    Triglyceride 75 03/14/2018 05:07 AM    CHOL/HDL Ratio 2.5 03/14/2018 05:07 AM     No results found for: GLUCPOC  Lab Results   Component Value Date/Time    Color DARK YELLOW 04/10/2022 11:18 PM    Appearance TURBID (A) 04/10/2022 11:18 PM    Specific gravity 1.014 04/10/2022 11:18 PM    pH (UA) 6.0 04/10/2022 11:18 PM    Protein 300 (A) 04/10/2022 11:18 PM    Glucose Negative 04/10/2022 11:18 PM    Ketone 15 (A) 04/10/2022 11:18 PM    Bilirubin NEGATIVE 03/13/2018 11:16 PM    Urobilinogen 2.0 (H) 04/10/2022 11:18 PM    Nitrites Negative 04/10/2022 11:18 PM    Leukocyte Esterase LARGE (A) 04/10/2022 11:18 PM    Epithelial cells FEW 04/10/2022 11:18 PM    Bacteria 4+ (A) 04/10/2022 11:18 PM    WBC >100 (H) 04/10/2022 11:18 PM    RBC 10-20 04/10/2022 11:18 PM         Medications Reviewed:     Current Facility-Administered Medications   Medication Dose Route Frequency    0.9% sodium chloride infusion 250 mL  250 mL IntraVENous PRN    metoprolol succinate (TOPROL-XL) XL tablet 25 mg  25 mg Oral QHS    sucralfate (CARAFATE) tablet 1 g  1 g Oral QID    sodium chloride (NS) flush 5-40 mL  5-40 mL IntraVENous Q8H    sodium chloride (NS) flush 5-40 mL  5-40 mL IntraVENous PRN    acetaminophen (TYLENOL) tablet 650 mg  650 mg Oral Q6H PRN    Or    acetaminophen (TYLENOL) suppository 650 mg  650 mg Rectal Q6H PRN    polyethylene glycol (MIRALAX) packet 17 g  17 g Oral DAILY PRN    ondansetron (ZOFRAN ODT) tablet 4 mg  4 mg Oral Q8H PRN    Or    ondansetron (ZOFRAN) injection 4 mg  4 mg IntraVENous Q6H PRN    octreotide (SANDOSTATIN) 500 mcg in 0.9% sodium chloride 500 mL infusion  50 mcg/hr IntraVENous CONTINUOUS    cefTRIAXone (ROCEPHIN) 1 g in 0.9% sodium chloride 10 mL IV syringe  1 g IntraVENous Q24H    lactated Ringers infusion  75 mL/hr IntraVENous CONTINUOUS    magnesium oxide (MAG-OX) tablet 400 mg  400 mg Oral BID    potassium chloride SR (KLOR-CON 10) tablet 40 mEq  40 mEq Oral BID    pantoprazole (PROTONIX) tablet 40 mg  40 mg Oral ACB&D     ______________________________________________________________________  EXPECTED LENGTH OF STAY: - - -  ACTUAL LENGTH OF STAY:          2      Please note that this dictation was completed with [employer] VMall, the computer voice recognition software. Quite often unanticipated grammatical, syntax, homophones, and other interpretive errors are inadvertently transcribed by the computer software. Please disregard these errors. Please excuse any errors that have escaped final proofreading.                 Svetlana Beth MD

## 2022-11-20 LAB
ABO + RH BLD: NORMAL
BLD PROD TYP BPU: NORMAL
BLOOD GROUP ANTIBODIES SERPL: NORMAL
BPU ID: NORMAL
CROSSMATCH RESULT,%XM: NORMAL
HCT VFR BLD AUTO: 23.6 % (ref 35–47)
HCT VFR BLD AUTO: 25.5 % (ref 35–47)
HGB BLD-MCNC: 7.5 G/DL (ref 11.5–16)
HGB BLD-MCNC: 7.9 G/DL (ref 11.5–16)
SPECIMEN EXP DATE BLD: NORMAL
STATUS OF UNIT,%ST: NORMAL
UNIT DIVISION, %UDIV: 0

## 2022-11-20 PROCEDURE — 85018 HEMOGLOBIN: CPT

## 2022-11-20 PROCEDURE — 74011250637 HC RX REV CODE- 250/637: Performed by: INTERNAL MEDICINE

## 2022-11-20 PROCEDURE — 74011250636 HC RX REV CODE- 250/636: Performed by: INTERNAL MEDICINE

## 2022-11-20 PROCEDURE — 74011250636 HC RX REV CODE- 250/636: Performed by: HOSPITALIST

## 2022-11-20 PROCEDURE — 65270000046 HC RM TELEMETRY

## 2022-11-20 PROCEDURE — 74011000250 HC RX REV CODE- 250: Performed by: INTERNAL MEDICINE

## 2022-11-20 PROCEDURE — 74011250637 HC RX REV CODE- 250/637: Performed by: HOSPITALIST

## 2022-11-20 PROCEDURE — 36415 COLL VENOUS BLD VENIPUNCTURE: CPT

## 2022-11-20 PROCEDURE — 74011636637 HC RX REV CODE- 636/637: Performed by: INTERNAL MEDICINE

## 2022-11-20 RX ORDER — ONDANSETRON 2 MG/ML
4 INJECTION INTRAMUSCULAR; INTRAVENOUS
Status: DISCONTINUED | OUTPATIENT
Start: 2022-11-20 | End: 2022-11-22 | Stop reason: HOSPADM

## 2022-11-20 RX ORDER — ONDANSETRON 4 MG/1
4 TABLET, ORALLY DISINTEGRATING ORAL
Status: DISCONTINUED | OUTPATIENT
Start: 2022-11-20 | End: 2022-11-22 | Stop reason: HOSPADM

## 2022-11-20 RX ADMIN — POTASSIUM CHLORIDE 40 MEQ: 750 TABLET, FILM COATED, EXTENDED RELEASE ORAL at 08:57

## 2022-11-20 RX ADMIN — ONDANSETRON 4 MG: 4 TABLET, ORALLY DISINTEGRATING ORAL at 13:38

## 2022-11-20 RX ADMIN — Medication 400 MG: at 18:07

## 2022-11-20 RX ADMIN — SODIUM CHLORIDE, PRESERVATIVE FREE 10 ML: 5 INJECTION INTRAVENOUS at 07:33

## 2022-11-20 RX ADMIN — Medication 400 MG: at 08:57

## 2022-11-20 RX ADMIN — OCTREOTIDE ACETATE 50 MCG/HR: 500 INJECTION, SOLUTION INTRAVENOUS; SUBCUTANEOUS at 18:10

## 2022-11-20 RX ADMIN — SUCRALFATE 1 G: 1 TABLET ORAL at 13:38

## 2022-11-20 RX ADMIN — SODIUM CHLORIDE, PRESERVATIVE FREE 10 ML: 5 INJECTION INTRAVENOUS at 13:38

## 2022-11-20 RX ADMIN — ONDANSETRON 4 MG: 4 TABLET, ORALLY DISINTEGRATING ORAL at 19:04

## 2022-11-20 RX ADMIN — SUCRALFATE 1 G: 1 TABLET ORAL at 08:57

## 2022-11-20 RX ADMIN — SUCRALFATE 1 G: 1 TABLET ORAL at 21:40

## 2022-11-20 RX ADMIN — SODIUM CHLORIDE, PRESERVATIVE FREE 10 ML: 5 INJECTION INTRAVENOUS at 21:41

## 2022-11-20 RX ADMIN — ONDANSETRON 4 MG: 4 TABLET, ORALLY DISINTEGRATING ORAL at 00:44

## 2022-11-20 RX ADMIN — SUCRALFATE 1 G: 1 TABLET ORAL at 18:07

## 2022-11-20 RX ADMIN — POTASSIUM CHLORIDE 40 MEQ: 750 TABLET, FILM COATED, EXTENDED RELEASE ORAL at 18:06

## 2022-11-20 NOTE — PROGRESS NOTES
Bedside shift change report given to Sung Pierce (oncoming nurse) by Tres Guzman RN (offgoing nurse). Report included the following information SBAR, Kardex, ED Summary, Intake/Output, MAR, Accordion, Recent Results, and Cardiac Rhythm NSR .

## 2022-11-20 NOTE — PROGRESS NOTES
Problem: Falls - Risk of  Goal: *Absence of Falls  Description: Document Judy Skill Fall Risk and appropriate interventions in the flowsheet.   Outcome: Progressing Towards Goal  Note: Fall Risk Interventions:  Mobility Interventions: Communicate number of staff needed for ambulation/transfer, Patient to call before getting OOB         Medication Interventions: Teach patient to arise slowly    Elimination Interventions: Call light in reach, Patient to call for help with toileting needs              Problem: Upper and Lower GI Bleed: Day 3  Goal: Activity/Safety  Outcome: Progressing Towards Goal  Goal: Diagnostic Test/Procedures  Outcome: Progressing Towards Goal  Goal: Nutrition/Diet  Outcome: Progressing Towards Goal

## 2022-11-20 NOTE — PROGRESS NOTES
Pt refuses antibiotics at this time, education provided. 2:242 pt request for octreotide drip to be stopped based on contraindications as researched by pt .

## 2022-11-20 NOTE — PROGRESS NOTES
[facility] Adult  Hospitalist Group                                                                                          Hospitalist Progress Note  Linn Mulligan MD  Answering service: [phone] from in house phone        Date of Service:  2022  NAME:  Horaico Bains  :    MRN:  [id number]      Admission Summary: This 49-year-old woman with past medical history significant for hypertension and esophageal ulcer presented at the emergency room with vomiting. The patient stated that she started vomiting blood last night. No associated abdominal pain. The patient also stated that she missed the dose of her Prilosec yesterday. The patient has history of esophageal ulcer and was last admitted to the hospital from 10/15/2022 to 10/17/2022. She was also admitted at that time with vomiting blood. The patient received blood transfusion. EGD was performed. The patient was discharged home in stable condition. The CT scan of the abdomen and pelvis done on arrival at the emergency room. No evidence of GI bleeding, but cirrhosis and sequela of portal hypertension noted and small esophageal varices also noted as well as small volume ascites. Interval history / Subjective:   Patient seen and examined agreeable to be seen by GI doctor if necessary will undergo endoscopy  Status post 1 unit of blood transfusion is still refusing the octreotide drip and any medication IV  Repeat lab and Transfuse if hemoglobin less than 7     Assessment & Plan:     1. Acute upper GI bleed. Patient has history of esophageal varices  -- Continue PPI and octreotide drip  --Patient seen by GI she is refusing EGD  --He also refusing taking PPI IV   --Continue Rocephin    2. Acute blood loss anemia. -- Hemoglobin came down to 6.8 this morning we will transfuse 1 unit hgb 7.5  -- Consult GI    3.   Hypokalemia hypomagnesemia patient refused IV repletion will add oral if accept will provide IV   4. Hypertension. Low blood pressure hold blood pressure medication  6. Hyperglycemia. --Monitor   7. Persistent atrial fibrillation. The patient has history of SVT. The EKG in the emergency room shows atrial fibrillation. consulted cardiology rate control replete electrolytes patient on metoprolol patient closely follows with Dr. Zuri Josue patient does not want to change any medications or add on any medication     Code status: DNR  DVT prophylaxis: SCD    Care Plan discussed with: Patient/Family and Nurse  Anticipated Disposition: Home w/Family  Anticipated Discharge: Less than 24 hours    High risk for deterioration patient refusing treatment for correcting electrolyte Will offer blood transfusion as well as reinforced the need for EGD discussed with patient advanced directive she is DNR but if continues to bleed and hemoglobin continues to trended down will be fatal          Hospital Problems  Date Reviewed: 11/18/2022            Codes Class Noted POA    * (Principal) Acute upper GI bleed ICD-10-CM: K92.2  ICD-9-CM: 578.9  11/17/2022 Yes         Review of Systems:   A comprehensive review of systems was negative. Vital Signs:    Last 24hrs VS reviewed since prior progress note.  Most recent are:  Visit Vitals  /62 (BP 1 Location: Right upper arm, BP Patient Position: At rest)   Pulse (!) 118   Temp 99.6 °F (37.6 °C)   Resp 21   Ht 5' 4\" (1.626 m)   Wt 49.9 kg (110 lb)   SpO2 90%   BMI 18.88 kg/m²         Intake/Output Summary (Last 24 hours) at 11/20/2022 1036  Last data filed at 11/19/2022 1830  Gross per 24 hour   Intake 321.25 ml   Output --   Net 321.25 ml          Physical Examination:     I had a face to face encounter with this patient and independently examined them on 11/20/2022 as outlined below:          General : alert x 3, awake, no acute distress, resting in bed, pleasant   HEENT: PEERL, EOMI, moist mucus membrane, TM clear  Neck: supple, no JVD, no meningeal signs  Chest: Clear to auscultation bilaterally   CVS: S1 S2 heard, Capillary refill less than 2 seconds  Abd: soft/ Non tender, non distended, BS physiological,   Ext: no clubbing, no cyanosis, no edema, brisk 2+ DP pulses  Neuro/Psych: pleasant mood and affect, CN 2-12 grossly intact,   Skin: warm     Data Review:    I personally reviewed  Image and labs      Labs:     Recent Labs     11/20/22  0054 11/19/22  1008 11/18/22  0216 11/17/22  1513   WBC  --  3.8  --  7.2   HGB 7.5* 6.8*   < > 7.8*   HCT 23.6* 22.7*   < > 25.2*   PLT  --  179  --  238    < > = values in this interval not displayed. Recent Labs     11/19/22  1008 11/18/22  0216 11/17/22  1513     --  141   K 4.1  --  3.0*   *  --  104   CO2 27  --  29   BUN 16  --  20   CREA 0.53*  --  0.44*   *  --  145*   CA 7.4*  --  8.1*   MG 1.4*  --  1.1*   PHOS  --  2.7  --        Recent Labs     11/19/22  1008 11/18/22  0216 11/17/22  1513   ALT 15  --  17   AP 60  --  61   TBILI 0.7  --  1.2*   TP 5.9*  --  6.1*   ALB 2.3*  --  2.5*   GLOB 3.6  --  3.6   LPSE  --  77  --        Recent Labs     11/17/22  1746   INR 1.6*   PTP 16.7*   APTT 25.2        Recent Labs     11/18/22 0216   TIBC 314   PSAT 4*   FERR 14*        Lab Results   Component Value Date/Time    Folate 4.5 (L) 11/18/2022 02:16 AM        No results for input(s): PH, PCO2, PO2 in the last 72 hours. No results for input(s): CPK, CKNDX, TROIQ in the last 72 hours.     No lab exists for component: CPKMB  Lab Results   Component Value Date/Time    Cholesterol, total 87 03/14/2018 05:07 AM    HDL Cholesterol 35 03/14/2018 05:07 AM    LDL, calculated 37 03/14/2018 05:07 AM    Triglyceride 75 03/14/2018 05:07 AM    CHOL/HDL Ratio 2.5 03/14/2018 05:07 AM     No results found for: GLUCPOC  Lab Results   Component Value Date/Time    Color DARK YELLOW 04/10/2022 11:18 PM    Appearance TURBID (A) 04/10/2022 11:18 PM    Specific gravity 1.014 04/10/2022 11:18 PM    pH (UA) 6.0 04/10/2022 11:18 PM    Protein 300 (A) 04/10/2022 11:18 PM    Glucose Negative 04/10/2022 11:18 PM    Ketone 15 (A) 04/10/2022 11:18 PM    Bilirubin NEGATIVE 03/13/2018 11:16 PM    Urobilinogen 2.0 (H) 04/10/2022 11:18 PM    Nitrites Negative 04/10/2022 11:18 PM    Leukocyte Esterase LARGE (A) 04/10/2022 11:18 PM    Epithelial cells FEW 04/10/2022 11:18 PM    Bacteria 4+ (A) 04/10/2022 11:18 PM    WBC >100 (H) 04/10/2022 11:18 PM    RBC 10-20 04/10/2022 11:18 PM         Medications Reviewed:     Current Facility-Administered Medications   Medication Dose Route Frequency    0.9% sodium chloride infusion 250 mL  250 mL IntraVENous PRN    pantoprazole (PROTONIX) 40 mg in 0.9% sodium chloride 10 mL injection  40 mg IntraVENous Q12H    metoprolol succinate (TOPROL-XL) XL tablet 25 mg  25 mg Oral QHS    sucralfate (CARAFATE) tablet 1 g  1 g Oral QID    sodium chloride (NS) flush 5-40 mL  5-40 mL IntraVENous Q8H    sodium chloride (NS) flush 5-40 mL  5-40 mL IntraVENous PRN    acetaminophen (TYLENOL) tablet 650 mg  650 mg Oral Q6H PRN    Or    acetaminophen (TYLENOL) suppository 650 mg  650 mg Rectal Q6H PRN    polyethylene glycol (MIRALAX) packet 17 g  17 g Oral DAILY PRN    ondansetron (ZOFRAN ODT) tablet 4 mg  4 mg Oral Q8H PRN    Or    ondansetron (ZOFRAN) injection 4 mg  4 mg IntraVENous Q6H PRN    octreotide (SANDOSTATIN) 500 mcg in 0.9% sodium chloride 500 mL infusion  50 mcg/hr IntraVENous CONTINUOUS    cefTRIAXone (ROCEPHIN) 1 g in 0.9% sodium chloride 10 mL IV syringe  1 g IntraVENous Q24H    lactated Ringers infusion  75 mL/hr IntraVENous CONTINUOUS    magnesium oxide (MAG-OX) tablet 400 mg  400 mg Oral BID    potassium chloride SR (KLOR-CON 10) tablet 40 mEq  40 mEq Oral BID     ______________________________________________________________________  EXPECTED LENGTH OF STAY: - - -  ACTUAL LENGTH OF STAY:          3      Please note that this dictation was completed with Dragon, the computer voice recognition software. Quite often unanticipated grammatical, syntax, homophones, and other interpretive errors are inadvertently transcribed by the computer software. Please disregard these errors. Please excuse any errors that have escaped final proofreading.                 Pa Coleman MD

## 2022-11-20 NOTE — PROGRESS NOTES
[facility]  [address] [facility] [address]  [place], [address]  [phone]                GI PROGRESS NOTE      NAME:   Indigo Mendoza   :    1950   MRN:    [id number]     Assessment/Plan   Upper GI bleeding- recommend restarting Octreotide and educated patient and  at bedside about benefits  EGD tomorrow by Dr Ronquillo Living or partners  Monitor H&H and transfuse as necessary     Patient Active Problem List   Diagnosis Code    SVT (supraventricular tachycardia) ([address] I47.1    Cachexia ([address] R64    GIB (gastrointestinal bleeding) K92.2    Sepsis ([address] A41.9    Acute hypokalemia E87.6    Hypomagnesemia E83.42    Tachycardia R00.0    GI bleed K92.2    GI bleeding K92.2    Acute upper GI bleed K92.2       Subjective:     Indigo Mendoza is a 74 y.o.  female who was admitted with GI bleeding. Patient refused EGD and wanted it done by Dr Everton Hernandez. No further bleeding but hgb has drifted down. She refused Octreotide. She now wants to consider EGD     Review of Systems    Constitutional: negative fever, negative chills, negative weight loss  Eyes:   negative visual changes  ENT:   negative sore throat, tongue or lip swelling  Respiratory:  negative cough, negative dyspnea  Cards:  negative for chest pain, palpitations, lower extremity edema  GI:   See HPI  :  negative for frequency, dysuria  Integument:  negative for rash and pruritus  Heme:  negative for easy bruising and gum/nose bleeding  Musculoskel: negative for myalgias,  back pain and muscle weakness  Neuro: negative for headaches, dizziness, vertigo  Psych:  negative for feelings of anxiety, depression           Objective:     VITALS:   Last 24hrs VS reviewed since prior hospitalist progress note.  Most recent are:  Visit Vitals  /62 (BP 1 Location: Right upper arm, BP Patient Position: At rest)   Pulse (!) 106   Temp 97.8 °F (36.6 °C)   Resp 21   Ht 5' 4\" (1.626 m)   Wt 49.9 kg (110 lb)   SpO2 (!) 88%   BMI 18.88 kg/m² Intake/Output Summary (Last 24 hours) at 11/20/2022 1200  Last data filed at 11/19/2022 1830  Gross per 24 hour   Intake 321.25 ml   Output --   Net 321.25 ml        PHYSICAL EXAM:  General   well developed, well nourished, appears stated age, in no acute distress  EENT  Normocephalic, Atraumatic, PERRLA, EOMI, sclera clear, nares clear, pharynx normal  Neck   Supple without nodes or mass. No thyromegaly or bruit  Respiratory   Clear To Auscultation bilaterally - no wheezes, rales, rhonchi, or crackles  Cardiology  Regular Rate and Rythmn  - no murmurs, rubs or gallops  Abdominal  Soft, non-tender, non-distended, positive bowel sounds, no hepatosplenomegaly, no palpable mass  Extremities  No clubbing, cyanosis, or edema. Pulses intact. Back  No spinal or muscle pain. No CVAT. Skin  Normal skin turgor. No rashes or skin ulcers noted  Neurological  No focal neurological deficits noted  Psychological  Oriented x 3. Normal affect.        Lab Data   Recent Results (from the past 12 hour(s))   HGB & HCT    Collection Time: 11/20/22 12:54 AM   Result Value Ref Range    HGB 7.5 (L) 11.5 - 16.0 g/dL    HCT 23.6 (L) 35.0 - 47.0 %         Medications: Reviewed    PMH/SH reviewed - no change compared to H&P  Attending Physician: Walker Sánchez MD   Date/Time:  11/20/2022

## 2022-11-20 NOTE — PROGRESS NOTES
11:30 Pt O2 at 88%. Pt refuses to wear oxygen 2L. Educated pt on the need. Pt still refused. MD notified.

## 2022-11-21 LAB
ANION GAP SERPL CALC-SCNC: 5 MMOL/L (ref 5–15)
BUN SERPL-MCNC: 11 MG/DL (ref 6–20)
BUN/CREAT SERPL: 21 (ref 12–20)
CALCIUM SERPL-MCNC: 7.2 MG/DL (ref 8.5–10.1)
CHLORIDE SERPL-SCNC: 111 MMOL/L (ref 97–108)
CO2 SERPL-SCNC: 23 MMOL/L (ref 21–32)
CREAT SERPL-MCNC: 0.53 MG/DL (ref 0.55–1.02)
GLUCOSE SERPL-MCNC: 167 MG/DL (ref 65–100)
HCT VFR BLD AUTO: 23.6 % (ref 35–47)
HCT VFR BLD AUTO: 25.6 % (ref 35–47)
HCT VFR BLD AUTO: 25.8 % (ref 35–47)
HEMOCCULT STL QL: POSITIVE
HGB BLD-MCNC: 7.2 G/DL (ref 11.5–16)
HGB BLD-MCNC: 7.8 G/DL (ref 11.5–16)
HGB BLD-MCNC: 7.9 G/DL (ref 11.5–16)
MAGNESIUM SERPL-MCNC: 1.6 MG/DL (ref 1.6–2.4)
POTASSIUM SERPL-SCNC: 4.5 MMOL/L (ref 3.5–5.1)
SODIUM SERPL-SCNC: 139 MMOL/L (ref 136–145)

## 2022-11-21 PROCEDURE — 74011250637 HC RX REV CODE- 250/637: Performed by: FAMILY MEDICINE

## 2022-11-21 PROCEDURE — 97165 OT EVAL LOW COMPLEX 30 MIN: CPT

## 2022-11-21 PROCEDURE — 74011250636 HC RX REV CODE- 250/636: Performed by: HOSPITALIST

## 2022-11-21 PROCEDURE — 74011636637 HC RX REV CODE- 636/637: Performed by: INTERNAL MEDICINE

## 2022-11-21 PROCEDURE — 74011250636 HC RX REV CODE- 250/636: Performed by: INTERNAL MEDICINE

## 2022-11-21 PROCEDURE — 36415 COLL VENOUS BLD VENIPUNCTURE: CPT

## 2022-11-21 PROCEDURE — 74011250637 HC RX REV CODE- 250/637: Performed by: INTERNAL MEDICINE

## 2022-11-21 PROCEDURE — 65270000046 HC RM TELEMETRY

## 2022-11-21 PROCEDURE — 74011250637 HC RX REV CODE- 250/637: Performed by: HOSPITALIST

## 2022-11-21 PROCEDURE — 97535 SELF CARE MNGMENT TRAINING: CPT

## 2022-11-21 PROCEDURE — 82272 OCCULT BLD FECES 1-3 TESTS: CPT

## 2022-11-21 PROCEDURE — 85018 HEMOGLOBIN: CPT

## 2022-11-21 PROCEDURE — 74011000250 HC RX REV CODE- 250: Performed by: INTERNAL MEDICINE

## 2022-11-21 PROCEDURE — 80048 BASIC METABOLIC PNL TOTAL CA: CPT

## 2022-11-21 PROCEDURE — 83735 ASSAY OF MAGNESIUM: CPT

## 2022-11-21 RX ORDER — PANTOPRAZOLE SODIUM 40 MG/1
40 TABLET, DELAYED RELEASE ORAL EVERY 12 HOURS
Status: DISCONTINUED | OUTPATIENT
Start: 2022-11-21 | End: 2022-11-22 | Stop reason: HOSPADM

## 2022-11-21 RX ADMIN — ONDANSETRON 4 MG: 4 TABLET, ORALLY DISINTEGRATING ORAL at 15:52

## 2022-11-21 RX ADMIN — Medication 400 MG: at 17:19

## 2022-11-21 RX ADMIN — ONDANSETRON 4 MG: 4 TABLET, ORALLY DISINTEGRATING ORAL at 08:35

## 2022-11-21 RX ADMIN — METOPROLOL SUCCINATE 25 MG: 25 TABLET, EXTENDED RELEASE ORAL at 22:33

## 2022-11-21 RX ADMIN — SODIUM CHLORIDE, PRESERVATIVE FREE 10 ML: 5 INJECTION INTRAVENOUS at 20:00

## 2022-11-21 RX ADMIN — PANTOPRAZOLE SODIUM 40 MG: 40 TABLET, DELAYED RELEASE ORAL at 13:01

## 2022-11-21 RX ADMIN — OCTREOTIDE ACETATE 50 MCG/HR: 500 INJECTION, SOLUTION INTRAVENOUS; SUBCUTANEOUS at 17:19

## 2022-11-21 RX ADMIN — SUCRALFATE 1 G: 1 TABLET ORAL at 17:19

## 2022-11-21 RX ADMIN — ONDANSETRON 4 MG: 4 TABLET, ORALLY DISINTEGRATING ORAL at 22:32

## 2022-11-21 RX ADMIN — Medication 400 MG: at 08:30

## 2022-11-21 RX ADMIN — SUCRALFATE 1 G: 1 TABLET ORAL at 13:01

## 2022-11-21 RX ADMIN — SUCRALFATE 1 G: 1 TABLET ORAL at 08:30

## 2022-11-21 RX ADMIN — POTASSIUM CHLORIDE 40 MEQ: 750 TABLET, FILM COATED, EXTENDED RELEASE ORAL at 17:19

## 2022-11-21 RX ADMIN — POTASSIUM CHLORIDE 40 MEQ: 750 TABLET, FILM COATED, EXTENDED RELEASE ORAL at 08:30

## 2022-11-21 RX ADMIN — SODIUM CHLORIDE, PRESERVATIVE FREE 10 ML: 5 INJECTION INTRAVENOUS at 13:01

## 2022-11-21 RX ADMIN — SUCRALFATE 1 G: 1 TABLET ORAL at 22:32

## 2022-11-21 NOTE — PROGRESS NOTES
Problem: Falls - Risk of  Goal: *Absence of Falls  Description: Document Anahy Buck Fall Risk and appropriate interventions in the flowsheet.   Outcome: Progressing Towards Goal  Note: Fall Risk Interventions:  Mobility Interventions: Communicate number of staff needed for ambulation/transfer, Patient to call before getting OOB         Medication Interventions: Teach patient to arise slowly    Elimination Interventions: Call light in reach, Patient to call for help with toileting needs, Toileting schedule/hourly rounds              Problem: Upper and Lower GI Bleed: Day 3  Goal: Activity/Safety  Outcome: Progressing Towards Goal  Goal: Diagnostic Test/Procedures  Outcome: Progressing Towards Goal  Goal: Nutrition/Diet  Outcome: Progressing Towards Goal

## 2022-11-21 NOTE — PROGRESS NOTES
OCCUPATIONAL THERAPY EVALUATION/DISCHARGE  Patient: Su Kee (87 y.o. female)  Date: 11/21/2022  Primary Diagnosis: Acute upper GI bleed [K92.2]       Precautions:        ASSESSMENT  Based on the objective data described below, the patient presents with near baseline ADL performance. She is impacted by environmental constraints, mild instability during functional mobility (though this appears to be her baseline). Patient does not use an assistive device but balances on furniture as needed. All DME needs are met for ADL completion. Follow up OT services are not indicated at this time. Current Level of Function (ADLs/self-care): minimal assistance with changing brief while on toilet (due to positioning), occasional assist with bowel hygiene due to prolapse    Functional Outcome Measure: The patient scored 70/100 on the Barthel Index outcome measure      PLAN :  Recommend with staff: ambulate to bathroom for toileting, in chair for meals     Recommendation for discharge: (in order for the patient to meet his/her long term goals)  No skilled occupational therapy/ follow up rehabilitation needs identified at this time. This discharge recommendation:  Has been made in collaboration with the attending provider and/or case management    IF patient discharges home will need the following DME: none       SUBJECTIVE:   Patient pleasant and cooperative     OBJECTIVE DATA SUMMARY:   HISTORY:   Past Medical History:   Diagnosis Date    Heart abnormality     SVT (supraventricular tachycardia) [facility] [address]     UTI (urinary tract infection)      Past Surgical History:   Procedure Laterality Date    HX ORTHOPAEDIC      HX TONSILLECTOMY      HX WISDOM TEETH EXTRACTION         Prior Level of Function/Environment/Context: lives with spouse in home with 1st floor bed/bath. Patient completes BADL with Mod I, ambulates without assistive device though tends to \"furniture walk\".  Spouse I available for assistance as needed Expanded or extensive additional review of patient history:   Home Situation  Home Environment: Private residence  One/Two Story Residence: Two story  # of Interior Steps: 12  Living Alone: No  Support Systems: Spouse/Significant Other  Patient Expects to be Discharged to[de-identified] Home  Current DME Used/Available at Home: Shower chair      EXAMINATION OF PERFORMANCE DEFICITS:  Cognitive/Behavioral Status:  Neurologic State: Alert  Orientation Level: Oriented X4  Cognition: Appropriate decision making; Follows commands; Appropriate safety awareness           Hearing: Auditory  Auditory Impairment: None    Vision/Perceptual:                                Corrective Lenses: Glasses    Range of Motion:    AROM: Within functional limits                         Strength:    Strength: Within functional limits                Coordination:  Coordination: Within functional limits  Fine Motor Skills-Upper: Left Intact; Right Intact    Gross Motor Skills-Upper: Left Intact; Right Intact    Tone & Sensation:    Tone: Normal                         Balance:  Sitting: Intact; Without support  Standing: Impaired; Without support  Standing - Static: Good; Unsupported  Standing - Dynamic : Fair;Unsupported    Functional Mobility and Transfers for ADLs:  Bed Mobility:  Supine to Sit: Independent    Transfers:  Sit to Stand: Independent  Stand to Sit: Independent  Bed to Chair: Modified independent  Bathroom Mobility: Supervision/set up    ADL Assessment:  Feeding: Independent    Oral Facial Hygiene/Grooming: Independent    Bathing: Modified independent    Type of Bath: Partial    Upper Body Dressing: Setup    Lower Body Dressing: Minimum assistance    Toileting: minimal assistance                ADL Intervention and task modifications:   Patient was educated on the benefits of maintaining activity tolerance, functional mobility, and independence with self care tasks during acute stay.  Encouraged patient to be out of bed for all meals, perform daily ADLs (as approved by RN/MD regarding bathing etc), performing functional mobility to/from bathroom, and increasing time OOB daily. Patient educated about the importance of maintaining activity tolerance to ensure safe return home and to baseline. Patient verbalized understanding of education. Functional Measure:    Barthel Index:  Bathin  Bladder: 5  Bowels: 5  Groomin  Dressin  Feeding: 10  Mobility: 10  Stairs: 5  Toilet Use: 10  Transfer (Bed to Chair and Back): 10  Total: 70/100      The Barthel ADL Index: Guidelines  1. The index should be used as a record of what a patient does, not as a record of what a patient could do. 2. The main aim is to establish degree of independence from any help, physical or verbal, however minor and for whatever reason. 3. The need for supervision renders the patient not independent. 4. A patient's performance should be established using the best available evidence. Asking the patient, friends/relatives and nurses are the usual sources, but direct observation and common sense are also important. However direct testing is not needed. 5. Usually the patient's performance over the preceding 24-48 hours is important, but occasionally longer periods will be relevant. 6. Middle categories imply that the patient supplies over 50 per cent of the effort. 7. Use of aids to be independent is allowed. Score Interpretation (from [address] [address])    Independent   60-79 Minimally independent   40-59 Partially dependent   20-39 Very dependent   <20 Totally dependent     -Elissa Manriquez., Barthel, D.W. (1965). Functional evaluation: the Barthel Index. [address] [facility] ([address] (1997). The Barthel activities of daily living index: self-reporting versus actual performance in the old (> or = 75 years). Journal of [address] [place] [address] 45(7), [facility], J.J.RUSS.F, Jaquan Khalil., Ivy Tapia. (1999).  Measuring the change in disability after inpatient rehabilitation; comparison of the responsiveness of the Barthel Index and Functional [place] Measure. Journal of Neurology, Neurosurgery, and Psychiatry, 66(4), 200-457. SHAYLA Rodriguez, LUPIS Mercedes, & Addison Peres M.A. (2004) Assessment of post-stroke quality of life in cost-effectiveness studies: The usefulness of the Barthel Index and the EuroQoL-5D. Quality of Life Research, 15, 429-74         Occupational Therapy Evaluation Charge Determination   History Examination Decision-Making   LOW Complexity : Brief history review  LOW Complexity : 1-3 performance deficits relating to physical, cognitive , or psychosocial skils that result in activity limitations and / or participation restrictions  LOW Complexity : No comorbidities that affect functional and no verbal or physical assistance needed to complete eval tasks       Based on the above components, the patient evaluation is determined to be of the following complexity level: LOW   Pain Rating:  none    Activity Tolerance:   Good    After treatment patient left in no apparent distress:    Sitting in chair and Call bell within reach    COMMUNICATION/EDUCATION:   The patients plan of care was discussed with: Registered nurse.      Thank you for this referral.  Weston Gloria OT  Time Calculation: 26 mins

## 2022-11-21 NOTE — PROGRESS NOTES
[address]  [address] [facility], [address]       GI PROGRESS NOTE  Will Charles Douglass  [phone] office  [phone] NP/PA in-hospital cell phone M-F until 4:30PM  After 5PM or on weekends, please call  for physician on call      NAME: Gregg Rasmussen   :  1950   MRN:  [id number]       Subjective:   Patient was seen last week and had declined EGD at that time. Per review of medications, she has declined PPI on a few occasions. She was seen yesterday by the oncall physician. Denies nausea, reflux, or vomiting. No abdominal pain. Denies melena or hematochezia. Discussed with RN. Patient received 1 unit PRBCs on . Objective:     VITALS:   Last 24hrs VS reviewed since prior progress note. Most recent are:  Visit Vitals  BP 96/60 (BP 1 Location: Right upper arm, BP Patient Position: Lying)   Pulse 91   Temp 97.7 °F (36.5 °C)   Resp 22   Ht 5' 4\" (1.626 m)   Wt 49.9 kg (110 lb)   SpO2 95%   BMI 18.88 kg/m²     PHYSICAL EXAM:  General: Cooperative, no acute distress    Neurologic:  Alert and oriented  HEENT: EOMI, no scleral icterus   Lungs:  No acute respiratory distress  Heart:  S1 S2  Abdomen: Soft, mildly distended, no tenderness, no guarding, no rebound. +Bowel sounds. Extremities: Warm  Psych:   Not anxious or agitated    Lab Data Reviewed:     Recent Results (from the past 24 hour(s))   HGB & HCT    Collection Time: 22  2:10 PM   Result Value Ref Range    HGB 7.9 (L) 11.5 - 16.0 g/dL    HCT 25.5 (L) 35.0 - 47.0 %   HGB & HCT    Collection Time: 22  1:04 AM   Result Value Ref Range    HGB 7.2 (L) 11.5 - 16.0 g/dL    HCT 23.6 (L) 35.0 - 47.0 %     EGD (10/15/22) by Dr. Guido Dill for GI bleeding showed an 8 mm esophageal ulcer most likely source of recent upper GI bleeding, likely Curran's esophagus; large hiatal hernia; normal duodenum. Assessment:     GI bleed: hematemesis and anemia. CT abdomen/pelvis with IV contrast (22):  no evidence of GI bleeding; cirrhosis and sequelae of portal hypertension, small esophageal varices, small volume of ascites; moderate nonspecific circumferential rectal wall thickening likely small rectal varices; large hiatal hernia. EGD in 10/2022 as above. Hgb 7.2 (down to 6.8 on 11/19, 7.8 on presentation), platelets 151, INR 1.6 (11/17). Status post 1 unit PRBCs on 11/19. Atrial fibrillation  Hypertension     Patient Active Problem List   Diagnosis Code    SVT (supraventricular tachycardia) (HCC) I47.1    Cachexia ([facility] [address] R64    GIB (gastrointestinal bleeding) K92.2    Sepsis ([facility] [address] A41.9    Acute hypokalemia E87.6    Hypomagnesemia E83.42    Tachycardia R00.0    GI bleed K92.2    GI bleeding K92.2    Acute upper GI bleed K92.2     Plan:     PPI BID  On Carafate  On Octreotide gtt  Repeat Hgb pending. Trend CBC and transfuse as necessary. Discussed EGD with patient. Details and risks of the procedure to include (but not limited to) anesthesia, bleeding, infection, and perforation were discussed. Patient would like to hold off on EGD at this time and monitor Hgb/signs of bleeding. Discussed with RN. Signed By: Mery Stack     11/21/2022  10:14 AM       GI attending note:    Chart reviewed. Agree with note of CARRIE. Patient has declined EGD twice after discussion of benefits with her. Please call our service back if she wishes to proceed with EGD. Outpatient GI follow-up with Dr. April Oneal for EGD and colonoscopy for rectal findings. Will sign off. Please call with any questions.     Dr. Oksana Navarrete

## 2022-11-21 NOTE — PROGRESS NOTES
Bedside shift change report given to Theron Arana RN (oncoming nurse) by Judson Magdaleno RN (offgoing nurse). Report included the following information SBAR, Kardex, MAR, Recent Results, and Cardiac Rhythm Sinus Tach/NSR .

## 2022-11-21 NOTE — PROGRESS NOTES
Transition Of Care: Noted, EDG on hold per GI note. PT eval pending. OT eval stating no needs. The patient plans to discharge home with  to transport when stable for discharge. RUR: 18%    The patient is from home and lives with . GI, PT/OT following. The patient plans to discharge home. Care Management Interventions  PCP Verified by CM: Yes  Palliative Care Criteria Met (RRAT>21 & CHF Dx)?: No  Mode of Transport at Discharge: Other (see comment)  Transition of Care Consult (CM Consult): Discharge Planning  MyChart Signup: No  Discharge Durable Medical Equipment: No  Health Maintenance Reviewed: Yes  Physical Therapy Consult: Yes  Occupational Therapy Consult: Yes  Speech Therapy Consult: No  Support Systems: Spouse/Significant Other  Confirm Follow Up Transport: Family  The Plan for Transition of Care is Related to the Following Treatment Goals : The patient plans to discharge home.  Resource Information Provided?: No  Discharge Location  Patient Expects to be Discharged to[de-identified] Home with family assistance     Reason for Admission:   Acute Upper Gi bleed. RUR Score:    18%              PCP: First and Last name:   Marilee Thibodeaux DO     Name of Practice:    Are you a current patient: Yes/No: Y   Approximate date of last visit: Nov, 2022   Can you participate in a virtual visit if needed: Yes    Do you (patient/family) have any concerns for transition/discharge? No              Plan for utilizing home health:   PT eval pending. No orders at this time. Current Advanced Directive/Advance Care Plan:  DNR      Healthcare Decision Maker:   Click here to complete [address] including selection of the Healthcare Decision Maker Relationship (ie \"Primary\")            Primary Decision MakerCalixto Casiano  [phone]    Transition of Care Plan:        CM met with the patient in room 421. The patient is alert and oriented x4.  Confirmed demographics. The patient is independent with ADL's/IADL's, does not currently drive saucedo snot own or use any dme at this time. The patient lives with her  in a 2 story private residence with 2 steps to enter and can stay in 1st level bedroom if needed. The patient and  use [facility] on [address] in [place] and she plans to discharge home with  to transport when stable for discharge. CM following for discharge needs.     Cheryle Polka RN/CRM

## 2022-11-21 NOTE — PROGRESS NOTES
[facility]  Hospitalist Group                                                                                          Hospitalist Progress Note  Martin Shane MD  Answering service: [phone] from in house phone        Date of Service:  2022  NAME:  Aman Nguyen  :  1950  MRN:  [id number]      Admission Summary:     Patient presents with vomiting of blood and admitted for concern of upper GI bleed. Interval history / Subjective:     Patient has no more vomiting blood. No abdominal pain. Tolerating diet. Assessment & Plan:     GI bleed  -Patient presents with vomiting of blood with acute blood loss anemia, history of esophageal varices  -On octreotide drip, previously was refusing but restarted, continue PPI  -Plan for EGD today per GI    Acute blood loss anemia  -Acute blood loss anemia due to GI bleed  -S/p transfusion of 1 unit PRBCs  -Monitor H&H    Hypertension  -Holding BP meds, monitor blood pressure    Persistent atrial fibrillation  -Continue metoprolol, not on anticoagulation due to GI bleed  -Rate is controlled, previously evaluated by cardiology    Electrolyte imbalance  -Multiple electrolyte abnormalities including hypokalemia and hypomagnesemia  -No labs available today, Gonsalo BMP and magnesium level     Code status: Full  Prophylaxis: [address] discussed with: Patient  Anticipated Disposition: 24 to 48 hours     Hospital Problems  Date Reviewed: 2022            Codes Class Noted POA    * (Principal) Acute upper GI bleed ICD-10-CM: K92.2  ICD-9-CM: 578.9  2022 Yes             Review of Systems:   A comprehensive review of systems was negative except for that written in the HPI. Vital Signs:    Last 24hrs VS reviewed since prior progress note.  Most recent are:  Visit Vitals  BP 96/60 (BP 1 Location: Right upper arm, BP Patient Position: Lying)   Pulse 85   Temp 97.7 °F (36.5 °C)   Resp 22   Ht 5' 4\" (1.626 m)   Wt 49.9 kg (110 lb)   SpO2 95%   BMI 18.88 kg/m²         Intake/Output Summary (Last 24 hours) at 11/21/2022 0901  Last data filed at 11/20/2022 1552  Gross per 24 hour   Intake 1000 ml   Output --   Net 1000 ml        Physical Examination:     I had a face to face encounter with this patient and independently examined them on 11/21/2022 as outlined below:          Constitutional:  No acute distress, cooperative, pleasant    ENT:  Oral mucosa moist, oropharynx benign. Resp:  CTA bilaterally. No wheezing/rhonchi/rales. No accessory muscle use. CV:  Regular rhythm, normal rate, no murmurs, gallops, rubs    GI:  Soft, non distended, non tender. normoactive bowel sounds, no hepatosplenomegaly     Musculoskeletal:  No edema, warm, 2+ pulses throughout    Neurologic:  Moves all extremities. AAOx3, CN II-XII reviewed            Data Review:    Review and/or order of clinical lab test      Labs:     Recent Labs     11/21/22  0104 11/20/22  1410 11/20/22  0054 11/19/22  1008   WBC  --   --   --  3.8   HGB 7.2* 7.9*   < > 6.8*   HCT 23.6* 25.5*   < > 22.7*   PLT  --   --   --  179    < > = values in this interval not displayed. Recent Labs     11/19/22  1008      K 4.1   *   CO2 27   BUN 16   CREA 0.53*   *   CA 7.4*   MG 1.4*     Recent Labs     11/19/22  1008   ALT 15   AP 60   TBILI 0.7   TP 5.9*   ALB 2.3*   GLOB 3.6     No results for input(s): INR, PTP, APTT, INREXT in the last 72 hours. No results for input(s): FE, TIBC, PSAT, FERR in the last 72 hours. Lab Results   Component Value Date/Time    Folate 4.5 (L) 11/18/2022 02:16 AM      No results for input(s): PH, PCO2, PO2 in the last 72 hours. No results for input(s): CPK, CKNDX, TROIQ in the last 72 hours.     No lab exists for component: CPKMB  Lab Results   Component Value Date/Time    Cholesterol, total 87 03/14/2018 05:07 AM    HDL Cholesterol 35 03/14/2018 05:07 AM    LDL, calculated 37 03/14/2018 05:07 AM    Triglyceride 75 03/14/2018 05:07 AM    CHOL/HDL Ratio 2.5 03/14/2018 05:07 AM     No results found for: [facility]  Lab Results   Component Value Date/Time    Color DARK YELLOW 04/10/2022 11:18 PM    Appearance TURBID (A) 04/10/2022 11:18 PM    Specific gravity 1.014 04/10/2022 11:18 PM    pH (UA) 6.0 04/10/2022 11:18 PM    Protein 300 (A) 04/10/2022 11:18 PM    Glucose Negative 04/10/2022 11:18 PM    Ketone 15 (A) 04/10/2022 11:18 PM    Bilirubin NEGATIVE 03/13/2018 11:16 PM    Urobilinogen 2.0 (H) 04/10/2022 11:18 PM    Nitrites Negative 04/10/2022 11:18 PM    Leukocyte Esterase LARGE (A) 04/10/2022 11:18 PM    Epithelial cells FEW 04/10/2022 11:18 PM    Bacteria 4+ (A) 04/10/2022 11:18 PM    WBC >100 (H) 04/10/2022 11:18 PM    RBC 10-20 04/10/2022 11:18 PM         Medications Reviewed:     Current Facility-Administered Medications   Medication Dose Route Frequency    ondansetron (ZOFRAN ODT) tablet 4 mg  4 mg Oral Q6H PRN    Or    ondansetron (ZOFRAN) injection 4 mg  4 mg IntraVENous Q6H PRN    0.9% sodium chloride infusion 250 mL  250 mL IntraVENous PRN    pantoprazole (PROTONIX) 40 mg in 0.9% sodium chloride 10 mL injection  40 mg IntraVENous Q12H    metoprolol succinate (TOPROL-XL) XL tablet 25 mg  25 mg Oral QHS    sucralfate (CARAFATE) tablet 1 g  1 g Oral QID    sodium chloride (NS) flush 5-40 mL  5-40 mL IntraVENous Q8H    sodium chloride (NS) flush 5-40 mL  5-40 mL IntraVENous PRN    acetaminophen (TYLENOL) tablet 650 mg  650 mg Oral Q6H PRN    Or    acetaminophen (TYLENOL) suppository 650 mg  650 mg Rectal Q6H PRN    polyethylene glycol (MIRALAX) packet 17 g  17 g Oral DAILY PRN    octreotide (SANDOSTATIN) 500 mcg in 0.9% sodium chloride 500 mL infusion  50 mcg/hr IntraVENous CONTINUOUS    cefTRIAXone (ROCEPHIN) 1 g in 0.9% sodium chloride 10 mL IV syringe  1 g IntraVENous Q24H    lactated Ringers infusion  75 mL/hr IntraVENous CONTINUOUS    magnesium oxide (MAG-OX) tablet 400 mg  400 mg Oral BID    potassium chloride SR (KLOR-CON 10) tablet 40 mEq  40 mEq Oral BID     ______________________________________________________________________  EXPECTED LENGTH OF STAY: - - -  ACTUAL LENGTH OF STAY:          4                 Alisa Gastelum MD

## 2022-11-21 NOTE — ADT AUTH CERT NOTES
Comments  Comment          Patient Demographics    Patient Name   Juan Hernandez   [id number] Legal Sex   Female    1950 Address   [address]   [facility] [place] [address] Phone   [phone] (Home)   [phone] (Mobile) *Preferred*     Patient Demographics    Patient Name   Juan Hernandez   [id number] Legal Sex   Female    1950 Address   [address]   [address] [place] [address] Phone   [phone] (Home)   [phone] (Mobile) *Preferred*     CSN:   [id number]     Admit Date: Admit Time Room Bed   2022  9:04 PM [phone]     Attending Providers    Provider Pager From To   Mundo Bhakta DO  22   Ilene Garrido MD  22   Vickie Hernandez MD  22   Ilene Garrido MD  22   Vickie Hernandez MD  22   lEisa Marrero MD  22      Patient Contacts    Name Relation Home Work Mobile   Rohith Rosen Spouse [phone]       Utilization Reviews       Gastrointestinal Bleeding, Upper - Care Day 4 (2022) by Jeevan Dixon       Review Entered Review Status   2022 1025 Completed      Criteria Review      Care Day: 4 Care Date: 2022 Level of Care: Telemetry    Guideline Day 2    Clinical Status    (X) * Hypotension absent    2022 10:25:20 EST by Jeevan Dixon      91/57, 90/78, 90/63    (X) * Bleeding absent or reduced    2022 10:25:20 EST by Jeevan Dixon      Reduced  Hgb 7.5-7.9 in 12 hours.     Activity    (X) Activity as tolerated    2022 10:25:20 EST by Jeevan Dixon      As tolerated with assist    Routes    ( ) * Oral hydration tolerated    2022 10:25:20 EST by Jeevan Dixon      NPO Per diet order    ( ) * Oral diet tolerated    2022 10:25:20 EST by Jeevan Dixon      NPO    Interventions    (X) * NG tube absent    (X) Hgb/Hct    2022 10:25:20 EST by Jeevan Dixon      HGB: 7.5 (L)  HCT: 23.6 (L)    Medications    ( ) Proton pump inhibitor    11/21/2022 10:25:20 EST by Erick Hernandez      Pt refused    * Milestone   Additional Notes    DATE:    11/20/22   IP       Pertinent Updates:   Patient seen and examined agreeable to be seen by GI doctor if necessary will undergo endoscopy   Status post 1 unit of blood transfusion is still refusing the octreotide drip and any medication IV   Repeat lab and Transfuse if hemoglobin less than 7       Assessment & Plan:   1. Acute upper GI bleed. Patient has history of esophageal varices   -- Continue PPI and octreotide drip   --Patient seen by GI she is refusing EGD   --He also refusing taking PPI IV    --Continue Rocephin       2. Acute blood loss anemia. -- Hemoglobin came down to 6.8 this morning we will transfuse 1 unit hgb 7.5   -- Consult GI       3. Hypokalemia hypomagnesemia patient refused IV repletion will add oral if accept will provide IV    4. Hypertension. Low blood pressure hold blood pressure medication   6. Hyperglycemia. --Monitor    7. Persistent atrial fibrillation. The patient has history of SVT. The EKG in the emergency room shows atrial fibrillation.   consulted cardiology rate control replete electrolytes patient on metoprolol patient closely follows with Dr. Shayna Hester patient does not want to change any medications or add on any medication          Vitals:   BP 90/78   Pulse (!) 118   Temp 99.6 °F (37.6 °C)   Resp 21   Ht 5' 4\" (1.626 m)   Wt 49.9 kg (110 lb)   SpO2 90% on RA      Physical Exam:   General : alert x 3, awake, no acute distress, resting in bed, pleasant    HEENT: PEERL, EOMI, moist mucus membrane, TM clear   Neck: supple, no JVD, no meningeal signs   Chest: Clear to auscultation bilaterally    CVS: S1 S2 heard, Capillary refill less than 2 seconds   Abd: soft/ Non tender, non distended, BS physiological,    Ext: no clubbing, no cyanosis, no edema, brisk 2+ DP pulses   Neuro/Psych: pleasant mood and affect, CN 2-12 grossly intact,    Skin: warm MD Consults/Assessments & Plans:   GI CONSULT   Upper GI bleeding- recommend restarting Octreotide and educated patient and  at bedside about benefits   EGD tomorrow by Dr Ian Moise or partners   Monitor H&H and transfuse as necessary      Medications:   Mag-Ox 400 mg PO b.i.d.      octreotide (SANDOSTATIN) 500 mcg in 0.9% sodium chloride 500 mL infusion   Rate: 50 mL/hr Dose: 50 mcg/hr   Freq: CONTINUOUS Route: IV      Carafate 1 gm PO four times a day   Zofran 4 mg PO x 2. Gastrointestinal Bleeding, Upper - Care Day 3 (11/19/2022) by Olga Denise       Review Entered Review Status   11/21/2022 1005 Completed      Criteria Review      Care Day: 3 Care Date: 11/19/2022 Level of Care: Telemetry    Guideline Day 2    Clinical Status    ( ) * Hypotension absent    11/21/2022 10:05:15 EST by Olga Denise      85/56    ( ) * Bleeding absent or reduced    11/21/2022 10:05:15 EST by Olga Denise      HGB droppoed from 8.2 to 6.8 in past 24 hours.     Activity    (X) Activity as tolerated    11/21/2022 10:05:15 EST by Olga Denise      As tolerated with assist    Routes    (X) * Oral hydration tolerated    11/21/2022 10:05:15 EST by Olga Denise      Per diet order    (X) * Oral diet tolerated    11/21/2022 10:05:15 EST by Olga Denise      Adult diet    Interventions    (X) * NG tube absent    (X) Hgb/Hct    11/21/2022 10:05:15 EST by Olga Denise      HGB: 6.8 (L)  HCT: 22.7 (L)    Medications    (X) Proton pump inhibitor    11/21/2022 10:05:15 EST by Olga Denise      Protonix 40 mg IV q 12  Protonix 40 mg PO b.i.d.    ( ) Antibiotic prophylaxis in cirrhotic patient    11/21/2022 10:05:15 EST by Olga Denise      None    * Milestone   Additional Notes    DATE:    11/19/22      IP, Telemetry      Pertinent Updates:   Interval history / Subjective:   Patient seen and examined    CBC showing hemoglobin of 6.8 BMP still pending   Will offer blood transfusion looks like patient opted to leave bleeding inside   Hemoglobin dropped from 10.1-6.8 in a month       Assessment & Plan:       1. Acute upper GI bleed. Patient has history of esophageal varices   -- Continue PPI and octreotide drip   --Patient seen by GI she is refusing EGD   --He also refusing taking PPI IV so oral ordered   --Continue Rocephin       2. Acute blood loss anemia. -- Hemoglobin came down to 6.8 this morning we will transfuse 1 unit       3. Hypokalemia hypomagnesemia patient refused IV repletion will add oral   4. Hypertension. Low blood pressure hold blood pressure medication   6. Hyperglycemia. --Monitor    7. Persistent atrial fibrillation. The patient has history of SVT. The EKG in the emergency room shows atrial fibrillation. consulted cardiology rate control replete electrolytes patient on metoprolol patient closely follows with Dr. Ozzie Nickerson patient does not want to change any medications or add on any medication      Vitals:   BP       85/56   Pulse 94   Temp 97.9 °F (36.6 °C)   Resp 27   Wt 49.9 kg (110 lb)   SpO2 100% on RA      Abnl/Pertinent Labs/Radiology/Diagnostic Studies:   WBC: 3.8   RBC: 2.49 (L)   PLATELET: 694      BUN: 16   Creatinine: 0.53 (L)   Protein, total: 5.9 (L)   Albumin: 2.3 (L)      Physical Exam:   General : alert x 3, awake, no acute distress, resting in bed, pleasant    HEENT: PEERL, EOMI, moist mucus membrane, TM clear   Neck: supple, no JVD, no meningeal signs   Chest: Clear to auscultation bilaterally    CVS: S1 S2 heard, Capillary refill less than 2 seconds   Abd: soft/ Non tender, non distended, BS physiological,    Ext: no clubbing, no cyanosis, no edema, brisk 2+ DP pulses   Neuro/Psych: pleasant mood and affect, CN 2-12 grossly intact,    Skin: warm       Medications:   octreotide (SANDOSTATIN) 500 mcg in 0.9% sodium chloride 500 mL infusion   Rate: 50 mL/hr Dose: 50 mcg/hr   Freq: CONTINUOUS Route: IV      Mag-Ox 400 mg  PO b.i.d. Klor-Con 40 meq PO b.i.d.    Mag sulfate 2 gm IV x 1. Carafate 1 g four times daily. Inpatient letter of recommendation by Ct Sanchez       Review Entered Review Status   2022 0955 In Primary      Criteria Review   We recommend that the following pt's hospitalization under INPATIENT [101] status   is APPROPRIATE         Name:          Sherryle Bali   :            1950   [facility]# :         [id number]  Insurance:   [employer]      Clinical summary                1.  Acute upper GI bleed. Patient has history of esophageal varices  -- On octreotide drip  --Patient  is refusing EGD  --IV PPIs  --Continue Rocephin     2. Acute blood loss anemia. --Hemoglobin 6.8, status post 1 unit of packed RBCs, monitor H/H, GI on board     3.   Hypokalemia hypomagnesemia   -Replete as indicated     Hemodynamically unstable as patient continues to be tachycardic with heart rate in 110s        Vitals                           tachycardic  Labs and Imaging       reviewed  Status decision based on clinical judgment and Commercial Utilization criteria, e.g., MCG, [place]   Comments               Appropriate for IP          This chart was reviewed at 9:40 AM 2022     Nacho Choi MD  Physician [place] [place] [id number] [facility]   Cell [phone]  ____________________________

## 2022-11-22 VITALS
WEIGHT: 110 LBS | SYSTOLIC BLOOD PRESSURE: 102 MMHG | RESPIRATION RATE: 19 BRPM | HEIGHT: 64 IN | DIASTOLIC BLOOD PRESSURE: 68 MMHG | HEART RATE: 88 BPM | TEMPERATURE: 98.4 F | OXYGEN SATURATION: 97 % | BODY MASS INDEX: 18.78 KG/M2

## 2022-11-22 LAB
HCT VFR BLD AUTO: 23.3 % (ref 35–47)
HGB BLD-MCNC: 7.4 G/DL (ref 11.5–16)

## 2022-11-22 PROCEDURE — 74011250637 HC RX REV CODE- 250/637: Performed by: INTERNAL MEDICINE

## 2022-11-22 PROCEDURE — 74011250637 HC RX REV CODE- 250/637: Performed by: HOSPITALIST

## 2022-11-22 PROCEDURE — 74011000250 HC RX REV CODE- 250: Performed by: INTERNAL MEDICINE

## 2022-11-22 PROCEDURE — 36415 COLL VENOUS BLD VENIPUNCTURE: CPT

## 2022-11-22 PROCEDURE — 85018 HEMOGLOBIN: CPT

## 2022-11-22 PROCEDURE — 74011250637 HC RX REV CODE- 250/637: Performed by: FAMILY MEDICINE

## 2022-11-22 RX ORDER — OMEPRAZOLE 20 MG/1
20 CAPSULE, DELAYED RELEASE ORAL 2 TIMES DAILY
Qty: 1 CAPSULE | Refills: 0 | Status: SHIPPED
Start: 2022-11-22

## 2022-11-22 RX ORDER — SUCRALFATE 1 G/1
1 TABLET ORAL 4 TIMES DAILY
Qty: 120 TABLET | Refills: 0 | Status: SHIPPED
Start: 2022-11-22 | End: 2022-12-22

## 2022-11-22 RX ADMIN — SUCRALFATE 1 G: 1 TABLET ORAL at 08:45

## 2022-11-22 RX ADMIN — SODIUM CHLORIDE, PRESERVATIVE FREE 10 ML: 5 INJECTION INTRAVENOUS at 06:53

## 2022-11-22 RX ADMIN — PANTOPRAZOLE SODIUM 40 MG: 40 TABLET, DELAYED RELEASE ORAL at 08:45

## 2022-11-22 RX ADMIN — Medication 400 MG: at 08:45

## 2022-11-22 RX ADMIN — POTASSIUM CHLORIDE 40 MEQ: 750 TABLET, FILM COATED, EXTENDED RELEASE ORAL at 08:45

## 2022-11-22 NOTE — PROGRESS NOTES
Bedside shift change report given to Cara Mcdonald RN (oncoming nurse) by Oksana Stephenson RN (offgoing nurse). Report included the following information SBAR, Kardex, MAR, Recent Results, and Cardiac Rhythm NSR .

## 2022-11-22 NOTE — DISCHARGE SUMMARY
Discharge Summary       PATIENT ID: Nagi Colindres  MRN: [id number]   YOB: 1950    DATE OF ADMISSION: 11/17/2022  9:04 PM    DATE OF DISCHARGE: 11/22/2022   PRIMARY CARE PROVIDER: John Salinas DO     ATTENDING PHYSICIAN:  Ashley Nielson  DISCHARGING PROVIDER: Bebe Barry MD    To contact this individual call [phone] and ask the  to page. If unavailable ask to be transferred the Adult Hospitalist Department. CONSULTATIONS: IP CONSULT TO HOSPITALIST  IP CONSULT TO GASTROENTEROLOGY  IP CONSULT TO CARDIOLOGY    PROCEDURES/SURGERIES: * No surgery found *    DISCHARGE DIAGNOSES:     GI bleed  Acute blood loss anemia  Hypertension  Persistent atrial fibrillation  Electrolyte imbalance    ADMISSION SUMMARY AND HOSPITAL COURSE:     HPI  This 75-year-old woman with past medical history significant for hypertension and esophageal ulcer presented at the emergency room with vomiting. The patient stated that she started vomiting blood last night. No associated abdominal pain. The patient also stated that she missed the dose of her Prilosec yesterday. The patient has history of esophageal ulcer and was last admitted to the hospital from 10/15/2022 to 10/17/2022. She was also admitted at that time with vomiting blood. The patient received blood transfusion. EGD was performed. The patient was discharged home in stable condition. The CT scan of the abdomen and pelvis done on arrival at the emergency room. No evidence of GI bleeding, but cirrhosis and sequela of portal hypertension noted and small esophageal varices also noted as well as small volume ascites. Hospital course  Patient presents with vomiting of blood and admitted for concern of upper GI bleed. Patient with recent admission last month prior to this admission and EGD was done at that time and shows 8 mm esophageal ulcer which is likely the source of upper GI bleed. Patient was reevaluated by GI this admission.   Patient has required 1 unit of PRBC transfusion for acute blood loss anemia. Clinically GI bleeding has resolved and H&H remained stable. After discussion with GI and patient, it was decided to hold off on EGD at this time and to follow-up with GI as outpatient. Patient's diet was advanced and tolerated. Patient will follow-up with gastroenterology as outpatient and to schedule repeat EGD to evaluate the esophageal ulcer. DISCHARGE DIAGNOSES / PLAN:        BMI: Body mass index is 18.88 kg/m². . This patient: Has a BMI within normal limits. PENDING TEST RESULTS:   At the time of discharge the following test results are still pending: None     ADDITIONAL CARE RECOMMENDATIONS:     Follow-up with GI as scheduled. NOTIFY YOUR PHYSICIAN FOR ANY OF THE FOLLOWING:   Fever over 101 degrees for 24 hours. Chest pain, shortness of breath, fever, chills, nausea, vomiting, diarrhea, change in mentation, falling, weakness, bleeding. Severe pain or pain not relieved by medications, as well as any other signs or symptoms that you may have questions about. FOLLOW UP APPOINTMENTS:    Follow-up Information       Follow up With Specialties Details Why Contact Info    Susana Boxer, [address] [facility] [phone]                 DIET: Cardiac Diet    ACTIVITY: Activity as tolerated    EQUIPMENT needed: None    DISCHARGE MEDICATIONS:  Current Discharge Medication List        START taking these medications    Details   omeprazole (PRILOSEC) 20 mg capsule Take 1 Capsule by mouth two (2) times a day. Qty: 1 Capsule, Refills: 0  Start date: 11/22/2022           CONTINUE these medications which have CHANGED    Details   sucralfate (Carafate) 1 gram tablet Take 1 Tablet by mouth four (4) times daily for 30 days.   Qty: 120 Tablet, Refills: 0  Start date: 11/22/2022, End date: 12/22/2022           CONTINUE these medications which have NOT CHANGED    Details   metoprolol succinate (TOPROL-XL) 25 mg XL tablet TAKE 1 TABLET BY MOUTH EVERY DAY AT NIGHT  Qty: 30 Tablet, Refills: 2      magnesium oxide (MAG-OX) 400 mg tablet To take 2 tablets (800 mg) in am and 1 tablet (400 mg ) in pm.  Qty: 270 Tab, Refills: 1    Comments: Dose increased to 800 mg in am and 400 mg in pm on 2-5-21. cholecalciferol (VITAMIN D3) 1,000 unit tablet Take 1 Tab by mouth daily. Qty: 90 Tab, Refills: 3      potassium chloride SR (K-TAB) 20 mEq tablet Take 1 Tab by mouth daily. Qty: 30 Tab, Refills: 2      cranberry fruit extract (CRANBERRY CONCENTRATE PO) Take 1 Tab by mouth daily. ascorbic acid, vitamin C, (VITAMIN C) 500 mg tablet Take 500 mg by mouth daily. STOP taking these medications       pantoprazole (PROTONIX) 40 mg tablet Comments:   Reason for Stopping:               DISPOSITION:   * Home With:   OT  PT  HH  RN       Long term SNF/Inpatient Rehab    Independent/assisted living    Hospice    Other:       PATIENT CONDITION AT DISCHARGE:     Functional status    Poor     Deconditioned    * Independent      Cognition   *  Lucid     Forgetful     Dementia      Catheters/lines (plus indication)    [place]     PICC     PEG    * None      Code status   *  Full code     DNR      PHYSICAL EXAMINATION AT DISCHARGE:  General:          Alert, cooperative, no distress, appears stated age. HEENT:           Atraumatic, anicteric sclerae, pink conjunctivae                          No oral ulcers, mucosa moist, throat clear, dentition fair  Neck:               Supple, symmetrical  Lungs:             Clear to auscultation bilaterally. No Wheezing or Rhonchi. No rales. Chest wall:      No tenderness  No Accessory muscle use. Heart:              Regular  rhythm,  No  murmur   No edema  Abdomen:        Soft, non-tender. Not distended. Bowel sounds normal  Extremities:     No cyanosis. No clubbing,                            Skin turgor normal, Capillary refill normal  Skin:                Not pale.   Not Jaundiced  No rashes   Psych:             Not anxious or agitated.   Neurologic:      Alert, moves all extremities, answers questions appropriately and responds to commands       [address]:  Problem List as of 11/22/2022 Date Reviewed: 11/18/2022            Codes Class Noted - Resolved    * (Principal) Acute upper GI bleed ICD-10-CM: K92.2  ICD-9-CM: 578.9  11/17/2022 - Present        GI bleeding ICD-10-CM: K92.2  ICD-9-CM: 578.9  10/15/2022 - Present        Acute hypokalemia ICD-10-CM: E87.6  ICD-9-CM: 276.8  10/9/2022 - Present        Hypomagnesemia ICD-10-CM: E83.42  ICD-9-CM: 275.2  10/9/2022 - Present        Tachycardia ICD-10-CM: R00.0  ICD-9-CM: 785.0  10/9/2022 - Present        GI bleed ICD-10-CM: K92.2  ICD-9-CM: 578.9  10/9/2022 - Present        Sepsis ([facility] [address] ICD-10-CM: A41.9  ICD-9-CM: 038.9, 995.91  4/11/2022 - Present        GIB (gastrointestinal bleeding) ICD-10-CM: K92.2  ICD-9-CM: 578.9  12/3/2020 - Present        Cachexia ([facility] [address] ICD-10-CM: R64  ICD-9-CM: 799.4  11/21/2018 - Present        SVT (supraventricular tachycardia) ([facility]) ICD-10-CM: I47.1  ICD-9-CM: 427.89  7/3/2018 - Present        RESOLVED: SVT (supraventricular tachycardia) ([facility] [address] ICD-10-CM: I47.1  ICD-9-CM: 427.89  3/13/2018 - 3/21/2018           Greater than 60 minutes were spent with the patient on counseling and coordination of care    Signed:   Phil Eckert MD  11/22/2022  10:46 AM

## 2022-11-22 NOTE — PROGRESS NOTES
Physical Therapy - defer  Chart reviewed in prep for therapy evaluation. Noted OT evaluated pt yesterday w/ no needs indicated and the plan for DC today with DC order and note in. Met with pt. Pt voiced no PT needs. She endorses ambulating in the home with intermittent furniture assist and in the community with her spouse assisting, denies falls, exercises (sitting). Observed pt ambulate in her hosp room. Gait is slow with, cautious, with no loss of balance. Educated pt in her fall risk and the role of PT and an assistive device in the event she would like to address her baseline balance impairment and pursue a device for ambulation. Pt was appreciative of the information discussed. Pt has the support needed for safe discharge home. Will defer formal therapy evaluation and sign off.

## 2024-03-07 NOTE — ROUTINE PROCESS
Bedside and Verbal shift change report given to Ketty Clinton RN (oncoming nurse) by Yasmin Bearden RN (offgoing nurse). Report included the following information SBAR, Kardex, Intake/Output, MAR and Recent Results. (3) adequate

## (undated) DEVICE — TUBING HYDR IRR --

## (undated) DEVICE — DISPOSABLE LAPAROSCOPIC CLIP APPLIER WITH 20 CLIPS.: Brand: EPIX® UNIVERSAL CLIP APPLIER

## (undated) DEVICE — Device: Brand: SINGLE USE INJECTOR NM600/610